# Patient Record
Sex: FEMALE | Race: OTHER | Employment: OTHER | URBAN - METROPOLITAN AREA
[De-identification: names, ages, dates, MRNs, and addresses within clinical notes are randomized per-mention and may not be internally consistent; named-entity substitution may affect disease eponyms.]

---

## 2017-01-31 ENCOUNTER — TELEPHONE (OUTPATIENT)
Dept: INTERNAL MEDICINE CLINIC | Facility: CLINIC | Age: 77
End: 2017-01-31

## 2017-01-31 ENCOUNTER — PRIOR ORIGINAL RECORDS (OUTPATIENT)
Dept: OTHER | Age: 77
End: 2017-01-31

## 2017-01-31 ENCOUNTER — OFFICE VISIT (OUTPATIENT)
Dept: INTERNAL MEDICINE CLINIC | Facility: CLINIC | Age: 77
End: 2017-01-31

## 2017-01-31 VITALS
WEIGHT: 137.63 LBS | TEMPERATURE: 98 F | OXYGEN SATURATION: 96 % | HEIGHT: 59 IN | HEART RATE: 64 BPM | BODY MASS INDEX: 27.75 KG/M2 | DIASTOLIC BLOOD PRESSURE: 80 MMHG | SYSTOLIC BLOOD PRESSURE: 128 MMHG

## 2017-01-31 DIAGNOSIS — I10 ESSENTIAL HYPERTENSION: ICD-10-CM

## 2017-01-31 DIAGNOSIS — Z72.0 TOBACCO USE: Primary | ICD-10-CM

## 2017-01-31 DIAGNOSIS — H26.9 CATARACT, UNSPECIFIED CATARACT TYPE, UNSPECIFIED LATERALITY: ICD-10-CM

## 2017-01-31 DIAGNOSIS — E78.2 COMBINED HYPERLIPIDEMIA: ICD-10-CM

## 2017-01-31 DIAGNOSIS — M25.552 PAIN OF LEFT HIP JOINT: ICD-10-CM

## 2017-01-31 DIAGNOSIS — R94.31 ABNORMAL ECG: ICD-10-CM

## 2017-01-31 DIAGNOSIS — Z01.818 PRE-OP EXAMINATION: ICD-10-CM

## 2017-01-31 LAB
BASOPHILS # BLD: 0.1 K/UL (ref 0–0.2)
BASOPHILS NFR BLD: 1 %
EOSINOPHIL # BLD: 0.4 K/UL (ref 0–0.7)
EOSINOPHIL NFR BLD: 5 %
ERYTHROCYTE [DISTWIDTH] IN BLOOD BY AUTOMATED COUNT: 14.7 % (ref 11–15)
HCT VFR BLD AUTO: 49 % (ref 35–48)
HGB BLD-MCNC: 15.9 G/DL (ref 12–16)
INR BLD: 1 (ref 0.9–1.2)
LYMPHOCYTES # BLD: 1.8 K/UL (ref 1–4)
LYMPHOCYTES NFR BLD: 21 %
MCH RBC QN AUTO: 32.8 PG (ref 27–32)
MCHC RBC AUTO-ENTMCNC: 32.5 G/DL (ref 32–37)
MCV RBC AUTO: 101 FL (ref 80–100)
MONOCYTES # BLD: 0.7 K/UL (ref 0–1)
MONOCYTES NFR BLD: 8 %
NEUTROPHILS # BLD AUTO: 5.6 K/UL (ref 1.8–7.7)
NEUTROPHILS NFR BLD: 65 %
PLATELET # BLD AUTO: 193 K/UL (ref 140–400)
PMV BLD AUTO: 8.7 FL (ref 7.4–10.3)
PROTHROMBIN TIME: 12.6 SECONDS (ref 11.8–14.5)
RBC # BLD AUTO: 4.85 M/UL (ref 3.7–5.4)
WBC # BLD AUTO: 8.6 K/UL (ref 4–11)

## 2017-01-31 PROCEDURE — G0463 HOSPITAL OUTPT CLINIC VISIT: HCPCS | Performed by: INTERNAL MEDICINE

## 2017-01-31 PROCEDURE — 93010 ELECTROCARDIOGRAM REPORT: CPT | Performed by: INTERNAL MEDICINE

## 2017-01-31 PROCEDURE — 36415 COLL VENOUS BLD VENIPUNCTURE: CPT | Performed by: INTERNAL MEDICINE

## 2017-01-31 PROCEDURE — 93005 ELECTROCARDIOGRAM TRACING: CPT | Performed by: INTERNAL MEDICINE

## 2017-01-31 PROCEDURE — 99205 OFFICE O/P NEW HI 60 MIN: CPT | Performed by: INTERNAL MEDICINE

## 2017-01-31 RX ORDER — LISINOPRIL 5 MG/1
TABLET ORAL
Refills: 5 | COMMUNITY
Start: 2017-01-27 | End: 2017-01-31

## 2017-01-31 RX ORDER — AMLODIPINE BESYLATE 10 MG/1
10 TABLET ORAL DAILY
Qty: 90 TABLET | Refills: 1 | Status: SHIPPED | OUTPATIENT
Start: 2017-01-31 | End: 2017-05-31

## 2017-01-31 RX ORDER — PRAVASTATIN SODIUM 40 MG
TABLET ORAL
COMMUNITY
Start: 2017-01-10 | End: 2017-01-31

## 2017-01-31 RX ORDER — AMLODIPINE BESYLATE 10 MG/1
10 TABLET ORAL DAILY
COMMUNITY
End: 2017-01-31

## 2017-01-31 RX ORDER — OXYBUTYNIN CHLORIDE 5 MG/1
TABLET ORAL
COMMUNITY
Start: 2016-12-31 | End: 2017-01-31

## 2017-01-31 RX ORDER — PRAVASTATIN SODIUM 40 MG
40 TABLET ORAL NIGHTLY
Qty: 90 TABLET | Refills: 1 | Status: SHIPPED | OUTPATIENT
Start: 2017-01-31 | End: 2017-09-19

## 2017-01-31 RX ORDER — ATENOLOL 50 MG/1
TABLET ORAL
COMMUNITY
Start: 2017-01-10 | End: 2017-01-31

## 2017-01-31 RX ORDER — OXYBUTYNIN CHLORIDE 5 MG/1
5 TABLET ORAL 2 TIMES DAILY
Qty: 180 TABLET | Refills: 1 | Status: SHIPPED | OUTPATIENT
Start: 2017-01-31 | End: 2017-08-01

## 2017-01-31 RX ORDER — LISINOPRIL 5 MG/1
TABLET ORAL
Qty: 90 TABLET | Refills: 1 | Status: SHIPPED | OUTPATIENT
Start: 2017-01-31 | End: 2017-03-31

## 2017-01-31 RX ORDER — ATENOLOL 50 MG/1
50 TABLET ORAL DAILY
Qty: 90 TABLET | Refills: 0 | Status: SHIPPED | OUTPATIENT
Start: 2017-01-31 | End: 2017-11-27

## 2017-01-31 NOTE — PROGRESS NOTES
HPI:    Patient ID: John Fischer is a 68year old female. Hip Pain   The pain is present in the left hip. This is a new problem. The current episode started more than 1 month ago. There has been no history of extremity trauma.  The problem occurs in Tab  Disp:  Rfl:    [DISCONTINUED] AmLODIPine Besylate 10 MG Oral Tab Take 10 mg by mouth daily.  Disp:  Rfl:       Past Medical History   Diagnosis Date   • Hyperlipidemia      Pt. taking 40 mg Pravastatin; states not been told she has high cholesterol   • Negative for chest pain, palpitations and leg swelling. Gastrointestinal: Negative for nausea, vomiting, abdominal pain, diarrhea, constipation, blood in stool, abdominal distention, anal bleeding and rectal pain.    Endocrine: Negative for cold intoleran Oral Tab Take 10 mg by mouth daily.  Disp:  Rfl:      Allergies:No Known Allergies    HISTORY:  Past Medical History   Diagnosis Date   • Hyperlipidemia      Pt. taking 40 mg Pravastatin; states not been told she has high cholesterol   • Essential hypertens retracted and not bulging. Tympanic membrane mobility is normal.  No middle ear effusion. No hemotympanum. No decreased hearing is noted. Nose: Nose normal. No mucosal edema, rhinorrhea, nose lacerations or sinus tenderness.  Right sinus exhibits no maxil left side. Dorsalis pedis pulses are 2+ on the right side, and 2+ on the left side. Posterior tibial pulses are 2+ on the right side, and 2+ on the left side.    Pulmonary/Chest: Effort normal and breath sounds normal. No accessory muscle usag preauricular, no posterior auricular and no occipital adenopathy present. Head (left side): No submental, no submandibular, no tonsillar, no preauricular, no posterior auricular and no occipital adenopathy present.      She has no cervical adenopathy Pravastatin Sodium 40 MG Oral Tab 90 tablet 1      Sig: Take 1 tablet (40 mg total) by mouth nightly. Oxybutynin Chloride 5 MG Oral Tab 180 tablet 1      Sig: Take 1 tablet (5 mg total) by mouth 2 (two) times daily.       lisinopril 5 MG Oral Tab 90 ta

## 2017-01-31 NOTE — PATIENT INSTRUCTIONS
ASSESSMENT/PLAN:   Tobacco use  (primary encounter diagnosis) Pt. Not want to quit. Combined hyperlipidemia Check records. Essential hypertension Good control. Careful with diet and excercise at least 30 minutes 3-4 times a week.  Check blood pres

## 2017-02-01 DIAGNOSIS — R89.9 ABNORMAL LABORATORY TEST RESULT: Primary | ICD-10-CM

## 2017-02-01 NOTE — PROGRESS NOTES
Quick Note:    CBC Normal (white blood cells and red blood cells and platelets), but cells are bigger in size and normal. Would recommend checking ferritin, total iron binding capacity, iron level, B12, TSH. Can we add all those on.   Clotting factors are n

## 2017-02-03 ENCOUNTER — HOSPITAL ENCOUNTER (OUTPATIENT)
Dept: GENERAL RADIOLOGY | Facility: HOSPITAL | Age: 77
Discharge: HOME | End: 2017-02-03
Attending: INTERNAL MEDICINE
Payer: MEDICARE

## 2017-02-03 ENCOUNTER — HOSPITAL ENCOUNTER (OUTPATIENT)
Dept: NUCLEAR MEDICINE | Facility: HOSPITAL | Age: 77
Discharge: HOME | End: 2017-02-03
Attending: INTERNAL MEDICINE
Payer: MEDICARE

## 2017-02-03 ENCOUNTER — HOSPITAL ENCOUNTER (OUTPATIENT)
Dept: CV DIAGNOSTICS | Facility: HOSPITAL | Age: 77
Discharge: HOME | End: 2017-02-03
Attending: INTERNAL MEDICINE
Payer: MEDICARE

## 2017-02-03 ENCOUNTER — TELEPHONE (OUTPATIENT)
Dept: INTERNAL MEDICINE CLINIC | Facility: CLINIC | Age: 77
End: 2017-02-03

## 2017-02-03 DIAGNOSIS — M25.559 ARTHRALGIA OF HIP, UNSPECIFIED LATERALITY: Primary | ICD-10-CM

## 2017-02-03 DIAGNOSIS — R89.9 ABNORMAL LABORATORY TEST RESULT: ICD-10-CM

## 2017-02-03 DIAGNOSIS — R94.31 ABNORMAL ECG: ICD-10-CM

## 2017-02-03 DIAGNOSIS — M25.552 PAIN OF LEFT HIP JOINT: ICD-10-CM

## 2017-02-03 LAB
FERRITIN SERPL IA-MCNC: 23 NG/ML (ref 11–307)
IRON SATN MFR SERPL: 35 % (ref 15–50)
IRON SERPL-MCNC: 124 MCG/DL (ref 28–170)
TIBC SERPL-MCNC: 350 MCG/DL (ref 228–428)
TRANSFERRIN SERPL-MCNC: 265 MG/DL (ref 192–382)
TSH SERPL-ACNC: 0.99 UIU/ML (ref 0.34–5.6)
VIT B12 SERPL-MCNC: 218 PG/ML (ref 181–914)

## 2017-02-03 PROCEDURE — 73502 X-RAY EXAM HIP UNI 2-3 VIEWS: CPT

## 2017-02-03 PROCEDURE — 36415 COLL VENOUS BLD VENIPUNCTURE: CPT | Performed by: INTERNAL MEDICINE

## 2017-02-03 PROCEDURE — 93018 CV STRESS TEST I&R ONLY: CPT | Performed by: INTERNAL MEDICINE

## 2017-02-03 PROCEDURE — 93017 CV STRESS TEST TRACING ONLY: CPT

## 2017-02-03 PROCEDURE — 82607 VITAMIN B-12: CPT | Performed by: INTERNAL MEDICINE

## 2017-02-03 PROCEDURE — 78452 HT MUSCLE IMAGE SPECT MULT: CPT

## 2017-02-03 PROCEDURE — 93018 CV STRESS TEST I&R ONLY: CPT | Performed by: NUCLEAR MEDICINE

## 2017-02-03 PROCEDURE — 93016 CV STRESS TEST SUPVJ ONLY: CPT | Performed by: INTERNAL MEDICINE

## 2017-02-03 PROCEDURE — 83540 ASSAY OF IRON: CPT | Performed by: INTERNAL MEDICINE

## 2017-02-03 PROCEDURE — 84443 ASSAY THYROID STIM HORMONE: CPT | Performed by: INTERNAL MEDICINE

## 2017-02-03 PROCEDURE — 82728 ASSAY OF FERRITIN: CPT | Performed by: INTERNAL MEDICINE

## 2017-02-03 PROCEDURE — 84466 ASSAY OF TRANSFERRIN: CPT | Performed by: INTERNAL MEDICINE

## 2017-02-03 RX ORDER — SODIUM CHLORIDE 0.9 % (FLUSH) 0.9 %
SYRINGE (ML) INJECTION
Status: COMPLETED
Start: 2017-02-03 | End: 2017-02-03

## 2017-02-03 RX ADMIN — SODIUM CHLORIDE 0.9 % (FLUSH) 10 ML: 0.9 % SYRINGE (ML) INJECTION at 13:28:00

## 2017-02-04 NOTE — PROGRESS NOTES
Quick Note:    Iron storage is Nl. Iron parameters are Nl. Thyroid is good. B 12 is very low.  B 12 injection 1000 qweek X 4 and then qmonth and check in 3 months.     ______

## 2017-02-06 ENCOUNTER — TELEPHONE (OUTPATIENT)
Dept: INTERNAL MEDICINE CLINIC | Facility: CLINIC | Age: 77
End: 2017-02-06

## 2017-02-07 ENCOUNTER — NURSE ONLY (OUTPATIENT)
Dept: INTERNAL MEDICINE CLINIC | Facility: CLINIC | Age: 77
End: 2017-02-07

## 2017-02-07 DIAGNOSIS — E53.8 B12 DEFICIENCY: Primary | ICD-10-CM

## 2017-02-07 PROCEDURE — 96372 THER/PROPH/DIAG INJ SC/IM: CPT | Performed by: INTERNAL MEDICINE

## 2017-02-07 RX ORDER — CYANOCOBALAMIN 1000 UG/ML
1000 INJECTION INTRAMUSCULAR; SUBCUTANEOUS ONCE
Status: COMPLETED | OUTPATIENT
Start: 2017-02-07 | End: 2017-02-07

## 2017-02-07 RX ADMIN — CYANOCOBALAMIN 1000 MCG: 1000 INJECTION INTRAMUSCULAR; SUBCUTANEOUS at 12:00:00

## 2017-02-07 NOTE — PROGRESS NOTES
Patient presents to the office for 1st B12 injection. Order verified on lab results note 2/3/2017. Patient tolerated injection well, no reactions. Next B12 appointment scheduled.

## 2017-02-10 ENCOUNTER — TELEPHONE (OUTPATIENT)
Dept: INTERNAL MEDICINE CLINIC | Facility: CLINIC | Age: 77
End: 2017-02-10

## 2017-02-10 NOTE — TELEPHONE ENCOUNTER
Called and spoke to patient. Notified of test results. Patient verbalized understanding and compliance to future plan of care. Patient had no questions at time of call.

## 2017-02-10 NOTE — TELEPHONE ENCOUNTER
Notes Recorded by Trell Scanlon MD on 2/3/2017 at 10:16 PM  Iron storage is Nl. Iron parameters are Nl. Thyroid is good. B 12 is very low. B 12 injection 1000 qweek X 4 and then qmonth and check in 3 months.

## 2017-02-10 NOTE — TELEPHONE ENCOUNTER
Notes Recorded by Alexandrea Ruelas [place] Christal on 2/7/2017 at 11:42 AM  Orders mailed to patient 2/6/17. Notes Recorded by Odin Park LPN on 3/3/9515 at 98:13 AM  PT order faxed to PT at [facility].   Notes Recorded by Zackary Carrel., MD on 2/6/2017 at 1:16 PM  Orders

## 2017-02-14 ENCOUNTER — NURSE ONLY (OUTPATIENT)
Dept: INTERNAL MEDICINE CLINIC | Facility: CLINIC | Age: 77
End: 2017-02-14

## 2017-02-14 DIAGNOSIS — E53.8 B12 DEFICIENCY: Primary | ICD-10-CM

## 2017-02-14 PROCEDURE — 96372 THER/PROPH/DIAG INJ SC/IM: CPT | Performed by: INTERNAL MEDICINE

## 2017-02-14 RX ORDER — CYANOCOBALAMIN 1000 UG/ML
1000 INJECTION INTRAMUSCULAR; SUBCUTANEOUS ONCE
Status: COMPLETED | OUTPATIENT
Start: 2017-02-14 | End: 2017-02-14

## 2017-02-14 RX ADMIN — CYANOCOBALAMIN 1000 MCG: 1000 INJECTION INTRAMUSCULAR; SUBCUTANEOUS at 12:53:00

## 2017-02-14 NOTE — PROGRESS NOTES
Patient presents to office for 2nd B12 injection. Order verified on lab result note  2/03/2017. Patient tolerated injection well, no reactions. Appointment scheduled for next B12 injection.

## 2017-02-15 LAB
HEMATOCRIT: 49 %
HEMATOCRIT: 49 %
HEMOGLOBIN: 15.9 G/DL
HEMOGLOBIN: 15.9 G/DL
INR: 1
PLATELETS: 193 K/UL
PLATELETS: 193 K/UL
PROTHROMBIN TIME: 12.6 SECONDS
RED BLOOD COUNT: 4.85 X 10-6/U
WHITE BLOOD COUNT: 8.6 X 10-3/U

## 2017-02-16 ENCOUNTER — PRIOR ORIGINAL RECORDS (OUTPATIENT)
Dept: OTHER | Age: 77
End: 2017-02-16

## 2017-02-16 ENCOUNTER — TELEPHONE (OUTPATIENT)
Dept: INTERNAL MEDICINE CLINIC | Facility: CLINIC | Age: 77
End: 2017-02-16

## 2017-02-16 ENCOUNTER — APPOINTMENT (OUTPATIENT)
Dept: PHYSICAL THERAPY | Facility: HOSPITAL | Age: 77
End: 2017-02-16
Attending: INTERNAL MEDICINE
Payer: MEDICARE

## 2017-02-22 ENCOUNTER — HOSPITAL ENCOUNTER (EMERGENCY)
Facility: HOSPITAL | Age: 77
Discharge: HOME | End: 2017-02-22
Attending: EMERGENCY MEDICINE
Payer: MEDICARE

## 2017-02-22 ENCOUNTER — APPOINTMENT (OUTPATIENT)
Dept: PHYSICAL THERAPY | Facility: HOSPITAL | Age: 77
End: 2017-02-22
Attending: INTERNAL MEDICINE
Payer: MEDICARE

## 2017-02-22 ENCOUNTER — PRIOR ORIGINAL RECORDS (OUTPATIENT)
Dept: OTHER | Age: 77
End: 2017-02-22

## 2017-02-22 ENCOUNTER — HOSPITAL ENCOUNTER (OUTPATIENT)
Dept: CV DIAGNOSTICS | Facility: HOSPITAL | Age: 77
Discharge: HOME | End: 2017-02-22
Attending: INTERNAL MEDICINE
Payer: MEDICARE

## 2017-02-22 ENCOUNTER — LAB ENCOUNTER (OUTPATIENT)
Dept: LAB | Facility: HOSPITAL | Age: 77
End: 2017-02-22
Attending: INTERNAL MEDICINE
Payer: MEDICARE

## 2017-02-22 ENCOUNTER — HOSPITAL ENCOUNTER (OUTPATIENT)
Dept: ULTRASOUND IMAGING | Facility: HOSPITAL | Age: 77
Discharge: HOME | End: 2017-02-22
Attending: INTERNAL MEDICINE
Payer: MEDICARE

## 2017-02-22 VITALS
SYSTOLIC BLOOD PRESSURE: 138 MMHG | OXYGEN SATURATION: 95 % | RESPIRATION RATE: 16 BRPM | HEART RATE: 66 BPM | DIASTOLIC BLOOD PRESSURE: 92 MMHG | TEMPERATURE: 98 F

## 2017-02-22 DIAGNOSIS — I10 HTN (HYPERTENSION): Primary | ICD-10-CM

## 2017-02-22 DIAGNOSIS — I71.9 AORTIC ANEURYSM (HCC): ICD-10-CM

## 2017-02-22 DIAGNOSIS — Q25.40 ABNORMALITY OF THORACIC AORTA: ICD-10-CM

## 2017-02-22 DIAGNOSIS — R94.31 ABNORMAL EKG: ICD-10-CM

## 2017-02-22 DIAGNOSIS — I15.9 SECONDARY HYPERTENSION: ICD-10-CM

## 2017-02-22 DIAGNOSIS — N28.9 RENAL INSUFFICIENCY: Primary | ICD-10-CM

## 2017-02-22 DIAGNOSIS — E78.00 ELEVATED CHOLESTEROL: ICD-10-CM

## 2017-02-22 LAB
ALT SERPL-CCNC: 9 U/L (ref 14–54)
ANION GAP SERPL CALC-SCNC: 6 MMOL/L (ref 0–18)
ANION GAP SERPL CALC-SCNC: 8 MMOL/L (ref 0–18)
AST SERPL-CCNC: 16 U/L (ref 15–41)
BASOPHILS # BLD: 0.1 K/UL (ref 0–0.2)
BASOPHILS NFR BLD: 1 %
BUN SERPL-MCNC: 36 MG/DL (ref 8–20)
BUN SERPL-MCNC: 39 MG/DL (ref 8–20)
BUN/CREAT SERPL: 14.2 (ref 10–20)
BUN/CREAT SERPL: 15.4 (ref 10–20)
BUN: 39 MG/DL
CALCIUM SERPL-MCNC: 8.6 MG/DL (ref 8.5–10.5)
CALCIUM SERPL-MCNC: 8.9 MG/DL (ref 8.5–10.5)
CALCIUM: 8.9 MG/DL
CHLORIDE SERPL-SCNC: 111 MMOL/L (ref 95–110)
CHLORIDE SERPL-SCNC: 112 MMOL/L (ref 95–110)
CHOLEST SERPL-MCNC: 173 MG/DL (ref 110–200)
CO2 SERPL-SCNC: 23 MMOL/L (ref 22–32)
CO2 SERPL-SCNC: 23 MMOL/L (ref 22–32)
CREAT SERPL-MCNC: 2.53 MG/DL (ref 0.5–1.5)
CREAT SERPL-MCNC: 2.54 MG/DL (ref 0.5–1.5)
CREATININE, SERUM: 2.53 MG/DL
EOSINOPHIL # BLD: 0.3 K/UL (ref 0–0.7)
EOSINOPHIL NFR BLD: 3 %
ERYTHROCYTE [DISTWIDTH] IN BLOOD BY AUTOMATED COUNT: 13.7 % (ref 11–15)
GLUCOSE SERPL-MCNC: 106 MG/DL (ref 70–99)
GLUCOSE SERPL-MCNC: 156 MG/DL (ref 70–99)
GLUCOSE: 106 MG/DL
HCT VFR BLD AUTO: 44.8 % (ref 35–48)
HDLC SERPL-MCNC: 60 MG/DL
HGB BLD-MCNC: 14.6 G/DL (ref 12–16)
LDLC SERPL CALC-MCNC: 94 MG/DL (ref 0–99)
LYMPHOCYTES # BLD: 1.6 K/UL (ref 1–4)
LYMPHOCYTES NFR BLD: 20 %
MCH RBC QN AUTO: 32.3 PG (ref 27–32)
MCHC RBC AUTO-ENTMCNC: 32.5 G/DL (ref 32–37)
MCV RBC AUTO: 99.2 FL (ref 80–100)
MONOCYTES # BLD: 0.6 K/UL (ref 0–1)
MONOCYTES NFR BLD: 7 %
NEUTROPHILS # BLD AUTO: 5.3 K/UL (ref 1.8–7.7)
NEUTROPHILS NFR BLD: 68 %
NONHDLC SERPL-MCNC: 113 MG/DL
OSMOLALITY UR CALC.SUM OF ELEC: 302 MOSM/KG (ref 275–295)
OSMOLALITY UR CALC.SUM OF ELEC: 306 MOSM/KG (ref 275–295)
PLATELET # BLD AUTO: 175 K/UL (ref 140–400)
PMV BLD AUTO: 8.4 FL (ref 7.4–10.3)
POTASSIUM SERPL-SCNC: 5.1 MMOL/L (ref 3.3–5.1)
POTASSIUM SERPL-SCNC: 6.5 MMOL/L (ref 3.3–5.1)
POTASSIUM, SERUM: 6.5 MEQ/L
RBC # BLD AUTO: 4.52 M/UL (ref 3.7–5.4)
SODIUM SERPL-SCNC: 141 MMOL/L (ref 136–144)
SODIUM SERPL-SCNC: 142 MMOL/L (ref 136–144)
SODIUM: 141 MEQ/L
TRIGL SERPL-MCNC: 97 MG/DL (ref 1–149)
TROPONIN I SERPL-MCNC: 0 NG/ML (ref ?–0.03)
WBC # BLD AUTO: 7.8 K/UL (ref 4–11)

## 2017-02-22 PROCEDURE — 85025 COMPLETE CBC W/AUTO DIFF WBC: CPT

## 2017-02-22 PROCEDURE — 84460 ALANINE AMINO (ALT) (SGPT): CPT

## 2017-02-22 PROCEDURE — 36415 COLL VENOUS BLD VENIPUNCTURE: CPT

## 2017-02-22 PROCEDURE — 99283 EMERGENCY DEPT VISIT LOW MDM: CPT

## 2017-02-22 PROCEDURE — 84484 ASSAY OF TROPONIN QUANT: CPT

## 2017-02-22 PROCEDURE — 76770 US EXAM ABDO BACK WALL COMP: CPT

## 2017-02-22 PROCEDURE — 80048 BASIC METABOLIC PNL TOTAL CA: CPT

## 2017-02-22 PROCEDURE — 80061 LIPID PANEL: CPT

## 2017-02-22 PROCEDURE — 93306 TTE W/DOPPLER COMPLETE: CPT

## 2017-02-22 PROCEDURE — 93005 ELECTROCARDIOGRAM TRACING: CPT

## 2017-02-22 PROCEDURE — 93306 TTE W/DOPPLER COMPLETE: CPT | Performed by: INTERNAL MEDICINE

## 2017-02-22 PROCEDURE — 93010 ELECTROCARDIOGRAM REPORT: CPT | Performed by: EMERGENCY MEDICINE

## 2017-02-22 PROCEDURE — 84450 TRANSFERASE (AST) (SGOT): CPT

## 2017-02-22 NOTE — ED NOTES
Pt discharged home in good condition with family pt and pt family verbalizes understanding of discharge instructions

## 2017-02-22 NOTE — ED PROVIDER NOTES
Patient Seen in: [facility] AND [facility] Emergency Department    History   Patient presents with:  Abnormal Result (metabolic, cardiac)    Stated Complaint: Abn Labs    HPI    Patient is a 30-year-old female with a history of high blood pressure and known aort Smoker        Packs/Day: 0.90  Years:           Types: Cigarettes      Start date: [birth year]01/1963    Alcohol Use: No                Review of Systems    Positive for stated complaint: Abn Labs  Other systems are as noted in HPI.   Constitutional and vital signs reviewed.           ED Course     Labs Reviewed   BASIC METABOLIC PANEL (8) - Abnormal; Notable for the following:     Glucose 156 (*)     Chloride 111 (*)     BUN 36 (*)     Creatinine 2.54 (*)     Calculated Osmolality 306 (*)     GFR, Non- Jenny

## 2017-02-23 ENCOUNTER — TELEPHONE (OUTPATIENT)
Dept: INTERNAL MEDICINE CLINIC | Facility: CLINIC | Age: 77
End: 2017-02-23

## 2017-02-23 ENCOUNTER — NURSE ONLY (OUTPATIENT)
Dept: INTERNAL MEDICINE CLINIC | Facility: CLINIC | Age: 77
End: 2017-02-23

## 2017-02-23 DIAGNOSIS — E53.8 B12 DEFICIENCY: Primary | ICD-10-CM

## 2017-02-23 LAB
ALT (SGPT): 9 U/L
AST (SGOT): 16 U/L
CHOLESTEROL, TOTAL: 173 MG/DL
GLUCOSE: 106 MG/DL
HDL CHOLESTEROL: 60 MG/DL
LDL CHOLESTEROL: 94 MG/DL
TRIGLYCERIDES: 97 MG/DL

## 2017-02-23 PROCEDURE — 96372 THER/PROPH/DIAG INJ SC/IM: CPT | Performed by: INTERNAL MEDICINE

## 2017-02-23 RX ORDER — CYANOCOBALAMIN 1000 UG/ML
1000 INJECTION INTRAMUSCULAR; SUBCUTANEOUS ONCE
Status: COMPLETED | OUTPATIENT
Start: 2017-02-23 | End: 2017-02-23

## 2017-02-23 RX ADMIN — CYANOCOBALAMIN 1000 MCG: 1000 INJECTION INTRAMUSCULAR; SUBCUTANEOUS at 11:15:00

## 2017-02-23 NOTE — PROGRESS NOTES
Patient presents to office for B12 injection. Order verified on 2/03/2017 lab result note. Patient tolerated injection well, no reactions. Next B12 injection scheduled.

## 2017-02-23 NOTE — TELEPHONE ENCOUNTER
Patient was in [place] [address] yesterday she is looking to see you tomorrow if possible to review the blood test

## 2017-02-23 NOTE — TELEPHONE ENCOUNTER
Sometimes when blood is drawn if it is drawn through a butterfly it can be lysed and or if it is not processed immediately the cells can break open when the cells break open the release potassium so could have been a false air but in the ER it was perfectl

## 2017-02-24 ENCOUNTER — TELEPHONE (OUTPATIENT)
Dept: INTERNAL MEDICINE CLINIC | Facility: CLINIC | Age: 77
End: 2017-02-24

## 2017-02-24 ENCOUNTER — PRIOR ORIGINAL RECORDS (OUTPATIENT)
Dept: OTHER | Age: 77
End: 2017-02-24

## 2017-02-24 NOTE — TELEPHONE ENCOUNTER
Spoke with Dr. oYlanda Elliott. Stop ACE I??? Echo shows ? Pleural effusion. On echo. (Noticed area adjacent space near heart). Has F/U with cards.

## 2017-02-27 NOTE — TELEPHONE ENCOUNTER
Do you want patient to stop ACE ? Is Echo showing pleural effusion and she should f/u with Dr. Latoya King?

## 2017-02-28 NOTE — TELEPHONE ENCOUNTER
LMTCB. Possible stopping ace (lisinopril) due to high potassium. She should F/U with Dr. Mable Bruner.

## 2017-02-28 NOTE — TELEPHONE ENCOUNTER
Dr. Jorge Batista, spoke with patient and advised that she follow up with Dr. Flora Perez, however, not sure if she should stop lisinopril based on note below. She said she is home if you want to call her.

## 2017-03-01 ENCOUNTER — APPOINTMENT (OUTPATIENT)
Dept: PHYSICAL THERAPY | Facility: HOSPITAL | Age: 77
End: 2017-03-01
Attending: INTERNAL MEDICINE
Payer: MEDICARE

## 2017-03-01 NOTE — TELEPHONE ENCOUNTER
Pt informed of Dr. Cindy Carpenter instructions to hold lisinopril and to check b/p on different days and different times. Pt informed to avoid salt and fried foods and to exercises 3-4 times a week.

## 2017-03-01 NOTE — TELEPHONE ENCOUNTER
Hold lisinopril for now. Careful with diet and excercise at least 30 minutes 3-4 times a week. Check blood pressures at different times on different days. Can purchase own blood pressure monitor. If not, check at local pharmacy.  Bake foods more and grill o

## 2017-03-06 ENCOUNTER — APPOINTMENT (OUTPATIENT)
Dept: PHYSICAL THERAPY | Facility: HOSPITAL | Age: 77
End: 2017-03-06
Attending: INTERNAL MEDICINE
Payer: MEDICARE

## 2017-03-08 ENCOUNTER — APPOINTMENT (OUTPATIENT)
Dept: PHYSICAL THERAPY | Facility: HOSPITAL | Age: 77
End: 2017-03-08
Attending: INTERNAL MEDICINE
Payer: MEDICARE

## 2017-03-13 ENCOUNTER — APPOINTMENT (OUTPATIENT)
Dept: PHYSICAL THERAPY | Facility: HOSPITAL | Age: 77
End: 2017-03-13
Attending: INTERNAL MEDICINE
Payer: MEDICARE

## 2017-03-15 ENCOUNTER — APPOINTMENT (OUTPATIENT)
Dept: PHYSICAL THERAPY | Facility: HOSPITAL | Age: 77
End: 2017-03-15
Attending: INTERNAL MEDICINE
Payer: MEDICARE

## 2017-03-20 ENCOUNTER — APPOINTMENT (OUTPATIENT)
Dept: PHYSICAL THERAPY | Facility: HOSPITAL | Age: 77
End: 2017-03-20
Attending: INTERNAL MEDICINE
Payer: MEDICARE

## 2017-03-21 ENCOUNTER — PRIOR ORIGINAL RECORDS (OUTPATIENT)
Dept: OTHER | Age: 77
End: 2017-03-21

## 2017-03-31 ENCOUNTER — TELEPHONE (OUTPATIENT)
Dept: INTERNAL MEDICINE CLINIC | Facility: CLINIC | Age: 77
End: 2017-03-31

## 2017-03-31 ENCOUNTER — OFFICE VISIT (OUTPATIENT)
Dept: INTERNAL MEDICINE CLINIC | Facility: CLINIC | Age: 77
End: 2017-03-31

## 2017-03-31 VITALS
HEIGHT: 59 IN | TEMPERATURE: 97 F | HEART RATE: 64 BPM | DIASTOLIC BLOOD PRESSURE: 80 MMHG | OXYGEN SATURATION: 93 % | WEIGHT: 140.81 LBS | SYSTOLIC BLOOD PRESSURE: 130 MMHG | BODY MASS INDEX: 28.39 KG/M2

## 2017-03-31 DIAGNOSIS — H26.9 CATARACT OF BOTH EYES, UNSPECIFIED CATARACT TYPE: ICD-10-CM

## 2017-03-31 DIAGNOSIS — E87.5 HYPERKALEMIA: Primary | ICD-10-CM

## 2017-03-31 DIAGNOSIS — E78.2 COMBINED HYPERLIPIDEMIA: ICD-10-CM

## 2017-03-31 DIAGNOSIS — E53.8 B12 DEFICIENCY: Primary | ICD-10-CM

## 2017-03-31 DIAGNOSIS — N18.30 CKD (CHRONIC KIDNEY DISEASE) STAGE 3, GFR 30-59 ML/MIN (HCC): ICD-10-CM

## 2017-03-31 DIAGNOSIS — I12.9 RENAL HYPERTENSION, STAGE 1-4 OR UNSPECIFIED CHRONIC KIDNEY DISEASE: ICD-10-CM

## 2017-03-31 PROCEDURE — 36415 COLL VENOUS BLD VENIPUNCTURE: CPT | Performed by: INTERNAL MEDICINE

## 2017-03-31 PROCEDURE — G0463 HOSPITAL OUTPT CLINIC VISIT: HCPCS | Performed by: INTERNAL MEDICINE

## 2017-03-31 PROCEDURE — 99214 OFFICE O/P EST MOD 30 MIN: CPT | Performed by: INTERNAL MEDICINE

## 2017-03-31 NOTE — PATIENT INSTRUCTIONS
ASSESSMENT/PLAN:   B12 deficiency  (primary encounter diagnosis) B 12 2000 a day. Check B 12 in 3 months. Combined hyperlipidemia Stable. Essential hypertension Good control. Careful with diet and excercise at least 30 minutes 3-4 times a week.  Kenzie Ventura

## 2017-03-31 NOTE — PROGRESS NOTES
HPI:    Patient ID: Mayuri Conde is a 68year old female. HPI    Coldness all over. S/P B 12 X 4. Hypertension  Patient is here for follow up of hypertension. BP at home: not check. Has been compliant with medications.   Exercise level: moderat total) by mouth daily. Disp: 90 tablet Rfl: 1   Pravastatin Sodium 40 MG Oral Tab Take 1 tablet (40 mg total) by mouth nightly. Disp: 90 tablet Rfl: 1   Oxybutynin Chloride 5 MG Oral Tab Take 1 tablet (5 mg total) by mouth 2 (two) times daily.  Disp: 180 ta pulses are 2+ on the right side, and 2+ on the left side. Posterior tibial pulses are 2+ on the right side, and 2+ on the left side. Pulmonary/Chest: Effort normal and breath sounds normal. No accessory muscle usage.  No apnea, no tachypnea and no

## 2017-03-31 NOTE — TELEPHONE ENCOUNTER
Paged for lab values, potassium 6.7. Drawn earlier today. Reviewed records and spoke with Dr Eunice Beal. Had high K last month and repeat just a few hours later was normal. Patient stopped her lisinopril at the time.  Saw Maurice Marie today for routine f/u and had rep

## 2017-04-01 ENCOUNTER — APPOINTMENT (OUTPATIENT)
Dept: LAB | Facility: HOSPITAL | Age: 77
End: 2017-04-01
Attending: INTERNAL MEDICINE
Payer: MEDICARE

## 2017-04-01 DIAGNOSIS — N18.30 CKD (CHRONIC KIDNEY DISEASE) STAGE 3, GFR 30-59 ML/MIN (HCC): Primary | ICD-10-CM

## 2017-04-01 DIAGNOSIS — E87.5 HYPERKALEMIA: ICD-10-CM

## 2017-04-01 PROCEDURE — 84132 ASSAY OF SERUM POTASSIUM: CPT

## 2017-04-01 PROCEDURE — 36415 COLL VENOUS BLD VENIPUNCTURE: CPT

## 2017-04-01 NOTE — TELEPHONE ENCOUNTER
Phoned patient again, agrees to go to [facility] for redraw

## 2017-04-03 ENCOUNTER — TELEPHONE (OUTPATIENT)
Dept: INTERNAL MEDICINE CLINIC | Facility: CLINIC | Age: 77
End: 2017-04-03

## 2017-04-03 ENCOUNTER — PATIENT MESSAGE (OUTPATIENT)
Dept: INTERNAL MEDICINE CLINIC | Facility: CLINIC | Age: 77
End: 2017-04-03

## 2017-04-03 DIAGNOSIS — I71.4 ABDOMINAL AORTIC ANEURYSM (AAA) WITHOUT RUPTURE (HCC): Primary | ICD-10-CM

## 2017-04-04 NOTE — TELEPHONE ENCOUNTER
Spoke to Dr. Travis Stokes. She wanted her to see Dr. Sebas Diaz who works with aneurysms so a referral was entered she should be able to call and schedule as soon as a referrals approved.

## 2017-04-06 NOTE — TELEPHONE ENCOUNTER
Referral to see Dr. Halima Almanzar has been approved. Tried to call patient, left message regarding approval of referral and to call back if any questions.

## 2017-04-18 ENCOUNTER — TELEPHONE (OUTPATIENT)
Dept: NEPHROLOGY | Facility: CLINIC | Age: 77
End: 2017-04-18

## 2017-04-18 ENCOUNTER — APPOINTMENT (OUTPATIENT)
Dept: LAB | Facility: HOSPITAL | Age: 77
End: 2017-04-18
Attending: INTERNAL MEDICINE
Payer: MEDICARE

## 2017-04-18 ENCOUNTER — OFFICE VISIT (OUTPATIENT)
Dept: NEPHROLOGY | Facility: CLINIC | Age: 77
End: 2017-04-18

## 2017-04-18 VITALS
BODY MASS INDEX: 28.06 KG/M2 | HEIGHT: 59 IN | WEIGHT: 139.19 LBS | DIASTOLIC BLOOD PRESSURE: 80 MMHG | SYSTOLIC BLOOD PRESSURE: 124 MMHG | TEMPERATURE: 98 F | HEART RATE: 78 BPM

## 2017-04-18 DIAGNOSIS — E87.5 HYPERKALEMIA: ICD-10-CM

## 2017-04-18 DIAGNOSIS — N18.4 CKD (CHRONIC KIDNEY DISEASE), STAGE IV (HCC): ICD-10-CM

## 2017-04-18 DIAGNOSIS — N18.4 CKD (CHRONIC KIDNEY DISEASE), STAGE IV (HCC): Primary | ICD-10-CM

## 2017-04-18 PROCEDURE — 83970 ASSAY OF PARATHORMONE: CPT | Performed by: INTERNAL MEDICINE

## 2017-04-18 PROCEDURE — 36415 COLL VENOUS BLD VENIPUNCTURE: CPT

## 2017-04-18 PROCEDURE — G0463 HOSPITAL OUTPT CLINIC VISIT: HCPCS | Performed by: INTERNAL MEDICINE

## 2017-04-18 PROCEDURE — 80069 RENAL FUNCTION PANEL: CPT

## 2017-04-18 PROCEDURE — 81001 URINALYSIS AUTO W/SCOPE: CPT

## 2017-04-18 PROCEDURE — 99204 OFFICE O/P NEW MOD 45 MIN: CPT | Performed by: INTERNAL MEDICINE

## 2017-04-18 PROCEDURE — 82306 VITAMIN D 25 HYDROXY: CPT | Performed by: INTERNAL MEDICINE

## 2017-04-18 PROCEDURE — 82043 UR ALBUMIN QUANTITATIVE: CPT

## 2017-04-18 PROCEDURE — 80076 HEPATIC FUNCTION PANEL: CPT

## 2017-04-18 PROCEDURE — 84156 ASSAY OF PROTEIN URINE: CPT

## 2017-04-18 PROCEDURE — 82570 ASSAY OF URINE CREATININE: CPT

## 2017-04-18 RX ORDER — GUAIFENESIN 400 MG
2000 TABLET ORAL DAILY
COMMUNITY
End: 2020-01-01 | Stop reason: ALTCHOICE

## 2017-04-18 NOTE — PROGRESS NOTES
Consult Requested By: Dr. Janet Miller    Reason for Consult: Hyperkalemia    HPI:     Symone Herzog is a 68 yr old female with pmh of left kidney cyst s/p removal, HL, AAA, tobacoo abuse, HTN, CKD stage IV who presented today for work up and evalu • Diabetes Brother    • Diabetes Maternal Grandmother    • Diabetes Maternal Aunt       Social History:   Smoking Status: Current Every Day Smoker        Packs/Day: 0.90  Years:           Types: Cigarettes      Start date: 06/01/1963    Alcohol Use: No intact  Nose/Mouth/Throat:mucous membranes are moist   Neck/Thyroid: neck is supple without adenopathy  Lymphatic: no abnormal cervical, supraclavicular adenopathy is noted  Respiratory:  lungs are clear to auscultation bilaterally  Cardiovascular: regular

## 2017-04-18 NOTE — PATIENT INSTRUCTIONS
Follow up in  2 weeks  Blood and urine test this week   Kidney ultrasound - call to make an appointment   Low potassium diet   Drink 4-5 glasses

## 2017-04-20 ENCOUNTER — HOSPITAL ENCOUNTER (OUTPATIENT)
Dept: ULTRASOUND IMAGING | Facility: HOSPITAL | Age: 77
Discharge: HOME | End: 2017-04-20
Attending: INTERNAL MEDICINE
Payer: MEDICARE

## 2017-04-20 DIAGNOSIS — N18.4 CKD (CHRONIC KIDNEY DISEASE), STAGE IV (HCC): ICD-10-CM

## 2017-04-20 PROCEDURE — 76770 US EXAM ABDO BACK WALL COMP: CPT

## 2017-05-05 ENCOUNTER — OFFICE VISIT (OUTPATIENT)
Dept: NEPHROLOGY | Facility: CLINIC | Age: 77
End: 2017-05-05

## 2017-05-05 ENCOUNTER — LAB ENCOUNTER (OUTPATIENT)
Dept: LAB | Facility: HOSPITAL | Age: 77
End: 2017-05-05
Attending: INTERNAL MEDICINE
Payer: MEDICARE

## 2017-05-05 VITALS
DIASTOLIC BLOOD PRESSURE: 83 MMHG | BODY MASS INDEX: 28.67 KG/M2 | WEIGHT: 142.19 LBS | TEMPERATURE: 99 F | HEART RATE: 64 BPM | SYSTOLIC BLOOD PRESSURE: 149 MMHG | HEIGHT: 59 IN

## 2017-05-05 DIAGNOSIS — R80.1 PERSISTENT PROTEINURIA: ICD-10-CM

## 2017-05-05 DIAGNOSIS — N18.4 CKD (CHRONIC KIDNEY DISEASE), STAGE IV (HCC): ICD-10-CM

## 2017-05-05 DIAGNOSIS — N18.4 CKD (CHRONIC KIDNEY DISEASE), STAGE IV (HCC): Primary | ICD-10-CM

## 2017-05-05 PROCEDURE — 86803 HEPATITIS C AB TEST: CPT

## 2017-05-05 PROCEDURE — 86235 NUCLEAR ANTIGEN ANTIBODY: CPT

## 2017-05-05 PROCEDURE — 86335 IMMUNFIX E-PHORSIS/URINE/CSF: CPT

## 2017-05-05 PROCEDURE — 86039 ANTINUCLEAR ANTIBODIES (ANA): CPT

## 2017-05-05 PROCEDURE — 84165 PROTEIN E-PHORESIS SERUM: CPT

## 2017-05-05 PROCEDURE — 86704 HEP B CORE ANTIBODY TOTAL: CPT

## 2017-05-05 PROCEDURE — 86706 HEP B SURFACE ANTIBODY: CPT

## 2017-05-05 PROCEDURE — 86160 COMPLEMENT ANTIGEN: CPT

## 2017-05-05 PROCEDURE — 86038 ANTINUCLEAR ANTIBODIES: CPT

## 2017-05-05 PROCEDURE — 83883 ASSAY NEPHELOMETRY NOT SPEC: CPT

## 2017-05-05 PROCEDURE — 80500 HEPATITIS B PROFILE: CPT

## 2017-05-05 PROCEDURE — G0463 HOSPITAL OUTPT CLINIC VISIT: HCPCS | Performed by: INTERNAL MEDICINE

## 2017-05-05 PROCEDURE — 87340 HEPATITIS B SURFACE AG IA: CPT

## 2017-05-05 PROCEDURE — 99214 OFFICE O/P EST MOD 30 MIN: CPT | Performed by: INTERNAL MEDICINE

## 2017-05-05 PROCEDURE — 86334 IMMUNOFIX E-PHORESIS SERUM: CPT

## 2017-05-05 PROCEDURE — 86225 DNA ANTIBODY NATIVE: CPT

## 2017-05-05 PROCEDURE — 36415 COLL VENOUS BLD VENIPUNCTURE: CPT

## 2017-05-05 PROCEDURE — 84166 PROTEIN E-PHORESIS/URINE/CSF: CPT

## 2017-05-05 NOTE — PROGRESS NOTES
Progress Note:     Nenita Ramirez is a 68 yr old female with pmh of left kidney cyst s/p removal, HL, AAA with 3 cm , tobacoo abuse, HTN, CKD stage IV with proteinuria who presented today for follow up     Patient recently started seeing Dr. Carlos martinez CABG.    • Diabetes Brother    • Diabetes Maternal Grandmother    • Diabetes Maternal Aunt       Social History:   Smoking Status: Current Every Day Smoker        Packs/Day: 0.90  Years:           Types: Cigarettes      Start date: 06/01/1963    Alcohol Us intact  Nose/Mouth/Throat:mucous membranes are moist   Neck/Thyroid: neck is supple without adenopathy  Lymphatic: no abnormal cervical, supraclavicular adenopathy is noted  Respiratory:  lungs are clear to auscultation bilaterally  Cardiovascular: regular requested or ordered in this encounter    Imaging & Referrals:  None     5/5/2017  Darline Junior MD

## 2017-05-05 NOTE — PATIENT INSTRUCTIONS
Follow up in 3 months  Blood and urine test as ordered   Take vitamin D - vitamin D3 2000 units daily

## 2017-05-30 ENCOUNTER — TELEPHONE (OUTPATIENT)
Dept: NEPHROLOGY | Facility: CLINIC | Age: 77
End: 2017-05-30

## 2017-05-30 NOTE — TELEPHONE ENCOUNTER
LOV 5/5/17. Dr. Johanny Pepper note says to return in 4 months for follow up. Patient contacted. Appointment booked for 9/8/17 at 11:40PM with Dr. Leroy Abbott.  Will need referral.

## 2017-05-31 ENCOUNTER — TELEPHONE (OUTPATIENT)
Dept: INTERNAL MEDICINE CLINIC | Facility: CLINIC | Age: 77
End: 2017-05-31

## 2017-05-31 RX ORDER — AMLODIPINE BESYLATE 10 MG/1
10 TABLET ORAL DAILY
Qty: 90 TABLET | Refills: 0 | Status: SHIPPED | OUTPATIENT
Start: 2017-05-31 | End: 2017-05-31

## 2017-05-31 RX ORDER — AMLODIPINE BESYLATE 10 MG/1
10 TABLET ORAL DAILY
Qty: 30 TABLET | Refills: 0 | Status: SHIPPED | OUTPATIENT
Start: 2017-05-31 | End: 2017-08-01

## 2017-05-31 NOTE — TELEPHONE ENCOUNTER
Needs 30 days to [facility] in [place]  90 days to [address] [place] [address]         Current outpatient prescriptions:   •  AmLODIPine Besylate 10 MG Oral Tab, Take 1 tablet (10 mg total) by mouth daily. , Disp: 90 tablet, Rfl: 1  •

## 2017-06-06 ENCOUNTER — PRIOR ORIGINAL RECORDS (OUTPATIENT)
Dept: OTHER | Age: 77
End: 2017-06-06

## 2017-06-30 ENCOUNTER — OFFICE VISIT (OUTPATIENT)
Dept: INTERNAL MEDICINE CLINIC | Facility: CLINIC | Age: 77
End: 2017-06-30

## 2017-06-30 VITALS
HEIGHT: 59 IN | BODY MASS INDEX: 28.26 KG/M2 | SYSTOLIC BLOOD PRESSURE: 134 MMHG | OXYGEN SATURATION: 91 % | DIASTOLIC BLOOD PRESSURE: 72 MMHG | TEMPERATURE: 99 F | HEART RATE: 66 BPM | WEIGHT: 140.19 LBS

## 2017-06-30 DIAGNOSIS — Z12.11 SCREENING FOR COLON CANCER: ICD-10-CM

## 2017-06-30 DIAGNOSIS — Z23 NEED FOR VACCINATION: ICD-10-CM

## 2017-06-30 DIAGNOSIS — N18.30 CKD (CHRONIC KIDNEY DISEASE) STAGE 3, GFR 30-59 ML/MIN (HCC): ICD-10-CM

## 2017-06-30 DIAGNOSIS — R20.2 NUMBNESS AND TINGLING OF BOTH FEET: ICD-10-CM

## 2017-06-30 DIAGNOSIS — E53.8 B12 DEFICIENCY: ICD-10-CM

## 2017-06-30 DIAGNOSIS — E87.5 HYPERKALEMIA: ICD-10-CM

## 2017-06-30 DIAGNOSIS — I71.4 ABDOMINAL AORTIC ANEURYSM (AAA) WITHOUT RUPTURE (HCC): Primary | ICD-10-CM

## 2017-06-30 DIAGNOSIS — R20.0 NUMBNESS AND TINGLING OF BOTH FEET: ICD-10-CM

## 2017-06-30 DIAGNOSIS — E78.2 COMBINED HYPERLIPIDEMIA: ICD-10-CM

## 2017-06-30 DIAGNOSIS — Z12.39 BREAST CANCER SCREENING: ICD-10-CM

## 2017-06-30 DIAGNOSIS — I12.9 RENAL HYPERTENSION, STAGE 1-4 OR UNSPECIFIED CHRONIC KIDNEY DISEASE: ICD-10-CM

## 2017-06-30 LAB
ALBUMIN SERPL BCP-MCNC: 3.5 G/DL (ref 3.5–4.8)
ALBUMIN/GLOB SERPL: 1.5 {RATIO} (ref 1–2)
ALP SERPL-CCNC: 63 U/L (ref 32–100)
ALT SERPL-CCNC: 14 U/L (ref 14–54)
ANION GAP SERPL CALC-SCNC: 10 MMOL/L (ref 0–18)
AST SERPL-CCNC: 19 U/L (ref 15–41)
BILIRUB SERPL-MCNC: 0.4 MG/DL (ref 0.3–1.2)
BUN SERPL-MCNC: 33 MG/DL (ref 8–20)
BUN/CREAT SERPL: 13.2 (ref 10–20)
CALCIUM SERPL-MCNC: 8.3 MG/DL (ref 8.5–10.5)
CHLORIDE SERPL-SCNC: 109 MMOL/L (ref 95–110)
CHOLEST SERPL-MCNC: 162 MG/DL (ref 110–200)
CO2 SERPL-SCNC: 19 MMOL/L (ref 22–32)
CREAT SERPL-MCNC: 2.5 MG/DL (ref 0.5–1.5)
GLOBULIN PLAS-MCNC: 2.3 G/DL (ref 2.5–3.7)
GLUCOSE SERPL-MCNC: 94 MG/DL (ref 70–99)
HDLC SERPL-MCNC: 66 MG/DL
LDLC SERPL CALC-MCNC: 68 MG/DL (ref 0–99)
NONHDLC SERPL-MCNC: 96 MG/DL
OSMOLALITY UR CALC.SUM OF ELEC: 293 MOSM/KG (ref 275–295)
POTASSIUM SERPL-SCNC: 4.4 MMOL/L (ref 3.3–5.1)
PROT SERPL-MCNC: 5.8 G/DL (ref 5.9–8.4)
SODIUM SERPL-SCNC: 138 MMOL/L (ref 136–144)
TRIGL SERPL-MCNC: 141 MG/DL (ref 1–149)
VIT B12 SERPL-MCNC: 535 PG/ML (ref 181–914)

## 2017-06-30 PROCEDURE — G0009 ADMIN PNEUMOCOCCAL VACCINE: HCPCS | Performed by: INTERNAL MEDICINE

## 2017-06-30 PROCEDURE — G0463 HOSPITAL OUTPT CLINIC VISIT: HCPCS | Performed by: INTERNAL MEDICINE

## 2017-06-30 PROCEDURE — 99214 OFFICE O/P EST MOD 30 MIN: CPT | Performed by: INTERNAL MEDICINE

## 2017-06-30 PROCEDURE — 36415 COLL VENOUS BLD VENIPUNCTURE: CPT | Performed by: INTERNAL MEDICINE

## 2017-06-30 PROCEDURE — 90670 PCV13 VACCINE IM: CPT | Performed by: INTERNAL MEDICINE

## 2017-06-30 NOTE — PATIENT INSTRUCTIONS
ASSESSMENT/PLAN:   Abdominal aortic aneurysm (aaa) without rupture (hcc)  (primary encounter diagnosis) Check U/S. Hyperkalemia Check blood. B12 deficiency Check blood. Combined hyperlipidemia Check fasting blood in 10-17.      Ckd (chronic kidn

## 2017-06-30 NOTE — PROGRESS NOTES
HPI:    Patient ID: Arcelia Durbin is a 68year old female. Numbness   This is a new problem. The current episode started more than 1 month ago. The problem occurs daily. The problem has been unchanged.  Associated symptoms include numbness (Bottoms o change. Respiratory: Negative for apnea, cough, choking, chest tightness, shortness of breath, wheezing and stridor. Cardiovascular: Negative for chest pain, palpitations and leg swelling.    Gastrointestinal: Negative for abdominal distention, abdomin OTHER SURGICAL HISTORY      Comment: Parathyroid sx. No date: TONSILLECTOMY   Family History   Problem Relation Age of Onset   • Heart Attack Father 54     CAd S/P MI.    • Heart Attack Mother 43     Rheumatic fever.     • Heart Disorder Brother 37     He ASSESSMENT/PLAN:   Abdominal aortic aneurysm (aaa) without rupture (hcc)  (primary encounter diagnosis) Check U/S. Hyperkalemia Check blood. B12 deficiency Check blood. Combined hyperlipidemia Check fasting blood in 10-17.      Ckd (chronic ki

## 2017-07-15 ENCOUNTER — APPOINTMENT (OUTPATIENT)
Dept: LAB | Facility: HOSPITAL | Age: 77
End: 2017-07-15
Attending: INTERNAL MEDICINE
Payer: MEDICARE

## 2017-07-15 DIAGNOSIS — Z12.11 SCREENING FOR COLON CANCER: ICD-10-CM

## 2017-07-15 PROCEDURE — 82274 ASSAY TEST FOR BLOOD FECAL: CPT

## 2017-07-17 LAB — HEMOCCULT STL QL: NEGATIVE

## 2017-07-18 ENCOUNTER — TELEPHONE (OUTPATIENT)
Dept: OPHTHALMOLOGY | Facility: CLINIC | Age: 77
End: 2017-07-18

## 2017-07-20 ENCOUNTER — TELEPHONE (OUTPATIENT)
Dept: INTERNAL MEDICINE CLINIC | Facility: CLINIC | Age: 77
End: 2017-07-20

## 2017-07-20 DIAGNOSIS — H26.9 CATARACT OF BOTH EYES, UNSPECIFIED CATARACT TYPE: Primary | ICD-10-CM

## 2017-08-01 ENCOUNTER — TELEPHONE (OUTPATIENT)
Dept: INTERNAL MEDICINE CLINIC | Facility: CLINIC | Age: 77
End: 2017-08-01

## 2017-08-01 RX ORDER — AMLODIPINE BESYLATE 10 MG/1
10 TABLET ORAL DAILY
Qty: 30 TABLET | Refills: 1 | Status: SHIPPED | OUTPATIENT
Start: 2017-08-01 | End: 2017-10-03

## 2017-08-01 RX ORDER — OXYBUTYNIN CHLORIDE 5 MG/1
5 TABLET ORAL 2 TIMES DAILY
Qty: 180 TABLET | Refills: 1 | Status: SHIPPED | OUTPATIENT
Start: 2017-08-01 | End: 2017-10-03

## 2017-08-01 NOTE — TELEPHONE ENCOUNTER
Current Outpatient Prescriptions:   •  AmLODIPine Besylate 10 MG Oral Tab, Take 1 tablet (10 mg total) by mouth daily. , Disp: 30 tablet, Rfl: 0  •  NOTE   Please send it to Mail Order

## 2017-08-01 NOTE — TELEPHONE ENCOUNTER
Current Outpatient Prescriptions:   ••  Oxybutynin Chloride 5 MG Oral Tab, Take 1 tablet (5 mg total) by mouth 2 (two) times daily.  (Patient taking differently: Take 5 mg by mouth 3 (three) times daily.  ), Disp: 180 tablet, Rfl: 1    NOTE Shes out of  P

## 2017-08-25 ENCOUNTER — TELEPHONE (OUTPATIENT)
Dept: INTERNAL MEDICINE CLINIC | Facility: CLINIC | Age: 77
End: 2017-08-25

## 2017-08-25 NOTE — TELEPHONE ENCOUNTER
Spoke to patient and ttempted to schedule Medicare annual exam with PCP, Pt asked if her visit on 10/6/17 if this could take place. Pt overwhelmed with all of her up coming appts. Thank you.

## 2017-08-30 ENCOUNTER — TELEPHONE (OUTPATIENT)
Dept: INTERNAL MEDICINE CLINIC | Facility: CLINIC | Age: 77
End: 2017-08-30

## 2017-08-30 DIAGNOSIS — I71.4 ABDOMINAL AORTIC ANEURYSM (AAA) WITHOUT RUPTURE (HCC): Primary | ICD-10-CM

## 2017-08-30 NOTE — TELEPHONE ENCOUNTER
Alexandra from Ultrasound department calling requesting clarification on US order, per dx on order they do have a separate US for that, requesting to make sure US complete is wanted or  Aortic US?  Patient is scheduled in a couple days, wanting to clarify ord

## 2017-08-31 NOTE — TELEPHONE ENCOUNTER
Did you want aorta only or did you want other abdominal organs as well? Per Lenora Rodriguez there is an order for aortic ultrasound only. Pls advise.

## 2017-08-31 NOTE — TELEPHONE ENCOUNTER
Marta Torres from [address] [place] [address] updated that [place] for abdominal aorta complete is the one Dr. Jonathan Kenyon wants since she only wants the aorta scanned.

## 2017-09-02 ENCOUNTER — APPOINTMENT (OUTPATIENT)
Dept: LAB | Facility: HOSPITAL | Age: 77
End: 2017-09-02
Attending: INTERNAL MEDICINE
Payer: MEDICARE

## 2017-09-02 ENCOUNTER — HOSPITAL ENCOUNTER (OUTPATIENT)
Dept: MAMMOGRAPHY | Facility: HOSPITAL | Age: 77
Discharge: HOME | End: 2017-09-02
Attending: INTERNAL MEDICINE
Payer: MEDICARE

## 2017-09-02 ENCOUNTER — HOSPITAL ENCOUNTER (OUTPATIENT)
Dept: ULTRASOUND IMAGING | Facility: HOSPITAL | Age: 77
Discharge: HOME | End: 2017-09-02
Attending: INTERNAL MEDICINE
Payer: MEDICARE

## 2017-09-02 DIAGNOSIS — I71.4 ABDOMINAL AORTIC ANEURYSM (AAA) WITHOUT RUPTURE (HCC): ICD-10-CM

## 2017-09-02 DIAGNOSIS — Z12.39 BREAST CANCER SCREENING: ICD-10-CM

## 2017-09-02 LAB
ALBUMIN SERPL BCP-MCNC: 3.8 G/DL (ref 3.5–4.8)
ALBUMIN/GLOB SERPL: 1.4 {RATIO} (ref 1–2)
ALP SERPL-CCNC: 58 U/L (ref 32–100)
ALT SERPL-CCNC: 10 U/L (ref 14–54)
ANION GAP SERPL CALC-SCNC: 8 MMOL/L (ref 0–18)
AST SERPL-CCNC: 14 U/L (ref 15–41)
BILIRUB SERPL-MCNC: 0.7 MG/DL (ref 0.3–1.2)
BUN SERPL-MCNC: 42 MG/DL (ref 8–20)
BUN/CREAT SERPL: 15.2 (ref 10–20)
CALCIUM SERPL-MCNC: 8.9 MG/DL (ref 8.5–10.5)
CHLORIDE SERPL-SCNC: 107 MMOL/L (ref 95–110)
CO2 SERPL-SCNC: 26 MMOL/L (ref 22–32)
CREAT SERPL-MCNC: 2.76 MG/DL (ref 0.5–1.5)
GLOBULIN PLAS-MCNC: 2.8 G/DL (ref 2.5–3.7)
GLUCOSE SERPL-MCNC: 104 MG/DL (ref 70–99)
MAGNESIUM SERPL-MCNC: 2.2 MG/DL (ref 1.8–2.5)
OSMOLALITY UR CALC.SUM OF ELEC: 303 MOSM/KG (ref 275–295)
POTASSIUM SERPL-SCNC: 5.3 MMOL/L (ref 3.3–5.1)
PROT SERPL-MCNC: 6.6 G/DL (ref 5.9–8.4)
SODIUM SERPL-SCNC: 141 MMOL/L (ref 136–144)
TSH SERPL-ACNC: 1.12 UIU/ML (ref 0.45–5.33)
VIT B12 SERPL-MCNC: >1500 PG/ML (ref 181–914)

## 2017-09-02 PROCEDURE — 77067 SCR MAMMO BI INCL CAD: CPT | Performed by: INTERNAL MEDICINE

## 2017-09-02 PROCEDURE — 83735 ASSAY OF MAGNESIUM: CPT | Performed by: INTERNAL MEDICINE

## 2017-09-02 PROCEDURE — 76770 US EXAM ABDO BACK WALL COMP: CPT | Performed by: INTERNAL MEDICINE

## 2017-09-02 PROCEDURE — 84443 ASSAY THYROID STIM HORMONE: CPT | Performed by: INTERNAL MEDICINE

## 2017-09-02 PROCEDURE — 80053 COMPREHEN METABOLIC PANEL: CPT | Performed by: INTERNAL MEDICINE

## 2017-09-02 PROCEDURE — 82607 VITAMIN B-12: CPT | Performed by: INTERNAL MEDICINE

## 2017-09-06 ENCOUNTER — APPOINTMENT (OUTPATIENT)
Dept: LAB | Facility: HOSPITAL | Age: 77
End: 2017-09-06
Attending: INTERNAL MEDICINE
Payer: MEDICARE

## 2017-09-06 DIAGNOSIS — N18.4 CKD (CHRONIC KIDNEY DISEASE), STAGE IV (HCC): ICD-10-CM

## 2017-09-06 DIAGNOSIS — E87.5 HYPERKALEMIA: ICD-10-CM

## 2017-09-06 LAB
ANION GAP SERPL CALC-SCNC: 8 MMOL/L (ref 0–18)
BUN SERPL-MCNC: 40 MG/DL (ref 8–20)
BUN/CREAT SERPL: 14.8 (ref 10–20)
CALCIUM SERPL-MCNC: 8.3 MG/DL (ref 8.5–10.5)
CHLORIDE SERPL-SCNC: 110 MMOL/L (ref 95–110)
CO2 SERPL-SCNC: 23 MMOL/L (ref 22–32)
CREAT SERPL-MCNC: 2.71 MG/DL (ref 0.5–1.5)
GLUCOSE SERPL-MCNC: 103 MG/DL (ref 70–99)
OSMOLALITY UR CALC.SUM OF ELEC: 302 MOSM/KG (ref 275–295)
POTASSIUM SERPL-SCNC: 4.9 MMOL/L (ref 3.3–5.1)
SODIUM SERPL-SCNC: 141 MMOL/L (ref 136–144)

## 2017-09-06 PROCEDURE — 80048 BASIC METABOLIC PNL TOTAL CA: CPT

## 2017-09-06 PROCEDURE — 36415 COLL VENOUS BLD VENIPUNCTURE: CPT

## 2017-09-08 ENCOUNTER — OFFICE VISIT (OUTPATIENT)
Dept: NEPHROLOGY | Facility: CLINIC | Age: 77
End: 2017-09-08

## 2017-09-08 VITALS
HEIGHT: 59 IN | BODY MASS INDEX: 28.1 KG/M2 | SYSTOLIC BLOOD PRESSURE: 136 MMHG | DIASTOLIC BLOOD PRESSURE: 84 MMHG | HEART RATE: 60 BPM | TEMPERATURE: 98 F | WEIGHT: 139.38 LBS

## 2017-09-08 DIAGNOSIS — N18.4 CHRONIC KIDNEY DISEASE (CKD), STAGE IV (SEVERE) (HCC): Primary | ICD-10-CM

## 2017-09-08 DIAGNOSIS — R80.1 PERSISTENT PROTEINURIA: ICD-10-CM

## 2017-09-08 PROCEDURE — 99214 OFFICE O/P EST MOD 30 MIN: CPT | Performed by: INTERNAL MEDICINE

## 2017-09-08 PROCEDURE — G0463 HOSPITAL OUTPT CLINIC VISIT: HCPCS | Performed by: INTERNAL MEDICINE

## 2017-09-08 RX ORDER — ACETAMINOPHEN 160 MG
2000 TABLET,DISINTEGRATING ORAL DAILY
COMMUNITY
End: 2017-09-08 | Stop reason: ALTCHOICE

## 2017-09-08 RX ORDER — CALCITRIOL 0.25 UG/1
0.25 CAPSULE, LIQUID FILLED ORAL DAILY
Qty: 90 CAPSULE | Refills: 1 | Status: SHIPPED | OUTPATIENT
Start: 2017-09-08 | End: 2017-11-03

## 2017-09-08 NOTE — PATIENT INSTRUCTIONS
Follow up in 4 weeks   Option meeting - call to make an appointment . Mahesh Orwell - [phone].  Nurse Neha Davis   Blood test before next visit   Start calcitriol 0.25 mg daily dose

## 2017-09-08 NOTE — PROGRESS NOTES
Progress Note:     Ruddy Lundberg is a 68 yr old female with pmh of left kidney cyst s/p removal, HL, AAA with 3 cm, tobacoo abuse, HTN, CKD stage IV with proteinuria who presented today for follow up     Patient recently started seeing Dr. Robert Painter as Disorder Brother 54     CAD S/P CABG.     • Diabetes Brother    • Diabetes Maternal Grandmother    • Diabetes Maternal Aunt       Social History: Smoking status: Current Every Day Smoker                                                   Packs/day: 0.90 PHYSICAL EXAM:   Constitutional: appears well hydrated alert and responsive no acute distress noted  Head/Face: normocephalic  Eyes/Vision: normal extraocular motion is intact  Nose/Mouth/Throat:mucous membranes are moist   Neck/Thyroid: neck is supp Provided info.  Patient to follow up with an educator regarding more details around RRT    Orders This Visit:    Orders Placed This Encounter      Renal Function Panel      PTH, Intact [E]    Meds This Visit:    Signed Prescriptions Disp Refills    calciTRI

## 2017-09-20 RX ORDER — PRAVASTATIN SODIUM 40 MG
TABLET ORAL
Qty: 90 TABLET | Refills: 1 | Status: SHIPPED | OUTPATIENT
Start: 2017-09-20 | End: 2018-05-19

## 2017-10-04 RX ORDER — AMLODIPINE BESYLATE 10 MG/1
TABLET ORAL
Qty: 90 TABLET | Refills: 0 | Status: SHIPPED | OUTPATIENT
Start: 2017-10-04 | End: 2018-02-06

## 2017-10-04 RX ORDER — OXYBUTYNIN CHLORIDE 5 MG/1
TABLET ORAL
Qty: 180 TABLET | Refills: 0 | Status: SHIPPED | OUTPATIENT
Start: 2017-10-04 | End: 2017-10-06 | Stop reason: ALTCHOICE

## 2017-10-04 NOTE — TELEPHONE ENCOUNTER
Refill protocol failed, creatinine out of range. [id number] [address] please advise on refill requests.     Hypertensive Medications  Protocol Criteria:  · Appointment scheduled in the past 6 months or in the next 3 months  · BMP or CMP in the past 12 months  · Creatinine

## 2017-10-05 NOTE — PROGRESS NOTES
HPI:    Patient ID: Artie Payne is a 68year old female. HPI  Artie Payne is a 68year old female who presents for a complete physical exam.   HPI:   Patient presents with:  Physical: pt denies any issues at this time   Lives alone.  2 floors 06/30/17 : 140 lb 3.2 oz (63.6 kg)  05/05/17 : 142 lb 3.2 oz (64.5 kg)    Body mass index is 27.67 kg/m².      Labs:     Lab Results  Component Value Date/Time   WBC 7.8 02/22/2017 02:34 PM   HGB 14.6 02/22/2017 02:34 PM    02/22/2017 02:34 PM • Essential hypertension    • Hyperlipidemia     Pt. taking 40 mg Pravastatin; states not been told she has high cholesterol      Past Surgical History:  No date:   No date: D & C  No date: LAPAROSCOPY, SURGICAL; ABLATION, RENAL MASS LE* Left Eyes: Negative for photophobia, pain, discharge, redness, itching and visual disturbance. Respiratory: Negative. Negative for apnea, cough, choking, chest tightness, shortness of breath, wheezing and stridor. Cardiovascular: Negative.   Negative for c Physical Exam   Constitutional: She is oriented to person, place, and time. Vital signs are normal. She appears well-developed and well-nourished. No distress. HENT:   Head: Normocephalic and atraumatic.    Right Ear: Tympanic membrane, external ear and Eyes: Conjunctivae, EOM and lids are normal. Pupils are equal, round, and reactive to light. Right eye exhibits no chemosis, no discharge, no exudate and no hordeolum. No foreign body present in the right eye.  Left eye exhibits no chemosis, no discharge, n Abdominal: Soft. Bowel sounds are normal. She exhibits no distension, no fluid wave, no ascites and no mass. There is no hepatosplenomegaly, splenomegaly or hepatomegaly. There is no tenderness.  There is no rigidity, no rebound, no guarding and no CVA tend Last Cholesterol Labs:     Lab Results  Component Value Date   CHOLEST 162 06/30/2017   HDL 66 06/30/2017   LDL 68 06/30/2017   TRIG 141 06/30/2017          Last Chemistry Labs:     Lab Results  Component Value Date   AST 14 (L) 09/02/2017   ALT 10 (L) 09/ She  reports that she has been smoking Cigarettes. She started smoking about 54 years ago. She has been smoking about 1.00 pack per day. She has never used smokeless tobacco. She reports that she does not drink alcohol or use drugs.      Medicare Hearing A Problems with daily activities? : No    Memory Problems?: No      Fall/Risk Assessment     Do you have 3 or more medical conditions?: 0-No    Have you fallen in the last 12 months?: 0-No    Do you accidently lose urine?: 1-Yes    Do you have difficulty see Fecal Occult Blood Annually Occult Blood (no units)   Date Value   07/15/2017 Negative    No flowsheet data found. Glaucoma Screening      Ophthalmology Visit Annually: Diabetics, FHx Glaucoma, AA>50, > 65 No flowsheet data found.     Bone Densi SPECIFIC DISEASE MONITORING Internal Lab or Procedure External Lab or Procedure   Annual Monitoring of Persistent     Medications (ACE/ARB, digoxin diuretics, anticonvulsants.)    Potassium  Annually Potassium (mmol/L)   Date Value   10/06/2017 5.2 (H) Sig: Take 1 capsule (2 mg total) by mouth daily.            Imaging & Referrals:  PNEUMOCOCCAL IMM (PNEUMOVAX)  XR DEXA BONE DENSITOMETRY (CPT=77080)     ASSESSMENT AND OTHER RELEVANT CHRONIC CONDITIONS:   Arcelia Durbin is a 68year old female who pr Living Will on file in [facility]? Jb Martinez does not have a Living Will on file in [facility]  Discussed with patient and provided information       Healthcare Power of Christine on file in Epic:    Jb Martinez does not have a Power of  for [employer]

## 2017-10-06 ENCOUNTER — OFFICE VISIT (OUTPATIENT)
Dept: INTERNAL MEDICINE CLINIC | Facility: CLINIC | Age: 77
End: 2017-10-06

## 2017-10-06 ENCOUNTER — TELEPHONE (OUTPATIENT)
Dept: INTERNAL MEDICINE CLINIC | Facility: CLINIC | Age: 77
End: 2017-10-06

## 2017-10-06 ENCOUNTER — APPOINTMENT (OUTPATIENT)
Dept: LAB | Facility: HOSPITAL | Age: 77
End: 2017-10-06
Attending: INTERNAL MEDICINE
Payer: MEDICARE

## 2017-10-06 ENCOUNTER — TELEPHONE (OUTPATIENT)
Dept: NEPHROLOGY | Facility: CLINIC | Age: 77
End: 2017-10-06

## 2017-10-06 VITALS
TEMPERATURE: 98 F | WEIGHT: 137 LBS | HEART RATE: 72 BPM | DIASTOLIC BLOOD PRESSURE: 79 MMHG | BODY MASS INDEX: 27.62 KG/M2 | OXYGEN SATURATION: 93 % | HEIGHT: 59 IN | SYSTOLIC BLOOD PRESSURE: 136 MMHG

## 2017-10-06 DIAGNOSIS — Z28.21 IMMUNIZATION NOT CARRIED OUT BECAUSE OF PATIENT REFUSAL: ICD-10-CM

## 2017-10-06 DIAGNOSIS — E21.0 PRIMARY HYPERPARATHYROIDISM (HCC): ICD-10-CM

## 2017-10-06 DIAGNOSIS — N39.41 URGE INCONTINENCE OF URINE: ICD-10-CM

## 2017-10-06 DIAGNOSIS — N18.4 CHRONIC KIDNEY DISEASE (CKD), STAGE IV (SEVERE) (HCC): ICD-10-CM

## 2017-10-06 DIAGNOSIS — N18.4 CKD (CHRONIC KIDNEY DISEASE), STAGE IV (HCC): ICD-10-CM

## 2017-10-06 DIAGNOSIS — E53.8 B12 DEFICIENCY: Primary | ICD-10-CM

## 2017-10-06 DIAGNOSIS — I12.9 RENAL HYPERTENSION, STAGE 1-4 OR UNSPECIFIED CHRONIC KIDNEY DISEASE: ICD-10-CM

## 2017-10-06 DIAGNOSIS — Z23 NEED FOR VACCINATION: ICD-10-CM

## 2017-10-06 DIAGNOSIS — E78.2 COMBINED HYPERLIPIDEMIA: ICD-10-CM

## 2017-10-06 DIAGNOSIS — E87.5 HYPERKALEMIA: ICD-10-CM

## 2017-10-06 DIAGNOSIS — Z78.0 POSTMENOPAUSAL: ICD-10-CM

## 2017-10-06 DIAGNOSIS — R80.1 PERSISTENT PROTEINURIA: ICD-10-CM

## 2017-10-06 DIAGNOSIS — Z00.00 ENCOUNTER FOR ANNUAL HEALTH EXAMINATION: ICD-10-CM

## 2017-10-06 DIAGNOSIS — I71.4 ABDOMINAL AORTIC ANEURYSM (AAA) WITHOUT RUPTURE (HCC): ICD-10-CM

## 2017-10-06 PROCEDURE — 90732 PPSV23 VACC 2 YRS+ SUBQ/IM: CPT | Performed by: INTERNAL MEDICINE

## 2017-10-06 PROCEDURE — 80069 RENAL FUNCTION PANEL: CPT

## 2017-10-06 PROCEDURE — G0009 ADMIN PNEUMOCOCCAL VACCINE: HCPCS | Performed by: INTERNAL MEDICINE

## 2017-10-06 PROCEDURE — 96160 PT-FOCUSED HLTH RISK ASSMT: CPT | Performed by: INTERNAL MEDICINE

## 2017-10-06 PROCEDURE — 36415 COLL VENOUS BLD VENIPUNCTURE: CPT

## 2017-10-06 PROCEDURE — 83970 ASSAY OF PARATHORMONE: CPT | Performed by: INTERNAL MEDICINE

## 2017-10-06 RX ORDER — BUPROPION HYDROCHLORIDE 150 MG/1
150 TABLET, EXTENDED RELEASE ORAL 2 TIMES DAILY
Qty: 60 TABLET | Refills: 3 | Status: SHIPPED | OUTPATIENT
Start: 2017-10-06 | End: 2018-02-16

## 2017-10-06 RX ORDER — TOLTERODINE 2 MG/1
2 CAPSULE, EXTENDED RELEASE ORAL DAILY
Qty: 30 CAPSULE | Refills: 2 | Status: SHIPPED | OUTPATIENT
Start: 2017-10-06 | End: 2017-11-21

## 2017-10-06 NOTE — TELEPHONE ENCOUNTER
Prior Authorization for Tolterodine Tartrate ER   Key:[id number]  Patient Last Name: Gardenia Ny  : 3/31/1940    [email or web address]

## 2017-10-09 ENCOUNTER — OFFICE VISIT (OUTPATIENT)
Dept: NEPHROLOGY | Facility: CLINIC | Age: 77
End: 2017-10-09

## 2017-10-09 VITALS
TEMPERATURE: 98 F | DIASTOLIC BLOOD PRESSURE: 72 MMHG | HEART RATE: 60 BPM | WEIGHT: 139.81 LBS | HEIGHT: 59 IN | SYSTOLIC BLOOD PRESSURE: 139 MMHG | BODY MASS INDEX: 28.19 KG/M2

## 2017-10-09 DIAGNOSIS — N18.4 CHRONIC KIDNEY DISEASE (CKD), STAGE IV (SEVERE) (HCC): Primary | ICD-10-CM

## 2017-10-09 PROCEDURE — G0463 HOSPITAL OUTPT CLINIC VISIT: HCPCS | Performed by: INTERNAL MEDICINE

## 2017-10-09 PROCEDURE — 99214 OFFICE O/P EST MOD 30 MIN: CPT | Performed by: INTERNAL MEDICINE

## 2017-10-09 RX ORDER — OXYBUTYNIN CHLORIDE 5 MG/1
5 TABLET ORAL 2 TIMES DAILY
COMMUNITY
End: 2018-02-06

## 2017-10-09 NOTE — PROGRESS NOTES
Progress Note:     Mariajose Haque is a 68 yr old female with pmh of left kidney cyst s/p removal, HL, AAA with 3 cm, tobacoo abuse, HTN, CKD stage IV with proteinuria who presented today for follow up     Patient recently started seeing Dr. Ally Hay as ruptured   • Heart Surgery Brother    • Heart Disorder Brother 54     CAD S/P CABG.     • Diabetes Brother    • Diabetes Maternal Grandmother    • Diabetes Maternal Aunt       Social History: Smoking status: Current Every Day Smoker bruising  Integumentary:  Negative for pruritus and rash  Musculoskeletal:  Negative for bone/joint symptoms  Neurological:  Negative for gait disturbance  Psychiatric:  Negative for inappropriate interaction        10/09/17  1120   BP: 139/72   Pulse: 60 pressure at home - stable in office     Follow up in 4 weeks    Had a long discussion with patient and 2 daughters about s/s of uremia and worsening renal failure on last visit. Forms of RRT and option meeting. ifo was provided.  Patient didn't go despite r

## 2017-10-09 NOTE — TELEPHONE ENCOUNTER
PA for Tolterodine Tartrate ER 2 mg cap completed with [employer] via CMM response time 24-72 hours KEY CHEO.

## 2017-10-26 ENCOUNTER — TELEPHONE (OUTPATIENT)
Dept: INTERNAL MEDICINE CLINIC | Facility: CLINIC | Age: 77
End: 2017-10-26

## 2017-10-26 ENCOUNTER — NURSE ONLY (OUTPATIENT)
Dept: INTERNAL MEDICINE CLINIC | Facility: CLINIC | Age: 77
End: 2017-10-26

## 2017-10-26 DIAGNOSIS — Z00.00 ANNUAL PHYSICAL EXAM: Primary | ICD-10-CM

## 2017-10-26 DIAGNOSIS — R82.90 ABNORMAL URINE: Primary | ICD-10-CM

## 2017-10-26 PROCEDURE — 81002 URINALYSIS NONAUTO W/O SCOPE: CPT | Performed by: INTERNAL MEDICINE

## 2017-10-26 RX ORDER — CIPROFLOXACIN 500 MG/1
500 TABLET, FILM COATED ORAL 2 TIMES DAILY
Qty: 10 TABLET | Refills: 0 | Status: SHIPPED | OUTPATIENT
Start: 2017-10-26 | End: 2017-10-31

## 2017-10-26 NOTE — TELEPHONE ENCOUNTER
Start antibiotics ASAP and take as directed with food til done. Take probiotics while on antibiotics if can to prevent yeast infections. Rx sent. Please notify pt. See new Rx, sent to pharmacy please notify patientUrine shows possible UTI.  Increase fluids

## 2017-10-26 NOTE — TELEPHONE ENCOUNTER
Patient brought in urine sample at [facility]. Stated she was supposed to bring urine sample two weeks ago? No order in chart. Please advise.

## 2017-10-26 NOTE — TELEPHONE ENCOUNTER
Patient walked into the [facility] office to let you know patient went to [address] [place] [address] for lab draw ordered by Dr Bird Sheets patient was turned away because we no longer can do lab draws with her insurance I advised patient to call her insurance to find locations that ar

## 2017-11-03 ENCOUNTER — TELEPHONE (OUTPATIENT)
Dept: INTERNAL MEDICINE CLINIC | Facility: CLINIC | Age: 77
End: 2017-11-03

## 2017-11-03 ENCOUNTER — OFFICE VISIT (OUTPATIENT)
Dept: NEPHROLOGY | Facility: CLINIC | Age: 77
End: 2017-11-03

## 2017-11-03 VITALS
HEART RATE: 70 BPM | WEIGHT: 139.63 LBS | TEMPERATURE: 98 F | BODY MASS INDEX: 28.15 KG/M2 | DIASTOLIC BLOOD PRESSURE: 77 MMHG | HEIGHT: 59 IN | SYSTOLIC BLOOD PRESSURE: 126 MMHG

## 2017-11-03 DIAGNOSIS — N18.30 STAGE 3 CHRONIC KIDNEY DISEASE (HCC): Primary | ICD-10-CM

## 2017-11-03 DIAGNOSIS — N18.5 CHRONIC KIDNEY DISEASE (CKD), STAGE V (HCC): Primary | ICD-10-CM

## 2017-11-03 PROCEDURE — 99214 OFFICE O/P EST MOD 30 MIN: CPT | Performed by: INTERNAL MEDICINE

## 2017-11-03 PROCEDURE — G0463 HOSPITAL OUTPT CLINIC VISIT: HCPCS | Performed by: INTERNAL MEDICINE

## 2017-11-03 RX ORDER — SODIUM POLYSTYRENE SULFONATE 15 G/60ML
SUSPENSION ORAL; RECTAL
Qty: 480 ML | Refills: 1 | Status: SHIPPED | OUTPATIENT
Start: 2017-11-03 | End: 2017-12-15

## 2017-11-03 RX ORDER — CALCITRIOL 0.25 UG/1
0.25 CAPSULE, LIQUID FILLED ORAL EVERY OTHER DAY
Qty: 90 CAPSULE | Refills: 1 | Status: SHIPPED | OUTPATIENT
Start: 2017-11-03 | End: 2017-12-15

## 2017-11-03 NOTE — PROGRESS NOTES
Progress Note:     Rita Paris is a 68 yr old female with pmh of left kidney cyst s/p removal, HL, AAA with 3 cm, tobacoo abuse, HTN, CKD stage IV with proteinuria who presented today for follow up     Patient recently started seeing Dr. Sherine Zavala as Brother 54     CAD S/P CABG.     • Diabetes Brother    • Diabetes Maternal Grandmother    • Diabetes Maternal Aunt       Social History: Smoking status: Current Every Day Smoker                                                   Packs/day: 1.00      Years: 0 abnormal sleep patterns  Hema/Lymph:  Negative for easy bleeding and easy bruising  Integumentary:  Negative for pruritus and rash  Musculoskeletal:  Negative for bone/joint symptoms  Neurological:  Negative for gait disturbance  Psychiatric:  Negative for hgb 14 -> 16.3. Repeat and check renal US. If continue to rise will refer to hematologist     3.  HTN:  - on atenolol and amlodipine   - monitor blood pressure at home - stable in office       Had a long discussion with patient and 2 daughters about s/s of

## 2017-11-03 NOTE — PATIENT INSTRUCTIONS
Drink 50-55 ounces a day   Blood test in 7-10 days   Go for educational meeting next week - nurse will call you  Follow up in 2 weeks   Reduce calcitriol to every other day   Start kayexelate 15 gm twice a week   Renal ultrasound as ordered

## 2017-11-07 PROBLEM — R73.9 HYPERGLYCEMIA: Status: ACTIVE | Noted: 2017-11-07

## 2017-11-07 PROBLEM — D75.1 POLYCYTHEMIA: Status: ACTIVE | Noted: 2017-11-07

## 2017-11-07 PROBLEM — Z72.0 TOBACCO USE: Status: ACTIVE | Noted: 2017-11-07

## 2017-11-07 PROBLEM — N39.3 STRESS INCONTINENCE IN FEMALE: Status: ACTIVE | Noted: 2017-11-07

## 2017-11-07 PROBLEM — E78.5 HYPERLIPIDEMIA: Status: ACTIVE | Noted: 2017-11-07

## 2017-11-07 PROBLEM — I70.0 CALCIFICATION OF AORTA (HCC): Status: ACTIVE | Noted: 2017-11-07

## 2017-11-07 PROBLEM — I10 ESSENTIAL HYPERTENSION: Status: ACTIVE | Noted: 2017-11-07

## 2017-11-09 ENCOUNTER — HOSPITAL ENCOUNTER (OUTPATIENT)
Dept: BONE DENSITY | Facility: HOSPITAL | Age: 77
Discharge: HOME | End: 2017-11-09
Attending: INTERNAL MEDICINE
Payer: MEDICARE

## 2017-11-09 DIAGNOSIS — Z78.0 POSTMENOPAUSAL: ICD-10-CM

## 2017-11-09 PROCEDURE — 77080 DXA BONE DENSITY AXIAL: CPT | Performed by: INTERNAL MEDICINE

## 2017-11-10 PROBLEM — M81.0 AGE-RELATED OSTEOPOROSIS WITHOUT CURRENT PATHOLOGICAL FRACTURE: Status: ACTIVE | Noted: 2017-11-10

## 2017-11-13 ENCOUNTER — HOSPITAL ENCOUNTER (OUTPATIENT)
Dept: ULTRASOUND IMAGING | Facility: HOSPITAL | Age: 77
Discharge: HOME | End: 2017-11-13
Attending: INTERNAL MEDICINE
Payer: MEDICARE

## 2017-11-13 DIAGNOSIS — N18.5 CHRONIC KIDNEY DISEASE (CKD), STAGE V (HCC): ICD-10-CM

## 2017-11-13 PROCEDURE — 76770 US EXAM ABDO BACK WALL COMP: CPT | Performed by: INTERNAL MEDICINE

## 2017-11-15 ENCOUNTER — PATIENT OUTREACH (OUTPATIENT)
Dept: CASE MANAGEMENT | Age: 77
End: 2017-11-15

## 2017-11-15 NOTE — PROGRESS NOTES
Outreached to patient in regards to enrollment to Chronic Care Management program. Left message for patient to return my call at ext. [phone]. Thank you.

## 2017-11-15 NOTE — PROGRESS NOTES
Patient left me a message to returned her call in regards to CCM program, patient  was not available. Left message for patient to return my call ext. [phone]. Thank you.

## 2017-11-21 ENCOUNTER — OFFICE VISIT (OUTPATIENT)
Dept: NEPHROLOGY | Facility: CLINIC | Age: 77
End: 2017-11-21

## 2017-11-21 ENCOUNTER — TELEPHONE (OUTPATIENT)
Dept: NEPHROLOGY | Facility: CLINIC | Age: 77
End: 2017-11-21

## 2017-11-21 VITALS
SYSTOLIC BLOOD PRESSURE: 121 MMHG | BODY MASS INDEX: 28.3 KG/M2 | HEART RATE: 65 BPM | DIASTOLIC BLOOD PRESSURE: 70 MMHG | HEIGHT: 59 IN | WEIGHT: 140.38 LBS | TEMPERATURE: 99 F

## 2017-11-21 DIAGNOSIS — N18.5 CHRONIC KIDNEY DISEASE (CKD), STAGE V (HCC): Primary | ICD-10-CM

## 2017-11-21 PROCEDURE — 99214 OFFICE O/P EST MOD 30 MIN: CPT | Performed by: INTERNAL MEDICINE

## 2017-11-21 PROCEDURE — G0463 HOSPITAL OUTPT CLINIC VISIT: HCPCS | Performed by: INTERNAL MEDICINE

## 2017-11-21 RX ORDER — MULTIVIT-MIN/IRON/FOLIC ACID/K 18-600-40
1 CAPSULE ORAL DAILY
COMMUNITY
End: 2019-01-18

## 2017-11-21 NOTE — TELEPHONE ENCOUNTER
pls call [facility] at [phone] as per patient she had cbc and renal function panel were done last week. If those weren't done, ask patient to get the lab work done this week.

## 2017-11-21 NOTE — PROGRESS NOTES
Progress Note:     Elfego Chavez is a 68 yr old female with pmh of left kidney cyst s/p removal, HL, AAA with 3 cm, tobacoo abuse, HTN, CKD stage IV with proteinuria who presented today for follow up     Patient recently started seeing Dr. Vivien Larkin as SURGICAL HISTORY      Comment: Parathyroid sx. No date: TONSILLECTOMY   Family History   Problem Relation Age of Onset   • Heart Attack Father 54     CAd S/P MI.    • Heart Attack Mother 43     Rheumatic fever.     • Heart Disorder Brother 37     Herat an drainage  Eyes:  Negative for eye discharge and vision loss  Cardiovascular:  Negative for chest pain, sob  Respiratory:  Negative for cough, dyspnea and wheezing  Gastrointestinal:  Negative for abdominal pain, constipation  Genitourinary:  Negative for d 2. CKD complications:  - PTH 688Y- On calcitriol 0.25 mg every other day  . Improve calcium   - bicarb stable  - hyperkalemia: low potassium diet, instruction and diet chart provided. kayexelate twice a week.  Off lisinopril.  - volume status and HTN: s

## 2017-11-21 NOTE — TELEPHONE ENCOUNTER
Spoke to Santos Blunt from [facility], States pt did have CBC and renal panel done on 11/14/17, faxing over results today 11/21/17. Office fax number provided. Awaiting fax.

## 2017-11-22 NOTE — TELEPHONE ENCOUNTER
Spoke with patient and informed her of results. Patient reports she is currently taking vitamin D 2000 units daily. Did you want her to continue or increase dose?

## 2017-11-22 NOTE — TELEPHONE ENCOUNTER
Lab results noted. Kidney function are stable compared to last month results - eGFR 14 ml/min.     Tell patient to start on vitamin D 2000 units daily dose     Follow up as instructed yesterday

## 2017-11-27 RX ORDER — ATENOLOL 50 MG/1
TABLET ORAL
Qty: 90 TABLET | Refills: 0 | Status: SHIPPED | OUTPATIENT
Start: 2017-11-27 | End: 2018-02-06

## 2017-12-05 ENCOUNTER — PRIOR ORIGINAL RECORDS (OUTPATIENT)
Dept: OTHER | Age: 77
End: 2017-12-05

## 2017-12-05 ENCOUNTER — MYAURORA ACCOUNT LINK (OUTPATIENT)
Dept: OTHER | Age: 77
End: 2017-12-05

## 2017-12-13 ENCOUNTER — TELEPHONE (OUTPATIENT)
Dept: NEPHROLOGY | Facility: CLINIC | Age: 77
End: 2017-12-13

## 2017-12-15 RX ORDER — CALCITRIOL 0.25 UG/1
0.25 CAPSULE, LIQUID FILLED ORAL EVERY OTHER DAY
Qty: 90 CAPSULE | Refills: 1 | Status: SHIPPED | OUTPATIENT
Start: 2017-12-15 | End: 2018-08-31

## 2017-12-15 RX ORDER — SODIUM POLYSTYRENE SULFONATE 15 G/60ML
SUSPENSION ORAL; RECTAL
Qty: 480 ML | Refills: 1 | Status: SHIPPED | OUTPATIENT
Start: 2017-12-15 | End: 2018-03-09

## 2017-12-21 ENCOUNTER — OFFICE VISIT (OUTPATIENT)
Dept: NEPHROLOGY | Facility: CLINIC | Age: 77
End: 2017-12-21

## 2017-12-21 ENCOUNTER — TELEPHONE (OUTPATIENT)
Dept: NEPHROLOGY | Facility: CLINIC | Age: 77
End: 2017-12-21

## 2017-12-21 VITALS
WEIGHT: 141.63 LBS | BODY MASS INDEX: 28.55 KG/M2 | TEMPERATURE: 98 F | SYSTOLIC BLOOD PRESSURE: 126 MMHG | HEIGHT: 59 IN | HEART RATE: 64 BPM | DIASTOLIC BLOOD PRESSURE: 79 MMHG

## 2017-12-21 DIAGNOSIS — N18.5 CHRONIC KIDNEY DISEASE, STAGE V (HCC): Primary | ICD-10-CM

## 2017-12-21 DIAGNOSIS — N18.5 CHRONIC KIDNEY DISEASE (CKD), STAGE V (HCC): Primary | ICD-10-CM

## 2017-12-21 PROCEDURE — G0463 HOSPITAL OUTPT CLINIC VISIT: HCPCS | Performed by: INTERNAL MEDICINE

## 2017-12-21 PROCEDURE — 99214 OFFICE O/P EST MOD 30 MIN: CPT | Performed by: INTERNAL MEDICINE

## 2017-12-21 NOTE — TELEPHONE ENCOUNTER
Renal function panel does not cross over to [facility]; needs to be ordered as BMP and phosphorus. Doubled checked with [facility] and renal function panel did not get drawn on 12/16/17. Discussed with [facility]. [facility] ordered labs to be drawn in ~3 weeks.  I edited chart so

## 2017-12-21 NOTE — PROGRESS NOTES
Progress Note:     Lizbeth Multani is a 68 yr old female with pmh of left kidney cyst s/p removal, HL, AAA with 3 cm, tobacoo abuse, HTN, CKD stage IV with proteinuria who presented today for follow up     Patient recently started seeing Dr. Efren Cody as SURGICAL HISTORY      Comment: Parathyroid sx. No date: TONSILLECTOMY   Family History   Problem Relation Age of Onset   • Heart Attack Father 54     CAd S/P MI.    • Heart Attack Mother 43     Rheumatic fever.     • Heart Disorder Brother 37     Herat an eye discharge and vision loss  Cardiovascular:  Negative for chest pain, sob  Respiratory:  Negative for cough, dyspnea and wheezing  Gastrointestinal:  Negative for abdominal pain, constipation  Genitourinary:  Negative for dysuria and hematuria  Endocrin every other day  . Improve calcium   - bicarb stable  - hyperkalemia: low potassium diet, instruction and diet chart provided. kayexelate twice a week.  Off lisinopril.  - volume status and HTN: stable   - cbc pending- last hgb 14 -> 16.3. - If continue to

## 2018-01-10 ENCOUNTER — TELEPHONE (OUTPATIENT)
Dept: INTERNAL MEDICINE CLINIC | Facility: CLINIC | Age: 78
End: 2018-01-10

## 2018-01-10 DIAGNOSIS — N18.4 CKD (CHRONIC KIDNEY DISEASE), STAGE IV (HCC): Primary | ICD-10-CM

## 2018-01-16 ENCOUNTER — TELEPHONE (OUTPATIENT)
Dept: NEPHROLOGY | Facility: CLINIC | Age: 78
End: 2018-01-16

## 2018-01-16 NOTE — TELEPHONE ENCOUNTER
Kyra/daughter calling for pt requesting that [facility] be mailed to pt. Pts insurance does not cover [facility]. Pls call daughter at:[phone],thanks.   *confirmed mailing address correct

## 2018-01-23 LAB
ABSOLUTE BASOPHILS: 77 CELLS/UL (ref 0–200)
ABSOLUTE EOSINOPHILS: 396 CELLS/UL (ref 15–500)
ABSOLUTE LYMPHOCYTES: 1858 CELLS/UL (ref 850–3900)
ABSOLUTE MONOCYTES: 576 CELLS/UL (ref 200–950)
ABSOLUTE NEUTROPHILS: 5693 CELLS/UL (ref 1500–7800)
BASOPHILS: 0.9 %
BUN/CREATININE RATIO: 12 (CALC) (ref 6–22)
BUN: 30 MG/DL (ref 7–25)
CALCIUM: 8.7 MG/DL (ref 8.6–10.4)
CARBON DIOXIDE: 27 MMOL/L (ref 20–31)
CHLORIDE: 109 MMOL/L (ref 98–110)
CREATININE: 2.5 MG/DL (ref 0.6–0.93)
EGFR IF AFRICN AM: 21 ML/MIN/1.73M2
EGFR IF NONAFRICN AM: 18 ML/MIN/1.73M2
EOSINOPHILS: 4.6 %
GLUCOSE: 93 MG/DL (ref 65–99)
HEMATOCRIT: 47.8 % (ref 35–45)
HEMOGLOBIN: 16 G/DL (ref 11.7–15.5)
LYMPHOCYTES: 21.6 %
MCH: 31.6 PG (ref 27–33)
MCHC: 33.5 G/DL (ref 32–36)
MCV: 94.3 FL (ref 80–100)
MONOCYTES: 6.7 %
MPV: 9.8 FL (ref 7.5–12.5)
NEUTROPHILS: 66.2 %
PHOSPHATE (AS PHOSPHORUS): 4.3 MG/DL (ref 2.1–4.3)
PLATELET COUNT: 247 THOUSAND/UL (ref 140–400)
POTASSIUM: 4.9 MMOL/L (ref 3.5–5.3)
RDW: 12.8 % (ref 11–15)
RED BLOOD CELL COUNT: 5.07 MILLION/UL (ref 3.8–5.1)
SODIUM: 144 MMOL/L (ref 135–146)
WHITE BLOOD CELL COUNT: 8.6 THOUSAND/UL (ref 3.8–10.8)

## 2018-01-26 ENCOUNTER — OFFICE VISIT (OUTPATIENT)
Dept: NEPHROLOGY | Facility: CLINIC | Age: 78
End: 2018-01-26

## 2018-01-26 VITALS
DIASTOLIC BLOOD PRESSURE: 88 MMHG | BODY MASS INDEX: 28.76 KG/M2 | HEIGHT: 59 IN | TEMPERATURE: 98 F | HEART RATE: 65 BPM | SYSTOLIC BLOOD PRESSURE: 145 MMHG | WEIGHT: 142.63 LBS

## 2018-01-26 DIAGNOSIS — N18.5 CHRONIC KIDNEY DISEASE (CKD), STAGE V (HCC): Primary | ICD-10-CM

## 2018-01-26 PROCEDURE — 99214 OFFICE O/P EST MOD 30 MIN: CPT | Performed by: INTERNAL MEDICINE

## 2018-01-26 PROCEDURE — G0463 HOSPITAL OUTPT CLINIC VISIT: HCPCS | Performed by: INTERNAL MEDICINE

## 2018-01-26 NOTE — PROGRESS NOTES
Progress Note:     Vern Eagle is a 68 yr old female with pmh of left kidney cyst s/p removal, HL, AAA with 3 cm, tobacoo abuse, HTN, CKD stage IV with proteinuria who presented today for follow up     Patient recently started seeing Dr. Javan Cardenas as lesion removed from left                kidney  2016: OTHER SURGICAL HISTORY      Comment: Parathyroid sx.    No date: TONSILLECTOMY   Family History   Problem Relation Age of Onset   • Heart Attack Father 54     CAd S/P MI.    • Heart Attack Mother 43 drainage, hearing loss and nasal drainage  Eyes:  Negative for eye discharge and vision loss  Cardiovascular:  Negative for chest pain, sob  Respiratory:  Negative for cough, dyspnea and wheezing  Gastrointestinal:  Negative for abdominal pain, constipatio continue to assess for RRT - no uremic symptoms    2. CKD complications:  - PTH 201E- On calcitriol 0.25 mg every other day. Improve calcium   - bicarb stable  - hyperkalemia: low potassium diet, instruction and diet chart provided.  Change kayexelate once

## 2018-02-07 RX ORDER — OXYBUTYNIN CHLORIDE 5 MG/1
TABLET ORAL
Qty: 180 TABLET | Refills: 0 | Status: SHIPPED | OUTPATIENT
Start: 2018-02-07 | End: 2018-04-25

## 2018-02-07 RX ORDER — AMLODIPINE BESYLATE 10 MG/1
TABLET ORAL
Qty: 90 TABLET | Refills: 0 | Status: SHIPPED | OUTPATIENT
Start: 2018-02-07 | End: 2018-04-25

## 2018-02-07 RX ORDER — ATENOLOL 50 MG/1
TABLET ORAL
Qty: 90 TABLET | Refills: 0 | Status: SHIPPED | OUTPATIENT
Start: 2018-02-07 | End: 2018-04-25

## 2018-02-07 NOTE — TELEPHONE ENCOUNTER
Hypertensive Medications  Protocol Criteria:  · Appointment scheduled in the past 6 months or in the next 3 months  · BMP or CMP in the past 12 months  · Creatinine result < 2  Recent Outpatient Visits            1 week ago Chronic kidney disease (CKD), st

## 2018-02-16 ENCOUNTER — OFFICE VISIT (OUTPATIENT)
Dept: INTERNAL MEDICINE CLINIC | Facility: CLINIC | Age: 78
End: 2018-02-16

## 2018-02-16 VITALS
HEIGHT: 59 IN | BODY MASS INDEX: 28.83 KG/M2 | HEART RATE: 62 BPM | TEMPERATURE: 99 F | WEIGHT: 143 LBS | DIASTOLIC BLOOD PRESSURE: 78 MMHG | OXYGEN SATURATION: 98 % | SYSTOLIC BLOOD PRESSURE: 129 MMHG

## 2018-02-16 DIAGNOSIS — N18.30 STAGE 3 CHRONIC KIDNEY DISEASE (HCC): ICD-10-CM

## 2018-02-16 DIAGNOSIS — N18.30 CKD (CHRONIC KIDNEY DISEASE) STAGE 3, GFR 30-59 ML/MIN (HCC): ICD-10-CM

## 2018-02-16 DIAGNOSIS — D75.1 POLYCYTHEMIA: ICD-10-CM

## 2018-02-16 DIAGNOSIS — I71.4 ABDOMINAL AORTIC ANEURYSM (AAA) WITHOUT RUPTURE (HCC): ICD-10-CM

## 2018-02-16 DIAGNOSIS — Z12.11 SCREENING FOR COLON CANCER: ICD-10-CM

## 2018-02-16 DIAGNOSIS — E21.0 PRIMARY HYPERPARATHYROIDISM (HCC): ICD-10-CM

## 2018-02-16 DIAGNOSIS — E53.8 B12 DEFICIENCY: ICD-10-CM

## 2018-02-16 DIAGNOSIS — E78.2 COMBINED HYPERLIPIDEMIA: ICD-10-CM

## 2018-02-16 DIAGNOSIS — Z12.39 BREAST CANCER SCREENING: ICD-10-CM

## 2018-02-16 DIAGNOSIS — Z28.21 IMMUNIZATION NOT CARRIED OUT BECAUSE OF PATIENT REFUSAL: ICD-10-CM

## 2018-02-16 DIAGNOSIS — H53.8 VISION BLURRING: ICD-10-CM

## 2018-02-16 DIAGNOSIS — I12.9 RENAL HYPERTENSION, STAGE 1-4 OR UNSPECIFIED CHRONIC KIDNEY DISEASE: ICD-10-CM

## 2018-02-16 DIAGNOSIS — Z00.00 ENCOUNTER FOR ANNUAL HEALTH EXAMINATION: ICD-10-CM

## 2018-02-16 DIAGNOSIS — N18.4 CKD (CHRONIC KIDNEY DISEASE), STAGE IV (HCC): Primary | ICD-10-CM

## 2018-02-16 DIAGNOSIS — Z00.00 ANNUAL PHYSICAL EXAM: ICD-10-CM

## 2018-02-16 LAB
MULTISTIX LOT#: NORMAL NUMERIC
PH, URINE: 5.5 (ref 4.5–8)
PROTEIN (URINE DIPSTICK): 300 MG/DL
SPECIFIC GRAVITY: 1.02 (ref 1–1.03)
URINE-COLOR: YELLOW
UROBILINOGEN,SEMI-QN: 0.2 MG/DL (ref 0–1.9)

## 2018-02-16 PROCEDURE — 81003 URINALYSIS AUTO W/O SCOPE: CPT | Performed by: INTERNAL MEDICINE

## 2018-02-16 PROCEDURE — 99397 PER PM REEVAL EST PAT 65+ YR: CPT | Performed by: INTERNAL MEDICINE

## 2018-02-16 NOTE — PROGRESS NOTES
HPI:    Patient ID: Dianna Choudhury is a 68year old female.     HPI  Dianna Choudhury is a 68year old female who presents for a complete physical exam.   HPI:   Patient presents with:  Wellness Visit: Medicare   4 steps to get into home with railings B two weeks)?: Several days    PHQ-2 SCORE: 2        Advance Directives     Do you have a healthcare power of ?: No    Do you have a living will?: No     Hearing Assessment (Required for AWV/SWV)      Hearing Screening    Time taken:  2/16/2018  3:02 is here for follow up of hypertension. BP at home: Not check. Has been compliant with medications. Exercise level: moderately active and has been following low salt diet. Weight has been stable.   Wt Readings from Last 3 Encounters:  02/16/18 : 143 lb (6 02:13 PM   TRIG 141 06/30/2017 02:13 PM   LDL 68 06/30/2017 02:13 PM   [place] 96 06/30/2017 02:13 PM       No results found for: A1C   No results found for: VITD      There are no preventive care reminders to display for this patient.       Current Outpati HISTORY      Comment: Parathyroid sx. No date: TONSILLECTOMY   Family History   Problem Relation Age of Onset   • Heart Attack Father 54     CAd S/P MI.    • Heart Attack Mother 43     Rheumatic fever.     • Heart Disorder Brother 37     Herat aneuyrism r constipation, diarrhea, nausea, rectal pain and vomiting. Endocrine: Negative for cold intolerance, heat intolerance, polydipsia, polyphagia and polyuria. Genitourinary: Positive for frequency and urgency.  Negative for decreased urine volume, difficult mouth 2 (two) times daily. Disp: 14 tablet Rfl: 0   Sulfamethoxazole-TMP DS (BACTRIM DS) 800-160 MG Oral Tab per tablet Take 1 tablet by mouth 2 (two) times daily.  Disp: 20 tablet Rfl: 0     Allergies:No Known Allergies    HISTORY:  Past Medical History: injected, not scarred, not perforated, not erythematous, not retracted and not bulging. Tympanic membrane mobility is normal. No middle ear effusion. No hemotympanum. No decreased hearing is noted.    Left Ear: Tympanic membrane, external ear and ear canal and phonation normal. Neck supple. Normal carotid pulses, no hepatojugular reflux and no JVD present. No tracheal tenderness present. Carotid bruit is not present. No tracheal deviation present. No thyroid mass and no thyromegaly present.    Cardiovascular: She has no cervical adenopathy. Right cervical: No superficial cervical, no deep cervical and no posterior cervical adenopathy present. Left cervical: No superficial cervical, no deep cervical and no posterior cervical adenopathy present. Lipid Panel [E]      Comp Metabolic Panel (14) [E]      Vitamin B12      POC Urinalysis, Automated Dip without microscopy (PCA and EMMG ONLY) [75845]      Urine Culture, Routine [E]    Meds This Visit:  No prescriptions requested or ordered in this encount tobacco cessation counseling today.  (update Vitals or Tob Hx section to [place] Tobacco Cessation counseling given as YES and refresh this link)          CAGE Alcohol screening   Katrin Gaytan was screened for Alcohol abuse and had a score of 0 so is at l (0.25 mcg total) by mouth every other day. Cholecalciferol (VITAMIN D) 2000 units Oral Cap Take 1 capsule by mouth daily.    PRAVASTATIN SODIUM 40 MG Oral Tab TAKE 1 TABLET EVERY NIGHT   Cyanocobalamin (VITAMIN B-12 ER) 2000 MCG Oral Tab CR Take 2,000 mcg conversations when two or more people are talking at the same time:  No   I have trouble understanding things on the TV:  No I have to strain to understand conversations:  No   I have to worry about missing the telephone ring or doorbell:  No I have troubl SCOPE    B12 deficiency  -     VITAMIN B12    Renal hypertension, stage 1-4 or unspecified chronic kidney disease    Combined hyperlipidemia  -     LIPID PANEL    CKD (chronic kidney disease) stage 3, GFR 30-59 ml/min  -     COMP METABOLIC PANEL (14)  - Cancer Screening      Colonoscopy Screen every 10 years There are no preventive care reminders to display for this patient. Update Health Maintenance if applicable    Flex Sigmoidoscopy Screen every 10 years No results found for this or any previous visit. history found This may be covered with your prescription benefits, but Medicare does not cover unless Medically needed    Zoster  Not covered by Medicare Part B No vaccine history found This may be covered with your pharmacy  prescription benefits        F score of 0 so is at low risk.     Patient Care Team: Patient Care Team:  Chandler Hernandez MD as PCP - General    Patient Active Problem List:     Aortic aneurysm [facility])     Primary hyperparathyroidism ([facility]     CKD (chronic kidney disease), stage IV ([facility] CR Take 2,000 mcg by mouth daily. aspirin 81 MG Oral Chew Tab Chew by mouth daily. MEDICAL INFORMATION:   She  has a past medical history of Aortic aneurysm ([address] Calculus of kidney; Essential hypertension; and Hyperlipidemia.     She  has a past griffith gail:  No I have trouble hearing conversations in a noisy background such as a crowded room or restaurant:  No   I get confused about where sounds come from:  No I misunderstand some words in a sentence and need to ask people to repeat themselves:   No 30-59 ml/min  -     COMP METABOLIC PANEL (14)  -     URINE CULTURE, ROUTINE    Screening for colon cancer    Abdominal aortic aneurysm (AAA) without rupture (HCC)    Breast cancer screening    Immunization not carried out because of patient refusal    Visi Blood Annually Occult Blood (no units)   Date Value   07/15/2017 Negative    No flowsheet data found. Glaucoma Screening      Ophthalmology Visit Annually: Diabetics, FHx Glaucoma, AA>50, > 65 No flowsheet data found.     Bone Density Screening

## 2018-02-16 NOTE — PATIENT INSTRUCTIONS
ASSESSMENT/PLAN:   Ckd (chronic kidney disease), stage iv (hcc)  (primary encounter diagnosis)No motrin, ibuprofen, advil, alleve, naprosyn  with these medications. F/U renal.     Annual physical exam Check urine. B12 deficiency Check blood.      Renal following:   • Overweight (BMI ³25 but <30)   • Family history of diabetes   • Age 72 years or older   • History of gestational diabetes or birth of baby weighing more than 9 pounds     Covered at least every 3 years,           GLUCOSE (mg/dL)   Date Value schedule if you are in this risk group, make sure you have a referral   Bone Density Screening      Bone density screening   Covered every 2 yrs after age 72    Covered yearly for Long term Glucocorticoid medication (Steroids) Requires diagnosis related to covered with a cut with metal- TD and TDaP Not covered by Medicare Part B) No orders found for this or any previous visit.  This may be covered with your prescription benefits, but Medicare does not cover unless Medically needed    Zoster (Not covered by Me

## 2018-02-18 PROBLEM — N30.00 ACUTE CYSTITIS WITHOUT HEMATURIA: Status: ACTIVE | Noted: 2018-02-18

## 2018-03-03 ENCOUNTER — PRIOR ORIGINAL RECORDS (OUTPATIENT)
Dept: OTHER | Age: 78
End: 2018-03-03

## 2018-03-04 LAB
BUN/CREATININE RATIO: 13 (CALC) (ref 6–22)
BUN: 36 MG/DL (ref 7–25)
CALCIUM: 8.2 MG/DL (ref 8.6–10.4)
CARBON DIOXIDE: 23 MMOL/L (ref 20–31)
CHLORIDE: 112 MMOL/L (ref 98–110)
CREATININE: 2.88 MG/DL (ref 0.6–0.93)
EGFR IF AFRICN AM: 18 ML/MIN/1.73M2
EGFR IF NONAFRICN AM: 15 ML/MIN/1.73M2
GLUCOSE: 83 MG/DL (ref 65–99)
POTASSIUM: 4.6 MMOL/L (ref 3.5–5.3)
SODIUM: 142 MMOL/L (ref 135–146)

## 2018-03-05 LAB
ALT (SGPT): 6 U/L
AST (SGOT): 12 U/L
CHOLESTEROL, TOTAL: 183 MG/DL
HDL CHOLESTEROL: 78 MG/DL
LDL CHOLESTEROL: 85 MG/DL
TRIGLYCERIDES: 100 MG/DL

## 2018-03-08 ENCOUNTER — TELEPHONE (OUTPATIENT)
Dept: INTERNAL MEDICINE CLINIC | Facility: CLINIC | Age: 78
End: 2018-03-08

## 2018-03-09 ENCOUNTER — OFFICE VISIT (OUTPATIENT)
Dept: NEPHROLOGY | Facility: CLINIC | Age: 78
End: 2018-03-09

## 2018-03-09 VITALS
DIASTOLIC BLOOD PRESSURE: 74 MMHG | TEMPERATURE: 98 F | SYSTOLIC BLOOD PRESSURE: 115 MMHG | WEIGHT: 144.38 LBS | HEIGHT: 59 IN | HEART RATE: 62 BPM | BODY MASS INDEX: 29.11 KG/M2

## 2018-03-09 DIAGNOSIS — N18.5 CHRONIC KIDNEY DISEASE (CKD), STAGE V (HCC): Primary | ICD-10-CM

## 2018-03-09 PROCEDURE — G0463 HOSPITAL OUTPT CLINIC VISIT: HCPCS | Performed by: INTERNAL MEDICINE

## 2018-03-09 PROCEDURE — 99214 OFFICE O/P EST MOD 30 MIN: CPT | Performed by: INTERNAL MEDICINE

## 2018-03-09 RX ORDER — SODIUM POLYSTYRENE SULFONATE 15 G/60ML
15 SUSPENSION ORAL; RECTAL WEEKLY
Qty: 120 ML | Refills: 2 | Status: SHIPPED | OUTPATIENT
Start: 2018-03-09 | End: 2018-03-16

## 2018-03-09 NOTE — PROGRESS NOTES
Progress Note:     Jeb Herndon is a 68 yr old female with pmh of left kidney cyst s/p removal, HL, AAA with 3 cm, tobacoo abuse, HTN, CKD stage IV with proteinuria who presented today for follow up     Patient recently started seeing Dr. Cris Palma as Relation Age of Onset   • Heart Attack Father 54     CAd S/P MI.    • Heart Attack Mother 43     Rheumatic fever. • Heart Disorder Brother 37     Herat aneuyrism ruptured   • Heart Surgery Brother    • Heart Disorder Brother 54     CAD S/P CABG.     • Eric Sheridan Negative for dysuria and hematuria  Endocrine:  Negative for abnormal sleep patterns  Hema/Lymph:  Negative for easy bleeding and easy bruising  Integumentary:  Negative for pruritus and rash  Musculoskeletal:  Negative for bone/joint symptoms  Neurologica lisinopril.  - volume status and HTN: stable   - Hgb - If continue to rise will refer to hematologist   - repeat renal US - Redemonstration of increase in the echogenicity of both kidneys compatible with a medical renal failure process.  No hydronephrosis o

## 2018-03-16 ENCOUNTER — TELEPHONE (OUTPATIENT)
Dept: NEPHROLOGY | Facility: CLINIC | Age: 78
End: 2018-03-16

## 2018-03-16 ENCOUNTER — PRIOR ORIGINAL RECORDS (OUTPATIENT)
Dept: OTHER | Age: 78
End: 2018-03-16

## 2018-03-16 RX ORDER — SODIUM POLYSTYRENE SULFONATE 15 G/60ML
15 SUSPENSION ORAL; RECTAL WEEKLY
Qty: 120 ML | Refills: 2 | Status: SHIPPED | OUTPATIENT
Start: 2018-03-16 | End: 2018-03-19

## 2018-03-16 NOTE — TELEPHONE ENCOUNTER
Current Outpatient Prescriptions:  Sodium Polystyrene Sulfonate 15 GM/60ML Oral Suspension Take 60 mL (15 g total) by mouth once a week.  Take 15 gm weekly Disp: 120 mL Rfl: 2

## 2018-03-16 NOTE — TELEPHONE ENCOUNTER
LOV 3/9/18. Medication refilled for 3 months per written protocol in Dr. Roman Jurado's absence from the office this week.

## 2018-03-20 RX ORDER — SODIUM POLYSTYRENE SULFONATE 15 G/60ML
15 SUSPENSION ORAL; RECTAL WEEKLY
Qty: 473 ML | Refills: 0 | Status: SHIPPED | OUTPATIENT
Start: 2018-03-20 | End: 2018-06-22

## 2018-03-23 ENCOUNTER — PATIENT MESSAGE (OUTPATIENT)
Dept: NEPHROLOGY | Facility: CLINIC | Age: 78
End: 2018-03-23

## 2018-03-23 DIAGNOSIS — N18.5 CHRONIC KIDNEY DISEASE, STAGE V (HCC): Primary | ICD-10-CM

## 2018-03-23 NOTE — TELEPHONE ENCOUNTER
From: Joaquin Gallegos  To: Caden Boston MD  Sent: 3/23/2018 12:30 PM CDT  Subject: Non-Urgent Medical Question    I need a blood order for the lab for my next visit. You can mail this to my home. Thank you.

## 2018-03-26 NOTE — TELEPHONE ENCOUNTER
Confirmed with [facility] that no additional labs are needed.  Changed renal function panel to BMP and phosphorus since pt uses [facility].

## 2018-04-20 ENCOUNTER — OFFICE VISIT (OUTPATIENT)
Dept: NEPHROLOGY | Facility: CLINIC | Age: 78
End: 2018-04-20

## 2018-04-20 VITALS
TEMPERATURE: 97 F | HEIGHT: 59 IN | WEIGHT: 146.81 LBS | SYSTOLIC BLOOD PRESSURE: 128 MMHG | BODY MASS INDEX: 29.6 KG/M2 | HEART RATE: 63 BPM | DIASTOLIC BLOOD PRESSURE: 79 MMHG

## 2018-04-20 DIAGNOSIS — N18.5 CHRONIC KIDNEY DISEASE (CKD), STAGE V (HCC): Primary | ICD-10-CM

## 2018-04-20 PROCEDURE — 99215 OFFICE O/P EST HI 40 MIN: CPT | Performed by: INTERNAL MEDICINE

## 2018-04-20 PROCEDURE — G0463 HOSPITAL OUTPT CLINIC VISIT: HCPCS | Performed by: INTERNAL MEDICINE

## 2018-04-20 NOTE — PROGRESS NOTES
Progress Note:     Moraima Hoyos is a 68 yr old female with pmh of left kidney cyst s/p removal, HL, AAA with 3 cm, tobacoo abuse, HTN, CKD stage IV with proteinuria who presented today for follow up     Patient recently started seeing Dr. Celeste Pike as Mother 43     Rheumatic fever. • Heart Disorder Brother 37     Herat aneuyrism ruptured   • Heart Surgery Brother    • Heart Disorder Brother 54     CAD S/P CABG.     • Diabetes Brother    • Diabetes Maternal Grandmother    • Diabetes Maternal Aunt Negative for easy bleeding and easy bruising  Integumentary:  Negative for pruritus and rash  Musculoskeletal:  Negative for bone/joint symptoms  Neurological:  Negative for gait disturbance  Psychiatric:  Negative for inappropriate interaction        04/2 hematologist   - repeat renal US - Redemonstration of increase in the echogenicity of both kidneys compatible with a medical renal failure process. No hydronephrosis or solid mass. No shadowing stone. Stable appearing cysts bilaterally stable    3.  HTN:  -

## 2018-04-25 RX ORDER — ATENOLOL 50 MG/1
TABLET ORAL
Qty: 90 TABLET | Refills: 0 | Status: SHIPPED | OUTPATIENT
Start: 2018-04-25 | End: 2018-08-17

## 2018-04-25 RX ORDER — OXYBUTYNIN CHLORIDE 5 MG/1
TABLET ORAL
Qty: 180 TABLET | Refills: 0 | Status: SHIPPED | OUTPATIENT
Start: 2018-04-25 | End: 2018-08-17

## 2018-04-25 RX ORDER — AMLODIPINE BESYLATE 10 MG/1
TABLET ORAL
Qty: 90 TABLET | Refills: 0 | Status: SHIPPED | OUTPATIENT
Start: 2018-04-25 | End: 2018-08-17

## 2018-04-25 NOTE — TELEPHONE ENCOUNTER
Refill Protocol Appointment Criteria  · Appointment scheduled in the past 12 months or in the next 3 months  Recent Outpatient Visits            5 days ago Chronic kidney disease (CKD), stage V [facility])    [address] [place] [address], [address] [place] [address], [address], months ago Chronic kidney disease (CKD), stage V [facility])    Марина Mosher MD    Office Visit        Future Appointments       Provider Department Appt Notes    In 1 month Delbert Roman MD [address], [place]

## 2018-05-20 RX ORDER — PRAVASTATIN SODIUM 40 MG
TABLET ORAL
Qty: 90 TABLET | Refills: 0 | Status: SHIPPED | OUTPATIENT
Start: 2018-05-20 | End: 2018-11-23

## 2018-06-01 ENCOUNTER — OFFICE VISIT (OUTPATIENT)
Dept: NEPHROLOGY | Facility: CLINIC | Age: 78
End: 2018-06-01

## 2018-06-01 VITALS
DIASTOLIC BLOOD PRESSURE: 88 MMHG | SYSTOLIC BLOOD PRESSURE: 154 MMHG | HEIGHT: 59 IN | HEART RATE: 70 BPM | WEIGHT: 148 LBS | BODY MASS INDEX: 29.84 KG/M2 | TEMPERATURE: 98 F

## 2018-06-01 DIAGNOSIS — N18.6 ESRD (END STAGE RENAL DISEASE) (HCC): Primary | ICD-10-CM

## 2018-06-01 DIAGNOSIS — N18.5 CHRONIC KIDNEY DISEASE (CKD), STAGE V (HCC): ICD-10-CM

## 2018-06-01 PROCEDURE — 99214 OFFICE O/P EST MOD 30 MIN: CPT | Performed by: INTERNAL MEDICINE

## 2018-06-01 PROCEDURE — G0463 HOSPITAL OUTPT CLINIC VISIT: HCPCS | Performed by: INTERNAL MEDICINE

## 2018-06-01 NOTE — PROGRESS NOTES
Progress Note:     Lizbeth Multani is a 68 yr old female with pmh of left kidney cyst s/p removal, HL, AAA with 3 cm, tobacoo abuse, HTN, CKD stage V with proteinuria who presented today for follow up     Patient recently started seeing Dr. Efren Cody as Day Smoker                                                   Packs/day: 1.00      Years: 0.00         Types: Cigarettes     Start date: 6/1/1963  Smokeless tobacco: Never Used                      Alcohol use:  No                 Medications (Active prior t (36.7 °C)       PHYSICAL EXAM:   Constitutional: appears well hydrated alert and responsive  Head/Face: normocephalic  Eyes/Vision: normal extraocular motion is intact  Nose/Mouth/Throat:mucous membranes are moist   Neck/Thyroid: neck is supple without sabrina - stable in office       Follow up in 4 weeks and will send patient for PD catheter placement. Discussed again the pros vs cons for PD vs HD .  Total time >35 mins       Orders This Visit:    Orders Placed This Encounter      CBC W Differential W Platelet [

## 2018-06-01 NOTE — PATIENT INSTRUCTIONS
Blood and urine test as ordered   Follow up in 4 weeks  Follow up with surgery for catheter placement

## 2018-06-21 PROBLEM — N30.00 ACUTE CYSTITIS WITHOUT HEMATURIA: Status: RESOLVED | Noted: 2018-02-18 | Resolved: 2018-06-21

## 2018-06-22 ENCOUNTER — OFFICE VISIT (OUTPATIENT)
Dept: INTERNAL MEDICINE CLINIC | Facility: CLINIC | Age: 78
End: 2018-06-22

## 2018-06-22 VITALS
TEMPERATURE: 98 F | SYSTOLIC BLOOD PRESSURE: 124 MMHG | HEART RATE: 67 BPM | HEIGHT: 59 IN | DIASTOLIC BLOOD PRESSURE: 77 MMHG | WEIGHT: 150 LBS | BODY MASS INDEX: 30.24 KG/M2 | OXYGEN SATURATION: 98 %

## 2018-06-22 DIAGNOSIS — E87.5 HYPERKALEMIA: ICD-10-CM

## 2018-06-22 DIAGNOSIS — I12.9 RENAL HYPERTENSION, STAGE 1-4 OR UNSPECIFIED CHRONIC KIDNEY DISEASE: ICD-10-CM

## 2018-06-22 DIAGNOSIS — R53.83 OTHER FATIGUE: ICD-10-CM

## 2018-06-22 DIAGNOSIS — E53.8 B12 DEFICIENCY: ICD-10-CM

## 2018-06-22 DIAGNOSIS — E78.2 COMBINED HYPERLIPIDEMIA: ICD-10-CM

## 2018-06-22 DIAGNOSIS — N18.4 CKD (CHRONIC KIDNEY DISEASE), STAGE IV (HCC): Primary | ICD-10-CM

## 2018-06-22 DIAGNOSIS — M81.0 AGE-RELATED OSTEOPOROSIS WITHOUT CURRENT PATHOLOGICAL FRACTURE: ICD-10-CM

## 2018-06-22 DIAGNOSIS — R25.2 CRAMPS OF LOWER EXTREMITY: ICD-10-CM

## 2018-06-22 PROCEDURE — 36415 COLL VENOUS BLD VENIPUNCTURE: CPT | Performed by: INTERNAL MEDICINE

## 2018-06-22 PROCEDURE — 99215 OFFICE O/P EST HI 40 MIN: CPT | Performed by: INTERNAL MEDICINE

## 2018-06-22 PROCEDURE — 93000 ELECTROCARDIOGRAM COMPLETE: CPT | Performed by: INTERNAL MEDICINE

## 2018-06-22 NOTE — ASSESSMENT & PLAN NOTE
11-9-17 dexa: CONCLUSION: Bone mineral content in the left femur is in the osteoporotic range. Bone mineral content in the lumbar spine is in the normal range; however, values are likely inaccurate due to levoscoliosis and spondylosis.    The

## 2018-06-22 NOTE — PROGRESS NOTES
HPI:    Patient ID: Hero Ac is a 66year old female. HPI   Cramps in legs and toes. Not exertional. At end of day. Noticed in past few months. Hypertension  Patient is here for follow up of hypertension. BP at home: not check.   Has been co leg swelling. Gastrointestinal: Negative for abdominal distention and abdominal pain. Endocrine: Negative for cold intolerance, heat intolerance, polydipsia, polyphagia and polyuria. Genitourinary: Negative for dysuria.    Musculoskeletal: Negative fo • Hyperlipidemia     Pt. taking 40 mg Pravastatin; states not been told she has high cholesterol      Past Surgical History:  No date:   11/15/2017: CATARACT EXTRACTION Right      Comment: Dr. Floyd President.   No date: D & C  No date: Pam Whitten normal.  No middle ear effusion. No hemotympanum. No decreased hearing is noted. Nose: Nose normal. No mucosal edema, rhinorrhea, nose lacerations, sinus tenderness, nasal deformity or nasal septal hematoma. No epistaxis. No foreign bodies.  Right sinus crepitus, no deformity and no laceration. Lumbar back: She exhibits normal range of motion, no tenderness, no bony tenderness, no swelling, no edema, no deformity, no laceration, no pain, no spasm and normal pulse. Negative SLR B/L.     Lymphadenop fracture  11-9-17 dexa: CONCLUSION: Bone mineral content in the left femur is in the osteoporotic range.                Bone mineral content in the lumbar spine is in the normal range; however, values are likely inaccurate due to levoscoliosis and spondylos

## 2018-06-22 NOTE — ASSESSMENT & PLAN NOTE
No motrin, ibuprofen, advil, alleve, naprosyn  with these medications. Sees renal MD Dr. Patrick Webb. ? PD or HD? (Dr. Hal Villafuerte).

## 2018-06-22 NOTE — ASSESSMENT & PLAN NOTE
Careful with diet and excercise at least 30 minutes 3-4 times a week. Check blood pressures at different times on different days. Can purchase own blood pressure monitor. If not, check at local pharmacy. Bake foods more and grill occasionally.  Avoid fried

## 2018-06-22 NOTE — ASSESSMENT & PLAN NOTE
9-2-17 abdominal U/S: CONCLUSION: Aneurysmal infrarenal abdominal aorta with distal aorta measuring 4.4 x 4.3 cm transverse. Abdominal aortic aneurysm relatively stable from February 22, 2017. Sees Dr. Rupert Noel cardiology with [facility]. Needs to check in 1 yr.  (9

## 2018-06-22 NOTE — PATIENT INSTRUCTIONS
ASSESSMENT/PLAN:   Ckd (chronic kidney disease), stage iv (hcc)  (primary encounter diagnosis)  Combined hyperlipidemia  Renal hypertension, stage 1-4 or unspecified chronic kidney disease  B12 deficiency  Hyperkalemia  Age-related osteoporosis without cur Encounter      Lipid Panel [E]      CBC W Differential W Platelet [E]      Comp Metabolic Panel (14) [E]      Magnesium [E]      TSH W Reflex To Free T4 [E]      Vitamin B12      Sed Rate, W[place]gren (Automated) [E]    Meds This Visit:  No prescriptions re

## 2018-06-25 ENCOUNTER — TELEPHONE (OUTPATIENT)
Dept: INTERNAL MEDICINE CLINIC | Facility: CLINIC | Age: 78
End: 2018-06-25

## 2018-06-25 NOTE — TELEPHONE ENCOUNTER
Pt asked if it's fine to draw her potassium levels tomorrow? She needs to get blood work before she goes to her specialist appt on Friday and wants to do both orders at th same time. Pt haven't start taking Kayexalate 30 g ×1 yet.  Please advice

## 2018-06-29 ENCOUNTER — OFFICE VISIT (OUTPATIENT)
Dept: NEPHROLOGY | Facility: CLINIC | Age: 78
End: 2018-06-29

## 2018-06-29 VITALS
HEART RATE: 59 BPM | HEIGHT: 59.5 IN | SYSTOLIC BLOOD PRESSURE: 133 MMHG | WEIGHT: 148.63 LBS | DIASTOLIC BLOOD PRESSURE: 77 MMHG | BODY MASS INDEX: 29.57 KG/M2 | TEMPERATURE: 97 F

## 2018-06-29 DIAGNOSIS — N18.5 CHRONIC KIDNEY DISEASE (CKD), STAGE V (HCC): Primary | ICD-10-CM

## 2018-06-29 PROBLEM — N18.6 CKD (CHRONIC KIDNEY DISEASE) STAGE V REQUIRING CHRONIC DIALYSIS (HCC): Status: ACTIVE | Noted: 2018-06-29

## 2018-06-29 PROBLEM — N18.4 CKD (CHRONIC KIDNEY DISEASE), STAGE IV (HCC): Status: RESOLVED | Noted: 2017-04-18 | Resolved: 2018-06-29

## 2018-06-29 PROBLEM — Z99.2 CKD (CHRONIC KIDNEY DISEASE) STAGE V REQUIRING CHRONIC DIALYSIS (HCC): Status: ACTIVE | Noted: 2018-06-29

## 2018-06-29 PROCEDURE — G0463 HOSPITAL OUTPT CLINIC VISIT: HCPCS | Performed by: INTERNAL MEDICINE

## 2018-06-29 PROCEDURE — 99214 OFFICE O/P EST MOD 30 MIN: CPT | Performed by: INTERNAL MEDICINE

## 2018-06-29 NOTE — PROGRESS NOTES
Progress Note:     Marlo Bumpers is a 68 yr old female with pmh of left kidney cyst s/p removal, HL, AAA with 3 cm, tobacoo abuse, HTN, CKD stage V with proteinuria who presented today for follow up     Patient recently started seeing Dr. Tish Stallings as Diabetes Maternal Grandmother    • Diabetes Maternal Aunt       Social History: Smoking status: Current Every Day Smoker                                                   Packs/day: 1.00      Years: 0.00         Types: Cigarettes     Start date: 6/1/1963 PHYSICAL EXAM:   Constitutional: appears well hydrated alert and responsive  Head/Face: normocephalic  Eyes/Vision: normal extraocular motion is intact  Nose/Mouth/Throat:mucous membranes are moist   Neck/Thyroid: neck is supple without adenopathy  L Orders This Visit:  No orders of the defined types were placed in this encounter.       Meds This Visit:    No prescriptions requested or ordered in this encounter  Imaging & Referrals:  None     6/29/2018  Jess Cleaning MD

## 2018-07-10 ENCOUNTER — ANESTHESIA (OUTPATIENT)
Dept: SURGERY | Facility: HOSPITAL | Age: 78
DRG: 673 | End: 2018-07-10
Payer: MEDICARE

## 2018-07-10 ENCOUNTER — APPOINTMENT (OUTPATIENT)
Dept: GENERAL RADIOLOGY | Facility: HOSPITAL | Age: 78
DRG: 673 | End: 2018-07-10
Attending: ANESTHESIOLOGY
Payer: MEDICARE

## 2018-07-10 ENCOUNTER — SURGERY (OUTPATIENT)
Age: 78
End: 2018-07-10

## 2018-07-10 ENCOUNTER — HOSPITAL ENCOUNTER (INPATIENT)
Facility: HOSPITAL | Age: 78
LOS: 1 days | Discharge: HOME | DRG: 673 | End: 2018-07-11
Attending: SURGERY | Admitting: INTERNAL MEDICINE
Payer: MEDICARE

## 2018-07-10 ENCOUNTER — ANESTHESIA EVENT (OUTPATIENT)
Dept: SURGERY | Facility: HOSPITAL | Age: 78
DRG: 673 | End: 2018-07-10
Payer: MEDICARE

## 2018-07-10 DIAGNOSIS — N18.6 CKD (CHRONIC KIDNEY DISEASE) STAGE V REQUIRING CHRONIC DIALYSIS (HCC): Primary | ICD-10-CM

## 2018-07-10 DIAGNOSIS — Z99.2 CKD (CHRONIC KIDNEY DISEASE) STAGE V REQUIRING CHRONIC DIALYSIS (HCC): Primary | ICD-10-CM

## 2018-07-10 LAB
ANION GAP SERPL CALC-SCNC: 9 MMOL/L (ref 0–18)
BASOPHILS # BLD: 0.1 K/UL (ref 0–0.2)
BASOPHILS NFR BLD: 1 %
BUN SERPL-MCNC: 47 MG/DL (ref 8–20)
BUN/CREAT SERPL: 14.2 (ref 10–20)
CALCIUM SERPL-MCNC: 7.6 MG/DL (ref 8.5–10.5)
CHLORIDE SERPL-SCNC: 108 MMOL/L (ref 95–110)
CO2 SERPL-SCNC: 20 MMOL/L (ref 22–32)
CREAT SERPL-MCNC: 3.31 MG/DL (ref 0.5–1.5)
EOSINOPHIL # BLD: 0 K/UL (ref 0–0.7)
EOSINOPHIL NFR BLD: 1 %
ERYTHROCYTE [DISTWIDTH] IN BLOOD BY AUTOMATED COUNT: 14 % (ref 11–15)
GLUCOSE SERPL-MCNC: 103 MG/DL (ref 70–99)
HCT VFR BLD AUTO: 44.5 % (ref 35–48)
HGB BLD-MCNC: 14.5 G/DL (ref 12–16)
LYMPHOCYTES # BLD: 0.8 K/UL (ref 1–4)
LYMPHOCYTES NFR BLD: 8 %
MAGNESIUM SERPL-MCNC: 2 MG/DL (ref 1.8–2.5)
MCH RBC QN AUTO: 31.8 PG (ref 27–32)
MCHC RBC AUTO-ENTMCNC: 32.5 G/DL (ref 32–37)
MCV RBC AUTO: 98.1 FL (ref 80–100)
MONOCYTES # BLD: 0.4 K/UL (ref 0–1)
MONOCYTES NFR BLD: 4 %
NEUTROPHILS # BLD AUTO: 8.5 K/UL (ref 1.8–7.7)
NEUTROPHILS NFR BLD: 87 %
OSMOLALITY UR CALC.SUM OF ELEC: 297 MOSM/KG (ref 275–295)
PHOSPHATE SERPL-MCNC: 5.5 MG/DL (ref 2.4–4.7)
PLATELET # BLD AUTO: 168 K/UL (ref 140–400)
PMV BLD AUTO: 7.7 FL (ref 7.4–10.3)
POTASSIUM SERPL-SCNC: 5.4 MMOL/L (ref 3.3–5.1)
RBC # BLD AUTO: 4.54 M/UL (ref 3.7–5.4)
SODIUM SERPL-SCNC: 137 MMOL/L (ref 136–144)
WBC # BLD AUTO: 9.7 K/UL (ref 4–11)

## 2018-07-10 PROCEDURE — 0WHG43Z INSERTION OF INFUSION DEVICE INTO PERITONEAL CAVITY, PERCUTANEOUS ENDOSCOPIC APPROACH: ICD-10-PCS | Performed by: SURGERY

## 2018-07-10 PROCEDURE — 3E0F7GC INTRODUCTION OF OTHER THERAPEUTIC SUBSTANCE INTO RESPIRATORY TRACT, VIA NATURAL OR ARTIFICIAL OPENING: ICD-10-PCS | Performed by: INTERNAL MEDICINE

## 2018-07-10 PROCEDURE — 71045 X-RAY EXAM CHEST 1 VIEW: CPT | Performed by: ANESTHESIOLOGY

## 2018-07-10 PROCEDURE — 99222 1ST HOSP IP/OBS MODERATE 55: CPT | Performed by: INTERNAL MEDICINE

## 2018-07-10 DEVICE — CATH DLYS 62.5CM 2 CUF: Type: IMPLANTABLE DEVICE | Site: ABDOMEN | Status: FUNCTIONAL

## 2018-07-10 RX ORDER — ACETAMINOPHEN 325 MG/1
650 TABLET ORAL EVERY 6 HOURS PRN
Status: DISCONTINUED | OUTPATIENT
Start: 2018-07-10 | End: 2018-07-11

## 2018-07-10 RX ORDER — LIDOCAINE HYDROCHLORIDE 10 MG/ML
INJECTION, SOLUTION EPIDURAL; INFILTRATION; INTRACAUDAL; PERINEURAL AS NEEDED
Status: DISCONTINUED | OUTPATIENT
Start: 2018-07-10 | End: 2018-07-10 | Stop reason: SURG

## 2018-07-10 RX ORDER — NEOSTIGMINE METHYLSULFATE 0.5 MG/ML
INJECTION INTRAVENOUS AS NEEDED
Status: DISCONTINUED | OUTPATIENT
Start: 2018-07-10 | End: 2018-07-10 | Stop reason: SURG

## 2018-07-10 RX ORDER — NALOXONE HYDROCHLORIDE 0.4 MG/ML
80 INJECTION, SOLUTION INTRAMUSCULAR; INTRAVENOUS; SUBCUTANEOUS AS NEEDED
Status: DISCONTINUED | OUTPATIENT
Start: 2018-07-10 | End: 2018-07-10 | Stop reason: HOSPADM

## 2018-07-10 RX ORDER — BISACODYL 10 MG
10 SUPPOSITORY, RECTAL RECTAL
Status: DISCONTINUED | OUTPATIENT
Start: 2018-07-10 | End: 2018-07-11

## 2018-07-10 RX ORDER — IPRATROPIUM BROMIDE AND ALBUTEROL SULFATE 2.5; .5 MG/3ML; MG/3ML
3 SOLUTION RESPIRATORY (INHALATION) 3 TIMES DAILY
Status: DISCONTINUED | OUTPATIENT
Start: 2018-07-10 | End: 2018-07-11

## 2018-07-10 RX ORDER — HYDROMORPHONE HYDROCHLORIDE 1 MG/ML
0.2 INJECTION, SOLUTION INTRAMUSCULAR; INTRAVENOUS; SUBCUTANEOUS EVERY 5 MIN PRN
Status: DISCONTINUED | OUTPATIENT
Start: 2018-07-10 | End: 2018-07-10 | Stop reason: HOSPADM

## 2018-07-10 RX ORDER — METOPROLOL TARTRATE 5 MG/5ML
2.5 INJECTION INTRAVENOUS ONCE
Status: DISCONTINUED | OUTPATIENT
Start: 2018-07-10 | End: 2018-07-10 | Stop reason: HOSPADM

## 2018-07-10 RX ORDER — HYDROMORPHONE HYDROCHLORIDE 1 MG/ML
0.6 INJECTION, SOLUTION INTRAMUSCULAR; INTRAVENOUS; SUBCUTANEOUS EVERY 5 MIN PRN
Status: DISCONTINUED | OUTPATIENT
Start: 2018-07-10 | End: 2018-07-10 | Stop reason: HOSPADM

## 2018-07-10 RX ORDER — METOCLOPRAMIDE 10 MG/1
10 TABLET ORAL ONCE
Status: COMPLETED | OUTPATIENT
Start: 2018-07-10 | End: 2018-07-10

## 2018-07-10 RX ORDER — IPRATROPIUM BROMIDE AND ALBUTEROL SULFATE 2.5; .5 MG/3ML; MG/3ML
3 SOLUTION RESPIRATORY (INHALATION)
Status: DISCONTINUED | OUTPATIENT
Start: 2018-07-11 | End: 2018-07-11

## 2018-07-10 RX ORDER — HALOPERIDOL 5 MG/ML
0.25 INJECTION INTRAMUSCULAR ONCE AS NEEDED
Status: DISCONTINUED | OUTPATIENT
Start: 2018-07-10 | End: 2018-07-10 | Stop reason: HOSPADM

## 2018-07-10 RX ORDER — HYDROMORPHONE HYDROCHLORIDE 1 MG/ML
0.4 INJECTION, SOLUTION INTRAMUSCULAR; INTRAVENOUS; SUBCUTANEOUS EVERY 2 HOUR PRN
Status: DISCONTINUED | OUTPATIENT
Start: 2018-07-10 | End: 2018-07-11

## 2018-07-10 RX ORDER — ONDANSETRON 2 MG/ML
4 INJECTION INTRAMUSCULAR; INTRAVENOUS EVERY 6 HOURS PRN
Status: DISCONTINUED | OUTPATIENT
Start: 2018-07-10 | End: 2018-07-11

## 2018-07-10 RX ORDER — ACETAMINOPHEN 500 MG
1000 TABLET ORAL ONCE
Status: COMPLETED | OUTPATIENT
Start: 2018-07-10 | End: 2018-07-10

## 2018-07-10 RX ORDER — HYDROMORPHONE HYDROCHLORIDE 1 MG/ML
0.4 INJECTION, SOLUTION INTRAMUSCULAR; INTRAVENOUS; SUBCUTANEOUS
Status: DISCONTINUED | OUTPATIENT
Start: 2018-07-10 | End: 2018-07-11

## 2018-07-10 RX ORDER — HYDROCODONE BITARTRATE AND ACETAMINOPHEN 5; 325 MG/1; MG/1
1 TABLET ORAL EVERY 4 HOURS PRN
Status: DISCONTINUED | OUTPATIENT
Start: 2018-07-10 | End: 2018-07-11

## 2018-07-10 RX ORDER — OXYBUTYNIN CHLORIDE 5 MG/1
5 TABLET ORAL 2 TIMES DAILY
Status: DISCONTINUED | OUTPATIENT
Start: 2018-07-11 | End: 2018-07-11

## 2018-07-10 RX ORDER — SODIUM CHLORIDE, SODIUM LACTATE, POTASSIUM CHLORIDE, CALCIUM CHLORIDE 600; 310; 30; 20 MG/100ML; MG/100ML; MG/100ML; MG/100ML
INJECTION, SOLUTION INTRAVENOUS CONTINUOUS
Status: DISCONTINUED | OUTPATIENT
Start: 2018-07-10 | End: 2018-07-10 | Stop reason: HOSPADM

## 2018-07-10 RX ORDER — LIDOCAINE HYDROCHLORIDE 10 MG/ML
INJECTION, SOLUTION EPIDURAL; INFILTRATION; INTRACAUDAL; PERINEURAL AS NEEDED
Status: DISCONTINUED | OUTPATIENT
Start: 2018-07-10 | End: 2018-07-10 | Stop reason: HOSPADM

## 2018-07-10 RX ORDER — AMLODIPINE BESYLATE 10 MG/1
10 TABLET ORAL
Status: DISCONTINUED | OUTPATIENT
Start: 2018-07-11 | End: 2018-07-11

## 2018-07-10 RX ORDER — PRAVASTATIN SODIUM 20 MG
40 TABLET ORAL NIGHTLY
Status: DISCONTINUED | OUTPATIENT
Start: 2018-07-11 | End: 2018-07-11

## 2018-07-10 RX ORDER — HYDROMORPHONE HYDROCHLORIDE 1 MG/ML
0.2 INJECTION, SOLUTION INTRAMUSCULAR; INTRAVENOUS; SUBCUTANEOUS
Status: DISCONTINUED | OUTPATIENT
Start: 2018-07-10 | End: 2018-07-11

## 2018-07-10 RX ORDER — HEPARIN SODIUM 5000 [USP'U]/ML
5000 INJECTION, SOLUTION INTRAVENOUS; SUBCUTANEOUS EVERY 8 HOURS SCHEDULED
Status: DISCONTINUED | OUTPATIENT
Start: 2018-07-10 | End: 2018-07-11

## 2018-07-10 RX ORDER — ROCURONIUM BROMIDE 10 MG/ML
INJECTION, SOLUTION INTRAVENOUS AS NEEDED
Status: DISCONTINUED | OUTPATIENT
Start: 2018-07-10 | End: 2018-07-10 | Stop reason: SURG

## 2018-07-10 RX ORDER — FAMOTIDINE 20 MG/1
20 TABLET ORAL ONCE
Status: COMPLETED | OUTPATIENT
Start: 2018-07-10 | End: 2018-07-10

## 2018-07-10 RX ORDER — CEFAZOLIN SODIUM/WATER 2 G/20 ML
2 SYRINGE (ML) INTRAVENOUS ONCE
Status: COMPLETED | OUTPATIENT
Start: 2018-07-10 | End: 2018-07-10

## 2018-07-10 RX ORDER — HYDROMORPHONE HYDROCHLORIDE 1 MG/ML
0.2 INJECTION, SOLUTION INTRAMUSCULAR; INTRAVENOUS; SUBCUTANEOUS EVERY 2 HOUR PRN
Status: DISCONTINUED | OUTPATIENT
Start: 2018-07-10 | End: 2018-07-11

## 2018-07-10 RX ORDER — HYDROCODONE BITARTRATE AND ACETAMINOPHEN 5; 325 MG/1; MG/1
1 TABLET ORAL AS NEEDED
Status: DISCONTINUED | OUTPATIENT
Start: 2018-07-10 | End: 2018-07-10 | Stop reason: HOSPADM

## 2018-07-10 RX ORDER — SODIUM CHLORIDE 9 MG/ML
INJECTION, SOLUTION INTRAVENOUS CONTINUOUS
Status: DISCONTINUED | OUTPATIENT
Start: 2018-07-10 | End: 2018-07-10

## 2018-07-10 RX ORDER — HYDROCODONE BITARTRATE AND ACETAMINOPHEN 5; 325 MG/1; MG/1
2 TABLET ORAL EVERY 4 HOURS PRN
Status: DISCONTINUED | OUTPATIENT
Start: 2018-07-10 | End: 2018-07-11

## 2018-07-10 RX ORDER — ATENOLOL 50 MG/1
50 TABLET ORAL
Status: DISCONTINUED | OUTPATIENT
Start: 2018-07-11 | End: 2018-07-11

## 2018-07-10 RX ORDER — DOCUSATE SODIUM 100 MG/1
100 CAPSULE, LIQUID FILLED ORAL 2 TIMES DAILY
Status: DISCONTINUED | OUTPATIENT
Start: 2018-07-10 | End: 2018-07-11

## 2018-07-10 RX ORDER — ONDANSETRON 2 MG/ML
4 INJECTION INTRAMUSCULAR; INTRAVENOUS ONCE AS NEEDED
Status: COMPLETED | OUTPATIENT
Start: 2018-07-10 | End: 2018-07-10

## 2018-07-10 RX ORDER — METOCLOPRAMIDE HYDROCHLORIDE 5 MG/ML
5 INJECTION INTRAMUSCULAR; INTRAVENOUS EVERY 8 HOURS PRN
Status: DISCONTINUED | OUTPATIENT
Start: 2018-07-10 | End: 2018-07-11

## 2018-07-10 RX ORDER — ONDANSETRON 2 MG/ML
INJECTION INTRAMUSCULAR; INTRAVENOUS AS NEEDED
Status: DISCONTINUED | OUTPATIENT
Start: 2018-07-10 | End: 2018-07-10 | Stop reason: SURG

## 2018-07-10 RX ORDER — EPHEDRINE SULFATE 50 MG/ML
INJECTION, SOLUTION INTRAVENOUS AS NEEDED
Status: DISCONTINUED | OUTPATIENT
Start: 2018-07-10 | End: 2018-07-10 | Stop reason: SURG

## 2018-07-10 RX ORDER — POLYETHYLENE GLYCOL 3350 17 G/17G
17 POWDER, FOR SOLUTION ORAL DAILY PRN
Status: DISCONTINUED | OUTPATIENT
Start: 2018-07-10 | End: 2018-07-11

## 2018-07-10 RX ORDER — HEPARIN SODIUM 5000 [USP'U]/ML
5000 INJECTION, SOLUTION INTRAVENOUS; SUBCUTANEOUS EVERY 12 HOURS SCHEDULED
Status: DISCONTINUED | OUTPATIENT
Start: 2018-07-11 | End: 2018-07-10

## 2018-07-10 RX ORDER — CHOLECALCIFEROL (VITAMIN D3) 125 MCG
2000 CAPSULE ORAL DAILY
Status: DISCONTINUED | OUTPATIENT
Start: 2018-07-11 | End: 2018-07-11

## 2018-07-10 RX ORDER — SODIUM CHLORIDE 0.9 % (FLUSH) 0.9 %
3 SYRINGE (ML) INJECTION AS NEEDED
Status: DISCONTINUED | OUTPATIENT
Start: 2018-07-10 | End: 2018-07-11

## 2018-07-10 RX ORDER — HYDROMORPHONE HYDROCHLORIDE 1 MG/ML
0.4 INJECTION, SOLUTION INTRAMUSCULAR; INTRAVENOUS; SUBCUTANEOUS EVERY 5 MIN PRN
Status: DISCONTINUED | OUTPATIENT
Start: 2018-07-10 | End: 2018-07-10 | Stop reason: HOSPADM

## 2018-07-10 RX ORDER — GLYCOPYRROLATE 0.2 MG/ML
INJECTION INTRAMUSCULAR; INTRAVENOUS AS NEEDED
Status: DISCONTINUED | OUTPATIENT
Start: 2018-07-10 | End: 2018-07-10 | Stop reason: SURG

## 2018-07-10 RX ORDER — HYDROCODONE BITARTRATE AND ACETAMINOPHEN 5; 325 MG/1; MG/1
1 TABLET ORAL EVERY 6 HOURS PRN
Qty: 15 TABLET | Refills: 0 | Status: SHIPPED | OUTPATIENT
Start: 2018-07-10 | End: 2018-08-31

## 2018-07-10 RX ORDER — HYDROCODONE BITARTRATE AND ACETAMINOPHEN 5; 325 MG/1; MG/1
2 TABLET ORAL AS NEEDED
Status: DISCONTINUED | OUTPATIENT
Start: 2018-07-10 | End: 2018-07-10 | Stop reason: HOSPADM

## 2018-07-10 RX ORDER — HYDROMORPHONE HYDROCHLORIDE 1 MG/ML
0.8 INJECTION, SOLUTION INTRAMUSCULAR; INTRAVENOUS; SUBCUTANEOUS EVERY 2 HOUR PRN
Status: DISCONTINUED | OUTPATIENT
Start: 2018-07-10 | End: 2018-07-11

## 2018-07-10 RX ADMIN — GLYCOPYRROLATE 0.2 MG: 0.2 INJECTION INTRAMUSCULAR; INTRAVENOUS at 10:35:00

## 2018-07-10 RX ADMIN — NEOSTIGMINE METHYLSULFATE 4 MG: 0.5 INJECTION INTRAVENOUS at 10:21:00

## 2018-07-10 RX ADMIN — LIDOCAINE HYDROCHLORIDE 50 MG: 10 INJECTION, SOLUTION EPIDURAL; INFILTRATION; INTRACAUDAL; PERINEURAL at 09:19:00

## 2018-07-10 RX ADMIN — ROCURONIUM BROMIDE 10 MG: 10 INJECTION, SOLUTION INTRAVENOUS at 09:45:00

## 2018-07-10 RX ADMIN — SODIUM CHLORIDE: 9 INJECTION, SOLUTION INTRAVENOUS at 09:55:00

## 2018-07-10 RX ADMIN — ROCURONIUM BROMIDE 40 MG: 10 INJECTION, SOLUTION INTRAVENOUS at 09:20:00

## 2018-07-10 RX ADMIN — CEFAZOLIN SODIUM/WATER 2 G: 2 G/20 ML SYRINGE (ML) INTRAVENOUS at 09:32:00

## 2018-07-10 RX ADMIN — SODIUM CHLORIDE: 9 INJECTION, SOLUTION INTRAVENOUS at 09:12:00

## 2018-07-10 RX ADMIN — EPHEDRINE SULFATE 10 MG: 50 INJECTION, SOLUTION INTRAVENOUS at 09:28:00

## 2018-07-10 RX ADMIN — NEOSTIGMINE METHYLSULFATE 1 MG: 0.5 INJECTION INTRAVENOUS at 10:35:00

## 2018-07-10 RX ADMIN — ONDANSETRON 4 MG: 2 INJECTION INTRAMUSCULAR; INTRAVENOUS at 09:47:00

## 2018-07-10 RX ADMIN — GLYCOPYRROLATE 0.6 MG: 0.2 INJECTION INTRAMUSCULAR; INTRAVENOUS at 10:21:00

## 2018-07-10 NOTE — BRIEF OP NOTE
[facility] POST ANESTHESIA CARE UNIT  Brief Op Note       Patients Name: Miguel Suárez  Attending Physician: Pedro Shannon MD  Operating Physician: Pedro Shannon MD  CSN: [id number]     Location:  OR  MRN: [id number]    YOB: 1940

## 2018-07-10 NOTE — H&P
Moon Cook is a 66year old female has been followed by renal for decreasing renal function. Pt here to discuss possible PD cath placement. Pt has discussed with Dr Fanny Matt and met with PD nurse already.   Previous surgery C sections only       TWICE DAILY Disp: 180 tablet Rfl: 0   AMLODIPINE BESYLATE 10 MG Oral Tab TAKE 1 TABLET EVERY DAY Disp: 90 tablet Rfl: 0   ATENOLOL 50 MG Oral Tab TAKE 1 TABLET EVERY DAY Disp: 90 tablet Rfl: 0   Sodium Polystyrene Sulfonate 15 GM/60ML Oral Suspension Take Packs/day: 1.00      Years: 0.00         Types: Cigarettes     Start date: 6/1/1963  Smokeless tobacco: Never Used                      Alcohol use:  No                   ROS:      GENERAL HEALTH: generally getting weaker, no fevers or chills

## 2018-07-10 NOTE — ANESTHESIA PREPROCEDURE EVALUATION
Anesthesia PreOp Note    HPI:     Cayden Harley is a 66year old female who presents for preoperative consultation requested by: Rajni Hernandez MD    Date of Surgery: 7/10/2018    Procedure(s):  LAPAROSCOPIC PERITONEAL DIALYSIS CATH INSERTION  Indicati HISTORY      Comment: Parathyroid sx.    No date: TONSILLECTOMY      Prescriptions Prior to Admission:  PRAVASTATIN SODIUM 40 MG Oral Tab TAKE 1 TABLET EVERY NIGHT Disp: 90 tablet Rfl: 0 7/8/2018   OXYBUTYNIN CHLORIDE 5 MG Oral Tab TAKE 1 TABLET TWICE DAILY Occupational History  None on file     Social History Main Topics   Smoking status: Current Every Day Smoker  0.50 Packs/day  For 53.00 Years     Types: Cigarettes    Start date: 6/1/1963    Smokeless tobacco: Never Used    Alcohol use Yes  0.0 oz/week 50-55%. Septal wall motion abnormality     Doppler parameters are consistent with abnormal left ventricular relaxation     (grade 1 diastolic dysfunction). 2. Mitral valve: Mildly calcified annulus. Normal thickness leaflets. .  3.  Right atrium: The atriu

## 2018-07-10 NOTE — ANESTHESIA PROCEDURE NOTES
Airway  Date/Time: 7/10/2018 9:22 AM  Urgency: elective    Airway not difficult    General Information and Staff    Patient location during procedure: OR  Anesthesiologist: Pam Cardona  Resident/CRNA: Darryl Cordon  Performed: anesthesiologist an

## 2018-07-10 NOTE — CONSULTS
[facility] HOSP - [facility]    Report of Consultation    Svetlana Campos Patient Status:  Hospital Outpatient Surgery    3/31/1940 MRN [id number]   Location [address] [facility] Attending Dianelys Pierre MD   Hosp Day # 0 [facility] taking 40 mg Pravastatin; states not been told she has high cholesterol   • Renal disorder    • Visual impairment        Past Surgical History  Past Surgical History:  No date:   No date: CATARACT  11/15/2017: CATARACT EXTRACTION Right      Commen mg Intravenous Q5 Min PRN   metoprolol Tartrate (LOPRESSOR) 5 MG/5ML injection 2.5 mg 2.5 mg Intravenous Once   Atropine Sulfate 0.1 MG/ML injection 0.5 mg 0.5 mg Intravenous PRN   Prochlorperazine Edisylate (COMPAZINE) injection 5 mg 5 mg Intravenous Once 06/27/2018   BUN 37 (H) 06/27/2018    06/27/2018   K 4.5 06/27/2018    06/27/2018   CO2 26 06/27/2018    (H) 06/27/2018   CA 8.6 06/27/2018   ALB 3.5 06/22/2018   ALKPHO 55 06/22/2018   TP 6.3 06/22/2018   AST 12 (L) 06/22/2018   ALT 9 (

## 2018-07-10 NOTE — H&P
[facility] HOSP - [facility]    History and Physical    Sukumar Chapman Patient Status:  Hospital Outpatient Surgery    3/31/1940 MRN [id number]   Location [address] UNIT Attending Stacey Bolanos MD   Hosp Day # 0 PCP Kimmy Freeman daily.       Review of Systems:     Constitutional: Negative for activity change, appetite change, chills, diaphoresis, fatigue, fever and unexpected weight change. HENT: Negative. Eyes: Negative.     Respiratory: Negative for apnea, cough, choking, ch Lab Results  Component Value Date   WBC 9.7 07/10/2018   HGB 14.5 07/10/2018   HCT 44.5 07/10/2018    07/10/2018   CREATSERUM 3.31 (H) 07/10/2018   BUN 47 (H) 07/10/2018    07/10/2018   K 5.4 (H) 07/10/2018    07/10/2018   CO2 20 (L)

## 2018-07-10 NOTE — ANESTHESIA POSTPROCEDURE EVALUATION
Patient: Miguel Suárez    Procedure Summary     Date:  07/10/18 Room / Location:  [address] [place] [address] MAIN OR 08 / 300 [facility] MAIN OR    Anesthesia Start:  1742 Anesthesia Stop:      Procedure:  LAPAROSCOPIC PERITONEAL DIALYSIS CATH INSERTION (N/A ) Diagnosis:  (Chronic franny

## 2018-07-11 VITALS
HEIGHT: 59 IN | DIASTOLIC BLOOD PRESSURE: 65 MMHG | OXYGEN SATURATION: 91 % | WEIGHT: 148.31 LBS | TEMPERATURE: 98 F | RESPIRATION RATE: 19 BRPM | SYSTOLIC BLOOD PRESSURE: 104 MMHG | BODY MASS INDEX: 29.9 KG/M2 | HEART RATE: 69 BPM

## 2018-07-11 LAB
ANION GAP SERPL CALC-SCNC: 7 MMOL/L (ref 0–18)
BASOPHILS # BLD: 0.1 K/UL (ref 0–0.2)
BASOPHILS NFR BLD: 1 %
BUN SERPL-MCNC: 44 MG/DL (ref 8–20)
BUN/CREAT SERPL: 13.4 (ref 10–20)
CALCIUM SERPL-MCNC: 7.6 MG/DL (ref 8.5–10.5)
CHLORIDE SERPL-SCNC: 110 MMOL/L (ref 95–110)
CO2 SERPL-SCNC: 21 MMOL/L (ref 22–32)
CREAT SERPL-MCNC: 3.29 MG/DL (ref 0.5–1.5)
EOSINOPHIL # BLD: 0.2 K/UL (ref 0–0.7)
EOSINOPHIL NFR BLD: 3 %
ERYTHROCYTE [DISTWIDTH] IN BLOOD BY AUTOMATED COUNT: 14.2 % (ref 11–15)
GLUCOSE SERPL-MCNC: 81 MG/DL (ref 70–99)
HBA1C MFR BLD: 5.7 % (ref 4–6)
HCT VFR BLD AUTO: 42.1 % (ref 35–48)
HGB BLD-MCNC: 13.5 G/DL (ref 12–16)
LYMPHOCYTES # BLD: 1.5 K/UL (ref 1–4)
LYMPHOCYTES NFR BLD: 19 %
MAGNESIUM SERPL-MCNC: 1.9 MG/DL (ref 1.8–2.5)
MCH RBC QN AUTO: 32.1 PG (ref 27–32)
MCHC RBC AUTO-ENTMCNC: 32.1 G/DL (ref 32–37)
MCV RBC AUTO: 100 FL (ref 80–100)
MONOCYTES # BLD: 0.7 K/UL (ref 0–1)
MONOCYTES NFR BLD: 8 %
NEUTROPHILS # BLD AUTO: 5.6 K/UL (ref 1.8–7.7)
NEUTROPHILS NFR BLD: 69 %
OSMOLALITY UR CALC.SUM OF ELEC: 296 MOSM/KG (ref 275–295)
PHOSPHATE SERPL-MCNC: 5.3 MG/DL (ref 2.4–4.7)
PLATELET # BLD AUTO: 180 K/UL (ref 140–400)
PMV BLD AUTO: 8 FL (ref 7.4–10.3)
POTASSIUM SERPL-SCNC: 5 MMOL/L (ref 3.3–5.1)
RBC # BLD AUTO: 4.21 M/UL (ref 3.7–5.4)
SODIUM SERPL-SCNC: 138 MMOL/L (ref 136–144)
WBC # BLD AUTO: 8 K/UL (ref 4–11)

## 2018-07-11 PROCEDURE — 99232 SBSQ HOSP IP/OBS MODERATE 35: CPT | Performed by: INTERNAL MEDICINE

## 2018-07-11 PROCEDURE — 99238 HOSP IP/OBS DSCHRG MGMT 30/<: CPT | Performed by: INTERNAL MEDICINE

## 2018-07-11 PROCEDURE — 99223 1ST HOSP IP/OBS HIGH 75: CPT | Performed by: INTERNAL MEDICINE

## 2018-07-11 NOTE — PROGRESS NOTES
[facility] Note:  Renal Dose Adjustment for Metoclopramide (REGLAN)    Sukumar Chapman has been prescribed Metoclopramide (REGLAN) 10 mg every 8 hours as needed for Nausea, vomiting.     Estimated Creatinine Clearance: 14.8 mL/min (A) (based on SCr of 3

## 2018-07-11 NOTE — PROGRESS NOTES
Vitamin B-12 ER TBCR 2,000 mcg is Non-Formulary Medication &  Auto-Substituted to Vitamin B-12 ER immediate release 2,000  Per P&T PROTOCOL

## 2018-07-11 NOTE — CM/SW NOTE
7/11/18 CM Discharge planning   Spoke with RN, pt is independent, planning d/c home today, no home  going needs identified.    Bev De Los Santos X [phone]

## 2018-07-11 NOTE — PLAN OF CARE
Patient Centered Care    • Patient preferences are identified and integrated in the patient's plan of care Progressing    Client participates in care     Patient/Family Goals    • Patient/Family Long Term Goal Progressing    • Patient/Family [address]

## 2018-07-11 NOTE — DISCHARGE SUMMARY
DISCHARGE SUMMARY     Jennifer Metcalf Patient Status:  Inpatient    3/31/1940 MRN [id number]   Location [facility] 3W/SW Attending Nolan Thurston., MD   [facility] Day # 1 PCP Michael Brock.  Phoebe Costa MD     Date of Admission: 7/10/2018  Date of Discharge hepatosplenomegaly. There is no tenderness. No hernia. Musculoskeletal: Normal range of motion. Neurological: She is alert and oriented to person, place, and time. She displays normal reflexes.  No cranial nerve deficit, sensory deficit or motor deficit List    New Orders    HYDROcodone-acetaminophen 5-325 MG Oral Tab  Take 1 tablet by mouth every 6 (six) hours as needed for Pain.       Home Meds - Unchanged    PRAVASTATIN SODIUM 40 MG Oral Tab  TAKE 1 TABLET EVERY NIGHT    OXYBUTYNIN CHLORIDE 5 MG Oral Ta

## 2018-07-11 NOTE — PROGRESS NOTES
[facility]    Progress Note    Vladislav Graver Patient Status:  Inpatient    3/31/1940 MRN [id number]   Location [facility] 3W/SW Attending Dara Tay MD   [facility] Day # 1 PCP Romelia Mckeon.  Helen Flower MD            Subjective: (cpt=71045)    Result Date: 7/10/2018  CONCLUSION:  1. Cardiomegaly. 2. Atherosclerosis. 3. Scarring/atelectasis. 4. Prominent markings. 5. Demineralization. 6. Scoliosis. 7. Osteoarthritis. 8. Postop changes in the left paratracheal region.      Dictated b

## 2018-07-11 NOTE — PROGRESS NOTES
[facility] - [facility]    Progress Note    [facility] Patient Status:  Inpatient    3/31/1940 MRN [id number]   Location [facility] 3W/SW Attending Deneen Soto MD   [facility] Day # 1 PCP Sandra Lepe.  Kyara Simon MD     Subjective:     Cons 06/22/2018   BILT 0.5 06/22/2018   TP 6.3 06/22/2018   AST 12 (L) 06/22/2018   ALT 9 (L) 06/22/2018   INR 1.0 01/31/2017   TSH 1.51 06/22/2018   ESRML 6 06/22/2018   MG 1.9 07/11/2018   PHOS 5.3 (H) 07/11/2018   TROP 0.00 02/22/2017   B12 >1,500 (H) 06/22/

## 2018-07-11 NOTE — OPERATIVE REPORT
[facility]    PATIENT'S NAME: Albin Hoyt   ATTENDING PHYSICIAN: [address] Mey Bai MD   OPERATING PHYSICIAN: Mich Stevens MD   PATIENT ACCOUNT#:   [id number]    LOCATION:  [address] [place] [address] #:   [id number]       DATE OF BIRTH: umbilicus. The only area to really bring it out was in the left lower quadrant. I had marked the area with the patient. The catheter was measured and we made a small incision just above the umbilicus in the rectus area on the left and taken down.   A pur placed careful dressings around it. Sterile dressings were applied. We added a portion of the tubing. The patient tolerated the procedure well and went to the recovery room in good condition. Dictated By David Louise MD  d: 07/11/2018 11:12:09  t:

## 2018-07-11 NOTE — CONSULTS
[facility]    PATIENT'S NAME: Theoplis Minder   ATTENDING PHYSICIAN: [address] [facility]  Sheila Costa MD   CONSULTING PHYSICIAN: Melchor Trinidad MD   PATIENT ACCOUNT#:   [id number]    LOCATION:  [facility] [address] [address] S #:   [id number]       DATE OF BI distress, has minimal discomfort from placement of the peritoneal dialysis catheter. VITAL SIGNS:  Blood pressure 104/65 with a pulse 69, respirations 19, temperature 98.2. Weight 148 pounds.    HEENT:  Mucous membranes moist.    SKIN:  Skin turgor adeq Encouraged her to quit smoking cigarettes completely.      Dictated By Aida Mahoney MD  d: 07/11/2018 10:24:49  t: 07/11/2018 10:33:19  [facility] [id number]  [facility]/

## 2018-07-20 ENCOUNTER — TELEPHONE (OUTPATIENT)
Dept: INTERNAL MEDICINE CLINIC | Facility: CLINIC | Age: 78
End: 2018-07-20

## 2018-07-20 DIAGNOSIS — N18.6 END STAGE RENAL DISEASE (HCC): Primary | ICD-10-CM

## 2018-07-20 NOTE — TELEPHONE ENCOUNTER
Patient called requesting a referral for dialysis at [address] [place] [address]. Pended referral. Please review diagnosis and sign off if you agree.     Thank you,  [facility] [phone]

## 2018-07-27 ENCOUNTER — TELEPHONE (OUTPATIENT)
Dept: NEPHROLOGY | Facility: CLINIC | Age: 78
End: 2018-07-27

## 2018-07-27 DIAGNOSIS — Z20.1 TB (TUBERCULOSIS) CONTACT: Primary | ICD-10-CM

## 2018-08-13 ENCOUNTER — HOSPITAL ENCOUNTER (OUTPATIENT)
Dept: GENERAL RADIOLOGY | Facility: HOSPITAL | Age: 78
Discharge: HOME | End: 2018-08-13
Attending: INTERNAL MEDICINE
Payer: MEDICARE

## 2018-08-13 DIAGNOSIS — Z20.1 TB (TUBERCULOSIS) CONTACT: ICD-10-CM

## 2018-08-13 PROCEDURE — 71046 X-RAY EXAM CHEST 2 VIEWS: CPT | Performed by: INTERNAL MEDICINE

## 2018-08-17 RX ORDER — AMLODIPINE BESYLATE 10 MG/1
10 TABLET ORAL
Qty: 90 TABLET | Refills: 0 | Status: SHIPPED | OUTPATIENT
Start: 2018-08-17 | End: 2019-01-18

## 2018-08-17 RX ORDER — ATENOLOL 50 MG/1
50 TABLET ORAL
Qty: 90 TABLET | Refills: 0 | Status: SHIPPED | OUTPATIENT
Start: 2018-08-17 | End: 2018-11-23

## 2018-08-17 RX ORDER — OXYBUTYNIN CHLORIDE 5 MG/1
5 TABLET ORAL 2 TIMES DAILY
Qty: 180 TABLET | Refills: 0 | Status: SHIPPED | OUTPATIENT
Start: 2018-08-17 | End: 2018-11-23

## 2018-08-17 NOTE — TELEPHONE ENCOUNTER
From: Cayden Harley  Sent: 8/17/2018 3:33 PM CDT  Subject: Medication Renewal Request    Cayden Harley would like a refill of the following medications:     OXYBUTYNIN CHLORIDE 5 MG Oral Tab [Eunice Villareal MD]     AMLODIPINE BESYLATE 10 MG Oral

## 2018-08-17 NOTE — TELEPHONE ENCOUNTER
Refill protocol failed, creatinine out of range.   [address] [address] please advise on refill request.      Hypertensive Medications  Protocol Criteria:  · Appointment scheduled in the past 6 months or in the next 3 months  · BMP or CMP in the past 12 months  · Creatinine

## 2018-08-29 ENCOUNTER — HOSPITAL ENCOUNTER (EMERGENCY)
Facility: HOSPITAL | Age: 78
Discharge: HOME | End: 2018-08-29
Attending: EMERGENCY MEDICINE
Payer: MEDICARE

## 2018-08-29 ENCOUNTER — TELEPHONE (OUTPATIENT)
Dept: INTERNAL MEDICINE CLINIC | Facility: CLINIC | Age: 78
End: 2018-08-29

## 2018-08-29 VITALS
WEIGHT: 140 LBS | OXYGEN SATURATION: 98 % | TEMPERATURE: 99 F | BODY MASS INDEX: 28.22 KG/M2 | HEART RATE: 70 BPM | RESPIRATION RATE: 18 BRPM | DIASTOLIC BLOOD PRESSURE: 89 MMHG | SYSTOLIC BLOOD PRESSURE: 129 MMHG | HEIGHT: 59 IN

## 2018-08-29 DIAGNOSIS — N30.00 ACUTE CYSTITIS WITHOUT HEMATURIA: Primary | ICD-10-CM

## 2018-08-29 DIAGNOSIS — R41.3 MEMORY DIFFICULTY: ICD-10-CM

## 2018-08-29 LAB
ANION GAP SERPL CALC-SCNC: 11 MMOL/L (ref 0–18)
BASOPHILS # BLD: 0 K/UL (ref 0–0.2)
BASOPHILS NFR BLD: 0 %
BILIRUB UR QL: NEGATIVE
BUN SERPL-MCNC: 52 MG/DL (ref 8–20)
BUN/CREAT SERPL: 11.8 (ref 10–20)
CALCIUM SERPL-MCNC: 7.6 MG/DL (ref 8.5–10.5)
CHLORIDE SERPL-SCNC: 97 MMOL/L (ref 95–110)
CO2 SERPL-SCNC: 23 MMOL/L (ref 22–32)
COLOR UR: YELLOW
CREAT SERPL-MCNC: 4.4 MG/DL (ref 0.5–1.5)
EOSINOPHIL # BLD: 0.4 K/UL (ref 0–0.7)
EOSINOPHIL NFR BLD: 5 %
ERYTHROCYTE [DISTWIDTH] IN BLOOD BY AUTOMATED COUNT: 13.8 % (ref 11–15)
GLUCOSE SERPL-MCNC: 94 MG/DL (ref 70–99)
GLUCOSE UR-MCNC: NEGATIVE MG/DL
HCT VFR BLD AUTO: 47.9 % (ref 35–48)
HGB BLD-MCNC: 15.5 G/DL (ref 12–16)
KETONES UR-MCNC: NEGATIVE MG/DL
LYMPHOCYTES # BLD: 1.7 K/UL (ref 1–4)
LYMPHOCYTES NFR BLD: 23 %
MCH RBC QN AUTO: 31.6 PG (ref 27–32)
MCHC RBC AUTO-ENTMCNC: 32.4 G/DL (ref 32–37)
MCV RBC AUTO: 97.3 FL (ref 80–100)
MONOCYTES # BLD: 0.8 K/UL (ref 0–1)
MONOCYTES NFR BLD: 11 %
NEUTROPHILS # BLD AUTO: 4.5 K/UL (ref 1.8–7.7)
NEUTROPHILS NFR BLD: 60 %
NITRITE UR QL STRIP.AUTO: NEGATIVE
OSMOLALITY UR CALC.SUM OF ELEC: 286 MOSM/KG (ref 275–295)
PH UR: 5 [PH] (ref 5–8)
PLATELET # BLD AUTO: 170 K/UL (ref 140–400)
PMV BLD AUTO: 8 FL (ref 7.4–10.3)
PROT UR-MCNC: 100 MG/DL
RBC # BLD AUTO: 4.93 M/UL (ref 3.7–5.4)
RBC #/AREA URNS AUTO: 7 /HPF
SODIUM SERPL-SCNC: 131 MMOL/L (ref 136–144)
SP GR UR STRIP: 1.02 (ref 1–1.03)
UROBILINOGEN UR STRIP-ACNC: <2
VIT C UR-MCNC: NEGATIVE MG/DL
WBC # BLD AUTO: 7.4 K/UL (ref 4–11)
WBC #/AREA URNS AUTO: 51 /HPF

## 2018-08-29 PROCEDURE — 87077 CULTURE AEROBIC IDENTIFY: CPT | Performed by: EMERGENCY MEDICINE

## 2018-08-29 PROCEDURE — 80048 BASIC METABOLIC PNL TOTAL CA: CPT

## 2018-08-29 PROCEDURE — 87086 URINE CULTURE/COLONY COUNT: CPT | Performed by: EMERGENCY MEDICINE

## 2018-08-29 PROCEDURE — 85025 COMPLETE CBC W/AUTO DIFF WBC: CPT | Performed by: EMERGENCY MEDICINE

## 2018-08-29 PROCEDURE — 85025 COMPLETE CBC W/AUTO DIFF WBC: CPT

## 2018-08-29 PROCEDURE — 87184 SC STD DISK METHOD PER PLATE: CPT | Performed by: EMERGENCY MEDICINE

## 2018-08-29 PROCEDURE — 81001 URINALYSIS AUTO W/SCOPE: CPT | Performed by: EMERGENCY MEDICINE

## 2018-08-29 PROCEDURE — 99284 EMERGENCY DEPT VISIT MOD MDM: CPT

## 2018-08-29 PROCEDURE — 87186 SC STD MICRODIL/AGAR DIL: CPT | Performed by: EMERGENCY MEDICINE

## 2018-08-29 PROCEDURE — 80048 BASIC METABOLIC PNL TOTAL CA: CPT | Performed by: EMERGENCY MEDICINE

## 2018-08-29 PROCEDURE — 36415 COLL VENOUS BLD VENIPUNCTURE: CPT

## 2018-08-29 RX ORDER — CEPHALEXIN 250 MG/1
250 CAPSULE ORAL 2 TIMES DAILY
Qty: 14 CAPSULE | Refills: 0 | Status: SHIPPED | OUTPATIENT
Start: 2018-08-29 | End: 2018-08-31

## 2018-08-29 NOTE — TELEPHONE ENCOUNTER
Unable to reach patient by phone. PD nurse was able to provide me the son's phone number, Jose Traylor. I contacted Jose Traylor. He is going to his mom's now to check on her and understands we are recommending ER evaluation.  He stated mom may be out to lunch with a frien

## 2018-08-29 NOTE — ED INITIAL ASSESSMENT (HPI)
Patient presents to triage with family member with complaints of increasing memory issues. Per son patient was sent to ED for evaluation for AMS. Patient is currently undergoing peritoneal dialysis.   Patient states a few nights ago she was preparing her

## 2018-08-29 NOTE — TELEPHONE ENCOUNTER
Son at [facility] home now. Patient called back. Instructed you recommended an ER eval and she expressed understanding. Stated son would transport to [facility] ER today. Will follow.

## 2018-08-29 NOTE — TELEPHONE ENCOUNTER
Avni Pacheco, peritoneal dialysis nurse with [address] [facility] ([phone]) phoned regarding [facility]. They are concerned that patient is having increased confusion and memory loss. They are checking a UA but wanted her to see you for evaluation.  She has an appoi

## 2018-08-30 ENCOUNTER — TELEPHONE (OUTPATIENT)
Dept: INTERNAL MEDICINE CLINIC | Facility: CLINIC | Age: 78
End: 2018-08-30

## 2018-08-30 NOTE — TELEPHONE ENCOUNTER
Complete Voice Message left - appt scheduled for tomorrow Friday at 2;45 in [facility]

## 2018-08-30 NOTE — ED PROVIDER NOTES
Patient Seen in: [facility] AND [facility] Emergency Department    History   Patient presents with:  Memory Loss: a few days ago    Stated Complaint: Alter mental status     HPI    79-year-old female on peritoneal dialysis for several weeks who had a short loss HISTORY      Comment: pd cath    No date: TONSILLECTOMY        Smoking status: Current Every Day Smoker                                                   Packs/day: 0.50      Years: 53.00        Types: Cigarettes     Start date: 6/1/1963  Smokeless tobacco Notable for the following:        Result Value    Sodium 131 (*)     BUN 52 (*)     Creatinine 4.40 (*)     Calcium, Total 7.6 (*)     GFR, Non- 9 (*)     GFR, -American 10 (*)     All other components within normal limits   URINALYS difficulty    Disposition:  Discharge  8/29/2018  7:25 pm    Follow-up:  MD Katherine Smith [facility] [phone]  [phone]    Schedule an appointment as soon as possible for a visit in 2 days      We recommend that you schedule follow

## 2018-08-30 NOTE — TELEPHONE ENCOUNTER
Patient went to [facility] ER yesterday patient has appointment September 21st patient wants to know if she needs to be seen sooner

## 2018-08-31 ENCOUNTER — OFFICE VISIT (OUTPATIENT)
Dept: INTERNAL MEDICINE CLINIC | Facility: CLINIC | Age: 78
End: 2018-08-31

## 2018-08-31 VITALS
DIASTOLIC BLOOD PRESSURE: 71 MMHG | BODY MASS INDEX: 29.04 KG/M2 | HEART RATE: 61 BPM | SYSTOLIC BLOOD PRESSURE: 118 MMHG | HEIGHT: 59.5 IN | WEIGHT: 146 LBS | OXYGEN SATURATION: 98 % | TEMPERATURE: 98 F

## 2018-08-31 DIAGNOSIS — G93.40 ACUTE ENCEPHALOPATHY: ICD-10-CM

## 2018-08-31 DIAGNOSIS — N30.00 ACUTE CYSTITIS WITHOUT HEMATURIA: Primary | ICD-10-CM

## 2018-08-31 PROCEDURE — 99214 OFFICE O/P EST MOD 30 MIN: CPT | Performed by: INTERNAL MEDICINE

## 2018-08-31 NOTE — PROGRESS NOTES
HPI:    Patient ID: Bienvenido Read is a 66year old female. HPI   Follow up from ER. Usu. qhs does PD. Warm up bag of fluid. Then, does PD. 2 nights ago, could not find bag. Bed and then awaken. Don't remember doing PD.  But was able to withdraw fluid Negative. Negative for chest pain, palpitations and leg swelling. Gastrointestinal: Negative for abdominal distention, abdominal pain, anal bleeding, blood in stool, constipation, diarrhea, nausea, rectal pain and vomiting.    Endocrine: Negative for col Sees Dr. Galileo Torres (renal MD). • Cataract    • Essential hypertension    • History of primary hyperparathyroidism 02/23/2016    S/P removal L superior and inferior parathyroids.     • Hyperlipidemia     Pt. taking 40 mg Pravastatin; states not been told s decreased hearing is noted. Left Ear: Tympanic membrane, external ear and ear canal normal. No lacerations. No drainage, swelling or tenderness. No foreign bodies. No mastoid tenderness.  Tympanic membrane is not injected, not scarred, not perforated, not no rhonchi. She has no rales. She exhibits no tenderness. Abdominal: Soft. Bowel sounds are normal. She exhibits no distension and no mass. There is no tenderness. There is no rebound, no guarding and no CVA tenderness.    Musculoskeletal: She exhibits no Culture, Routine [E]    Meds This Visit:  No prescriptions requested or ordered in this encounter    Imaging & Referrals:  None        #[id number]

## 2018-08-31 NOTE — PATIENT INSTRUCTIONS
ASSESSMENT/PLAN:   Acute cystitis without hematuria  (primary encounter diagnosis)Start antibiotics ASAP and take as directed with food til done. Take probiotics while on antibiotics if can to prevent yeast infections. Urine shows UTI. Increase fluids.  Ena Correia

## 2018-09-03 PROBLEM — H26.9 CATARACT: Status: ACTIVE | Noted: 2018-09-03

## 2018-09-03 PROBLEM — H54.7 VISUAL IMPAIRMENT: Status: ACTIVE | Noted: 2018-09-03

## 2018-09-03 PROBLEM — N20.0 CALCULUS OF KIDNEY: Status: ACTIVE | Noted: 2018-09-03

## 2018-09-21 ENCOUNTER — OFFICE VISIT (OUTPATIENT)
Dept: INTERNAL MEDICINE CLINIC | Facility: CLINIC | Age: 78
End: 2018-09-21

## 2018-09-21 VITALS
OXYGEN SATURATION: 95 % | BODY MASS INDEX: 28.65 KG/M2 | HEIGHT: 59.5 IN | SYSTOLIC BLOOD PRESSURE: 112 MMHG | HEART RATE: 71 BPM | TEMPERATURE: 99 F | DIASTOLIC BLOOD PRESSURE: 82 MMHG | WEIGHT: 144 LBS

## 2018-09-21 DIAGNOSIS — E53.8 B12 DEFICIENCY: ICD-10-CM

## 2018-09-21 DIAGNOSIS — N30.00 ACUTE CYSTITIS WITHOUT HEMATURIA: ICD-10-CM

## 2018-09-21 DIAGNOSIS — E87.5 HYPERKALEMIA: ICD-10-CM

## 2018-09-21 DIAGNOSIS — N18.6 END STAGE RENAL DISEASE (HCC): Primary | ICD-10-CM

## 2018-09-21 DIAGNOSIS — I12.9 RENAL HYPERTENSION, STAGE 1-4 OR UNSPECIFIED CHRONIC KIDNEY DISEASE: ICD-10-CM

## 2018-09-21 DIAGNOSIS — E78.2 COMBINED HYPERLIPIDEMIA: ICD-10-CM

## 2018-09-21 LAB
APPEARANCE: CLEAR
GLUCOSE (URINE DIPSTICK): 100 MG/DL
MULTISTIX LOT#: NORMAL NUMERIC
PH, URINE: 5.5 (ref 4.5–8)
PROTEIN (URINE DIPSTICK): 100 MG/DL
SPECIFIC GRAVITY: 1.01 (ref 1–1.03)
URINE-COLOR: YELLOW
UROBILINOGEN,SEMI-QN: 0.2 MG/DL (ref 0–1.9)

## 2018-09-21 PROCEDURE — 81003 URINALYSIS AUTO W/O SCOPE: CPT | Performed by: INTERNAL MEDICINE

## 2018-09-21 PROCEDURE — 90653 IIV ADJUVANT VACCINE IM: CPT | Performed by: INTERNAL MEDICINE

## 2018-09-21 PROCEDURE — G0008 ADMIN INFLUENZA VIRUS VAC: HCPCS | Performed by: INTERNAL MEDICINE

## 2018-09-21 PROCEDURE — 99214 OFFICE O/P EST MOD 30 MIN: CPT | Performed by: INTERNAL MEDICINE

## 2018-09-21 RX ORDER — LEVOFLOXACIN 250 MG/1
250 TABLET ORAL DAILY
Qty: 10 TABLET | Refills: 0 | Status: SHIPPED | OUTPATIENT
Start: 2018-09-21 | End: 2018-10-01

## 2018-09-21 NOTE — PROGRESS NOTES
HPI:    Patient ID: Meera De Los Santos is a 66year old female. HPI   No more vaginal itching. No Sx. Hypertension  Patient is here for follow up of hypertension. BP at home: not check. Has been compliant with medications.   Exercise level: somewhat volume, difficulty urinating, dysuria, flank pain, frequency, hematuria, urgency, vaginal bleeding, vaginal discharge and vaginal pain. Neurological: Positive for numbness (More witting. Rubs and goes away. R lateral leg. X several months. ).  Negative f CATARACT EXTRACTION; Right      Comment:  Dr. Maren Hernandez.   No date: D & C  7/10/2018: LAPAROSCOPIC PERITONEAL DIALYSIS CATH INSERTION; N/A      Comment:  Performed by Cyndi Savage MD at [facility]  No date: LAPAROSCOPY, SURGICAL; ABLATION, RENAL MASS LESION(S side, and 2+ on the left side. Posterior tibial pulses are 2+ on the right side, and 2+ on the left side. Pulmonary/Chest: Effort normal and breath sounds normal. No accessory muscle usage. No apnea, no tachypnea and no bradypnea.  No respiratory d 6-18.     Hyperkalemia Resolved. Combined hyperlipidemia Good level in 6-22-18. Renal hypertension, stage 1-4 or unspecified Good control. Careful with diet and excercise at least 30 minutes 3-4 times a week.  Check blood pressures at different time

## 2018-09-21 NOTE — PATIENT INSTRUCTIONS
ASSESSMENT/PLAN:   End stage renal disease (hcc)  (primary encounter diagnosis) On PD. Follow up with renal.     B12 deficiency Doing good 6-18. Hyperkalemia Resolved. Combined hyperlipidemia Good level in 6-22-18.      Renal hypertension, stage 1

## 2018-09-22 ENCOUNTER — TELEPHONE (OUTPATIENT)
Dept: INTERNAL MEDICINE CLINIC | Facility: CLINIC | Age: 78
End: 2018-09-22

## 2018-10-03 ENCOUNTER — TELEPHONE (OUTPATIENT)
Dept: INTERNAL MEDICINE CLINIC | Facility: CLINIC | Age: 78
End: 2018-10-03

## 2018-10-03 DIAGNOSIS — N30.90 CYSTITIS: Primary | ICD-10-CM

## 2018-10-04 NOTE — TELEPHONE ENCOUNTER
OK for either in network. Bernardo Lin MD             Hi Dr. Javier Bonilla wants patient redirected to [facility], Maryam Godoy or Peg Nice as the have a contact with [facility] and Dr. Rafi Brock doesn't.      Ple

## 2018-11-01 ENCOUNTER — TELEPHONE (OUTPATIENT)
Dept: NEPHROLOGY | Facility: CLINIC | Age: 78
End: 2018-11-01

## 2018-11-01 DIAGNOSIS — T85.611A PERITONEAL DIALYSIS CATHETER DYSFUNCTION, INITIAL ENCOUNTER (HCC): Primary | ICD-10-CM

## 2018-11-26 RX ORDER — OXYBUTYNIN CHLORIDE 5 MG/1
5 TABLET ORAL 2 TIMES DAILY
Qty: 180 TABLET | Refills: 0 | Status: SHIPPED | OUTPATIENT
Start: 2018-11-26 | End: 2019-01-30

## 2018-11-26 RX ORDER — ATENOLOL 50 MG/1
50 TABLET ORAL
Qty: 90 TABLET | Refills: 0 | OUTPATIENT
Start: 2018-11-26

## 2018-11-26 RX ORDER — ATENOLOL 50 MG/1
TABLET ORAL
Qty: 90 TABLET | Refills: 0 | OUTPATIENT
Start: 2018-11-26

## 2018-11-26 RX ORDER — OXYBUTYNIN CHLORIDE 5 MG/1
TABLET ORAL
Qty: 180 TABLET | Refills: 0 | OUTPATIENT
Start: 2018-11-26

## 2018-11-26 RX ORDER — PRAVASTATIN SODIUM 40 MG
TABLET ORAL
Qty: 90 TABLET | Refills: 0 | OUTPATIENT
Start: 2018-11-26

## 2018-11-26 RX ORDER — OXYBUTYNIN CHLORIDE 5 MG/1
5 TABLET ORAL 2 TIMES DAILY
Qty: 180 TABLET | Refills: 0 | OUTPATIENT
Start: 2018-11-26

## 2018-11-26 RX ORDER — PRAVASTATIN SODIUM 40 MG
40 TABLET ORAL NIGHTLY
Qty: 90 TABLET | Refills: 0 | Status: SHIPPED | OUTPATIENT
Start: 2018-11-26 | End: 2019-01-30

## 2018-11-26 RX ORDER — ATENOLOL 50 MG/1
50 TABLET ORAL
Qty: 90 TABLET | Refills: 0 | Status: SHIPPED | OUTPATIENT
Start: 2018-11-26 | End: 2019-01-30

## 2018-12-04 ENCOUNTER — PATIENT OUTREACH (OUTPATIENT)
Dept: CASE MANAGEMENT | Age: 78
End: 2018-12-04

## 2018-12-04 NOTE — PROGRESS NOTES
Outreached to patient in regards to scheduling Medicare Annual exam (AHA). Left message for patient to return my call at ext. [phone]. Patient is eligible February 2019. Thank you.

## 2018-12-06 NOTE — PROGRESS NOTES
Patient returned my call in regards to scheduling her Medicare Annual Exam, appt scheduled with her PCP for 03/15/2019. Thank you.

## 2019-01-01 ENCOUNTER — APPOINTMENT (OUTPATIENT)
Dept: GENERAL RADIOLOGY | Facility: HOSPITAL | Age: 79
DRG: 356 | End: 2019-01-01
Attending: INTERNAL MEDICINE
Payer: MEDICARE

## 2019-01-01 ENCOUNTER — HOSPITAL ENCOUNTER (INPATIENT)
Facility: HOSPITAL | Age: 79
LOS: 8 days | Discharge: NURSING, REHAB OR LONG-TERM CARE | DRG: 286 | End: 2019-01-01
Attending: EMERGENCY MEDICINE | Admitting: INTERNAL MEDICINE
Payer: MEDICARE

## 2019-01-01 ENCOUNTER — TELEPHONE (OUTPATIENT)
Dept: INTERNAL MEDICINE CLINIC | Facility: CLINIC | Age: 79
End: 2019-01-01

## 2019-01-01 ENCOUNTER — TELEPHONE (OUTPATIENT)
Dept: NEPHROLOGY | Facility: CLINIC | Age: 79
End: 2019-01-01

## 2019-01-01 ENCOUNTER — APPOINTMENT (OUTPATIENT)
Dept: GENERAL RADIOLOGY | Facility: HOSPITAL | Age: 79
DRG: 871 | End: 2019-01-01
Attending: CLINICAL NURSE SPECIALIST
Payer: MEDICARE

## 2019-01-01 ENCOUNTER — APPOINTMENT (OUTPATIENT)
Dept: GENERAL RADIOLOGY | Facility: HOSPITAL | Age: 79
DRG: 871 | End: 2019-01-01
Attending: INTERNAL MEDICINE
Payer: MEDICARE

## 2019-01-01 ENCOUNTER — ANESTHESIA EVENT (OUTPATIENT)
Dept: ENDOSCOPY | Facility: HOSPITAL | Age: 79
DRG: 356 | End: 2019-01-01
Payer: MEDICARE

## 2019-01-01 ENCOUNTER — APPOINTMENT (OUTPATIENT)
Dept: CT IMAGING | Facility: HOSPITAL | Age: 79
End: 2019-01-01
Attending: EMERGENCY MEDICINE
Payer: MEDICARE

## 2019-01-01 ENCOUNTER — APPOINTMENT (OUTPATIENT)
Dept: GENERAL RADIOLOGY | Facility: HOSPITAL | Age: 79
DRG: 286 | End: 2019-01-01
Attending: INTERNAL MEDICINE
Payer: MEDICARE

## 2019-01-01 ENCOUNTER — APPOINTMENT (OUTPATIENT)
Dept: CT IMAGING | Facility: HOSPITAL | Age: 79
DRG: 871 | End: 2019-01-01
Attending: INTERNAL MEDICINE
Payer: MEDICARE

## 2019-01-01 ENCOUNTER — LAB ENCOUNTER (OUTPATIENT)
Dept: LAB | Facility: HOSPITAL | Age: 79
DRG: 356 | End: 2019-01-01
Attending: INTERNAL MEDICINE
Payer: MEDICARE

## 2019-01-01 ENCOUNTER — APPOINTMENT (OUTPATIENT)
Dept: GENERAL RADIOLOGY | Facility: HOSPITAL | Age: 79
DRG: 377 | End: 2019-01-01
Attending: EMERGENCY MEDICINE
Payer: MEDICARE

## 2019-01-01 ENCOUNTER — OFFICE VISIT (OUTPATIENT)
Dept: NEPHROLOGY | Facility: CLINIC | Age: 79
End: 2019-01-01
Payer: MEDICARE

## 2019-01-01 ENCOUNTER — NURSE TRIAGE (OUTPATIENT)
Dept: INTERNAL MEDICINE CLINIC | Facility: CLINIC | Age: 79
End: 2019-01-01

## 2019-01-01 ENCOUNTER — APPOINTMENT (OUTPATIENT)
Dept: INTERVENTIONAL RADIOLOGY/VASCULAR | Facility: HOSPITAL | Age: 79
DRG: 286 | End: 2019-01-01
Attending: NURSE PRACTITIONER
Payer: MEDICARE

## 2019-01-01 ENCOUNTER — APPOINTMENT (OUTPATIENT)
Dept: CT IMAGING | Facility: HOSPITAL | Age: 79
DRG: 871 | End: 2019-01-01
Attending: CLINICAL NURSE SPECIALIST
Payer: MEDICARE

## 2019-01-01 ENCOUNTER — TELEPHONE (OUTPATIENT)
Dept: FAMILY MEDICINE CLINIC | Facility: CLINIC | Age: 79
End: 2019-01-01

## 2019-01-01 ENCOUNTER — NURSE TRIAGE (OUTPATIENT)
Dept: OTHER | Age: 79
End: 2019-01-01

## 2019-01-01 ENCOUNTER — SNF VISIT (OUTPATIENT)
Dept: INTERNAL MEDICINE CLINIC | Facility: SKILLED NURSING FACILITY | Age: 79
End: 2019-01-01

## 2019-01-01 ENCOUNTER — SNF DISCHARGE (OUTPATIENT)
Dept: INTERNAL MEDICINE CLINIC | Facility: SKILLED NURSING FACILITY | Age: 79
End: 2019-01-01

## 2019-01-01 ENCOUNTER — HOSPITAL ENCOUNTER (INPATIENT)
Facility: HOSPITAL | Age: 79
LOS: 9 days | Discharge: NURSING, REHAB OR LONG-TERM CARE | DRG: 377 | End: 2019-01-01
Attending: EMERGENCY MEDICINE | Admitting: INTERNAL MEDICINE
Payer: MEDICARE

## 2019-01-01 ENCOUNTER — APPOINTMENT (OUTPATIENT)
Dept: GENERAL RADIOLOGY | Facility: HOSPITAL | Age: 79
DRG: 286 | End: 2019-01-01
Attending: EMERGENCY MEDICINE
Payer: MEDICARE

## 2019-01-01 ENCOUNTER — APPOINTMENT (OUTPATIENT)
Dept: MRI IMAGING | Facility: HOSPITAL | Age: 79
DRG: 871 | End: 2019-01-01
Attending: INTERNAL MEDICINE
Payer: MEDICARE

## 2019-01-01 ENCOUNTER — APPOINTMENT (OUTPATIENT)
Dept: ULTRASOUND IMAGING | Facility: HOSPITAL | Age: 79
DRG: 871 | End: 2019-01-01
Attending: INTERNAL MEDICINE
Payer: MEDICARE

## 2019-01-01 ENCOUNTER — HOSPITAL ENCOUNTER (EMERGENCY)
Facility: HOSPITAL | Age: 79
Discharge: HOME | End: 2019-01-01
Attending: EMERGENCY MEDICINE
Payer: MEDICARE

## 2019-01-01 ENCOUNTER — APPOINTMENT (OUTPATIENT)
Dept: GENERAL RADIOLOGY | Facility: HOSPITAL | Age: 79
DRG: 871 | End: 2019-01-01
Attending: EMERGENCY MEDICINE
Payer: MEDICARE

## 2019-01-01 ENCOUNTER — EXTERNAL FACILITY (OUTPATIENT)
Dept: FAMILY MEDICINE CLINIC | Facility: CLINIC | Age: 79
End: 2019-01-01

## 2019-01-01 ENCOUNTER — MED REC SCAN ONLY (OUTPATIENT)
Dept: INTERNAL MEDICINE CLINIC | Facility: CLINIC | Age: 79
End: 2019-01-01

## 2019-01-01 ENCOUNTER — APPOINTMENT (OUTPATIENT)
Dept: GENERAL RADIOLOGY | Facility: HOSPITAL | Age: 79
End: 2019-01-01
Attending: EMERGENCY MEDICINE
Payer: MEDICARE

## 2019-01-01 ENCOUNTER — ANESTHESIA (OUTPATIENT)
Dept: ENDOSCOPY | Facility: HOSPITAL | Age: 79
DRG: 377 | End: 2019-01-01
Payer: MEDICARE

## 2019-01-01 ENCOUNTER — APPOINTMENT (OUTPATIENT)
Dept: GENERAL RADIOLOGY | Facility: HOSPITAL | Age: 79
DRG: 268 | End: 2019-01-01
Attending: INTERNAL MEDICINE
Payer: MEDICARE

## 2019-01-01 ENCOUNTER — APPOINTMENT (OUTPATIENT)
Dept: CT IMAGING | Facility: HOSPITAL | Age: 79
DRG: 377 | End: 2019-01-01
Attending: EMERGENCY MEDICINE
Payer: MEDICARE

## 2019-01-01 ENCOUNTER — HOSPITAL ENCOUNTER (INPATIENT)
Facility: HOSPITAL | Age: 79
LOS: 18 days | Discharge: NURSING, REHAB OR LONG-TERM CARE | DRG: 871 | End: 2019-01-01
Attending: EMERGENCY MEDICINE | Admitting: INTERNAL MEDICINE
Payer: MEDICARE

## 2019-01-01 ENCOUNTER — TELEPHONE (OUTPATIENT)
Dept: OTHER | Age: 79
End: 2019-01-01

## 2019-01-01 ENCOUNTER — APPOINTMENT (OUTPATIENT)
Dept: PICC SERVICES | Facility: HOSPITAL | Age: 79
DRG: 871 | End: 2019-01-01
Attending: INTERNAL MEDICINE
Payer: MEDICARE

## 2019-01-01 ENCOUNTER — ANESTHESIA (OUTPATIENT)
Dept: INTERVENTIONAL RADIOLOGY/VASCULAR | Facility: HOSPITAL | Age: 79
DRG: 268 | End: 2019-01-01
Payer: MEDICARE

## 2019-01-01 ENCOUNTER — TELEPHONE (OUTPATIENT)
Dept: MEDSURG UNIT | Facility: HOSPITAL | Age: 79
End: 2019-01-01

## 2019-01-01 ENCOUNTER — OFFICE VISIT (OUTPATIENT)
Dept: INTERNAL MEDICINE CLINIC | Facility: CLINIC | Age: 79
End: 2019-01-01
Payer: MEDICARE

## 2019-01-01 ENCOUNTER — ANESTHESIA EVENT (OUTPATIENT)
Dept: ENDOSCOPY | Facility: HOSPITAL | Age: 79
DRG: 377 | End: 2019-01-01
Payer: MEDICARE

## 2019-01-01 ENCOUNTER — APPOINTMENT (OUTPATIENT)
Dept: CT IMAGING | Facility: HOSPITAL | Age: 79
DRG: 871 | End: 2019-01-01
Attending: EMERGENCY MEDICINE
Payer: MEDICARE

## 2019-01-01 ENCOUNTER — HOSPITAL ENCOUNTER (INPATIENT)
Facility: HOSPITAL | Age: 79
LOS: 6 days | Discharge: HOME HEALTH | DRG: 356 | End: 2019-01-01
Attending: EMERGENCY MEDICINE | Admitting: INTERNAL MEDICINE
Payer: MEDICARE

## 2019-01-01 ENCOUNTER — PATIENT OUTREACH (OUTPATIENT)
Dept: CASE MANAGEMENT | Age: 79
End: 2019-01-01

## 2019-01-01 ENCOUNTER — ANESTHESIA (OUTPATIENT)
Dept: ENDOSCOPY | Facility: HOSPITAL | Age: 79
DRG: 356 | End: 2019-01-01
Payer: MEDICARE

## 2019-01-01 ENCOUNTER — ANESTHESIA EVENT (OUTPATIENT)
Dept: SURGERY | Facility: HOSPITAL | Age: 79
DRG: 356 | End: 2019-01-01
Payer: MEDICARE

## 2019-01-01 ENCOUNTER — APPOINTMENT (OUTPATIENT)
Dept: CT IMAGING | Facility: HOSPITAL | Age: 79
DRG: 286 | End: 2019-01-01
Attending: INTERNAL MEDICINE
Payer: MEDICARE

## 2019-01-01 ENCOUNTER — SNF ADMIT/H&P (OUTPATIENT)
Dept: INTERNAL MEDICINE CLINIC | Facility: SKILLED NURSING FACILITY | Age: 79
End: 2019-01-01

## 2019-01-01 ENCOUNTER — HOSPITAL ENCOUNTER (INPATIENT)
Dept: INTERVENTIONAL RADIOLOGY/VASCULAR | Facility: HOSPITAL | Age: 79
LOS: 2 days | Discharge: HOME HEALTH | DRG: 268 | End: 2019-01-01
Attending: SURGERY | Admitting: SURGERY
Payer: MEDICARE

## 2019-01-01 ENCOUNTER — APPOINTMENT (OUTPATIENT)
Dept: CT IMAGING | Facility: HOSPITAL | Age: 79
DRG: 871 | End: 2019-01-01
Attending: Other
Payer: MEDICARE

## 2019-01-01 ENCOUNTER — APPOINTMENT (OUTPATIENT)
Dept: GENERAL RADIOLOGY | Facility: HOSPITAL | Age: 79
End: 2019-01-01
Payer: MEDICARE

## 2019-01-01 ENCOUNTER — ANESTHESIA (OUTPATIENT)
Dept: SURGERY | Facility: HOSPITAL | Age: 79
DRG: 356 | End: 2019-01-01
Payer: MEDICARE

## 2019-01-01 ENCOUNTER — APPOINTMENT (OUTPATIENT)
Dept: CV DIAGNOSTICS | Facility: HOSPITAL | Age: 79
DRG: 286 | End: 2019-01-01
Attending: INTERNAL MEDICINE
Payer: MEDICARE

## 2019-01-01 VITALS
RESPIRATION RATE: 15 BRPM | TEMPERATURE: 98 F | HEIGHT: 63 IN | BODY MASS INDEX: 23.51 KG/M2 | OXYGEN SATURATION: 92 % | SYSTOLIC BLOOD PRESSURE: 126 MMHG | WEIGHT: 132.69 LBS | DIASTOLIC BLOOD PRESSURE: 65 MMHG | HEART RATE: 73 BPM

## 2019-01-01 VITALS
WEIGHT: 134 LBS | TEMPERATURE: 98 F | BODY MASS INDEX: 29 KG/M2 | DIASTOLIC BLOOD PRESSURE: 94 MMHG | HEART RATE: 57 BPM | SYSTOLIC BLOOD PRESSURE: 196 MMHG | OXYGEN SATURATION: 96 %

## 2019-01-01 VITALS
SYSTOLIC BLOOD PRESSURE: 115 MMHG | RESPIRATION RATE: 18 BRPM | DIASTOLIC BLOOD PRESSURE: 61 MMHG | HEART RATE: 63 BPM | WEIGHT: 147.13 LBS | TEMPERATURE: 100 F | OXYGEN SATURATION: 95 % | BODY MASS INDEX: 29.66 KG/M2 | HEIGHT: 59 IN

## 2019-01-01 VITALS
TEMPERATURE: 98 F | OXYGEN SATURATION: 97 % | WEIGHT: 123 LBS | DIASTOLIC BLOOD PRESSURE: 64 MMHG | HEART RATE: 74 BPM | SYSTOLIC BLOOD PRESSURE: 106 MMHG | HEIGHT: 57 IN | BODY MASS INDEX: 26.54 KG/M2 | RESPIRATION RATE: 18 BRPM

## 2019-01-01 VITALS
DIASTOLIC BLOOD PRESSURE: 81 MMHG | TEMPERATURE: 99 F | RESPIRATION RATE: 22 BRPM | HEART RATE: 57 BPM | OXYGEN SATURATION: 90 % | SYSTOLIC BLOOD PRESSURE: 159 MMHG

## 2019-01-01 VITALS
HEART RATE: 69 BPM | DIASTOLIC BLOOD PRESSURE: 82 MMHG | RESPIRATION RATE: 17 BRPM | SYSTOLIC BLOOD PRESSURE: 150 MMHG | WEIGHT: 133 LBS | BODY MASS INDEX: 29 KG/M2 | OXYGEN SATURATION: 98 % | TEMPERATURE: 98 F

## 2019-01-01 VITALS
TEMPERATURE: 98 F | HEART RATE: 63 BPM | SYSTOLIC BLOOD PRESSURE: 150 MMHG | WEIGHT: 147 LBS | DIASTOLIC BLOOD PRESSURE: 82 MMHG | BODY MASS INDEX: 30 KG/M2

## 2019-01-01 VITALS
HEART RATE: 72 BPM | BODY MASS INDEX: 23 KG/M2 | TEMPERATURE: 98 F | DIASTOLIC BLOOD PRESSURE: 71 MMHG | SYSTOLIC BLOOD PRESSURE: 136 MMHG | WEIGHT: 130 LBS | OXYGEN SATURATION: 97 % | RESPIRATION RATE: 18 BRPM

## 2019-01-01 VITALS
RESPIRATION RATE: 18 BRPM | TEMPERATURE: 98 F | WEIGHT: 129.69 LBS | OXYGEN SATURATION: 100 % | DIASTOLIC BLOOD PRESSURE: 63 MMHG | SYSTOLIC BLOOD PRESSURE: 109 MMHG | HEIGHT: 63 IN | BODY MASS INDEX: 22.98 KG/M2 | HEART RATE: 68 BPM

## 2019-01-01 VITALS
HEIGHT: 59 IN | BODY MASS INDEX: 29.23 KG/M2 | HEART RATE: 67 BPM | WEIGHT: 145 LBS | SYSTOLIC BLOOD PRESSURE: 136 MMHG | DIASTOLIC BLOOD PRESSURE: 82 MMHG | TEMPERATURE: 99 F

## 2019-01-01 VITALS
SYSTOLIC BLOOD PRESSURE: 158 MMHG | OXYGEN SATURATION: 94 % | WEIGHT: 132.38 LBS | HEIGHT: 57.01 IN | BODY MASS INDEX: 28.56 KG/M2 | HEART RATE: 60 BPM | RESPIRATION RATE: 16 BRPM | TEMPERATURE: 98 F | DIASTOLIC BLOOD PRESSURE: 76 MMHG

## 2019-01-01 VITALS
HEART RATE: 63 BPM | RESPIRATION RATE: 20 BRPM | BODY MASS INDEX: 29 KG/M2 | OXYGEN SATURATION: 96 % | SYSTOLIC BLOOD PRESSURE: 168 MMHG | TEMPERATURE: 98 F | DIASTOLIC BLOOD PRESSURE: 93 MMHG | WEIGHT: 132.25 LBS

## 2019-01-01 DIAGNOSIS — N18.6 ESRD (END STAGE RENAL DISEASE) ON DIALYSIS (HCC): ICD-10-CM

## 2019-01-01 DIAGNOSIS — A41.9 SEPSIS DUE TO UNDETERMINED ORGANISM (HCC): Primary | ICD-10-CM

## 2019-01-01 DIAGNOSIS — N30.01 ACUTE CYSTITIS WITH HEMATURIA: ICD-10-CM

## 2019-01-01 DIAGNOSIS — Z09 FOLLOW UP: ICD-10-CM

## 2019-01-01 DIAGNOSIS — R51.9 HEADACHE ABOVE THE EYE REGION: ICD-10-CM

## 2019-01-01 DIAGNOSIS — Z71.89 ENCOUNTER FOR MEDICATION REVIEW AND COUNSELING: ICD-10-CM

## 2019-01-01 DIAGNOSIS — R51.9 HEADACHE, TEMPORAL: ICD-10-CM

## 2019-01-01 DIAGNOSIS — J96.01 ACUTE RESPIRATORY FAILURE WITH HYPOXIA (HCC): ICD-10-CM

## 2019-01-01 DIAGNOSIS — I71.4 ABDOMINAL AORTIC ANEURYSM WITHOUT RUPTURE (HCC): ICD-10-CM

## 2019-01-01 DIAGNOSIS — R29.6 RECURRENT FALLS: Primary | ICD-10-CM

## 2019-01-01 DIAGNOSIS — D64.9 ANEMIA, UNSPECIFIED TYPE: Primary | ICD-10-CM

## 2019-01-01 DIAGNOSIS — J44.9 CHRONIC OBSTRUCTIVE PULMONARY DISEASE, UNSPECIFIED COPD TYPE (HCC): ICD-10-CM

## 2019-01-01 DIAGNOSIS — D62 ACUTE BLOOD LOSS ANEMIA: ICD-10-CM

## 2019-01-01 DIAGNOSIS — G93.40 ACUTE ENCEPHALOPATHY: ICD-10-CM

## 2019-01-01 DIAGNOSIS — N18.4 CKD (CHRONIC KIDNEY DISEASE), STAGE IV (HCC): ICD-10-CM

## 2019-01-01 DIAGNOSIS — J81.0 ACUTE PULMONARY EDEMA (HCC): Primary | ICD-10-CM

## 2019-01-01 DIAGNOSIS — K92.0 HEMATEMESIS WITH NAUSEA: ICD-10-CM

## 2019-01-01 DIAGNOSIS — D63.1 ANEMIA DUE TO STAGE 4 CHRONIC KIDNEY DISEASE (HCC): ICD-10-CM

## 2019-01-01 DIAGNOSIS — R11.0 NAUSEA: ICD-10-CM

## 2019-01-01 DIAGNOSIS — I10 ESSENTIAL HYPERTENSION: ICD-10-CM

## 2019-01-01 DIAGNOSIS — Z51.89 ENCOUNTER FOR MEDICATION ADJUSTMENT: ICD-10-CM

## 2019-01-01 DIAGNOSIS — N18.6 ANEMIA DUE TO CHRONIC KIDNEY DISEASE, ON CHRONIC DIALYSIS (HCC): ICD-10-CM

## 2019-01-01 DIAGNOSIS — E53.8 VITAMIN B 12 DEFICIENCY: ICD-10-CM

## 2019-01-01 DIAGNOSIS — I71.4 AAA (ABDOMINAL AORTIC ANEURYSM) WITHOUT RUPTURE (HCC): Primary | ICD-10-CM

## 2019-01-01 DIAGNOSIS — E87.5 HYPERKALEMIA: ICD-10-CM

## 2019-01-01 DIAGNOSIS — I10 ESSENTIAL HYPERTENSION: Primary | ICD-10-CM

## 2019-01-01 DIAGNOSIS — T85.611A PERITONEAL DIALYSIS CATHETER DYSFUNCTION, INITIAL ENCOUNTER (HCC): Primary | ICD-10-CM

## 2019-01-01 DIAGNOSIS — D64.9 ANEMIA, UNSPECIFIED TYPE: ICD-10-CM

## 2019-01-01 DIAGNOSIS — Z99.2 ANEMIA DUE TO CHRONIC KIDNEY DISEASE, ON CHRONIC DIALYSIS (HCC): ICD-10-CM

## 2019-01-01 DIAGNOSIS — J43.9 PULMONARY EMPHYSEMA, UNSPECIFIED EMPHYSEMA TYPE (HCC): ICD-10-CM

## 2019-01-01 DIAGNOSIS — E78.2 MIXED HYPERLIPIDEMIA: ICD-10-CM

## 2019-01-01 DIAGNOSIS — N18.6 END STAGE RENAL DISEASE (HCC): Primary | ICD-10-CM

## 2019-01-01 DIAGNOSIS — R41.0 EPISODE OF CONFUSION: Primary | ICD-10-CM

## 2019-01-01 DIAGNOSIS — R51.9 HEADACHE DISORDER: ICD-10-CM

## 2019-01-01 DIAGNOSIS — Z99.2 ESRD ON HEMODIALYSIS (HCC): ICD-10-CM

## 2019-01-01 DIAGNOSIS — J96.02 ACUTE RESPIRATORY FAILURE WITH HYPERCAPNIA (HCC): ICD-10-CM

## 2019-01-01 DIAGNOSIS — R06.02 SOB (SHORTNESS OF BREATH): Primary | ICD-10-CM

## 2019-01-01 DIAGNOSIS — A41.9 SEPSIS DUE TO UNDETERMINED ORGANISM (HCC): ICD-10-CM

## 2019-01-01 DIAGNOSIS — Z72.0 TOBACCO USE: Primary | ICD-10-CM

## 2019-01-01 DIAGNOSIS — J81.0 ACUTE PULMONARY EDEMA (HCC): ICD-10-CM

## 2019-01-01 DIAGNOSIS — D63.1 ANEMIA DUE TO CHRONIC KIDNEY DISEASE, ON CHRONIC DIALYSIS (HCC): ICD-10-CM

## 2019-01-01 DIAGNOSIS — Z99.2 ESRD (END STAGE RENAL DISEASE) ON DIALYSIS (HCC): ICD-10-CM
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
## 2019-01-01 DIAGNOSIS — Z02.9 ENCOUNTERS FOR ADMINISTRATIVE PURPOSE: ICD-10-CM

## 2019-01-01 DIAGNOSIS — J44.1 COPD EXACERBATION (HCC): ICD-10-CM

## 2019-01-01 DIAGNOSIS — T14.8XXA HEMATOMA: ICD-10-CM

## 2019-01-01 DIAGNOSIS — N18.6 ESRD ON HEMODIALYSIS (HCC): ICD-10-CM

## 2019-01-01 DIAGNOSIS — K92.1 MELENA: ICD-10-CM

## 2019-01-01 DIAGNOSIS — J43.2 CENTRILOBULAR EMPHYSEMA (HCC): ICD-10-CM

## 2019-01-01 DIAGNOSIS — N18.6 ESRD (END STAGE RENAL DISEASE) ON DIALYSIS (HCC): Primary | ICD-10-CM

## 2019-01-01 DIAGNOSIS — Z99.2 ESRD (END STAGE RENAL DISEASE) ON DIALYSIS (HCC): Primary | ICD-10-CM

## 2019-01-01 DIAGNOSIS — R35.0 FREQUENCY OF URINATION: ICD-10-CM

## 2019-01-01 DIAGNOSIS — N18.4 CKD (CHRONIC KIDNEY DISEASE) STAGE 4, GFR 15-29 ML/MIN (HCC): Primary | ICD-10-CM

## 2019-01-01 DIAGNOSIS — T85.611A PERITONEAL DIALYSIS CATHETER DYSFUNCTION (HCC): ICD-10-CM

## 2019-01-01 DIAGNOSIS — R51.9 ACUTE NONINTRACTABLE HEADACHE, UNSPECIFIED HEADACHE TYPE: Primary | ICD-10-CM

## 2019-01-01 DIAGNOSIS — K92.2 UPPER GI BLEED: ICD-10-CM

## 2019-01-01 DIAGNOSIS — Z02.9 DISCHARGE PLANNING ISSUES: ICD-10-CM

## 2019-01-01 DIAGNOSIS — N18.4 CKD (CHRONIC KIDNEY DISEASE) STAGE 4, GFR 15-29 ML/MIN (HCC): ICD-10-CM

## 2019-01-01 DIAGNOSIS — R07.9 CHEST PAIN OF UNCERTAIN ETIOLOGY: Primary | ICD-10-CM

## 2019-01-01 DIAGNOSIS — J45.21 MILD INTERMITTENT REACTIVE AIRWAY DISEASE WITH ACUTE EXACERBATION: ICD-10-CM

## 2019-01-01 DIAGNOSIS — Z79.899 ENCOUNTER FOR MEDICATION REVIEW: ICD-10-CM

## 2019-01-01 DIAGNOSIS — R41.82 ALTERED MENTAL STATUS, UNSPECIFIED ALTERED MENTAL STATUS TYPE: ICD-10-CM

## 2019-01-01 DIAGNOSIS — R09.02 HYPOXIA: ICD-10-CM

## 2019-01-01 DIAGNOSIS — I71.4 ABDOMINAL AORTIC ANEURYSM (AAA) WITHOUT RUPTURE (HCC): ICD-10-CM

## 2019-01-01 DIAGNOSIS — N18.4 ANEMIA DUE TO STAGE 4 CHRONIC KIDNEY DISEASE (HCC): ICD-10-CM

## 2019-01-01 DIAGNOSIS — R19.7 DIARRHEA, UNSPECIFIED TYPE: ICD-10-CM

## 2019-01-01 LAB
ALBUMIN SERPL-MCNC: 2.4 G/DL (ref 3.4–5)
ALBUMIN SERPL-MCNC: 2.7 G/DL (ref 3.4–5)
ALBUMIN SERPL-MCNC: 2.8 G/DL (ref 3.4–5)
ALBUMIN SERPL-MCNC: 2.9 G/DL (ref 3.4–5)
ALBUMIN SERPL-MCNC: 2.9 G/DL (ref 3.4–5)
ALBUMIN SERPL-MCNC: 3 G/DL (ref 3.4–5)
ALBUMIN SERPL-MCNC: 3.2 G/DL (ref 3.4–5)
ALBUMIN/GLOB SERPL: 1 {RATIO} (ref 1–2)
ALBUMIN/GLOB SERPL: 1 {RATIO} (ref 1–2)
ALBUMIN/GLOB SERPL: 1.1 {RATIO} (ref 1–2)
ALBUMIN/GLOB SERPL: 1.3 {RATIO} (ref 1–2)
ALP LIVER SERPL-CCNC: 49 U/L (ref 55–142)
ALP LIVER SERPL-CCNC: 52 U/L (ref 55–142)
ALP LIVER SERPL-CCNC: 54 U/L (ref 55–142)
ALP LIVER SERPL-CCNC: 56 U/L (ref 55–142)
ALP LIVER SERPL-CCNC: 60 U/L (ref 55–142)
ALP LIVER SERPL-CCNC: 68 U/L (ref 55–142)
ALT SERPL-CCNC: 10 U/L (ref 13–56)
ALT SERPL-CCNC: 12 U/L (ref 13–56)
ALT SERPL-CCNC: 12 U/L (ref 13–56)
ALT SERPL-CCNC: 18 U/L (ref 13–56)
ALT SERPL-CCNC: 19 U/L (ref 13–56)
ALT SERPL-CCNC: 21 U/L (ref 13–56)
AMMONIA PLAS-MCNC: 18 UMOL/L (ref 11–32)
ANION GAP SERPL CALC-SCNC: 10 MMOL/L (ref 0–18)
ANION GAP SERPL CALC-SCNC: 11 MMOL/L (ref 0–18)
ANION GAP SERPL CALC-SCNC: 12 MMOL/L (ref 0–18)
ANION GAP SERPL CALC-SCNC: 13 MMOL/L (ref 0–18)
ANION GAP SERPL CALC-SCNC: 13 MMOL/L (ref 0–18)
ANION GAP SERPL CALC-SCNC: 3 MMOL/L (ref 0–18)
ANION GAP SERPL CALC-SCNC: 6 MMOL/L (ref 0–18)
ANION GAP SERPL CALC-SCNC: 7 MMOL/L (ref 0–18)
ANION GAP SERPL CALC-SCNC: 7 MMOL/L (ref 0–18)
ANION GAP SERPL CALC-SCNC: 8 MMOL/L (ref 0–18)
ANION GAP SERPL CALC-SCNC: 9 MMOL/L (ref 0–18)
ANTIBODY SCREEN: NEGATIVE
APTT PPP: 30.8 SECONDS (ref 23.2–35.3)
AST SERPL-CCNC: 12 U/L (ref 15–37)
AST SERPL-CCNC: 13 U/L (ref 15–37)
AST SERPL-CCNC: 13 U/L (ref 15–37)
AST SERPL-CCNC: 16 U/L (ref 15–37)
AST SERPL-CCNC: 20 U/L (ref 15–37)
AST SERPL-CCNC: 29 U/L (ref 15–37)
BASE EXCESS BLD CALC-SCNC: -2.3 MMOL/L (ref ?–2)
BASE EXCESS BLD CALC-SCNC: -5.2 MMOL/L (ref ?–2)
BASE EXCESS BLD CALC-SCNC: 0 MMOL/L (ref ?–2)
BASE EXCESS BLD CALC-SCNC: 0.7 MMOL/L (ref ?–2)
BASE EXCESS BLD CALC-SCNC: 1.3 MMOL/L (ref ?–2)
BASOPHILS # BLD AUTO: 0.04 X10(3) UL (ref 0–0.2)
BASOPHILS # BLD AUTO: 0.05 X10(3) UL (ref 0–0.2)
BASOPHILS # BLD AUTO: 0.06 X10(3) UL (ref 0–0.2)
BASOPHILS # BLD AUTO: 0.07 X10(3) UL (ref 0–0.2)
BASOPHILS # BLD AUTO: 0.08 X10(3) UL (ref 0–0.2)
BASOPHILS # BLD AUTO: 0.08 X10(3) UL (ref 0–0.2)
BASOPHILS # BLD AUTO: 0.1 X10(3) UL (ref 0–0.2)
BASOPHILS # BLD: 0 X10(3) UL (ref 0–0.2)
BASOPHILS # BLD: 0.06 X10(3) UL (ref 0–0.2)
BASOPHILS # BLD: 0.08 X10(3) UL (ref 0–0.2)
BASOPHILS # BLD: 0.09 X10(3) UL (ref 0–0.2)
BASOPHILS # BLD: 0.1 X10(3) UL (ref 0–0.2)
BASOPHILS # BLD: 0.23 X10(3) UL (ref 0–0.2)
BASOPHILS # BLD: 0.36 X10(3) UL (ref 0–0.2)
BASOPHILS NFR BLD AUTO: 0.5 %
BASOPHILS NFR BLD AUTO: 0.6 %
BASOPHILS NFR BLD AUTO: 0.7 %
BASOPHILS NFR BLD AUTO: 0.8 %
BASOPHILS NFR BLD AUTO: 0.9 %
BASOPHILS NFR BLD AUTO: 1 %
BASOPHILS NFR BLD AUTO: 1.1 %
BASOPHILS NFR BLD AUTO: 1.3 %
BASOPHILS NFR BLD: 0 %
BASOPHILS NFR BLD: 1 %
BASOPHILS NFR BLD: 3 %
BASOPHILS NFR BLD: 4 %
BILIRUB SERPL-MCNC: 0.3 MG/DL (ref 0.1–2)
BILIRUB SERPL-MCNC: 0.3 MG/DL (ref 0.1–2)
BILIRUB SERPL-MCNC: 0.4 MG/DL (ref 0.1–2)
BILIRUB SERPL-MCNC: 0.5 MG/DL (ref 0.1–2)
BILIRUB UR QL: NEGATIVE
BLOOD GAS EPAP: 6 CM H2O
BLOOD GAS IPAP: 14 CM H2O
BLOOD TYPE BARCODE: 6200
BUN BLD-MCNC: 17 MG/DL (ref 7–18)
BUN BLD-MCNC: 21 MG/DL (ref 7–18)
BUN BLD-MCNC: 21 MG/DL (ref 7–18)
BUN BLD-MCNC: 23 MG/DL (ref 7–18)
BUN BLD-MCNC: 25 MG/DL (ref 7–18)
BUN BLD-MCNC: 27 MG/DL (ref 7–18)
BUN BLD-MCNC: 27 MG/DL (ref 7–18)
BUN BLD-MCNC: 28 MG/DL (ref 7–18)
BUN BLD-MCNC: 29 MG/DL (ref 7–18)
BUN BLD-MCNC: 30 MG/DL (ref 7–18)
BUN BLD-MCNC: 31 MG/DL (ref 7–18)
BUN BLD-MCNC: 37 MG/DL (ref 7–18)
BUN BLD-MCNC: 38 MG/DL (ref 7–18)
BUN BLD-MCNC: 41 MG/DL (ref 7–18)
BUN BLD-MCNC: 42 MG/DL (ref 7–18)
BUN BLD-MCNC: 46 MG/DL (ref 7–18)
BUN BLD-MCNC: 48 MG/DL (ref 7–18)
BUN BLD-MCNC: 49 MG/DL (ref 7–18)
BUN BLD-MCNC: 50 MG/DL (ref 7–18)
BUN BLD-MCNC: 54 MG/DL (ref 7–18)
BUN BLD-MCNC: 57 MG/DL (ref 7–18)
BUN BLD-MCNC: 59 MG/DL (ref 7–18)
BUN BLD-MCNC: 60 MG/DL (ref 7–18)
BUN BLD-MCNC: 65 MG/DL (ref 7–18)
BUN BLD-MCNC: 68 MG/DL (ref 7–18)
BUN BLD-MCNC: 73 MG/DL (ref 7–18)
BUN BLD-MCNC: 76 MG/DL (ref 7–18)
BUN BLD-MCNC: 80 MG/DL (ref 7–18)
BUN/CREAT SERPL: 10 (ref 10–20)
BUN/CREAT SERPL: 11.1 (ref 10–20)
BUN/CREAT SERPL: 4.2 (ref 10–20)
BUN/CREAT SERPL: 4.3 (ref 10–20)
BUN/CREAT SERPL: 4.3 (ref 10–20)
BUN/CREAT SERPL: 4.9 (ref 10–20)
BUN/CREAT SERPL: 5 (ref 10–20)
BUN/CREAT SERPL: 5 (ref 10–20)
BUN/CREAT SERPL: 5.2 (ref 10–20)
BUN/CREAT SERPL: 5.3 (ref 10–20)
BUN/CREAT SERPL: 5.7 (ref 10–20)
BUN/CREAT SERPL: 5.9 (ref 10–20)
BUN/CREAT SERPL: 6 (ref 10–20)
BUN/CREAT SERPL: 6.3 (ref 10–20)
BUN/CREAT SERPL: 6.5 (ref 10–20)
BUN/CREAT SERPL: 6.5 (ref 10–20)
BUN/CREAT SERPL: 7.3 (ref 10–20)
BUN/CREAT SERPL: 7.4 (ref 10–20)
BUN/CREAT SERPL: 7.5 (ref 10–20)
BUN/CREAT SERPL: 7.6 (ref 10–20)
BUN/CREAT SERPL: 8 (ref 10–20)
BUN/CREAT SERPL: 8.5 (ref 10–20)
BUN/CREAT SERPL: 9.2 (ref 10–20)
BUN/CREAT SERPL: 9.6 (ref 10–20)
CALCIUM BLD-MCNC: 7.2 MG/DL (ref 8.5–10.1)
CALCIUM BLD-MCNC: 7.4 MG/DL (ref 8.5–10.1)
CALCIUM BLD-MCNC: 7.5 MG/DL (ref 8.5–10.1)
CALCIUM BLD-MCNC: 7.7 MG/DL (ref 8.5–10.1)
CALCIUM BLD-MCNC: 7.7 MG/DL (ref 8.5–10.1)
CALCIUM BLD-MCNC: 7.8 MG/DL (ref 8.5–10.1)
CALCIUM BLD-MCNC: 7.9 MG/DL (ref 8.5–10.1)
CALCIUM BLD-MCNC: 8.1 MG/DL (ref 8.5–10.1)
CALCIUM BLD-MCNC: 8.2 MG/DL (ref 8.5–10.1)
CALCIUM BLD-MCNC: 8.3 MG/DL (ref 8.5–10.1)
CALCIUM BLD-MCNC: 8.4 MG/DL (ref 8.5–10.1)
CALCIUM BLD-MCNC: 8.5 MG/DL (ref 8.5–10.1)
CALCIUM BLD-MCNC: 8.6 MG/DL (ref 8.5–10.1)
CALCIUM BLD-MCNC: 8.7 MG/DL (ref 8.5–10.1)
CALCIUM BLD-MCNC: 8.9 MG/DL (ref 8.5–10.1)
CALCIUM BLD-MCNC: 9.7 MG/DL (ref 8.5–10.1)
CHLORIDE SERPL-SCNC: 101 MMOL/L (ref 98–112)
CHLORIDE SERPL-SCNC: 102 MMOL/L (ref 98–112)
CHLORIDE SERPL-SCNC: 103 MMOL/L (ref 98–112)
CHLORIDE SERPL-SCNC: 104 MMOL/L (ref 98–112)
CHLORIDE SERPL-SCNC: 105 MMOL/L (ref 98–112)
CHLORIDE SERPL-SCNC: 106 MMOL/L (ref 98–112)
CHLORIDE SERPL-SCNC: 106 MMOL/L (ref 98–112)
CHLORIDE SERPL-SCNC: 107 MMOL/L (ref 98–112)
CHLORIDE SERPL-SCNC: 107 MMOL/L (ref 98–112)
CHLORIDE SERPL-SCNC: 108 MMOL/L (ref 98–112)
CHLORIDE SERPL-SCNC: 108 MMOL/L (ref 98–112)
CHLORIDE SERPL-SCNC: 109 MMOL/L (ref 98–112)
CHLORIDE SERPL-SCNC: 109 MMOL/L (ref 98–112)
CHLORIDE SERPL-SCNC: 99 MMOL/L (ref 98–112)
CHLORIDE SERPL-SCNC: 99 MMOL/L (ref 98–112)
CK SERPL-CCNC: 67 U/L (ref 26–192)
CO2 SERPL-SCNC: 23 MMOL/L (ref 21–32)
CO2 SERPL-SCNC: 23 MMOL/L (ref 21–32)
CO2 SERPL-SCNC: 24 MMOL/L (ref 21–32)
CO2 SERPL-SCNC: 24 MMOL/L (ref 21–32)
CO2 SERPL-SCNC: 25 MMOL/L (ref 21–32)
CO2 SERPL-SCNC: 26 MMOL/L (ref 21–32)
CO2 SERPL-SCNC: 27 MMOL/L (ref 21–32)
CO2 SERPL-SCNC: 28 MMOL/L (ref 21–32)
CO2 SERPL-SCNC: 30 MMOL/L (ref 21–32)
CO2 SERPL-SCNC: 30 MMOL/L (ref 21–32)
CO2 SERPL-SCNC: 31 MMOL/L (ref 21–32)
CO2 SERPL-SCNC: 32 MMOL/L (ref 21–32)
CO2 SERPL-SCNC: 33 MMOL/L (ref 21–32)
CO2 SERPL-SCNC: 33 MMOL/L (ref 21–32)
CO2 SERPL-SCNC: 35 MMOL/L (ref 21–32)
COLOR UR: YELLOW
CREAT BLD-MCNC: 3.93 MG/DL (ref 0.55–1.02)
CREAT BLD-MCNC: 4.01 MG/DL (ref 0.55–1.02)
CREAT BLD-MCNC: 4.13 MG/DL (ref 0.55–1.02)
CREAT BLD-MCNC: 4.22 MG/DL (ref 0.55–1.02)
CREAT BLD-MCNC: 4.37 MG/DL (ref 0.55–1.02)
CREAT BLD-MCNC: 4.72 MG/DL (ref 0.55–1.02)
CREAT BLD-MCNC: 4.88 MG/DL (ref 0.55–1.02)
CREAT BLD-MCNC: 4.98 MG/DL (ref 0.55–1.02)
CREAT BLD-MCNC: 5.5 MG/DL (ref 0.55–1.02)
CREAT BLD-MCNC: 5.54 MG/DL (ref 0.55–1.02)
CREAT BLD-MCNC: 5.85 MG/DL (ref 0.55–1.02)
CREAT BLD-MCNC: 6.19 MG/DL (ref 0.55–1.02)
CREAT BLD-MCNC: 6.21 MG/DL (ref 0.55–1.02)
CREAT BLD-MCNC: 6.23 MG/DL (ref 0.55–1.02)
CREAT BLD-MCNC: 6.59 MG/DL (ref 0.55–1.02)
CREAT BLD-MCNC: 6.59 MG/DL (ref 0.55–1.02)
CREAT BLD-MCNC: 6.64 MG/DL (ref 0.55–1.02)
CREAT BLD-MCNC: 6.74 MG/DL (ref 0.55–1.02)
CREAT BLD-MCNC: 6.8 MG/DL (ref 0.55–1.02)
CREAT BLD-MCNC: 7.16 MG/DL (ref 0.55–1.02)
CREAT BLD-MCNC: 7.34 MG/DL (ref 0.55–1.02)
CREAT BLD-MCNC: 7.35 MG/DL (ref 0.55–1.02)
CREAT BLD-MCNC: 7.59 MG/DL (ref 0.55–1.02)
CREAT BLD-MCNC: 7.73 MG/DL (ref 0.55–1.02)
CREAT BLD-MCNC: 7.89 MG/DL (ref 0.55–1.02)
CREAT BLD-MCNC: 8 MG/DL (ref 0.55–1.02)
CREAT BLD-MCNC: 8.71 MG/DL (ref 0.55–1.02)
CREAT BLD-MCNC: 8.99 MG/DL (ref 0.55–1.02)
DEPRECATED HBV CORE AB SER IA-ACNC: 136.2 NG/ML (ref 18–340)
DEPRECATED RDW RBC AUTO: 50.6 FL (ref 35.1–46.3)
DEPRECATED RDW RBC AUTO: 58 FL (ref 35.1–46.3)
DEPRECATED RDW RBC AUTO: 58.3 FL (ref 35.1–46.3)
DEPRECATED RDW RBC AUTO: 59.5 FL (ref 35.1–46.3)
DEPRECATED RDW RBC AUTO: 60 FL (ref 35.1–46.3)
DEPRECATED RDW RBC AUTO: 60.5 FL (ref 35.1–46.3)
DEPRECATED RDW RBC AUTO: 61.1 FL (ref 35.1–46.3)
DEPRECATED RDW RBC AUTO: 61.1 FL (ref 35.1–46.3)
DEPRECATED RDW RBC AUTO: 61.7 FL (ref 35.1–46.3)
DEPRECATED RDW RBC AUTO: 62.5 FL (ref 35.1–46.3)
DEPRECATED RDW RBC AUTO: 63 FL (ref 35.1–46.3)
DEPRECATED RDW RBC AUTO: 64.5 FL (ref 35.1–46.3)
DEPRECATED RDW RBC AUTO: 64.9 FL (ref 35.1–46.3)
DEPRECATED RDW RBC AUTO: 65 FL (ref 35.1–46.3)
DEPRECATED RDW RBC AUTO: 65.1 FL (ref 35.1–46.3)
DEPRECATED RDW RBC AUTO: 65.4 FL (ref 35.1–46.3)
DEPRECATED RDW RBC AUTO: 65.8 FL (ref 35.1–46.3)
DEPRECATED RDW RBC AUTO: 66.1 FL (ref 35.1–46.3)
DEPRECATED RDW RBC AUTO: 66.1 FL (ref 35.1–46.3)
DEPRECATED RDW RBC AUTO: 70 FL (ref 35.1–46.3)
DEPRECATED RDW RBC AUTO: 73 FL (ref 35.1–46.3)
DEPRECATED RDW RBC AUTO: 73.8 FL (ref 35.1–46.3)
DEPRECATED RDW RBC AUTO: 74.7 FL (ref 35.1–46.3)
DEPRECATED RDW RBC AUTO: 74.8 FL (ref 35.1–46.3)
DEPRECATED RDW RBC AUTO: 75.6 FL (ref 35.1–46.3)
DEPRECATED RDW RBC AUTO: 76.2 FL (ref 35.1–46.3)
DEPRECATED RDW RBC AUTO: 76.4 FL (ref 35.1–46.3)
DEPRECATED RDW RBC AUTO: 76.5 FL (ref 35.1–46.3)
DEPRECATED RDW RBC AUTO: 78.5 FL (ref 35.1–46.3)
DEPRECATED RDW RBC AUTO: 78.8 FL (ref 35.1–46.3)
DEPRECATED RDW RBC AUTO: 79.2 FL (ref 35.1–46.3)
DEPRECATED RDW RBC AUTO: 79.6 FL (ref 35.1–46.3)
DEPRECATED RDW RBC AUTO: 82.4 FL (ref 35.1–46.3)
EOSINOPHIL # BLD AUTO: 0.01 X10(3) UL (ref 0–0.7)
EOSINOPHIL # BLD AUTO: 0.09 X10(3) UL (ref 0–0.7)
EOSINOPHIL # BLD AUTO: 0.11 X10(3) UL (ref 0–0.7)
EOSINOPHIL # BLD AUTO: 0.11 X10(3) UL (ref 0–0.7)
EOSINOPHIL # BLD AUTO: 0.14 X10(3) UL (ref 0–0.7)
EOSINOPHIL # BLD AUTO: 0.14 X10(3) UL (ref 0–0.7)
EOSINOPHIL # BLD AUTO: 0.15 X10(3) UL (ref 0–0.7)
EOSINOPHIL # BLD AUTO: 0.16 X10(3) UL (ref 0–0.7)
EOSINOPHIL # BLD AUTO: 0.17 X10(3) UL (ref 0–0.7)
EOSINOPHIL # BLD AUTO: 0.18 X10(3) UL (ref 0–0.7)
EOSINOPHIL # BLD AUTO: 0.21 X10(3) UL (ref 0–0.7)
EOSINOPHIL # BLD AUTO: 0.22 X10(3) UL (ref 0–0.7)
EOSINOPHIL # BLD AUTO: 0.23 X10(3) UL (ref 0–0.7)
EOSINOPHIL # BLD AUTO: 0.23 X10(3) UL (ref 0–0.7)
EOSINOPHIL # BLD AUTO: 0.24 X10(3) UL (ref 0–0.7)
EOSINOPHIL # BLD AUTO: 0.33 X10(3) UL (ref 0–0.7)
EOSINOPHIL # BLD: 0 X10(3) UL (ref 0–0.7)
EOSINOPHIL # BLD: 0.08 X10(3) UL (ref 0–0.7)
EOSINOPHIL # BLD: 0.08 X10(3) UL (ref 0–0.7)
EOSINOPHIL # BLD: 0.21 X10(3) UL (ref 0–0.7)
EOSINOPHIL # BLD: 0.25 X10(3) UL (ref 0–0.7)
EOSINOPHIL # BLD: 0.36 X10(3) UL (ref 0–0.7)
EOSINOPHIL NFR BLD AUTO: 0.2 %
EOSINOPHIL NFR BLD AUTO: 1.3 %
EOSINOPHIL NFR BLD AUTO: 1.8 %
EOSINOPHIL NFR BLD AUTO: 1.9 %
EOSINOPHIL NFR BLD AUTO: 2 %
EOSINOPHIL NFR BLD AUTO: 2.2 %
EOSINOPHIL NFR BLD AUTO: 2.3 %
EOSINOPHIL NFR BLD AUTO: 2.3 %
EOSINOPHIL NFR BLD AUTO: 2.5 %
EOSINOPHIL NFR BLD AUTO: 2.6 %
EOSINOPHIL NFR BLD AUTO: 2.7 %
EOSINOPHIL NFR BLD AUTO: 2.8 %
EOSINOPHIL NFR BLD AUTO: 2.9 %
EOSINOPHIL NFR BLD AUTO: 3.1 %
EOSINOPHIL NFR BLD AUTO: 3.2 %
EOSINOPHIL NFR BLD AUTO: 3.3 %
EOSINOPHIL NFR BLD AUTO: 3.5 %
EOSINOPHIL NFR BLD AUTO: 3.6 %
EOSINOPHIL NFR BLD AUTO: 4.8 %
EOSINOPHIL NFR BLD: 0 %
EOSINOPHIL NFR BLD: 1 %
EOSINOPHIL NFR BLD: 1 %
EOSINOPHIL NFR BLD: 2 %
EOSINOPHIL NFR BLD: 3 %
EOSINOPHIL NFR BLD: 4 %
ERYTHROCYTE [DISTWIDTH] IN BLOOD BY AUTOMATED COUNT: 13.4 % (ref 11–15)
ERYTHROCYTE [DISTWIDTH] IN BLOOD BY AUTOMATED COUNT: 14.8 % (ref 11–15)
ERYTHROCYTE [DISTWIDTH] IN BLOOD BY AUTOMATED COUNT: 15.1 % (ref 11–15)
ERYTHROCYTE [DISTWIDTH] IN BLOOD BY AUTOMATED COUNT: 15.2 % (ref 11–15)
ERYTHROCYTE [DISTWIDTH] IN BLOOD BY AUTOMATED COUNT: 15.3 % (ref 11–15)
ERYTHROCYTE [DISTWIDTH] IN BLOOD BY AUTOMATED COUNT: 15.3 % (ref 11–15)
ERYTHROCYTE [DISTWIDTH] IN BLOOD BY AUTOMATED COUNT: 15.5 % (ref 11–15)
ERYTHROCYTE [DISTWIDTH] IN BLOOD BY AUTOMATED COUNT: 15.5 % (ref 11–15)
ERYTHROCYTE [DISTWIDTH] IN BLOOD BY AUTOMATED COUNT: 15.6 % (ref 11–15)
ERYTHROCYTE [DISTWIDTH] IN BLOOD BY AUTOMATED COUNT: 16.1 % (ref 11–15)
ERYTHROCYTE [DISTWIDTH] IN BLOOD BY AUTOMATED COUNT: 16.2 % (ref 11–15)
ERYTHROCYTE [DISTWIDTH] IN BLOOD BY AUTOMATED COUNT: 16.6 % (ref 11–15)
ERYTHROCYTE [DISTWIDTH] IN BLOOD BY AUTOMATED COUNT: 16.7 % (ref 11–15)
ERYTHROCYTE [DISTWIDTH] IN BLOOD BY AUTOMATED COUNT: 17 % (ref 11–15)
ERYTHROCYTE [DISTWIDTH] IN BLOOD BY AUTOMATED COUNT: 17.1 % (ref 11–15)
ERYTHROCYTE [DISTWIDTH] IN BLOOD BY AUTOMATED COUNT: 17.1 % (ref 11–15)
ERYTHROCYTE [DISTWIDTH] IN BLOOD BY AUTOMATED COUNT: 17.2 % (ref 11–15)
ERYTHROCYTE [DISTWIDTH] IN BLOOD BY AUTOMATED COUNT: 18.5 % (ref 11–15)
ERYTHROCYTE [DISTWIDTH] IN BLOOD BY AUTOMATED COUNT: 19 % (ref 11–15)
ERYTHROCYTE [DISTWIDTH] IN BLOOD BY AUTOMATED COUNT: 19.5 % (ref 11–15)
ERYTHROCYTE [DISTWIDTH] IN BLOOD BY AUTOMATED COUNT: 19.8 % (ref 11–15)
ERYTHROCYTE [DISTWIDTH] IN BLOOD BY AUTOMATED COUNT: 20.2 % (ref 11–15)
ERYTHROCYTE [DISTWIDTH] IN BLOOD BY AUTOMATED COUNT: 20.4 % (ref 11–15)
ERYTHROCYTE [DISTWIDTH] IN BLOOD BY AUTOMATED COUNT: 20.7 % (ref 11–15)
ERYTHROCYTE [DISTWIDTH] IN BLOOD BY AUTOMATED COUNT: 21 % (ref 11–15)
ERYTHROCYTE [DISTWIDTH] IN BLOOD BY AUTOMATED COUNT: 21 % (ref 11–15)
ERYTHROCYTE [DISTWIDTH] IN BLOOD BY AUTOMATED COUNT: 21.1 % (ref 11–15)
ERYTHROCYTE [DISTWIDTH] IN BLOOD BY AUTOMATED COUNT: 21.2 % (ref 11–15)
ERYTHROCYTE [DISTWIDTH] IN BLOOD BY AUTOMATED COUNT: 21.6 % (ref 11–15)
ERYTHROCYTE [DISTWIDTH] IN BLOOD BY AUTOMATED COUNT: 21.7 % (ref 11–15)
EST. AVERAGE GLUCOSE BLD GHB EST-MCNC: 91 MG/DL (ref 68–126)
EST. AVERAGE GLUCOSE BLD GHB EST-MCNC: 94 MG/DL (ref 68–126)
GLOBULIN PLAS-MCNC: 2.2 G/DL (ref 2.8–4.4)
GLOBULIN PLAS-MCNC: 2.5 G/DL (ref 2.8–4.4)
GLOBULIN PLAS-MCNC: 2.6 G/DL (ref 2.8–4.4)
GLOBULIN PLAS-MCNC: 2.7 G/DL (ref 2.8–4.4)
GLOBULIN PLAS-MCNC: 2.8 G/DL (ref 2.8–4.4)
GLOBULIN PLAS-MCNC: 3.3 G/DL (ref 2.8–4.4)
GLUCOSE BLD-MCNC: 101 MG/DL (ref 70–99)
GLUCOSE BLD-MCNC: 104 MG/DL (ref 70–99)
GLUCOSE BLD-MCNC: 105 MG/DL (ref 70–99)
GLUCOSE BLD-MCNC: 112 MG/DL (ref 70–99)
GLUCOSE BLD-MCNC: 131 MG/DL (ref 70–99)
GLUCOSE BLD-MCNC: 180 MG/DL (ref 70–99)
GLUCOSE BLD-MCNC: 50 MG/DL (ref 70–99)
GLUCOSE BLD-MCNC: 68 MG/DL (ref 70–99)
GLUCOSE BLD-MCNC: 70 MG/DL (ref 70–99)
GLUCOSE BLD-MCNC: 71 MG/DL (ref 70–99)
GLUCOSE BLD-MCNC: 73 MG/DL (ref 70–99)
GLUCOSE BLD-MCNC: 74 MG/DL (ref 70–99)
GLUCOSE BLD-MCNC: 75 MG/DL (ref 70–99)
GLUCOSE BLD-MCNC: 76 MG/DL (ref 70–99)
GLUCOSE BLD-MCNC: 76 MG/DL (ref 70–99)
GLUCOSE BLD-MCNC: 81 MG/DL (ref 70–99)
GLUCOSE BLD-MCNC: 81 MG/DL (ref 70–99)
GLUCOSE BLD-MCNC: 82 MG/DL (ref 70–99)
GLUCOSE BLD-MCNC: 82 MG/DL (ref 70–99)
GLUCOSE BLD-MCNC: 83 MG/DL (ref 70–99)
GLUCOSE BLD-MCNC: 84 MG/DL (ref 70–99)
GLUCOSE BLD-MCNC: 88 MG/DL (ref 70–99)
GLUCOSE BLD-MCNC: 92 MG/DL (ref 70–99)
GLUCOSE BLD-MCNC: 93 MG/DL (ref 70–99)
GLUCOSE BLD-MCNC: 94 MG/DL (ref 70–99)
GLUCOSE BLD-MCNC: 96 MG/DL (ref 70–99)
GLUCOSE BLD-MCNC: 98 MG/DL (ref 70–99)
GLUCOSE BLDC GLUCOMTR-MCNC: 100 MG/DL (ref 70–99)
GLUCOSE BLDC GLUCOMTR-MCNC: 101 MG/DL (ref 70–99)
GLUCOSE BLDC GLUCOMTR-MCNC: 103 MG/DL (ref 70–99)
GLUCOSE BLDC GLUCOMTR-MCNC: 103 MG/DL (ref 70–99)
GLUCOSE BLDC GLUCOMTR-MCNC: 104 MG/DL (ref 70–99)
GLUCOSE BLDC GLUCOMTR-MCNC: 105 MG/DL (ref 70–99)
GLUCOSE BLDC GLUCOMTR-MCNC: 105 MG/DL (ref 70–99)
GLUCOSE BLDC GLUCOMTR-MCNC: 107 MG/DL (ref 70–99)
GLUCOSE BLDC GLUCOMTR-MCNC: 108 MG/DL (ref 70–99)
GLUCOSE BLDC GLUCOMTR-MCNC: 109 MG/DL (ref 70–99)
GLUCOSE BLDC GLUCOMTR-MCNC: 111 MG/DL (ref 70–99)
GLUCOSE BLDC GLUCOMTR-MCNC: 113 MG/DL (ref 70–99)
GLUCOSE BLDC GLUCOMTR-MCNC: 114 MG/DL (ref 70–99)
GLUCOSE BLDC GLUCOMTR-MCNC: 120 MG/DL (ref 70–99)
GLUCOSE BLDC GLUCOMTR-MCNC: 136 MG/DL (ref 70–99)
GLUCOSE BLDC GLUCOMTR-MCNC: 147 MG/DL (ref 70–99)
GLUCOSE BLDC GLUCOMTR-MCNC: 159 MG/DL (ref 70–99)
GLUCOSE BLDC GLUCOMTR-MCNC: 72 MG/DL (ref 70–99)
GLUCOSE BLDC GLUCOMTR-MCNC: 75 MG/DL (ref 70–99)
GLUCOSE BLDC GLUCOMTR-MCNC: 84 MG/DL (ref 70–99)
GLUCOSE BLDC GLUCOMTR-MCNC: 84 MG/DL (ref 70–99)
GLUCOSE BLDC GLUCOMTR-MCNC: 85 MG/DL (ref 70–99)
GLUCOSE BLDC GLUCOMTR-MCNC: 88 MG/DL (ref 70–99)
GLUCOSE BLDC GLUCOMTR-MCNC: 90 MG/DL (ref 70–99)
GLUCOSE BLDC GLUCOMTR-MCNC: 90 MG/DL (ref 70–99)
GLUCOSE BLDC GLUCOMTR-MCNC: 91 MG/DL (ref 70–99)
GLUCOSE BLDC GLUCOMTR-MCNC: 92 MG/DL (ref 70–99)
GLUCOSE BLDC GLUCOMTR-MCNC: 94 MG/DL (ref 70–99)
GLUCOSE BLDC GLUCOMTR-MCNC: 96 MG/DL (ref 70–99)
GLUCOSE BLDC GLUCOMTR-MCNC: 98 MG/DL (ref 70–99)
GLUCOSE BLDC GLUCOMTR-MCNC: 98 MG/DL (ref 70–99)
GLUCOSE BLDC GLUCOMTR-MCNC: 99 MG/DL (ref 70–99)
GLUCOSE UR-MCNC: NEGATIVE MG/DL
H PYLORI AG STL QL IA: POSITIVE
HAV IGM SER QL: 2.1 MG/DL (ref 1.6–2.6)
HAV IGM SER QL: 2.1 MG/DL (ref 1.6–2.6)
HAV IGM SER QL: 2.5 MG/DL (ref 1.6–2.6)
HAV IGM SER QL: 2.5 MG/DL (ref 1.6–2.6)
HBA1C MFR BLD HPLC: 4.8 % (ref ?–5.7)
HBA1C MFR BLD HPLC: 4.9 % (ref ?–5.7)
HBV CORE AB SERPL QL IA: NONREACTIVE
HBV SURFACE AB SER QL: NONREACTIVE
HBV SURFACE AB SERPL IA-ACNC: <3.1 MIU/ML
HBV SURFACE AG SER-ACNC: <0.1 [IU]/L
HBV SURFACE AG SERPL QL IA: NONREACTIVE
HCO3 BLDA-SCNC: 20.8 MEQ/L (ref 21–27)
HCO3 BLDA-SCNC: 23.1 MEQ/L (ref 21–27)
HCO3 BLDA-SCNC: 24.9 MEQ/L (ref 21–27)
HCO3 BLDA-SCNC: 25.4 MEQ/L (ref 21–27)
HCO3 BLDA-SCNC: 25.9 MEQ/L (ref 21–27)
HCT VFR BLD AUTO: 21.1 % (ref 35–48)
HCT VFR BLD AUTO: 21.3 % (ref 35–48)
HCT VFR BLD AUTO: 21.7 % (ref 35–48)
HCT VFR BLD AUTO: 22.1 % (ref 35–48)
HCT VFR BLD AUTO: 23.1 % (ref 35–48)
HCT VFR BLD AUTO: 23.1 % (ref 35–48)
HCT VFR BLD AUTO: 23.3 % (ref 35–48)
HCT VFR BLD AUTO: 23.4 % (ref 35–48)
HCT VFR BLD AUTO: 23.5 % (ref 35–48)
HCT VFR BLD AUTO: 23.5 % (ref 35–48)
HCT VFR BLD AUTO: 23.8 % (ref 35–48)
HCT VFR BLD AUTO: 23.9 % (ref 35–48)
HCT VFR BLD AUTO: 24.2 % (ref 35–48)
HCT VFR BLD AUTO: 24.4 % (ref 35–48)
HCT VFR BLD AUTO: 24.4 % (ref 35–48)
HCT VFR BLD AUTO: 24.6 % (ref 35–48)
HCT VFR BLD AUTO: 24.6 % (ref 35–48)
HCT VFR BLD AUTO: 24.8 % (ref 35–48)
HCT VFR BLD AUTO: 24.9 % (ref 35–48)
HCT VFR BLD AUTO: 25.4 % (ref 35–48)
HCT VFR BLD AUTO: 25.6 % (ref 35–48)
HCT VFR BLD AUTO: 25.7 % (ref 35–48)
HCT VFR BLD AUTO: 25.9 % (ref 35–48)
HCT VFR BLD AUTO: 26 % (ref 35–48)
HCT VFR BLD AUTO: 26 % (ref 35–48)
HCT VFR BLD AUTO: 26.4 % (ref 35–48)
HCT VFR BLD AUTO: 26.6 % (ref 35–48)
HCT VFR BLD AUTO: 26.8 % (ref 35–48)
HCT VFR BLD AUTO: 26.9 % (ref 35–48)
HCT VFR BLD AUTO: 27.2 % (ref 35–48)
HCT VFR BLD AUTO: 27.4 % (ref 35–48)
HCT VFR BLD AUTO: 27.4 % (ref 35–48)
HCT VFR BLD AUTO: 27.6 % (ref 35–48)
HCT VFR BLD AUTO: 27.7 % (ref 35–48)
HCT VFR BLD AUTO: 27.8 % (ref 35–48)
HCT VFR BLD AUTO: 28 % (ref 35–48)
HCT VFR BLD AUTO: 28.6 % (ref 35–48)
HCT VFR BLD AUTO: 28.7 % (ref 35–48)
HCT VFR BLD AUTO: 28.8 % (ref 35–48)
HCT VFR BLD AUTO: 31.2 % (ref 35–48)
HCT VFR BLD AUTO: 32.8 % (ref 35–48)
HCT VFR BLD AUTO: 35.4 % (ref 35–48)
HEMOCCULT STL QL: POSITIVE
HEPARIN AB PPP QL PL AGG: NEGATIVE
HGB BLD-MCNC: 10 G/DL (ref 12–16)
HGB BLD-MCNC: 10.2 G/DL (ref 12–16)
HGB BLD-MCNC: 6.5 G/DL (ref 12–16)
HGB BLD-MCNC: 6.5 G/DL (ref 12–16)
HGB BLD-MCNC: 6.7 G/DL (ref 12–16)
HGB BLD-MCNC: 6.9 G/DL (ref 12–16)
HGB BLD-MCNC: 7 G/DL (ref 12–16)
HGB BLD-MCNC: 7.1 G/DL (ref 12–16)
HGB BLD-MCNC: 7.2 G/DL (ref 12–16)
HGB BLD-MCNC: 7.3 G/DL (ref 12–16)
HGB BLD-MCNC: 7.5 G/DL (ref 12–16)
HGB BLD-MCNC: 7.6 G/DL (ref 12–16)
HGB BLD-MCNC: 7.7 G/DL (ref 12–16)
HGB BLD-MCNC: 7.8 G/DL (ref 12–16)
HGB BLD-MCNC: 7.8 G/DL (ref 12–16)
HGB BLD-MCNC: 7.9 G/DL (ref 12–16)
HGB BLD-MCNC: 8 G/DL (ref 12–16)
HGB BLD-MCNC: 8 G/DL (ref 12–16)
HGB BLD-MCNC: 8.2 G/DL (ref 12–16)
HGB BLD-MCNC: 8.2 G/DL (ref 12–16)
HGB BLD-MCNC: 8.3 G/DL (ref 12–16)
HGB BLD-MCNC: 8.4 G/DL (ref 12–16)
HGB BLD-MCNC: 8.5 G/DL (ref 12–16)
HGB BLD-MCNC: 8.6 G/DL (ref 12–16)
HGB BLD-MCNC: 8.8 G/DL (ref 12–16)
HGB BLD-MCNC: 8.9 G/DL (ref 12–16)
HGB BLD-MCNC: 9.7 G/DL (ref 12–16)
IMM GRANULOCYTES # BLD AUTO: 0.05 X10(3) UL (ref 0–1)
IMM GRANULOCYTES # BLD AUTO: 0.06 X10(3) UL (ref 0–1)
IMM GRANULOCYTES # BLD AUTO: 0.07 X10(3) UL (ref 0–1)
IMM GRANULOCYTES # BLD AUTO: 0.07 X10(3) UL (ref 0–1)
IMM GRANULOCYTES # BLD AUTO: 0.08 X10(3) UL (ref 0–1)
IMM GRANULOCYTES # BLD AUTO: 0.08 X10(3) UL (ref 0–1)
IMM GRANULOCYTES # BLD AUTO: 0.09 X10(3) UL (ref 0–1)
IMM GRANULOCYTES # BLD AUTO: 0.1 X10(3) UL (ref 0–1)
IMM GRANULOCYTES # BLD AUTO: 0.14 X10(3) UL (ref 0–1)
IMM GRANULOCYTES # BLD AUTO: 0.15 X10(3) UL (ref 0–1)
IMM GRANULOCYTES # BLD AUTO: 0.17 X10(3) UL (ref 0–1)
IMM GRANULOCYTES # BLD AUTO: 0.22 X10(3) UL (ref 0–1)
IMM GRANULOCYTES # BLD AUTO: 0.23 X10(3) UL (ref 0–1)
IMM GRANULOCYTES # BLD AUTO: 0.24 X10(3) UL (ref 0–1)
IMM GRANULOCYTES # BLD AUTO: 0.38 X10(3) UL (ref 0–1)
IMM GRANULOCYTES NFR BLD: 0.7 %
IMM GRANULOCYTES NFR BLD: 0.8 %
IMM GRANULOCYTES NFR BLD: 0.9 %
IMM GRANULOCYTES NFR BLD: 1 %
IMM GRANULOCYTES NFR BLD: 1 %
IMM GRANULOCYTES NFR BLD: 1.1 %
IMM GRANULOCYTES NFR BLD: 1.1 %
IMM GRANULOCYTES NFR BLD: 1.2 %
IMM GRANULOCYTES NFR BLD: 1.2 %
IMM GRANULOCYTES NFR BLD: 1.3 %
IMM GRANULOCYTES NFR BLD: 1.4 %
IMM GRANULOCYTES NFR BLD: 1.5 %
IMM GRANULOCYTES NFR BLD: 1.9 %
IMM GRANULOCYTES NFR BLD: 2 %
IMM GRANULOCYTES NFR BLD: 2.6 %
IMM GRANULOCYTES NFR BLD: 3.3 %
IMM GRANULOCYTES NFR BLD: 3.8 %
IMM GRANULOCYTES NFR BLD: 4 %
IMM GRANULOCYTES NFR BLD: 4.8 %
INR BLD: 1.33 (ref 0.9–1.2)
IRON SATURATION: 16 % (ref 15–50)
IRON SERPL-MCNC: 41 UG/DL (ref 50–170)
KETONES UR-MCNC: NEGATIVE MG/DL
LACTATE SERPL-SCNC: 0.4 MMOL/L (ref 0.4–2)
LACTATE SERPL-SCNC: 0.8 MMOL/L (ref 0.4–2)
LACTIC ACID (BLOOD GAS): 0.6 MMOL/L (ref 0.5–2.2)
LYMPHOCYTES # BLD AUTO: 0.55 X10(3) UL (ref 1–4)
LYMPHOCYTES # BLD AUTO: 0.7 X10(3) UL (ref 1–4)
LYMPHOCYTES # BLD AUTO: 0.74 X10(3) UL (ref 1–4)
LYMPHOCYTES # BLD AUTO: 0.76 X10(3) UL (ref 1–4)
LYMPHOCYTES # BLD AUTO: 0.77 X10(3) UL (ref 1–4)
LYMPHOCYTES # BLD AUTO: 0.91 X10(3) UL (ref 1–4)
LYMPHOCYTES # BLD AUTO: 0.97 X10(3) UL (ref 1–4)
LYMPHOCYTES # BLD AUTO: 1.07 X10(3) UL (ref 1–4)
LYMPHOCYTES # BLD AUTO: 1.12 X10(3) UL (ref 1–4)
LYMPHOCYTES # BLD AUTO: 1.13 X10(3) UL (ref 1–4)
LYMPHOCYTES # BLD AUTO: 1.15 X10(3) UL (ref 1–4)
LYMPHOCYTES # BLD AUTO: 1.16 X10(3) UL (ref 1–4)
LYMPHOCYTES # BLD AUTO: 1.17 X10(3) UL (ref 1–4)
LYMPHOCYTES # BLD AUTO: 1.26 X10(3) UL (ref 1–4)
LYMPHOCYTES # BLD AUTO: 1.28 X10(3) UL (ref 1–4)
LYMPHOCYTES # BLD AUTO: 1.33 X10(3) UL (ref 1–4)
LYMPHOCYTES # BLD AUTO: 1.38 X10(3) UL (ref 1–4)
LYMPHOCYTES # BLD AUTO: 1.38 X10(3) UL (ref 1–4)
LYMPHOCYTES # BLD AUTO: 1.5 X10(3) UL (ref 1–4)
LYMPHOCYTES # BLD AUTO: 1.64 X10(3) UL (ref 1–4)
LYMPHOCYTES # BLD AUTO: 1.7 X10(3) UL (ref 1–4)
LYMPHOCYTES NFR BLD AUTO: 10.6 %
LYMPHOCYTES NFR BLD AUTO: 10.7 %
LYMPHOCYTES NFR BLD AUTO: 11.2 %
LYMPHOCYTES NFR BLD AUTO: 11.9 %
LYMPHOCYTES NFR BLD AUTO: 14 %
LYMPHOCYTES NFR BLD AUTO: 15.4 %
LYMPHOCYTES NFR BLD AUTO: 15.6 %
LYMPHOCYTES NFR BLD AUTO: 15.7 %
LYMPHOCYTES NFR BLD AUTO: 16.7 %
LYMPHOCYTES NFR BLD AUTO: 18.1 %
LYMPHOCYTES NFR BLD AUTO: 18.6 %
LYMPHOCYTES NFR BLD AUTO: 19.6 %
LYMPHOCYTES NFR BLD AUTO: 19.8 %
LYMPHOCYTES NFR BLD AUTO: 19.8 %
LYMPHOCYTES NFR BLD AUTO: 20.5 %
LYMPHOCYTES NFR BLD AUTO: 20.8 %
LYMPHOCYTES NFR BLD AUTO: 20.8 %
LYMPHOCYTES NFR BLD AUTO: 20.9 %
LYMPHOCYTES NFR BLD AUTO: 23.5 %
LYMPHOCYTES NFR BLD AUTO: 23.8 %
LYMPHOCYTES NFR BLD AUTO: 9.9 %
LYMPHOCYTES NFR BLD: 0.33 X10(3) UL (ref 1–4)
LYMPHOCYTES NFR BLD: 0.39 X10(3) UL (ref 1–4)
LYMPHOCYTES NFR BLD: 0.53 X10(3) UL (ref 1–4)
LYMPHOCYTES NFR BLD: 0.62 X10(3) UL (ref 1–4)
LYMPHOCYTES NFR BLD: 0.7 X10(3) UL (ref 1–4)
LYMPHOCYTES NFR BLD: 0.7 X10(3) UL (ref 1–4)
LYMPHOCYTES NFR BLD: 0.83 X10(3) UL (ref 1–4)
LYMPHOCYTES NFR BLD: 1.01 X10(3) UL (ref 1–4)
LYMPHOCYTES NFR BLD: 1.24 X10(3) UL (ref 1–4)
LYMPHOCYTES NFR BLD: 1.25 X10(3) UL (ref 1–4)
LYMPHOCYTES NFR BLD: 10 %
LYMPHOCYTES NFR BLD: 11 %
LYMPHOCYTES NFR BLD: 12 %
LYMPHOCYTES NFR BLD: 12 %
LYMPHOCYTES NFR BLD: 2 %
LYMPHOCYTES NFR BLD: 5 %
LYMPHOCYTES NFR BLD: 6 %
LYMPHOCYTES NFR BLD: 6 %
LYMPHOCYTES NFR BLD: 8 %
LYMPHOCYTES NFR BLD: 9 %
M PROTEIN MFR SERPL ELPH: 5 G/DL (ref 6.4–8.2)
M PROTEIN MFR SERPL ELPH: 5.3 G/DL (ref 6.4–8.2)
M PROTEIN MFR SERPL ELPH: 5.4 G/DL (ref 6.4–8.2)
M PROTEIN MFR SERPL ELPH: 5.5 G/DL (ref 6.4–8.2)
M PROTEIN MFR SERPL ELPH: 5.6 G/DL (ref 6.4–8.2)
M PROTEIN MFR SERPL ELPH: 6.5 G/DL (ref 6.4–8.2)
MCH RBC QN AUTO: 31.8 PG (ref 26–34)
MCH RBC QN AUTO: 32 PG (ref 26–34)
MCH RBC QN AUTO: 32.2 PG (ref 26–34)
MCH RBC QN AUTO: 32.6 PG (ref 26–34)
MCH RBC QN AUTO: 32.6 PG (ref 26–34)
MCH RBC QN AUTO: 32.7 PG (ref 26–34)
MCH RBC QN AUTO: 32.7 PG (ref 26–34)
MCH RBC QN AUTO: 32.8 PG (ref 26–34)
MCH RBC QN AUTO: 32.9 PG (ref 26–34)
MCH RBC QN AUTO: 33 PG (ref 26–34)
MCH RBC QN AUTO: 33 PG (ref 26–34)
MCH RBC QN AUTO: 33.1 PG (ref 26–34)
MCH RBC QN AUTO: 33.2 PG (ref 26–34)
MCH RBC QN AUTO: 33.3 PG (ref 26–34)
MCH RBC QN AUTO: 33.5 PG (ref 26–34)
MCH RBC QN AUTO: 33.6 PG (ref 26–34)
MCH RBC QN AUTO: 33.6 PG (ref 26–34)
MCH RBC QN AUTO: 34.2 PG (ref 26–34)
MCHC RBC AUTO-ENTMCNC: 28.8 G/DL (ref 31–37)
MCHC RBC AUTO-ENTMCNC: 29 G/DL (ref 31–37)
MCHC RBC AUTO-ENTMCNC: 29.1 G/DL (ref 31–37)
MCHC RBC AUTO-ENTMCNC: 29.4 G/DL (ref 31–37)
MCHC RBC AUTO-ENTMCNC: 29.5 G/DL (ref 31–37)
MCHC RBC AUTO-ENTMCNC: 29.8 G/DL (ref 31–37)
MCHC RBC AUTO-ENTMCNC: 30.2 G/DL (ref 31–37)
MCHC RBC AUTO-ENTMCNC: 30.2 G/DL (ref 31–37)
MCHC RBC AUTO-ENTMCNC: 30.3 G/DL (ref 31–37)
MCHC RBC AUTO-ENTMCNC: 30.4 G/DL (ref 31–37)
MCHC RBC AUTO-ENTMCNC: 30.4 G/DL (ref 31–37)
MCHC RBC AUTO-ENTMCNC: 30.5 G/DL (ref 31–37)
MCHC RBC AUTO-ENTMCNC: 30.6 G/DL (ref 31–37)
MCHC RBC AUTO-ENTMCNC: 30.6 G/DL (ref 31–37)
MCHC RBC AUTO-ENTMCNC: 30.7 G/DL (ref 31–37)
MCHC RBC AUTO-ENTMCNC: 30.8 G/DL (ref 31–37)
MCHC RBC AUTO-ENTMCNC: 30.8 G/DL (ref 31–37)
MCHC RBC AUTO-ENTMCNC: 30.9 G/DL (ref 31–37)
MCHC RBC AUTO-ENTMCNC: 31.1 G/DL (ref 31–37)
MCHC RBC AUTO-ENTMCNC: 31.1 G/DL (ref 31–37)
MCHC RBC AUTO-ENTMCNC: 31.2 G/DL (ref 31–37)
MCHC RBC AUTO-ENTMCNC: 31.3 G/DL (ref 31–37)
MCV RBC AUTO: 102.3 FL (ref 80–100)
MCV RBC AUTO: 102.6 FL (ref 80–100)
MCV RBC AUTO: 103.1 FL (ref 80–100)
MCV RBC AUTO: 104.5 FL (ref 80–100)
MCV RBC AUTO: 104.8 FL (ref 80–100)
MCV RBC AUTO: 105.4 FL (ref 80–100)
MCV RBC AUTO: 105.6 FL (ref 80–100)
MCV RBC AUTO: 106 FL (ref 80–100)
MCV RBC AUTO: 106.4 FL (ref 80–100)
MCV RBC AUTO: 107 FL (ref 80–100)
MCV RBC AUTO: 107.6 FL (ref 80–100)
MCV RBC AUTO: 107.8 FL (ref 80–100)
MCV RBC AUTO: 108.4 FL (ref 80–100)
MCV RBC AUTO: 108.4 FL (ref 80–100)
MCV RBC AUTO: 108.5 FL (ref 80–100)
MCV RBC AUTO: 108.5 FL (ref 80–100)
MCV RBC AUTO: 108.8 FL (ref 80–100)
MCV RBC AUTO: 108.9 FL (ref 80–100)
MCV RBC AUTO: 109.2 FL (ref 80–100)
MCV RBC AUTO: 109.4 FL (ref 80–100)
MCV RBC AUTO: 109.6 FL (ref 80–100)
MCV RBC AUTO: 109.7 FL (ref 80–100)
MCV RBC AUTO: 109.7 FL (ref 80–100)
MCV RBC AUTO: 109.9 FL (ref 80–100)
MCV RBC AUTO: 110.1 FL (ref 80–100)
MCV RBC AUTO: 110.8 FL (ref 80–100)
MCV RBC AUTO: 111.3 FL (ref 80–100)
MCV RBC AUTO: 112.4 FL (ref 80–100)
MCV RBC AUTO: 112.6 FL (ref 80–100)
MCV RBC AUTO: 112.7 FL (ref 80–100)
MCV RBC AUTO: 112.9 FL (ref 80–100)
MCV RBC AUTO: 114 FL (ref 80–100)
MCV RBC AUTO: 115.7 FL (ref 80–100)
METAMYELOCYTES # BLD: 0.08 X10(3) UL
METAMYELOCYTES # BLD: 0.08 X10(3) UL
METAMYELOCYTES # BLD: 0.41 X10(3) UL
METAMYELOCYTES NFR BLD: 1 %
METAMYELOCYTES NFR BLD: 1 %
METAMYELOCYTES NFR BLD: 2 %
MODIFIED ALLEN TEST: POSITIVE
MONOCYTES # BLD AUTO: 0.33 X10(3) UL (ref 0.1–1)
MONOCYTES # BLD AUTO: 0.43 X10(3) UL (ref 0.1–1)
MONOCYTES # BLD AUTO: 0.46 X10(3) UL (ref 0.1–1)
MONOCYTES # BLD AUTO: 0.57 X10(3) UL (ref 0.1–1)
MONOCYTES # BLD AUTO: 0.61 X10(3) UL (ref 0.1–1)
MONOCYTES # BLD AUTO: 0.63 X10(3) UL (ref 0.1–1)
MONOCYTES # BLD AUTO: 0.65 X10(3) UL (ref 0.1–1)
MONOCYTES # BLD AUTO: 0.66 X10(3) UL (ref 0.1–1)
MONOCYTES # BLD AUTO: 0.68 X10(3) UL (ref 0.1–1)
MONOCYTES # BLD AUTO: 0.71 X10(3) UL (ref 0.1–1)
MONOCYTES # BLD AUTO: 0.71 X10(3) UL (ref 0.1–1)
MONOCYTES # BLD AUTO: 0.73 X10(3) UL (ref 0.1–1)
MONOCYTES # BLD AUTO: 0.74 X10(3) UL (ref 0.1–1)
MONOCYTES # BLD AUTO: 0.76 X10(3) UL (ref 0.1–1)
MONOCYTES # BLD AUTO: 0.76 X10(3) UL (ref 0.1–1)
MONOCYTES # BLD AUTO: 0.77 X10(3) UL (ref 0.1–1)
MONOCYTES # BLD AUTO: 0.77 X10(3) UL (ref 0.1–1)
MONOCYTES # BLD: 0.15 X10(3) UL (ref 0.1–1)
MONOCYTES # BLD: 0.23 X10(3) UL (ref 0.1–1)
MONOCYTES # BLD: 0.25 X10(3) UL (ref 0.1–1)
MONOCYTES # BLD: 0.26 X10(3) UL (ref 0.1–1)
MONOCYTES # BLD: 0.33 X10(3) UL (ref 0.1–1)
MONOCYTES # BLD: 0.5 X10(3) UL (ref 0.1–1)
MONOCYTES # BLD: 0.62 X10(3) UL (ref 0.1–1)
MONOCYTES # BLD: 0.62 X10(3) UL (ref 0.1–1)
MONOCYTES # BLD: 1.04 X10(3) UL (ref 0.1–1)
MONOCYTES # BLD: 1.15 X10(3) UL (ref 0.1–1)
MONOCYTES NFR BLD AUTO: 10 %
MONOCYTES NFR BLD AUTO: 10.1 %
MONOCYTES NFR BLD AUTO: 10.3 %
MONOCYTES NFR BLD AUTO: 10.3 %
MONOCYTES NFR BLD AUTO: 10.4 %
MONOCYTES NFR BLD AUTO: 10.5 %
MONOCYTES NFR BLD AUTO: 10.6 %
MONOCYTES NFR BLD AUTO: 10.9 %
MONOCYTES NFR BLD AUTO: 11.2 %
MONOCYTES NFR BLD AUTO: 11.4 %
MONOCYTES NFR BLD AUTO: 11.6 %
MONOCYTES NFR BLD AUTO: 11.8 %
MONOCYTES NFR BLD AUTO: 12.1 %
MONOCYTES NFR BLD AUTO: 6 %
MONOCYTES NFR BLD AUTO: 6.4 %
MONOCYTES NFR BLD AUTO: 7.5 %
MONOCYTES NFR BLD AUTO: 7.9 %
MONOCYTES NFR BLD AUTO: 8.5 %
MONOCYTES NFR BLD AUTO: 9.3 %
MONOCYTES NFR BLD AUTO: 9.3 %
MONOCYTES NFR BLD AUTO: 9.7 %
MONOCYTES NFR BLD: 2 %
MONOCYTES NFR BLD: 3 %
MONOCYTES NFR BLD: 4 %
MONOCYTES NFR BLD: 5 %
MONOCYTES NFR BLD: 6 %
MONOCYTES NFR BLD: 7 %
MONOCYTES NFR BLD: 7 %
MONOCYTES NFR BLD: 9 %
MORPHOLOGY: NORMAL
MORPHOLOGY: NORMAL
MRSA DNA SPEC QL NAA+PROBE: NEGATIVE
MYELOCYTES # BLD: 0.06 X10(3) UL
MYELOCYTES # BLD: 0.21 X10(3) UL
MYELOCYTES # BLD: 0.27 X10(3) UL
MYELOCYTES NFR BLD: 1 %
MYELOCYTES NFR BLD: 1 %
MYELOCYTES NFR BLD: 3 %
NEUTROPHILS # BLD AUTO: 13.75 X10 (3) UL (ref 1.5–7.7)
NEUTROPHILS # BLD AUTO: 3.48 X10 (3) UL (ref 1.5–7.7)
NEUTROPHILS # BLD AUTO: 3.48 X10(3) UL (ref 1.5–7.7)
NEUTROPHILS # BLD AUTO: 3.58 X10 (3) UL (ref 1.5–7.7)
NEUTROPHILS # BLD AUTO: 3.58 X10(3) UL (ref 1.5–7.7)
NEUTROPHILS # BLD AUTO: 3.6 X10 (3) UL (ref 1.5–7.7)
NEUTROPHILS # BLD AUTO: 3.6 X10(3) UL (ref 1.5–7.7)
NEUTROPHILS # BLD AUTO: 3.65 X10 (3) UL (ref 1.5–7.7)
NEUTROPHILS # BLD AUTO: 3.65 X10(3) UL (ref 1.5–7.7)
NEUTROPHILS # BLD AUTO: 3.67 X10 (3) UL (ref 1.5–7.7)
NEUTROPHILS # BLD AUTO: 3.67 X10(3) UL (ref 1.5–7.7)
NEUTROPHILS # BLD AUTO: 3.89 X10 (3) UL (ref 1.5–7.7)
NEUTROPHILS # BLD AUTO: 3.92 X10 (3) UL (ref 1.5–7.7)
NEUTROPHILS # BLD AUTO: 3.92 X10(3) UL (ref 1.5–7.7)
NEUTROPHILS # BLD AUTO: 4.2 X10 (3) UL (ref 1.5–7.7)
NEUTROPHILS # BLD AUTO: 4.2 X10(3) UL (ref 1.5–7.7)
NEUTROPHILS # BLD AUTO: 4.36 X10 (3) UL (ref 1.5–7.7)
NEUTROPHILS # BLD AUTO: 4.36 X10(3) UL (ref 1.5–7.7)
NEUTROPHILS # BLD AUTO: 4.38 X10 (3) UL (ref 1.5–7.7)
NEUTROPHILS # BLD AUTO: 4.38 X10(3) UL (ref 1.5–7.7)
NEUTROPHILS # BLD AUTO: 4.39 X10 (3) UL (ref 1.5–7.7)
NEUTROPHILS # BLD AUTO: 4.39 X10 (3) UL (ref 1.5–7.7)
NEUTROPHILS # BLD AUTO: 4.39 X10(3) UL (ref 1.5–7.7)
NEUTROPHILS # BLD AUTO: 4.39 X10(3) UL (ref 1.5–7.7)
NEUTROPHILS # BLD AUTO: 4.4 X10 (3) UL (ref 1.5–7.7)
NEUTROPHILS # BLD AUTO: 4.4 X10(3) UL (ref 1.5–7.7)
NEUTROPHILS # BLD AUTO: 4.46 X10 (3) UL (ref 1.5–7.7)
NEUTROPHILS # BLD AUTO: 4.46 X10(3) UL (ref 1.5–7.7)
NEUTROPHILS # BLD AUTO: 4.47 X10 (3) UL (ref 1.5–7.7)
NEUTROPHILS # BLD AUTO: 4.66 X10 (3) UL (ref 1.5–7.7)
NEUTROPHILS # BLD AUTO: 4.66 X10(3) UL (ref 1.5–7.7)
NEUTROPHILS # BLD AUTO: 4.73 X10 (3) UL (ref 1.5–7.7)
NEUTROPHILS # BLD AUTO: 4.73 X10(3) UL (ref 1.5–7.7)
NEUTROPHILS # BLD AUTO: 4.82 X10 (3) UL (ref 1.5–7.7)
NEUTROPHILS # BLD AUTO: 4.82 X10(3) UL (ref 1.5–7.7)
NEUTROPHILS # BLD AUTO: 4.88 X10 (3) UL (ref 1.5–7.7)
NEUTROPHILS # BLD AUTO: 4.88 X10(3) UL (ref 1.5–7.7)
NEUTROPHILS # BLD AUTO: 5.34 X10 (3) UL (ref 1.5–7.7)
NEUTROPHILS # BLD AUTO: 5.45 X10 (3) UL (ref 1.5–7.7)
NEUTROPHILS # BLD AUTO: 5.45 X10(3) UL (ref 1.5–7.7)
NEUTROPHILS # BLD AUTO: 5.58 X10 (3) UL (ref 1.5–7.7)
NEUTROPHILS # BLD AUTO: 5.58 X10(3) UL (ref 1.5–7.7)
NEUTROPHILS # BLD AUTO: 5.67 X10 (3) UL (ref 1.5–7.7)
NEUTROPHILS # BLD AUTO: 5.67 X10(3) UL (ref 1.5–7.7)
NEUTROPHILS # BLD AUTO: 5.79 X10 (3) UL (ref 1.5–7.7)
NEUTROPHILS # BLD AUTO: 6.05 X10 (3) UL (ref 1.5–7.7)
NEUTROPHILS # BLD AUTO: 6.08 X10 (3) UL (ref 1.5–7.7)
NEUTROPHILS # BLD AUTO: 6.09 X10 (3) UL (ref 1.5–7.7)
NEUTROPHILS # BLD AUTO: 6.34 X10 (3) UL (ref 1.5–7.7)
NEUTROPHILS # BLD AUTO: 6.34 X10(3) UL (ref 1.5–7.7)
NEUTROPHILS # BLD AUTO: 6.65 X10 (3) UL (ref 1.5–7.7)
NEUTROPHILS NFR BLD AUTO: 57.4 %
NEUTROPHILS NFR BLD AUTO: 61.3 %
NEUTROPHILS NFR BLD AUTO: 61.6 %
NEUTROPHILS NFR BLD AUTO: 62.7 %
NEUTROPHILS NFR BLD AUTO: 63.1 %
NEUTROPHILS NFR BLD AUTO: 63.1 %
NEUTROPHILS NFR BLD AUTO: 64.1 %
NEUTROPHILS NFR BLD AUTO: 64.5 %
NEUTROPHILS NFR BLD AUTO: 65.5 %
NEUTROPHILS NFR BLD AUTO: 66.4 %
NEUTROPHILS NFR BLD AUTO: 66.6 %
NEUTROPHILS NFR BLD AUTO: 67.2 %
NEUTROPHILS NFR BLD AUTO: 67.5 %
NEUTROPHILS NFR BLD AUTO: 69.3 %
NEUTROPHILS NFR BLD AUTO: 71.9 %
NEUTROPHILS NFR BLD AUTO: 72.1 %
NEUTROPHILS NFR BLD AUTO: 73.2 %
NEUTROPHILS NFR BLD AUTO: 74.7 %
NEUTROPHILS NFR BLD AUTO: 75.7 %
NEUTROPHILS NFR BLD AUTO: 79.2 %
NEUTROPHILS NFR BLD AUTO: 79.5 %
NEUTROPHILS NFR BLD: 67 %
NEUTROPHILS NFR BLD: 74 %
NEUTROPHILS NFR BLD: 76 %
NEUTROPHILS NFR BLD: 77 %
NEUTROPHILS NFR BLD: 79 %
NEUTROPHILS NFR BLD: 80 %
NEUTROPHILS NFR BLD: 82 %
NEUTROPHILS NFR BLD: 83 %
NEUTROPHILS NFR BLD: 84 %
NEUTROPHILS NFR BLD: 91 %
NEUTS BAND NFR BLD: 11 %
NEUTS BAND NFR BLD: 2 %
NEUTS BAND NFR BLD: 3 %
NEUTS BAND NFR BLD: 3 %
NEUTS BAND NFR BLD: 4 %
NEUTS BAND NFR BLD: 5 %
NEUTS BAND NFR BLD: 6 %
NEUTS HYPERSEG # BLD: 14.92 X10(3) UL (ref 1.5–7.7)
NEUTS HYPERSEG # BLD: 17.8 X10(3) UL (ref 1.5–7.7)
NEUTS HYPERSEG # BLD: 4.37 X10(3) UL (ref 1.5–7.7)
NEUTS HYPERSEG # BLD: 6.72 X10(3) UL (ref 1.5–7.7)
NEUTS HYPERSEG # BLD: 6.76 X10(3) UL (ref 1.5–7.7)
NEUTS HYPERSEG # BLD: 6.78 X10(3) UL (ref 1.5–7.7)
NEUTS HYPERSEG # BLD: 6.79 X10(3) UL (ref 1.5–7.7)
NEUTS HYPERSEG # BLD: 7.14 X10(3) UL (ref 1.5–7.7)
NEUTS HYPERSEG # BLD: 7.74 X10(3) UL (ref 1.5–7.7)
NEUTS HYPERSEG # BLD: 8.22 X10(3) UL (ref 1.5–7.7)
NITRITE UR QL STRIP.AUTO: NEGATIVE
NRBC BLD MANUAL-RTO: 1 %
NRBC BLD MANUAL-RTO: 2 %
NRBC BLD MANUAL-RTO: 2 %
NRBC BLD MANUAL-RTO: 3 %
O2 CT BLD-SCNC: 11 VOL% (ref 15–23)
O2 CT BLD-SCNC: 11.4 VOL% (ref 15–23)
O2 CT BLD-SCNC: 12.7 VOL% (ref 15–23)
O2 CT BLD-SCNC: 12.9 VOL% (ref 15–23)
O2 CT BLD-SCNC: 13.7 VOL% (ref 15–23)
O2 CT BLD-SCNC: 9.4 VOL% (ref 15–23)
O2/TOTAL GAS SETTING VFR VENT: 100 %
O2/TOTAL GAS SETTING VFR VENT: 50 %
OSMOLALITY SERPL CALC.SUM OF ELEC: 286 MOSM/KG (ref 275–295)
OSMOLALITY SERPL CALC.SUM OF ELEC: 290 MOSM/KG (ref 275–295)
OSMOLALITY SERPL CALC.SUM OF ELEC: 290 MOSM/KG (ref 275–295)
OSMOLALITY SERPL CALC.SUM OF ELEC: 293 MOSM/KG (ref 275–295)
OSMOLALITY SERPL CALC.SUM OF ELEC: 294 MOSM/KG (ref 275–295)
OSMOLALITY SERPL CALC.SUM OF ELEC: 295 MOSM/KG (ref 275–295)
OSMOLALITY SERPL CALC.SUM OF ELEC: 297 MOSM/KG (ref 275–295)
OSMOLALITY SERPL CALC.SUM OF ELEC: 298 MOSM/KG (ref 275–295)
OSMOLALITY SERPL CALC.SUM OF ELEC: 299 MOSM/KG (ref 275–295)
OSMOLALITY SERPL CALC.SUM OF ELEC: 301 MOSM/KG (ref 275–295)
OSMOLALITY SERPL CALC.SUM OF ELEC: 301 MOSM/KG (ref 275–295)
OSMOLALITY SERPL CALC.SUM OF ELEC: 302 MOSM/KG (ref 275–295)
OSMOLALITY SERPL CALC.SUM OF ELEC: 303 MOSM/KG (ref 275–295)
OSMOLALITY SERPL CALC.SUM OF ELEC: 304 MOSM/KG (ref 275–295)
OSMOLALITY SERPL CALC.SUM OF ELEC: 308 MOSM/KG (ref 275–295)
OSMOLALITY SERPL CALC.SUM OF ELEC: 309 MOSM/KG (ref 275–295)
OSMOLALITY SERPL CALC.SUM OF ELEC: 312 MOSM/KG (ref 275–295)
OSMOLALITY SERPL CALC.SUM OF ELEC: 312 MOSM/KG (ref 275–295)
OSMOLALITY SERPL CALC.SUM OF ELEC: 314 MOSM/KG (ref 275–295)
OSMOLALITY SERPL CALC.SUM OF ELEC: 314 MOSM/KG (ref 275–295)
OSMOLALITY SERPL CALC.SUM OF ELEC: 319 MOSM/KG (ref 275–295)
OXYGEN LITERS/MINUTE: 2 L/MIN
OXYGEN LITERS/MINUTE: 3 L/MIN
OXYGEN LITERS/MINUTE: 4 L/MIN
OXYGEN LITERS/MINUTE: 5 L/MIN
PATIENT FASTING: NO
PATIENT FASTING: YES
PCO2 BLDA: 119 MM HG (ref 35–45)
PCO2 BLDA: 49 MM HG (ref 35–45)
PCO2 BLDA: 56 MM HG (ref 35–45)
PCO2 BLDA: 57 MM HG (ref 35–45)
PCO2 BLDA: 63 MM HG (ref 35–45)
PCO2 BLDA: 70 MM HG (ref 35–45)
PEEP SETTING VENT: 5 CM H2O
PH BLDA: 6.9 [PH] (ref 7.35–7.45)
PH BLDA: 7.21 [PH] (ref 7.35–7.45)
PH BLDA: 7.22 [PH] (ref 7.35–7.45)
PH BLDA: 7.22 [PH] (ref 7.35–7.45)
PH BLDA: 7.3 [PH] (ref 7.35–7.45)
PH BLDA: 7.35 [PH] (ref 7.35–7.45)
PH UR: 5 [PH] (ref 5–8)
PHOSPHATE SERPL-MCNC: 2.3 MG/DL (ref 2.5–4.9)
PHOSPHATE SERPL-MCNC: 2.4 MG/DL (ref 2.5–4.9)
PHOSPHATE SERPL-MCNC: 2.9 MG/DL (ref 2.5–4.9)
PHOSPHATE SERPL-MCNC: 3.3 MG/DL (ref 2.5–4.9)
PHOSPHATE SERPL-MCNC: 3.4 MG/DL (ref 2.5–4.9)
PHOSPHATE SERPL-MCNC: 4.4 MG/DL (ref 2.5–4.9)
PHOSPHATE SERPL-MCNC: 5.6 MG/DL (ref 2.5–4.9)
PHOSPHATE SERPL-MCNC: 7.6 MG/DL (ref 2.5–4.9)
PLATELET # BLD AUTO: 100 10(3)UL (ref 150–450)
PLATELET # BLD AUTO: 101 10(3)UL (ref 150–450)
PLATELET # BLD AUTO: 104 10(3)UL (ref 150–450)
PLATELET # BLD AUTO: 111 10(3)UL (ref 150–450)
PLATELET # BLD AUTO: 112 10(3)UL (ref 150–450)
PLATELET # BLD AUTO: 112 10(3)UL (ref 150–450)
PLATELET # BLD AUTO: 113 10(3)UL (ref 150–450)
PLATELET # BLD AUTO: 118 10(3)UL (ref 150–450)
PLATELET # BLD AUTO: 118 10(3)UL (ref 150–450)
PLATELET # BLD AUTO: 119 10(3)UL (ref 150–450)
PLATELET # BLD AUTO: 132 10(3)UL (ref 150–450)
PLATELET # BLD AUTO: 139 10(3)UL (ref 150–450)
PLATELET # BLD AUTO: 41 10(3)UL (ref 150–450)
PLATELET # BLD AUTO: 44 10(3)UL (ref 150–450)
PLATELET # BLD AUTO: 50 10(3)UL (ref 150–450)
PLATELET # BLD AUTO: 50 10(3)UL (ref 150–450)
PLATELET # BLD AUTO: 54 10(3)UL (ref 150–450)
PLATELET # BLD AUTO: 60 10(3)UL (ref 150–450)
PLATELET # BLD AUTO: 63 10(3)UL (ref 150–450)
PLATELET # BLD AUTO: 69 10(3)UL (ref 150–450)
PLATELET # BLD AUTO: 71 10(3)UL (ref 150–450)
PLATELET # BLD AUTO: 73 10(3)UL (ref 150–450)
PLATELET # BLD AUTO: 76 10(3)UL (ref 150–450)
PLATELET # BLD AUTO: 78 10(3)UL (ref 150–450)
PLATELET # BLD AUTO: 81 10(3)UL (ref 150–450)
PLATELET # BLD AUTO: 81 10(3)UL (ref 150–450)
PLATELET # BLD AUTO: 83 10(3)UL (ref 150–450)
PLATELET # BLD AUTO: 86 10(3)UL (ref 150–450)
PLATELET # BLD AUTO: 86 10(3)UL (ref 150–450)
PLATELET # BLD AUTO: 88 10(3)UL (ref 150–450)
PLATELET # BLD AUTO: 89 10(3)UL (ref 150–450)
PLATELET # BLD AUTO: 91 10(3)UL (ref 150–450)
PLATELET # BLD AUTO: 92 10(3)UL (ref 150–450)
PLATELET MORPHOLOGY: NORMAL
PO2 BLDA: 118 MM HG (ref 80–100)
PO2 BLDA: 138 MM HG (ref 80–100)
PO2 BLDA: 61 MM HG (ref 80–100)
PO2 BLDA: 73 MM HG (ref 80–100)
PO2 BLDA: 88 MM HG (ref 80–100)
PO2 BLDA: 98 MM HG (ref 80–100)
POTASSIUM SERPL-SCNC: 3.3 MMOL/L (ref 3.5–5.1)
POTASSIUM SERPL-SCNC: 3.5 MMOL/L (ref 3.5–5.1)
POTASSIUM SERPL-SCNC: 3.6 MMOL/L (ref 3.5–5.1)
POTASSIUM SERPL-SCNC: 3.6 MMOL/L (ref 3.5–5.1)
POTASSIUM SERPL-SCNC: 3.7 MMOL/L (ref 3.5–5.1)
POTASSIUM SERPL-SCNC: 3.8 MMOL/L (ref 3.5–5.1)
POTASSIUM SERPL-SCNC: 3.8 MMOL/L (ref 3.5–5.1)
POTASSIUM SERPL-SCNC: 3.9 MMOL/L (ref 3.5–5.1)
POTASSIUM SERPL-SCNC: 4 MMOL/L (ref 3.5–5.1)
POTASSIUM SERPL-SCNC: 4.1 MMOL/L (ref 3.5–5.1)
POTASSIUM SERPL-SCNC: 4.1 MMOL/L (ref 3.5–5.1)
POTASSIUM SERPL-SCNC: 4.2 MMOL/L (ref 3.5–5.1)
POTASSIUM SERPL-SCNC: 4.3 MMOL/L (ref 3.5–5.1)
POTASSIUM SERPL-SCNC: 4.4 MMOL/L (ref 3.5–5.1)
POTASSIUM SERPL-SCNC: 4.6 MMOL/L (ref 3.5–5.1)
POTASSIUM SERPL-SCNC: 4.7 MMOL/L (ref 3.5–5.1)
POTASSIUM SERPL-SCNC: 4.9 MMOL/L (ref 3.5–5.1)
POTASSIUM SERPL-SCNC: 5 MMOL/L (ref 3.5–5.1)
POTASSIUM SERPL-SCNC: 5.1 MMOL/L (ref 3.5–5.1)
POTASSIUM SERPL-SCNC: 6.8 MMOL/L (ref 3.5–5.1)
PROCALCITONIN SERPL-MCNC: 0.8 NG/ML
PROT UR-MCNC: >=500 MG/DL
PROTHROMBIN TIME: 16.4 SECONDS (ref 11.8–14.5)
PUNCTURE CHARGE: YES
RBC # BLD AUTO: 2.02 X10(6)UL (ref 3.8–5.3)
RBC # BLD AUTO: 2.02 X10(6)UL (ref 3.8–5.3)
RBC # BLD AUTO: 2.04 X10(6)UL (ref 3.8–5.3)
RBC # BLD AUTO: 2.05 X10(6)UL (ref 3.8–5.3)
RBC # BLD AUTO: 2.15 X10(6)UL (ref 3.8–5.3)
RBC # BLD AUTO: 2.15 X10(6)UL (ref 3.8–5.3)
RBC # BLD AUTO: 2.17 X10(6)UL (ref 3.8–5.3)
RBC # BLD AUTO: 2.18 X10(6)UL (ref 3.8–5.3)
RBC # BLD AUTO: 2.26 X10(6)UL (ref 3.8–5.3)
RBC # BLD AUTO: 2.28 X10(6)UL (ref 3.8–5.3)
RBC # BLD AUTO: 2.28 X10(6)UL (ref 3.8–5.3)
RBC # BLD AUTO: 2.31 X10(6)UL (ref 3.8–5.3)
RBC # BLD AUTO: 2.32 X10(6)UL (ref 3.8–5.3)
RBC # BLD AUTO: 2.33 X10(6)UL (ref 3.8–5.3)
RBC # BLD AUTO: 2.34 X10(6)UL (ref 3.8–5.3)
RBC # BLD AUTO: 2.36 X10(6)UL (ref 3.8–5.3)
RBC # BLD AUTO: 2.37 X10(6)UL (ref 3.8–5.3)
RBC # BLD AUTO: 2.38 X10(6)UL (ref 3.8–5.3)
RBC # BLD AUTO: 2.39 X10(6)UL (ref 3.8–5.3)
RBC # BLD AUTO: 2.46 X10(6)UL (ref 3.8–5.3)
RBC # BLD AUTO: 2.5 X10(6)UL (ref 3.8–5.3)
RBC # BLD AUTO: 2.5 X10(6)UL (ref 3.8–5.3)
RBC # BLD AUTO: 2.51 X10(6)UL (ref 3.8–5.3)
RBC # BLD AUTO: 2.51 X10(6)UL (ref 3.8–5.3)
RBC # BLD AUTO: 2.53 X10(6)UL (ref 3.8–5.3)
RBC # BLD AUTO: 2.55 X10(6)UL (ref 3.8–5.3)
RBC # BLD AUTO: 2.58 X10(6)UL (ref 3.8–5.3)
RBC # BLD AUTO: 2.58 X10(6)UL (ref 3.8–5.3)
RBC # BLD AUTO: 2.64 X10(6)UL (ref 3.8–5.3)
RBC # BLD AUTO: 2.73 X10(6)UL (ref 3.8–5.3)
RBC # BLD AUTO: 2.98 X10(6)UL (ref 3.8–5.3)
RBC # BLD AUTO: 3.05 X10(6)UL (ref 3.8–5.3)
RBC # BLD AUTO: 3.06 X10(6)UL (ref 3.8–5.3)
RBC #/AREA URNS AUTO: 209 /HPF
RESP RATE: 20 BPM
RH BLOOD TYPE: POSITIVE
SAO2 % BLDA: 94.2 % (ref 94–100)
SAO2 % BLDA: 96.2 % (ref 94–100)
SAO2 % BLDA: 97.3 % (ref 94–100)
SAO2 % BLDA: 98.2 % (ref 94–100)
SAO2 % BLDA: 98.3 % (ref 94–100)
SAO2 % BLDA: 98.8 % (ref 94–100)
SODIUM SERPL-SCNC: 134 MMOL/L (ref 136–145)
SODIUM SERPL-SCNC: 135 MMOL/L (ref 136–145)
SODIUM SERPL-SCNC: 135 MMOL/L (ref 136–145)
SODIUM SERPL-SCNC: 136 MMOL/L (ref 136–145)
SODIUM SERPL-SCNC: 138 MMOL/L (ref 136–145)
SODIUM SERPL-SCNC: 139 MMOL/L (ref 136–145)
SODIUM SERPL-SCNC: 140 MMOL/L (ref 136–145)
SODIUM SERPL-SCNC: 141 MMOL/L (ref 136–145)
SODIUM SERPL-SCNC: 142 MMOL/L (ref 136–145)
SODIUM SERPL-SCNC: 143 MMOL/L (ref 136–145)
SODIUM SERPL-SCNC: 144 MMOL/L (ref 136–145)
SP GR UR STRIP: 1.02 (ref 1–1.03)
SPECIMEN VOL 24H UR: 450 ML
T PALLIDUM AB SER QL: NEGATIVE
T4 FREE SERPL-MCNC: 1 NG/DL (ref 0.8–1.7)
THYROGLOB SERPL-MCNC: <15 U/ML (ref ?–60)
THYROPEROXIDASE AB SERPL-ACNC: <28 U/ML (ref ?–60)
THYROXINE BINDING GLOBULIN: 13.7 UG/ML
TOTAL CELLS COUNTED: 100
TOTAL IRON BINDING CAPACITY: 258 UG/DL (ref 240–450)
TRANSFERRIN SERPL-MCNC: 173 MG/DL (ref 200–360)
TROPONIN I SERPL-MCNC: <0.045 NG/ML (ref ?–0.04)
TSH RECEPTOR AB: <0.9 IU/L
TSI SER-ACNC: 4.16 MIU/ML (ref 0.36–3.74)
UROBILINOGEN UR STRIP-ACNC: <2
VIT B12 SERPL-MCNC: >2000 PG/ML (ref 193–986)
VIT B12 SERPL-MCNC: >2000 PG/ML (ref 193–986)
VIT C UR-MCNC: NEGATIVE MG/DL
WBC # BLD AUTO: 10.4 X10(3) UL (ref 4–11)
WBC # BLD AUTO: 16.4 X10(3) UL (ref 4–11)
WBC # BLD AUTO: 20.7 X10(3) UL (ref 4–11)
WBC # BLD AUTO: 3.5 X10(3) UL (ref 4–11)
WBC # BLD AUTO: 5.4 X10(3) UL (ref 4–11)
WBC # BLD AUTO: 5.5 X10(3) UL (ref 4–11)
WBC # BLD AUTO: 5.5 X10(3) UL (ref 4–11)
WBC # BLD AUTO: 5.6 X10(3) UL (ref 4–11)
WBC # BLD AUTO: 5.7 X10(3) UL (ref 4–11)
WBC # BLD AUTO: 5.7 X10(3) UL (ref 4–11)
WBC # BLD AUTO: 5.8 X10(3) UL (ref 4–11)
WBC # BLD AUTO: 6.2 X10(3) UL (ref 4–11)
WBC # BLD AUTO: 6.2 X10(3) UL (ref 4–11)
WBC # BLD AUTO: 6.3 X10(3) UL (ref 4–11)
WBC # BLD AUTO: 6.4 X10(3) UL (ref 4–11)
WBC # BLD AUTO: 6.4 X10(3) UL (ref 4–11)
WBC # BLD AUTO: 6.9 X10(3) UL (ref 4–11)
WBC # BLD AUTO: 7 X10(3) UL (ref 4–11)
WBC # BLD AUTO: 7 X10(3) UL (ref 4–11)
WBC # BLD AUTO: 7.1 X10(3) UL (ref 4–11)
WBC # BLD AUTO: 7.1 X10(3) UL (ref 4–11)
WBC # BLD AUTO: 7.2 X10(3) UL (ref 4–11)
WBC # BLD AUTO: 7.3 X10(3) UL (ref 4–11)
WBC # BLD AUTO: 7.5 X10(3) UL (ref 4–11)
WBC # BLD AUTO: 7.6 X10(3) UL (ref 4–11)
WBC # BLD AUTO: 7.7 X10(3) UL (ref 4–11)
WBC # BLD AUTO: 7.8 X10(3) UL (ref 4–11)
WBC # BLD AUTO: 7.9 X10(3) UL (ref 4–11)
WBC # BLD AUTO: 8.3 X10(3) UL (ref 4–11)
WBC # BLD AUTO: 8.4 X10(3) UL (ref 4–11)
WBC # BLD AUTO: 8.8 X10(3) UL (ref 4–11)
WBC # BLD AUTO: 8.8 X10(3) UL (ref 4–11)
WBC # BLD AUTO: 8.9 X10(3) UL (ref 4–11)
WBC #/AREA URNS AUTO: 1812 /HPF

## 2019-01-01 PROCEDURE — 93306 TTE W/DOPPLER COMPLETE: CPT | Performed by: INTERNAL MEDICINE

## 2019-01-01 PROCEDURE — 99223 1ST HOSP IP/OBS HIGH 75: CPT | Performed by: INTERNAL MEDICINE

## 2019-01-01 PROCEDURE — 99232 SBSQ HOSP IP/OBS MODERATE 35: CPT | Performed by: INTERNAL MEDICINE

## 2019-01-01 PROCEDURE — 3E0G8GC INTRODUCTION OF OTHER THERAPEUTIC SUBSTANCE INTO UPPER GI, VIA NATURAL OR ARTIFICIAL OPENING ENDOSCOPIC: ICD-10-PCS | Performed by: INTERNAL MEDICINE

## 2019-01-01 PROCEDURE — 5A1D70Z PERFORMANCE OF URINARY FILTRATION, INTERMITTENT, LESS THAN 6 HOURS PER DAY: ICD-10-PCS | Performed by: INTERNAL MEDICINE

## 2019-01-01 PROCEDURE — 99233 SBSQ HOSP IP/OBS HIGH 50: CPT | Performed by: INTERNAL MEDICINE

## 2019-01-01 PROCEDURE — 71046 X-RAY EXAM CHEST 2 VIEWS: CPT | Performed by: INTERNAL MEDICINE

## 2019-01-01 PROCEDURE — 85025 COMPLETE CBC W/AUTO DIFF WBC: CPT

## 2019-01-01 PROCEDURE — 0D568ZZ DESTRUCTION OF STOMACH, VIA NATURAL OR ARTIFICIAL OPENING ENDOSCOPIC: ICD-10-PCS | Performed by: INTERNAL MEDICINE

## 2019-01-01 PROCEDURE — 0W3P8ZZ CONTROL BLEEDING IN GASTROINTESTINAL TRACT, VIA NATURAL OR ARTIFICIAL OPENING ENDOSCOPIC: ICD-10-PCS | Performed by: INTERNAL MEDICINE

## 2019-01-01 PROCEDURE — 99291 CRITICAL CARE FIRST HOUR: CPT | Performed by: INTERNAL MEDICINE

## 2019-01-01 PROCEDURE — 99315 NF DSCHRG MGMT 30 MIN/LESS: CPT | Performed by: FAMILY MEDICINE

## 2019-01-01 PROCEDURE — 80048 BASIC METABOLIC PNL TOTAL CA: CPT | Performed by: EMERGENCY MEDICINE

## 2019-01-01 PROCEDURE — 74177 CT ABD & PELVIS W/CONTRAST: CPT | Performed by: INTERNAL MEDICINE

## 2019-01-01 PROCEDURE — 70450 CT HEAD/BRAIN W/O DYE: CPT | Performed by: EMERGENCY MEDICINE

## 2019-01-01 PROCEDURE — 99239 HOSP IP/OBS DSCHRG MGMT >30: CPT | Performed by: INTERNAL MEDICINE

## 2019-01-01 PROCEDURE — 0DB68ZX EXCISION OF STOMACH, VIA NATURAL OR ARTIFICIAL OPENING ENDOSCOPIC, DIAGNOSTIC: ICD-10-PCS | Performed by: INTERNAL MEDICINE

## 2019-01-01 PROCEDURE — 70450 CT HEAD/BRAIN W/O DYE: CPT | Performed by: CLINICAL NURSE SPECIALIST

## 2019-01-01 PROCEDURE — 71045 X-RAY EXAM CHEST 1 VIEW: CPT | Performed by: INTERNAL MEDICINE

## 2019-01-01 PROCEDURE — 99232 SBSQ HOSP IP/OBS MODERATE 35: CPT | Performed by: OTHER

## 2019-01-01 PROCEDURE — 71045 X-RAY EXAM CHEST 1 VIEW: CPT | Performed by: CLINICAL NURSE SPECIALIST

## 2019-01-01 PROCEDURE — 99307 SBSQ NF CARE SF MDM 10: CPT | Performed by: FAMILY MEDICINE

## 2019-01-01 PROCEDURE — 73000 X-RAY EXAM OF COLLAR BONE: CPT | Performed by: INTERNAL MEDICINE

## 2019-01-01 PROCEDURE — 99308 SBSQ NF CARE LOW MDM 20: CPT | Performed by: FAMILY MEDICINE

## 2019-01-01 PROCEDURE — 99238 HOSP IP/OBS DSCHRG MGMT 30/<: CPT | Performed by: INTERNAL MEDICINE

## 2019-01-01 PROCEDURE — 99231 SBSQ HOSP IP/OBS SF/LOW 25: CPT | Performed by: OTHER

## 2019-01-01 PROCEDURE — 73060 X-RAY EXAM OF HUMERUS: CPT | Performed by: INTERNAL MEDICINE

## 2019-01-01 PROCEDURE — 72125 CT NECK SPINE W/O DYE: CPT | Performed by: EMERGENCY MEDICINE

## 2019-01-01 PROCEDURE — 5A09357 ASSISTANCE WITH RESPIRATORY VENTILATION, LESS THAN 24 CONSECUTIVE HOURS, CONTINUOUS POSITIVE AIRWAY PRESSURE: ICD-10-PCS | Performed by: INTERNAL MEDICINE

## 2019-01-01 PROCEDURE — 70496 CT ANGIOGRAPHY HEAD: CPT | Performed by: INTERNAL MEDICINE

## 2019-01-01 PROCEDURE — 85025 COMPLETE CBC W/AUTO DIFF WBC: CPT | Performed by: EMERGENCY MEDICINE

## 2019-01-01 PROCEDURE — 73590 X-RAY EXAM OF LOWER LEG: CPT | Performed by: INTERNAL MEDICINE

## 2019-01-01 PROCEDURE — 73501 X-RAY EXAM HIP UNI 1 VIEW: CPT | Performed by: INTERNAL MEDICINE

## 2019-01-01 PROCEDURE — 93005 ELECTROCARDIOGRAM TRACING: CPT

## 2019-01-01 PROCEDURE — 72125 CT NECK SPINE W/O DYE: CPT | Performed by: CLINICAL NURSE SPECIALIST

## 2019-01-01 PROCEDURE — 99306 1ST NF CARE HIGH MDM 50: CPT | Performed by: FAMILY MEDICINE

## 2019-01-01 PROCEDURE — 94640 AIRWAY INHALATION TREATMENT: CPT

## 2019-01-01 PROCEDURE — 99214 OFFICE O/P EST MOD 30 MIN: CPT | Performed by: INTERNAL MEDICINE

## 2019-01-01 PROCEDURE — 99285 EMERGENCY DEPT VISIT HI MDM: CPT

## 2019-01-01 PROCEDURE — 02HV33Z INSERTION OF INFUSION DEVICE INTO SUPERIOR VENA CAVA, PERCUTANEOUS APPROACH: ICD-10-PCS | Performed by: INTERNAL MEDICINE

## 2019-01-01 PROCEDURE — 99309 SBSQ NF CARE MODERATE MDM 30: CPT | Performed by: NURSE PRACTITIONER

## 2019-01-01 PROCEDURE — 99310 SBSQ NF CARE HIGH MDM 45: CPT | Performed by: NURSE PRACTITIONER

## 2019-01-01 PROCEDURE — 04VK3DZ RESTRICTION OF RIGHT FEMORAL ARTERY WITH INTRALUMINAL DEVICE, PERCUTANEOUS APPROACH: ICD-10-PCS | Performed by: SURGERY

## 2019-01-01 PROCEDURE — 71045 X-RAY EXAM CHEST 1 VIEW: CPT | Performed by: EMERGENCY MEDICINE

## 2019-01-01 PROCEDURE — 04VL3DZ RESTRICTION OF LEFT FEMORAL ARTERY WITH INTRALUMINAL DEVICE, PERCUTANEOUS APPROACH: ICD-10-PCS | Performed by: SURGERY

## 2019-01-01 PROCEDURE — 70450 CT HEAD/BRAIN W/O DYE: CPT | Performed by: INTERNAL MEDICINE

## 2019-01-01 PROCEDURE — 0WPG03Z REMOVAL OF INFUSION DEVICE FROM PERITONEAL CAVITY, OPEN APPROACH: ICD-10-PCS | Performed by: SURGERY

## 2019-01-01 PROCEDURE — 93010 ELECTROCARDIOGRAM REPORT: CPT | Performed by: EMERGENCY MEDICINE

## 2019-01-01 PROCEDURE — 0BH17EZ INSERTION OF ENDOTRACHEAL AIRWAY INTO TRACHEA, VIA NATURAL OR ARTIFICIAL OPENING: ICD-10-PCS | Performed by: INTERNAL MEDICINE

## 2019-01-01 PROCEDURE — 0DC68ZZ EXTIRPATION OF MATTER FROM STOMACH, VIA NATURAL OR ARTIFICIAL OPENING ENDOSCOPIC: ICD-10-PCS | Performed by: INTERNAL MEDICINE

## 2019-01-01 PROCEDURE — B41C1ZZ FLUOROSCOPY OF PELVIC ARTERIES USING LOW OSMOLAR CONTRAST: ICD-10-PCS | Performed by: SURGERY

## 2019-01-01 PROCEDURE — 5A1D70Z PERFORMANCE OF URINARY FILTRATION, INTERMITTENT, LESS THAN 6 HOURS PER DAY: ICD-10-PCS | Performed by: SURGERY

## 2019-01-01 PROCEDURE — 36415 COLL VENOUS BLD VENIPUNCTURE: CPT

## 2019-01-01 PROCEDURE — 04V03DZ RESTRICTION OF ABDOMINAL AORTA WITH INTRALUMINAL DEVICE, PERCUTANEOUS APPROACH: ICD-10-PCS | Performed by: SURGERY

## 2019-01-01 PROCEDURE — 80048 BASIC METABOLIC PNL TOTAL CA: CPT

## 2019-01-01 PROCEDURE — 99283 EMERGENCY DEPT VISIT LOW MDM: CPT

## 2019-01-01 PROCEDURE — 99308 SBSQ NF CARE LOW MDM 20: CPT | Performed by: NURSE PRACTITIONER

## 2019-01-01 PROCEDURE — 99305 1ST NF CARE MODERATE MDM 35: CPT | Performed by: FAMILY MEDICINE

## 2019-01-01 PROCEDURE — 30233N1 TRANSFUSION OF NONAUTOLOGOUS RED BLOOD CELLS INTO PERIPHERAL VEIN, PERCUTANEOUS APPROACH: ICD-10-PCS | Performed by: INTERNAL MEDICINE

## 2019-01-01 PROCEDURE — 99223 1ST HOSP IP/OBS HIGH 75: CPT | Performed by: OTHER

## 2019-01-01 PROCEDURE — 99226 SUBSEQUENT OBSERVATION CARE: CPT | Performed by: INTERNAL MEDICINE

## 2019-01-01 PROCEDURE — 31500 INSERT EMERGENCY AIRWAY: CPT | Performed by: INTERNAL MEDICINE

## 2019-01-01 PROCEDURE — 5A1945Z RESPIRATORY VENTILATION, 24-96 CONSECUTIVE HOURS: ICD-10-PCS | Performed by: INTERNAL MEDICINE

## 2019-01-01 PROCEDURE — 70486 CT MAXILLOFACIAL W/O DYE: CPT | Performed by: CLINICAL NURSE SPECIALIST

## 2019-01-01 PROCEDURE — 90935 HEMODIALYSIS ONE EVALUATION: CPT | Performed by: INTERNAL MEDICINE

## 2019-01-01 PROCEDURE — 70486 CT MAXILLOFACIAL W/O DYE: CPT | Performed by: EMERGENCY MEDICINE

## 2019-01-01 PROCEDURE — 99222 1ST HOSP IP/OBS MODERATE 55: CPT | Performed by: INTERNAL MEDICINE

## 2019-01-01 PROCEDURE — 36592 COLLECT BLOOD FROM PICC: CPT

## 2019-01-01 PROCEDURE — 76700 US EXAM ABDOM COMPLETE: CPT | Performed by: INTERNAL MEDICINE

## 2019-01-01 PROCEDURE — B2111ZZ FLUOROSCOPY OF MULTIPLE CORONARY ARTERIES USING LOW OSMOLAR CONTRAST: ICD-10-PCS | Performed by: INTERNAL MEDICINE

## 2019-01-01 PROCEDURE — 4A023N8 MEASUREMENT OF CARDIAC SAMPLING AND PRESSURE, BILATERAL, PERCUTANEOUS APPROACH: ICD-10-PCS | Performed by: INTERNAL MEDICINE

## 2019-01-01 PROCEDURE — 70551 MRI BRAIN STEM W/O DYE: CPT | Performed by: INTERNAL MEDICINE

## 2019-01-01 PROCEDURE — 71260 CT THORAX DX C+: CPT | Performed by: EMERGENCY MEDICINE

## 2019-01-01 PROCEDURE — B41F1ZZ FLUOROSCOPY OF RIGHT LOWER EXTREMITY ARTERIES USING LOW OSMOLAR CONTRAST: ICD-10-PCS | Performed by: INTERNAL MEDICINE

## 2019-01-01 PROCEDURE — 84484 ASSAY OF TROPONIN QUANT: CPT | Performed by: EMERGENCY MEDICINE

## 2019-01-01 PROCEDURE — B4101ZZ FLUOROSCOPY OF ABDOMINAL AORTA USING LOW OSMOLAR CONTRAST: ICD-10-PCS | Performed by: SURGERY

## 2019-01-01 PROCEDURE — 90792 PSYCH DIAG EVAL W/MED SRVCS: CPT | Performed by: OTHER

## 2019-01-01 PROCEDURE — 99284 EMERGENCY DEPT VISIT MOD MDM: CPT

## 2019-01-01 PROCEDURE — 99219 INITIAL OBSERVATION CARE,LEVL II: CPT | Performed by: INTERNAL MEDICINE

## 2019-01-01 PROCEDURE — 1111F DSCHRG MED/CURRENT MED MERGE: CPT | Performed by: INTERNAL MEDICINE

## 2019-01-01 PROCEDURE — 1111F DSCHRG MED/CURRENT MED MERGE: CPT

## 2019-01-01 PROCEDURE — 71101 X-RAY EXAM UNILAT RIBS/CHEST: CPT | Performed by: EMERGENCY MEDICINE

## 2019-01-01 PROCEDURE — 99215 OFFICE O/P EST HI 40 MIN: CPT | Performed by: INTERNAL MEDICINE

## 2019-01-01 PROCEDURE — 99495 TRANSJ CARE MGMT MOD F2F 14D: CPT | Performed by: INTERNAL MEDICINE

## 2019-01-01 PROCEDURE — 70450 CT HEAD/BRAIN W/O DYE: CPT | Performed by: OTHER

## 2019-01-01 RX ORDER — PROCHLORPERAZINE EDISYLATE 5 MG/ML
5 INJECTION INTRAMUSCULAR; INTRAVENOUS ONCE AS NEEDED
Status: DISCONTINUED | OUTPATIENT
Start: 2019-01-01 | End: 2019-01-01 | Stop reason: HOSPADM

## 2019-01-01 RX ORDER — CARVEDILOL 6.25 MG/1
6.25 TABLET ORAL 2 TIMES DAILY WITH MEALS
Status: DISCONTINUED | OUTPATIENT
Start: 2019-01-01 | End: 2019-01-01

## 2019-01-01 RX ORDER — HEPARIN SODIUM 1000 [USP'U]/ML
5000 INJECTION, SOLUTION INTRAVENOUS; SUBCUTANEOUS
Status: DISCONTINUED | OUTPATIENT
Start: 2019-01-01 | End: 2019-01-01

## 2019-01-01 RX ORDER — CIPROFLOXACIN 250 MG/1
250 TABLET, FILM COATED ORAL 2 TIMES DAILY
Qty: 10 TABLET | Refills: 0 | Status: SHIPPED | OUTPATIENT
Start: 2019-01-01 | End: 2019-01-01

## 2019-01-01 RX ORDER — ATORVASTATIN CALCIUM 20 MG/1
20 TABLET, FILM COATED ORAL NIGHTLY
Qty: 30 TABLET | Refills: 2 | Status: SHIPPED | OUTPATIENT
Start: 2019-01-01

## 2019-01-01 RX ORDER — VALSARTAN 320 MG/1
320 TABLET ORAL DAILY
Status: DISCONTINUED | OUTPATIENT
Start: 2019-01-01 | End: 2019-01-01

## 2019-01-01 RX ORDER — HEPARIN SODIUM 1000 [USP'U]/ML
INJECTION, SOLUTION INTRAVENOUS; SUBCUTANEOUS
Status: COMPLETED
Start: 2019-01-01 | End: 2019-01-01

## 2019-01-01 RX ORDER — CALCIUM ACETATE 667 MG/1
1 CAPSULE ORAL
Status: DISCONTINUED | OUTPATIENT
Start: 2019-01-01 | End: 2019-01-01

## 2019-01-01 RX ORDER — ONDANSETRON 2 MG/ML
4 INJECTION INTRAMUSCULAR; INTRAVENOUS ONCE AS NEEDED
Status: DISCONTINUED | OUTPATIENT
Start: 2019-01-01 | End: 2019-01-01 | Stop reason: HOSPADM

## 2019-01-01 RX ORDER — SODIUM CHLORIDE 0.9 % (FLUSH) 0.9 %
3 SYRINGE (ML) INJECTION AS NEEDED
Status: DISCONTINUED | OUTPATIENT
Start: 2019-01-01 | End: 2019-01-01

## 2019-01-01 RX ORDER — PHENYLEPHRINE HCL 10 MG/ML
VIAL (ML) INJECTION AS NEEDED
Status: DISCONTINUED | OUTPATIENT
Start: 2019-01-01 | End: 2019-01-01 | Stop reason: SURG

## 2019-01-01 RX ORDER — ASPIRIN 81 MG/1
324 TABLET, CHEWABLE ORAL ONCE
Status: COMPLETED | OUTPATIENT
Start: 2019-01-01 | End: 2019-01-01

## 2019-01-01 RX ORDER — MORPHINE SULFATE 4 MG/ML
4 INJECTION, SOLUTION INTRAMUSCULAR; INTRAVENOUS EVERY 10 MIN PRN
Status: DISCONTINUED | OUTPATIENT
Start: 2019-01-01 | End: 2019-01-01 | Stop reason: HOSPADM

## 2019-01-01 RX ORDER — ALBUMIN (HUMAN) 12.5 G/50ML
100 SOLUTION INTRAVENOUS AS NEEDED
Status: DISCONTINUED | OUTPATIENT
Start: 2019-01-01 | End: 2019-01-01

## 2019-01-01 RX ORDER — ONDANSETRON 2 MG/ML
4 INJECTION INTRAMUSCULAR; INTRAVENOUS EVERY 4 HOURS PRN
Status: DISCONTINUED | OUTPATIENT
Start: 2019-01-01 | End: 2019-01-01 | Stop reason: ALTCHOICE

## 2019-01-01 RX ORDER — ALBUTEROL SULFATE 90 UG/1
2 AEROSOL, METERED RESPIRATORY (INHALATION) EVERY 4 HOURS PRN
Qty: 1 INHALER | Refills: 0 | Status: SHIPPED | OUTPATIENT
Start: 2019-01-01 | End: 2019-01-01

## 2019-01-01 RX ORDER — ACETAMINOPHEN 325 MG/1
TABLET ORAL
Status: COMPLETED
Start: 2019-01-01 | End: 2019-01-01

## 2019-01-01 RX ORDER — ATENOLOL 25 MG/1
25 TABLET ORAL DAILY
Qty: 30 TABLET | Refills: 0 | Status: ON HOLD | OUTPATIENT
Start: 2019-01-01 | End: 2019-01-01

## 2019-01-01 RX ORDER — ATORVASTATIN CALCIUM 10 MG/1
10 TABLET, FILM COATED ORAL NIGHTLY
Status: DISCONTINUED | OUTPATIENT
Start: 2019-01-01 | End: 2019-01-01

## 2019-01-01 RX ORDER — NALOXONE HYDROCHLORIDE 0.4 MG/ML
0.08 INJECTION, SOLUTION INTRAMUSCULAR; INTRAVENOUS; SUBCUTANEOUS
Status: DISCONTINUED | OUTPATIENT
Start: 2019-01-01 | End: 2019-01-01

## 2019-01-01 RX ORDER — LIDOCAINE HYDROCHLORIDE 10 MG/ML
0.5 INJECTION, SOLUTION INFILTRATION; PERINEURAL ONCE AS NEEDED
Status: ACTIVE | OUTPATIENT
Start: 2019-01-01 | End: 2019-01-01

## 2019-01-01 RX ORDER — IPRATROPIUM BROMIDE AND ALBUTEROL SULFATE 2.5; .5 MG/3ML; MG/3ML
3 SOLUTION RESPIRATORY (INHALATION)
Status: DISCONTINUED | OUTPATIENT
Start: 2019-01-01 | End: 2019-01-01

## 2019-01-01 RX ORDER — IPRATROPIUM BROMIDE AND ALBUTEROL SULFATE 2.5; .5 MG/3ML; MG/3ML
3 SOLUTION RESPIRATORY (INHALATION) ONCE
Status: COMPLETED | OUTPATIENT
Start: 2019-01-01 | End: 2019-01-01

## 2019-01-01 RX ORDER — HYDROMORPHONE HYDROCHLORIDE 1 MG/ML
0.6 INJECTION, SOLUTION INTRAMUSCULAR; INTRAVENOUS; SUBCUTANEOUS EVERY 5 MIN PRN
Status: DISCONTINUED | OUTPATIENT
Start: 2019-01-01 | End: 2019-01-01 | Stop reason: HOSPADM

## 2019-01-01 RX ORDER — HYDRALAZINE HYDROCHLORIDE 25 MG/1
TABLET, FILM COATED ORAL
Qty: 270 TABLET | Refills: 1 | Status: SHIPPED | OUTPATIENT
Start: 2019-01-01

## 2019-01-01 RX ORDER — FAMOTIDINE 10 MG/ML
20 INJECTION, SOLUTION INTRAVENOUS DAILY
Status: DISCONTINUED | OUTPATIENT
Start: 2019-01-01 | End: 2019-01-01

## 2019-01-01 RX ORDER — ONDANSETRON 2 MG/ML
4 INJECTION INTRAMUSCULAR; INTRAVENOUS EVERY 6 HOURS PRN
Status: DISCONTINUED | OUTPATIENT
Start: 2019-01-01 | End: 2019-01-01

## 2019-01-01 RX ORDER — IPRATROPIUM BROMIDE AND ALBUTEROL SULFATE 2.5; .5 MG/3ML; MG/3ML
3 SOLUTION RESPIRATORY (INHALATION) EVERY 6 HOURS PRN
Status: DISCONTINUED | OUTPATIENT
Start: 2019-01-01 | End: 2019-01-01

## 2019-01-01 RX ORDER — ASPIRIN 81 MG/1
81 TABLET, CHEWABLE ORAL DAILY
Status: DISCONTINUED | OUTPATIENT
Start: 2019-01-01 | End: 2019-01-01

## 2019-01-01 RX ORDER — ROCURONIUM BROMIDE 10 MG/ML
INJECTION, SOLUTION INTRAVENOUS AS NEEDED
Status: DISCONTINUED | OUTPATIENT
Start: 2019-01-01 | End: 2019-01-01 | Stop reason: SURG

## 2019-01-01 RX ORDER — METOPROLOL TARTRATE 5 MG/5ML
2.5 INJECTION INTRAVENOUS ONCE
Status: DISCONTINUED | OUTPATIENT
Start: 2019-01-01 | End: 2019-01-01 | Stop reason: HOSPADM

## 2019-01-01 RX ORDER — HEPARIN SODIUM 1000 [USP'U]/ML
1.5 INJECTION, SOLUTION INTRAVENOUS; SUBCUTANEOUS ONCE
Status: DISCONTINUED | OUTPATIENT
Start: 2019-01-01 | End: 2019-01-01 | Stop reason: ALTCHOICE

## 2019-01-01 RX ORDER — CLARITHROMYCIN 500 MG/1
250 TABLET, COATED ORAL EVERY 12 HOURS SCHEDULED
Status: DISCONTINUED | OUTPATIENT
Start: 2019-01-01 | End: 2019-01-01

## 2019-01-01 RX ORDER — NEOSTIGMINE METHYLSULFATE 0.5 MG/ML
INJECTION INTRAVENOUS AS NEEDED
Status: DISCONTINUED | OUTPATIENT
Start: 2019-01-01 | End: 2019-01-01 | Stop reason: SURG

## 2019-01-01 RX ORDER — HALOPERIDOL 5 MG/ML
0.5 INJECTION INTRAMUSCULAR EVERY 2 HOUR PRN
Status: DISCONTINUED | OUTPATIENT
Start: 2019-01-01 | End: 2019-01-01

## 2019-01-01 RX ORDER — HYDRALAZINE HYDROCHLORIDE 50 MG/1
50 TABLET, FILM COATED ORAL 3 TIMES DAILY
Qty: 90 TABLET | Refills: 1 | Status: ON HOLD | OUTPATIENT
Start: 2019-01-01 | End: 2019-01-01

## 2019-01-01 RX ORDER — HYDRALAZINE HYDROCHLORIDE 50 MG/1
50 TABLET, FILM COATED ORAL 3 TIMES DAILY
Status: DISCONTINUED | OUTPATIENT
Start: 2019-01-01 | End: 2019-01-01

## 2019-01-01 RX ORDER — SODIUM CHLORIDE 0.9 % (FLUSH) 0.9 %
10 SYRINGE (ML) INJECTION AS NEEDED
Status: DISCONTINUED | OUTPATIENT
Start: 2019-01-01 | End: 2019-01-01

## 2019-01-01 RX ORDER — ALBUTEROL SULFATE 2.5 MG/3ML
SOLUTION RESPIRATORY (INHALATION)
Status: DISPENSED
Start: 2019-01-01 | End: 2019-01-01

## 2019-01-01 RX ORDER — SODIUM CHLORIDE 9 MG/ML
INJECTION, SOLUTION INTRAVENOUS ONCE
Status: COMPLETED | OUTPATIENT
Start: 2019-01-01 | End: 2019-01-01

## 2019-01-01 RX ORDER — HEPARIN SODIUM 1000 [USP'U]/ML
INJECTION, SOLUTION INTRAVENOUS; SUBCUTANEOUS
Status: DISPENSED
Start: 2019-01-01 | End: 2019-01-01

## 2019-01-01 RX ORDER — CALCIUM GLUCONATE 94 MG/ML
1 INJECTION, SOLUTION INTRAVENOUS ONCE
Status: DISCONTINUED | OUTPATIENT
Start: 2019-01-01 | End: 2019-01-01

## 2019-01-01 RX ORDER — DEXTROSE MONOHYDRATE 25 G/50ML
50 INJECTION, SOLUTION INTRAVENOUS AS NEEDED
Status: DISCONTINUED | OUTPATIENT
Start: 2019-01-01 | End: 2019-01-01

## 2019-01-01 RX ORDER — HYDRALAZINE HYDROCHLORIDE 50 MG/1
50 TABLET, FILM COATED ORAL 2 TIMES DAILY
Qty: 60 TABLET | Refills: 1 | Status: SHIPPED | OUTPATIENT
Start: 2019-01-01 | End: 2019-01-01

## 2019-01-01 RX ORDER — HYDROMORPHONE HYDROCHLORIDE 1 MG/ML
0.4 INJECTION, SOLUTION INTRAMUSCULAR; INTRAVENOUS; SUBCUTANEOUS EVERY 5 MIN PRN
Status: DISCONTINUED | OUTPATIENT
Start: 2019-01-01 | End: 2019-01-01 | Stop reason: HOSPADM

## 2019-01-01 RX ORDER — LIDOCAINE HYDROCHLORIDE 10 MG/ML
INJECTION, SOLUTION EPIDURAL; INFILTRATION; INTRACAUDAL; PERINEURAL AS NEEDED
Status: DISCONTINUED | OUTPATIENT
Start: 2019-01-01 | End: 2019-01-01

## 2019-01-01 RX ORDER — ACETAMINOPHEN 325 MG/1
650 TABLET ORAL EVERY 6 HOURS PRN
Status: DISCONTINUED | OUTPATIENT
Start: 2019-01-01 | End: 2019-01-01

## 2019-01-01 RX ORDER — METHYLPREDNISOLONE SODIUM SUCCINATE 125 MG/2ML
60 INJECTION, POWDER, LYOPHILIZED, FOR SOLUTION INTRAMUSCULAR; INTRAVENOUS ONCE
Status: COMPLETED | OUTPATIENT
Start: 2019-01-01 | End: 2019-01-01

## 2019-01-01 RX ORDER — FAMOTIDINE 20 MG/1
20 TABLET ORAL ONCE
Status: DISCONTINUED | OUTPATIENT
Start: 2019-01-01 | End: 2019-01-01

## 2019-01-01 RX ORDER — POTASSIUM CHLORIDE 20 MEQ/1
20 TABLET, EXTENDED RELEASE ORAL ONCE
Status: COMPLETED | OUTPATIENT
Start: 2019-01-01 | End: 2019-01-01

## 2019-01-01 RX ORDER — ONDANSETRON 2 MG/ML
INJECTION INTRAMUSCULAR; INTRAVENOUS AS NEEDED
Status: DISCONTINUED | OUTPATIENT
Start: 2019-01-01 | End: 2019-01-01 | Stop reason: SURG

## 2019-01-01 RX ORDER — HALOPERIDOL 5 MG/ML
0.25 INJECTION INTRAMUSCULAR ONCE AS NEEDED
Status: DISCONTINUED | OUTPATIENT
Start: 2019-01-01 | End: 2019-01-01 | Stop reason: HOSPADM

## 2019-01-01 RX ORDER — ACETAMINOPHEN 500 MG
TABLET ORAL
Status: DISPENSED
Start: 2019-01-01 | End: 2019-01-01

## 2019-01-01 RX ORDER — MORPHINE SULFATE 10 MG/ML
6 INJECTION, SOLUTION INTRAMUSCULAR; INTRAVENOUS EVERY 10 MIN PRN
Status: DISCONTINUED | OUTPATIENT
Start: 2019-01-01 | End: 2019-01-01 | Stop reason: HOSPADM

## 2019-01-01 RX ORDER — ESCITALOPRAM OXALATE 10 MG/1
5 TABLET ORAL DAILY
Status: DISCONTINUED | OUTPATIENT
Start: 2019-01-01 | End: 2019-01-01

## 2019-01-01 RX ORDER — NALOXONE HYDROCHLORIDE 0.4 MG/ML
80 INJECTION, SOLUTION INTRAMUSCULAR; INTRAVENOUS; SUBCUTANEOUS AS NEEDED
Status: DISCONTINUED | OUTPATIENT
Start: 2019-01-01 | End: 2019-01-01 | Stop reason: HOSPADM

## 2019-01-01 RX ORDER — IPRATROPIUM BROMIDE AND ALBUTEROL SULFATE 2.5; .5 MG/3ML; MG/3ML
3 SOLUTION RESPIRATORY (INHALATION) 2 TIMES DAILY
Status: DISCONTINUED | OUTPATIENT
Start: 2019-01-01 | End: 2019-01-01

## 2019-01-01 RX ORDER — ASPIRIN 81 MG/1
81 TABLET ORAL DAILY
Status: DISCONTINUED | OUTPATIENT
Start: 2019-01-01 | End: 2019-01-01 | Stop reason: SDUPTHER

## 2019-01-01 RX ORDER — MORPHINE SULFATE 2 MG/ML
2 INJECTION, SOLUTION INTRAMUSCULAR; INTRAVENOUS EVERY 10 MIN PRN
Status: DISCONTINUED | OUTPATIENT
Start: 2019-01-01 | End: 2019-01-01 | Stop reason: HOSPADM

## 2019-01-01 RX ORDER — LIDOCAINE HYDROCHLORIDE 10 MG/ML
INJECTION, SOLUTION EPIDURAL; INFILTRATION; INTRACAUDAL; PERINEURAL AS NEEDED
Status: DISCONTINUED | OUTPATIENT
Start: 2019-01-01 | End: 2019-01-01 | Stop reason: SURG

## 2019-01-01 RX ORDER — HEPARIN SODIUM 5000 [USP'U]/ML
5000 INJECTION, SOLUTION INTRAVENOUS; SUBCUTANEOUS EVERY 12 HOURS SCHEDULED
Status: DISCONTINUED | OUTPATIENT
Start: 2019-01-01 | End: 2019-01-01

## 2019-01-01 RX ORDER — ALBUTEROL SULFATE 2.5 MG/3ML
2.5 SOLUTION RESPIRATORY (INHALATION)
Status: DISCONTINUED | OUTPATIENT
Start: 2019-01-01 | End: 2019-01-01

## 2019-01-01 RX ORDER — IBUPROFEN 400 MG/1
400 TABLET ORAL ONCE
Status: COMPLETED | OUTPATIENT
Start: 2019-01-01 | End: 2019-01-01

## 2019-01-01 RX ORDER — CALCIUM ACETATE 667 MG/1
1 CAPSULE ORAL
Status: ON HOLD | COMMUNITY
End: 2019-01-01

## 2019-01-01 RX ORDER — PANTOPRAZOLE SODIUM 40 MG/1
40 TABLET, DELAYED RELEASE ORAL
Qty: 30 TABLET | Refills: 1 | Status: SHIPPED | OUTPATIENT
Start: 2019-01-01

## 2019-01-01 RX ORDER — DEXTROSE AND SODIUM CHLORIDE 5; .9 G/100ML; G/100ML
INJECTION, SOLUTION INTRAVENOUS CONTINUOUS
Status: DISCONTINUED | OUTPATIENT
Start: 2019-01-01 | End: 2019-01-01

## 2019-01-01 RX ORDER — ALBUMIN, HUMAN INJ 5% 5 %
500 SOLUTION INTRAVENOUS ONCE
Status: COMPLETED | OUTPATIENT
Start: 2019-01-01 | End: 2019-01-01

## 2019-01-01 RX ORDER — ATENOLOL 50 MG/1
50 TABLET ORAL
Status: DISCONTINUED | OUTPATIENT
Start: 2019-01-01 | End: 2019-01-01

## 2019-01-01 RX ORDER — GLYCOPYRROLATE 0.2 MG/ML
INJECTION INTRAMUSCULAR; INTRAVENOUS AS NEEDED
Status: DISCONTINUED | OUTPATIENT
Start: 2019-01-01 | End: 2019-01-01 | Stop reason: SURG

## 2019-01-01 RX ORDER — HYDRALAZINE HYDROCHLORIDE 25 MG/1
25 TABLET, FILM COATED ORAL 3 TIMES DAILY PRN
Qty: 90 TABLET | Refills: 1 | Status: SHIPPED | OUTPATIENT
Start: 2019-01-01 | End: 2019-01-01

## 2019-01-01 RX ORDER — IPRATROPIUM BROMIDE AND ALBUTEROL SULFATE 2.5; .5 MG/3ML; MG/3ML
3 SOLUTION RESPIRATORY (INHALATION)
Status: DISCONTINUED | OUTPATIENT
Start: 2019-01-01 | End: 2019-01-01 | Stop reason: ALTCHOICE

## 2019-01-01 RX ORDER — PANTOPRAZOLE SODIUM 40 MG/1
40 TABLET, DELAYED RELEASE ORAL
Status: DISCONTINUED | OUTPATIENT
Start: 2019-01-01 | End: 2019-01-01

## 2019-01-01 RX ORDER — ACETAMINOPHEN 325 MG/1
650 TABLET ORAL EVERY 8 HOURS PRN
Status: DISCONTINUED | OUTPATIENT
Start: 2019-01-01 | End: 2019-01-01

## 2019-01-01 RX ORDER — ONDANSETRON 4 MG/1
4 TABLET, FILM COATED ORAL EVERY 8 HOURS PRN
COMMUNITY
End: 2019-01-01

## 2019-01-01 RX ORDER — LIDOCAINE HYDROCHLORIDE 20 MG/ML
INJECTION, SOLUTION EPIDURAL; INFILTRATION; INTRACAUDAL; PERINEURAL
Status: COMPLETED
Start: 2019-01-01 | End: 2019-01-01

## 2019-01-01 RX ORDER — ACETAMINOPHEN 325 MG/1
650 TABLET ORAL EVERY 4 HOURS PRN
Status: DISCONTINUED | OUTPATIENT
Start: 2019-01-01 | End: 2019-01-01

## 2019-01-01 RX ORDER — FOLIC ACID 1 MG/1
1 TABLET ORAL DAILY
Qty: 30 TABLET | Refills: 2 | Status: SHIPPED | OUTPATIENT
Start: 2019-01-01

## 2019-01-01 RX ORDER — LOPERAMIDE HYDROCHLORIDE 2 MG/1
2 CAPSULE ORAL EVERY 6 HOURS PRN
COMMUNITY
End: 2020-01-01 | Stop reason: ALTCHOICE

## 2019-01-01 RX ORDER — ALBUTEROL SULFATE 90 UG/1
2 AEROSOL, METERED RESPIRATORY (INHALATION) EVERY 4 HOURS PRN
Status: DISCONTINUED | OUTPATIENT
Start: 2019-01-01 | End: 2019-01-01

## 2019-01-01 RX ORDER — BISACODYL 10 MG
10 SUPPOSITORY, RECTAL RECTAL
Status: DISCONTINUED | OUTPATIENT
Start: 2019-01-01 | End: 2019-01-01

## 2019-01-01 RX ORDER — SODIUM CHLORIDE 9 MG/ML
INJECTION, SOLUTION INTRAVENOUS CONTINUOUS
Status: DISCONTINUED | OUTPATIENT
Start: 2019-01-01 | End: 2019-01-01 | Stop reason: HOSPADM

## 2019-01-01 RX ORDER — METOCLOPRAMIDE 10 MG/1
5 TABLET ORAL ONCE
Status: DISCONTINUED | OUTPATIENT
Start: 2019-01-01 | End: 2019-01-01

## 2019-01-01 RX ORDER — ASPIRIN 81 MG/1
81 TABLET ORAL DAILY
Status: DISCONTINUED | OUTPATIENT
Start: 2019-01-01 | End: 2019-01-01

## 2019-01-01 RX ORDER — LORAZEPAM 2 MG/ML
0.5 INJECTION INTRAMUSCULAR ONCE
Status: COMPLETED | OUTPATIENT
Start: 2019-01-01 | End: 2019-01-01

## 2019-01-01 RX ORDER — HYDROCODONE BITARTRATE AND ACETAMINOPHEN 5; 325 MG/1; MG/1
1 TABLET ORAL AS NEEDED
Status: DISCONTINUED | OUTPATIENT
Start: 2019-01-01 | End: 2019-01-01 | Stop reason: HOSPADM

## 2019-01-01 RX ORDER — HEPARIN SODIUM 5000 [USP'U]/ML
INJECTION, SOLUTION INTRAVENOUS; SUBCUTANEOUS
Status: DISCONTINUED
Start: 2019-01-01 | End: 2019-01-01 | Stop reason: WASHOUT

## 2019-01-01 RX ORDER — AMOXICILLIN 500 MG/1
1000 CAPSULE ORAL DAILY
Status: DISCONTINUED | OUTPATIENT
Start: 2019-01-01 | End: 2019-01-01

## 2019-01-01 RX ORDER — HYDRALAZINE HYDROCHLORIDE 50 MG/1
TABLET, FILM COATED ORAL
Qty: 180 TABLET | Refills: 1 | OUTPATIENT
Start: 2019-01-01

## 2019-01-01 RX ORDER — CLARITHROMYCIN 250 MG/1
250 TABLET, FILM COATED ORAL EVERY 12 HOURS SCHEDULED
Qty: 28 TABLET | Refills: 0 | Status: SHIPPED | OUTPATIENT
Start: 2019-01-01 | End: 2019-01-01

## 2019-01-01 RX ORDER — MORPHINE SULFATE 4 MG/ML
2 INJECTION, SOLUTION INTRAMUSCULAR; INTRAVENOUS EVERY 10 MIN PRN
Status: DISCONTINUED | OUTPATIENT
Start: 2019-01-01 | End: 2019-01-01 | Stop reason: HOSPADM

## 2019-01-01 RX ORDER — CARVEDILOL 3.12 MG/1
3.12 TABLET ORAL 2 TIMES DAILY WITH MEALS
Qty: 60 TABLET | Refills: 2 | Status: SHIPPED | OUTPATIENT
Start: 2019-01-01 | End: 2020-01-01 | Stop reason: DRUGHIGH

## 2019-01-01 RX ORDER — SODIUM CHLORIDE 9 MG/ML
INJECTION, SOLUTION INTRAVENOUS CONTINUOUS PRN
Status: DISCONTINUED | OUTPATIENT
Start: 2019-01-01 | End: 2019-01-01 | Stop reason: SURG

## 2019-01-01 RX ORDER — CHLORHEXIDINE GLUCONATE 4 G/100ML
30 SOLUTION TOPICAL
Status: COMPLETED | OUTPATIENT
Start: 2019-01-01 | End: 2019-01-01

## 2019-01-01 RX ORDER — FAMOTIDINE 10 MG/ML
20 INJECTION, SOLUTION INTRAVENOUS 2 TIMES DAILY
Status: DISCONTINUED | OUTPATIENT
Start: 2019-01-01 | End: 2019-01-01

## 2019-01-01 RX ORDER — ACETAMINOPHEN 325 MG/1
650 TABLET ORAL EVERY 8 HOURS PRN
COMMUNITY
End: 2020-01-01 | Stop reason: ALTCHOICE

## 2019-01-01 RX ORDER — MORPHINE SULFATE 2 MG/ML
1 INJECTION, SOLUTION INTRAMUSCULAR; INTRAVENOUS EVERY 4 HOURS PRN
Status: DISCONTINUED | OUTPATIENT
Start: 2019-01-01 | End: 2019-01-01

## 2019-01-01 RX ORDER — UBIDECARENONE 75 MG
100 CAPSULE ORAL DAILY
Status: DISCONTINUED | OUTPATIENT
Start: 2019-01-01 | End: 2019-01-01

## 2019-01-01 RX ORDER — MIDODRINE HYDROCHLORIDE 5 MG/1
2.5 TABLET ORAL 3 TIMES DAILY
Status: DISCONTINUED | OUTPATIENT
Start: 2019-01-01 | End: 2019-01-01

## 2019-01-01 RX ORDER — DEXTROSE AND SODIUM CHLORIDE 5; .45 G/100ML; G/100ML
INJECTION, SOLUTION INTRAVENOUS CONTINUOUS
Status: DISCONTINUED | OUTPATIENT
Start: 2019-01-01 | End: 2019-01-01

## 2019-01-01 RX ORDER — HYDROCODONE BITARTRATE AND ACETAMINOPHEN 5; 325 MG/1; MG/1
2 TABLET ORAL EVERY 4 HOURS PRN
Status: DISCONTINUED | OUTPATIENT
Start: 2019-01-01 | End: 2019-01-01

## 2019-01-01 RX ORDER — CARVEDILOL 3.12 MG/1
3.12 TABLET ORAL 2 TIMES DAILY WITH MEALS
Status: DISCONTINUED | OUTPATIENT
Start: 2019-01-01 | End: 2019-01-01

## 2019-01-01 RX ORDER — ATENOLOL 25 MG/1
50 TABLET ORAL
Status: DISCONTINUED | OUTPATIENT
Start: 2019-01-01 | End: 2019-01-01

## 2019-01-01 RX ORDER — METOCLOPRAMIDE HYDROCHLORIDE 5 MG/ML
10 INJECTION INTRAMUSCULAR; INTRAVENOUS EVERY 8 HOURS PRN
Status: DISCONTINUED | OUTPATIENT
Start: 2019-01-01 | End: 2019-01-01

## 2019-01-01 RX ORDER — CEFAZOLIN SODIUM/WATER 2 G/20 ML
SYRINGE (ML) INTRAVENOUS AS NEEDED
Status: DISCONTINUED | OUTPATIENT
Start: 2019-01-01 | End: 2019-01-01 | Stop reason: SURG

## 2019-01-01 RX ORDER — ALBUMIN (HUMAN) 12.5 G/50ML
100 SOLUTION INTRAVENOUS AS NEEDED
Status: DISCONTINUED | OUTPATIENT
Start: 2019-01-01 | End: 2019-01-01 | Stop reason: ALTCHOICE

## 2019-01-01 RX ORDER — FOLIC ACID 1 MG/1
1 TABLET ORAL DAILY
Status: DISCONTINUED | OUTPATIENT
Start: 2019-01-01 | End: 2019-01-01

## 2019-01-01 RX ORDER — ATENOLOL 50 MG/1
25 TABLET ORAL DAILY
Status: DISCONTINUED | OUTPATIENT
Start: 2019-01-01 | End: 2019-01-01

## 2019-01-01 RX ORDER — DEXTROSE MONOHYDRATE 25 G/50ML
50 INJECTION, SOLUTION INTRAVENOUS
Status: DISCONTINUED | OUTPATIENT
Start: 2019-01-01 | End: 2019-01-01 | Stop reason: HOSPADM

## 2019-01-01 RX ORDER — CALCIUM ACETATE 667 MG/1
667 CAPSULE ORAL 2 TIMES DAILY
Status: DISCONTINUED | OUTPATIENT
Start: 2019-01-01 | End: 2019-01-01

## 2019-01-01 RX ORDER — HEPARIN SODIUM 1000 [USP'U]/ML
INJECTION, SOLUTION INTRAVENOUS; SUBCUTANEOUS AS NEEDED
Status: DISCONTINUED | OUTPATIENT
Start: 2019-01-01 | End: 2019-01-01 | Stop reason: SURG

## 2019-01-01 RX ORDER — ATENOLOL 50 MG/1
50 TABLET ORAL
Qty: 90 TABLET | Refills: 1 | Status: ON HOLD | OUTPATIENT
Start: 2019-01-01 | End: 2019-01-01

## 2019-01-01 RX ORDER — ALBUTEROL SULFATE 90 UG/1
2 AEROSOL, METERED RESPIRATORY (INHALATION) EVERY 4 HOURS PRN
Qty: 1 INHALER | Refills: 0 | Status: ON HOLD | OUTPATIENT
Start: 2019-01-01 | End: 2019-01-01

## 2019-01-01 RX ORDER — ALBUMIN (HUMAN) 12.5 G/50ML
12.5 SOLUTION INTRAVENOUS ONCE
Status: COMPLETED | OUTPATIENT
Start: 2019-01-01 | End: 2019-01-01

## 2019-01-01 RX ORDER — CHOLECALCIFEROL (VITAMIN D3) 125 MCG
2000 CAPSULE ORAL DAILY
Status: DISCONTINUED | OUTPATIENT
Start: 2019-01-01 | End: 2019-01-01

## 2019-01-01 RX ORDER — SODIUM CHLORIDE 9 MG/ML
INJECTION, SOLUTION INTRAVENOUS ONCE
Status: DISCONTINUED | OUTPATIENT
Start: 2019-01-01 | End: 2019-01-01

## 2019-01-01 RX ORDER — DIPHENHYDRAMINE HCL 50 MG
CAPSULE ORAL
Status: COMPLETED
Start: 2019-01-01 | End: 2019-01-01

## 2019-01-01 RX ORDER — METOCLOPRAMIDE HYDROCHLORIDE 5 MG/ML
5 INJECTION INTRAMUSCULAR; INTRAVENOUS EVERY 8 HOURS PRN
Status: DISCONTINUED | OUTPATIENT
Start: 2019-01-01 | End: 2019-01-01

## 2019-01-01 RX ORDER — CEFAZOLIN SODIUM/WATER 2 G/20 ML
SYRINGE (ML) INTRAVENOUS
Status: COMPLETED
Start: 2019-01-01 | End: 2019-01-01

## 2019-01-01 RX ORDER — ESCITALOPRAM OXALATE 5 MG/1
5 TABLET ORAL DAILY
Qty: 30 TABLET | Refills: 0 | Status: SHIPPED | OUTPATIENT
Start: 2019-01-01

## 2019-01-01 RX ORDER — MORPHINE SULFATE 2 MG/ML
1 INJECTION, SOLUTION INTRAMUSCULAR; INTRAVENOUS EVERY 2 HOUR PRN
Status: DISCONTINUED | OUTPATIENT
Start: 2019-01-01 | End: 2019-01-01

## 2019-01-01 RX ORDER — CEFAZOLIN SODIUM/WATER 2 G/20 ML
2 SYRINGE (ML) INTRAVENOUS EVERY 8 HOURS
Status: COMPLETED | OUTPATIENT
Start: 2019-01-01 | End: 2019-01-01

## 2019-01-01 RX ORDER — ATORVASTATIN CALCIUM 20 MG/1
20 TABLET, FILM COATED ORAL NIGHTLY
Status: DISCONTINUED | OUTPATIENT
Start: 2019-01-01 | End: 2019-01-01

## 2019-01-01 RX ORDER — SODIUM CHLORIDE, SODIUM LACTATE, POTASSIUM CHLORIDE, CALCIUM CHLORIDE 600; 310; 30; 20 MG/100ML; MG/100ML; MG/100ML; MG/100ML
INJECTION, SOLUTION INTRAVENOUS CONTINUOUS
Status: DISCONTINUED | OUTPATIENT
Start: 2019-01-01 | End: 2019-01-01

## 2019-01-01 RX ORDER — MORPHINE SULFATE 4 MG/ML
4 INJECTION, SOLUTION INTRAMUSCULAR; INTRAVENOUS EVERY 2 HOUR PRN
Status: DISCONTINUED | OUTPATIENT
Start: 2019-01-01 | End: 2019-01-01

## 2019-01-01 RX ORDER — MORPHINE SULFATE 2 MG/ML
2 INJECTION, SOLUTION INTRAMUSCULAR; INTRAVENOUS EVERY 2 HOUR PRN
Status: DISCONTINUED | OUTPATIENT
Start: 2019-01-01 | End: 2019-01-01

## 2019-01-01 RX ORDER — GARLIC EXTRACT 500 MG
1 CAPSULE ORAL DAILY
COMMUNITY
End: 2020-01-01 | Stop reason: ALTCHOICE

## 2019-01-01 RX ORDER — IPRATROPIUM BROMIDE AND ALBUTEROL SULFATE 2.5; .5 MG/3ML; MG/3ML
3 SOLUTION RESPIRATORY (INHALATION) 2 TIMES DAILY
Status: ON HOLD | COMMUNITY
End: 2019-01-01

## 2019-01-01 RX ORDER — POLYETHYLENE GLYCOL 3350 17 G/17G
17 POWDER, FOR SOLUTION ORAL DAILY PRN
Status: DISCONTINUED | OUTPATIENT
Start: 2019-01-01 | End: 2019-01-01

## 2019-01-01 RX ORDER — METHYLPREDNISOLONE SODIUM SUCCINATE 40 MG/ML
40 INJECTION, POWDER, LYOPHILIZED, FOR SOLUTION INTRAMUSCULAR; INTRAVENOUS ONCE
Status: COMPLETED | OUTPATIENT
Start: 2019-01-01 | End: 2019-01-01

## 2019-01-01 RX ORDER — DIPHENHYDRAMINE HCL 50 MG
50 CAPSULE ORAL ONCE
Status: DISCONTINUED | OUTPATIENT
Start: 2019-01-01 | End: 2019-01-01 | Stop reason: ALTCHOICE

## 2019-01-01 RX ORDER — IPRATROPIUM BROMIDE AND ALBUTEROL SULFATE 2.5; .5 MG/3ML; MG/3ML
3 SOLUTION RESPIRATORY (INHALATION) EVERY 6 HOURS PRN
Qty: 30 VIAL | Refills: 0 | Status: SHIPPED | OUTPATIENT
Start: 2019-01-01

## 2019-01-01 RX ORDER — HYDROMORPHONE HYDROCHLORIDE 1 MG/ML
0.2 INJECTION, SOLUTION INTRAMUSCULAR; INTRAVENOUS; SUBCUTANEOUS EVERY 5 MIN PRN
Status: DISCONTINUED | OUTPATIENT
Start: 2019-01-01 | End: 2019-01-01 | Stop reason: HOSPADM

## 2019-01-01 RX ORDER — HYDROCODONE BITARTRATE AND ACETAMINOPHEN 5; 325 MG/1; MG/1
2 TABLET ORAL AS NEEDED
Status: DISCONTINUED | OUTPATIENT
Start: 2019-01-01 | End: 2019-01-01 | Stop reason: HOSPADM

## 2019-01-01 RX ORDER — AMOXICILLIN 500 MG/1
1000 CAPSULE ORAL DAILY
Qty: 28 CAPSULE | Refills: 0 | Status: SHIPPED | OUTPATIENT
Start: 2019-01-01 | End: 2019-01-01

## 2019-01-01 RX ORDER — ALBUTEROL SULFATE 2.5 MG/3ML
2.5 SOLUTION RESPIRATORY (INHALATION) 3 TIMES DAILY
Status: DISCONTINUED | OUTPATIENT
Start: 2019-01-01 | End: 2019-01-01

## 2019-01-01 RX ORDER — VALSARTAN 320 MG/1
160 TABLET ORAL DAILY
Status: DISCONTINUED | OUTPATIENT
Start: 2019-01-01 | End: 2019-01-01

## 2019-01-01 RX ORDER — SODIUM CHLORIDE 9 MG/ML
INJECTION, SOLUTION INTRAVENOUS
Status: COMPLETED | OUTPATIENT
Start: 2019-01-01 | End: 2019-01-01

## 2019-01-01 RX ORDER — ETOMIDATE 2 MG/ML
INJECTION INTRAVENOUS AS NEEDED
Status: DISCONTINUED | OUTPATIENT
Start: 2019-01-01 | End: 2019-01-01 | Stop reason: SURG

## 2019-01-01 RX ORDER — GENTAMICIN SULFATE 1 MG/G
CREAM TOPICAL DAILY
Status: ON HOLD | COMMUNITY
End: 2019-01-01

## 2019-01-01 RX ORDER — ROCURONIUM BROMIDE 10 MG/ML
INJECTION, SOLUTION INTRAVENOUS
Status: DISPENSED
Start: 2019-01-01 | End: 2019-01-01

## 2019-01-01 RX ORDER — HYDROCODONE BITARTRATE AND ACETAMINOPHEN 5; 325 MG/1; MG/1
1 TABLET ORAL EVERY 4 HOURS PRN
Status: DISCONTINUED | OUTPATIENT
Start: 2019-01-01 | End: 2019-01-01

## 2019-01-01 RX ORDER — SODIUM CHLORIDE, SODIUM LACTATE, POTASSIUM CHLORIDE, CALCIUM CHLORIDE 600; 310; 30; 20 MG/100ML; MG/100ML; MG/100ML; MG/100ML
INJECTION, SOLUTION INTRAVENOUS CONTINUOUS
Status: DISCONTINUED | OUTPATIENT
Start: 2019-01-01 | End: 2019-01-01 | Stop reason: HOSPADM

## 2019-01-01 RX ORDER — ACETAMINOPHEN 500 MG
1000 TABLET ORAL ONCE
Status: COMPLETED | OUTPATIENT
Start: 2019-01-01 | End: 2019-01-01

## 2019-01-01 RX ORDER — METHYLPREDNISOLONE SODIUM SUCCINATE 125 MG/2ML
125 INJECTION, POWDER, LYOPHILIZED, FOR SOLUTION INTRAMUSCULAR; INTRAVENOUS ONCE
Status: COMPLETED | OUTPATIENT
Start: 2019-01-01 | End: 2019-01-01

## 2019-01-01 RX ORDER — MIDAZOLAM HYDROCHLORIDE 1 MG/ML
INJECTION INTRAMUSCULAR; INTRAVENOUS
Status: COMPLETED
Start: 2019-01-01 | End: 2019-01-01

## 2019-01-01 RX ORDER — ESCITALOPRAM OXALATE 5 MG/1
5 TABLET ORAL DAILY
Status: DISCONTINUED | OUTPATIENT
Start: 2019-01-01 | End: 2019-01-01

## 2019-01-01 RX ORDER — GARLIC EXTRACT 500 MG
1 CAPSULE ORAL DAILY
Status: DISCONTINUED | OUTPATIENT
Start: 2019-01-01 | End: 2019-01-01

## 2019-01-01 RX ORDER — ATENOLOL 25 MG/1
25 TABLET ORAL DAILY
Status: DISCONTINUED | OUTPATIENT
Start: 2019-01-01 | End: 2019-01-01

## 2019-01-01 RX ORDER — SODIUM CHLORIDE 9 MG/ML
INJECTION, SOLUTION INTRAVENOUS
Status: COMPLETED
Start: 2019-01-01 | End: 2019-01-01

## 2019-01-01 RX ORDER — FUROSEMIDE 10 MG/ML
80 INJECTION INTRAMUSCULAR; INTRAVENOUS ONCE
Status: COMPLETED | OUTPATIENT
Start: 2019-01-01 | End: 2019-01-01

## 2019-01-01 RX ORDER — HEPARIN SODIUM 5000 [USP'U]/ML
5000 INJECTION, SOLUTION INTRAVENOUS; SUBCUTANEOUS EVERY 8 HOURS SCHEDULED
Status: DISCONTINUED | OUTPATIENT
Start: 2019-01-01 | End: 2019-01-01

## 2019-01-01 RX ADMIN — LIDOCAINE HYDROCHLORIDE 10 MG: 10 INJECTION, SOLUTION EPIDURAL; INFILTRATION; INTRACAUDAL; PERINEURAL at 19:04:00

## 2019-01-01 RX ADMIN — SODIUM CHLORIDE: 9 INJECTION, SOLUTION INTRAVENOUS at 14:37:00

## 2019-01-01 RX ADMIN — HYDROCODONE BITARTRATE AND ACETAMINOPHEN 1 TABLET: 5; 325 TABLET ORAL at 00:53:00

## 2019-01-01 RX ADMIN — HEPARIN SODIUM 5000 UNITS: 5000 INJECTION, SOLUTION INTRAVENOUS; SUBCUTANEOUS at 08:28:00

## 2019-01-01 RX ADMIN — CEFAZOLIN SODIUM/WATER 2 G: 2 G/20 ML SYRINGE (ML) INTRAVENOUS at 09:35:00

## 2019-01-01 RX ADMIN — DEXTROSE AND SODIUM CHLORIDE 100 ML/HR: 5; .45 INJECTION, SOLUTION INTRAVENOUS at 21:57:00

## 2019-01-01 RX ADMIN — DEXTROSE AND SODIUM CHLORIDE: 5; .45 INJECTION, SOLUTION INTRAVENOUS at 07:58:00

## 2019-01-01 RX ADMIN — SODIUM CHLORIDE: 9 INJECTION, SOLUTION INTRAVENOUS at 14:27:00

## 2019-01-01 RX ADMIN — NEOSTIGMINE METHYLSULFATE 3 MG: 0.5 INJECTION INTRAVENOUS at 11:46:00

## 2019-01-01 RX ADMIN — HEPARIN SODIUM 5000 UNITS: 5000 INJECTION, SOLUTION INTRAVENOUS; SUBCUTANEOUS at 21:49:00

## 2019-01-01 RX ADMIN — PHENYLEPHRINE HCL 50 MCG: 10 MG/ML VIAL (ML) INJECTION at 10:04:00

## 2019-01-01 RX ADMIN — ATORVASTATIN CALCIUM 20 MG: 20 TABLET, FILM COATED ORAL at 21:17:00

## 2019-01-01 RX ADMIN — PHENYLEPHRINE HCL 50 MCG: 10 MG/ML VIAL (ML) INJECTION at 10:09:00

## 2019-01-01 RX ADMIN — ETOMIDATE 6 MG: 2 INJECTION INTRAVENOUS at 14:49:00

## 2019-01-01 RX ADMIN — HEPARIN SODIUM 5000 UNITS: 5000 INJECTION, SOLUTION INTRAVENOUS; SUBCUTANEOUS at 20:53:00

## 2019-01-01 RX ADMIN — ONDANSETRON 4 MG: 2 INJECTION INTRAMUSCULAR; INTRAVENOUS at 19:25:00

## 2019-01-01 RX ADMIN — ACETAMINOPHEN 1000 MG: 500 MG TABLET ORAL at 07:31:00

## 2019-01-01 RX ADMIN — ONDANSETRON 4 MG: 2 INJECTION INTRAMUSCULAR; INTRAVENOUS at 13:42:00

## 2019-01-01 RX ADMIN — CALCIUM ACETATE 667 MG: 667 CAPSULE ORAL at 10:53:00

## 2019-01-01 RX ADMIN — CEFAZOLIN SODIUM/WATER 2 G: 2 G/20 ML SYRINGE (ML) INTRAVENOUS at 14:43:00

## 2019-01-01 RX ADMIN — ONDANSETRON 4 MG: 2 INJECTION INTRAMUSCULAR; INTRAVENOUS at 10:43:00

## 2019-01-01 RX ADMIN — HYDROCODONE BITARTRATE AND ACETAMINOPHEN 1 TABLET: 5; 325 TABLET ORAL at 20:53:00

## 2019-01-01 RX ADMIN — CEFAZOLIN SODIUM/WATER 2 G: 2 G/20 ML SYRINGE (ML) INTRAVENOUS at 17:43:00

## 2019-01-01 RX ADMIN — HEPARIN SODIUM 4000 UNITS: 1000 INJECTION, SOLUTION INTRAVENOUS; SUBCUTANEOUS at 10:31:00

## 2019-01-01 RX ADMIN — SODIUM CHLORIDE: 9 INJECTION, SOLUTION INTRAVENOUS at 19:03:00

## 2019-01-01 RX ADMIN — PHENYLEPHRINE HCL 50 MCG: 10 MG/ML VIAL (ML) INJECTION at 10:30:00

## 2019-01-01 RX ADMIN — SODIUM CHLORIDE: 9 INJECTION, SOLUTION INTRAVENOUS at 08:40:00

## 2019-01-01 RX ADMIN — SODIUM CHLORIDE: 9 INJECTION, SOLUTION INTRAVENOUS at 08:34:00

## 2019-01-01 RX ADMIN — ONDANSETRON 4 MG: 2 INJECTION INTRAMUSCULAR; INTRAVENOUS at 05:41:00

## 2019-01-01 RX ADMIN — CEFAZOLIN SODIUM/WATER 2 G: 2 G/20 ML SYRINGE (ML) INTRAVENOUS at 00:53:00

## 2019-01-01 RX ADMIN — LIDOCAINE HYDROCHLORIDE 40 MG: 10 INJECTION, SOLUTION EPIDURAL; INFILTRATION; INTRACAUDAL; PERINEURAL at 08:36:00

## 2019-01-01 RX ADMIN — ETOMIDATE 6 MG: 2 INJECTION INTRAVENOUS at 14:51:00

## 2019-01-01 RX ADMIN — ATENOLOL 50 MG: 50 TABLET ORAL at 08:28:00

## 2019-01-01 RX ADMIN — GLYCOPYRROLATE 0.4 MG: 0.2 INJECTION INTRAMUSCULAR; INTRAVENOUS at 11:46:00

## 2019-01-01 RX ADMIN — DEXTROSE AND SODIUM CHLORIDE: 5; .45 INJECTION, SOLUTION INTRAVENOUS at 14:15:00

## 2019-01-01 RX ADMIN — ROCURONIUM BROMIDE 50 MG: 10 INJECTION, SOLUTION INTRAVENOUS at 09:28:00

## 2019-01-18 ENCOUNTER — OFFICE VISIT (OUTPATIENT)
Dept: INTERNAL MEDICINE CLINIC | Facility: CLINIC | Age: 79
End: 2019-01-18
Payer: MEDICARE

## 2019-01-18 ENCOUNTER — TELEPHONE (OUTPATIENT)
Dept: INTERNAL MEDICINE CLINIC | Facility: CLINIC | Age: 79
End: 2019-01-18

## 2019-01-18 VITALS
HEIGHT: 59.5 IN | DIASTOLIC BLOOD PRESSURE: 76 MMHG | HEART RATE: 64 BPM | SYSTOLIC BLOOD PRESSURE: 114 MMHG | WEIGHT: 143 LBS | OXYGEN SATURATION: 98 % | TEMPERATURE: 98 F | BODY MASS INDEX: 28.45 KG/M2

## 2019-01-18 DIAGNOSIS — Z99.2 CKD (CHRONIC KIDNEY DISEASE) STAGE V REQUIRING CHRONIC DIALYSIS (HCC): ICD-10-CM

## 2019-01-18 DIAGNOSIS — I10 ESSENTIAL HYPERTENSION: Primary | ICD-10-CM

## 2019-01-18 DIAGNOSIS — N18.6 CKD (CHRONIC KIDNEY DISEASE) STAGE V REQUIRING CHRONIC DIALYSIS (HCC): ICD-10-CM

## 2019-01-18 DIAGNOSIS — R63.0 DECREASED APPETITE: ICD-10-CM

## 2019-01-18 PROCEDURE — 99214 OFFICE O/P EST MOD 30 MIN: CPT | Performed by: INTERNAL MEDICINE

## 2019-01-18 NOTE — PATIENT INSTRUCTIONS
ASSESSMENT/PLAN:   Essential hypertension  (primary encounter diagnosis) Good control. Careful with diet and excercise at least 30 minutes 3-4 times a week. Check blood pressures at different times on different days.  Can purchase own blood pressure monitor

## 2019-01-18 NOTE — PROGRESS NOTES
HPI:    Patient ID: Dieudonne Camp is a 66year old female. HPI   Saw urologist. Told not infection. Gave oint. Has F/U with urologist. NO appetite X several months. Denies GERD. ? Maybe lazy. Not easy ot cook for 1 person. ? Start with PD.  Never eat problem, drooling, ear discharge, ear pain, facial swelling, hearing loss, mouth sores, nosebleeds, postnasal drip, rhinorrhea, sinus pressure, sneezing, sore throat, tinnitus, trouble swallowing and voice change.     Eyes: Negative for photophobia, pain, d Tab Take 1 tablet (5 mg total) by mouth 2 (two) times daily. Disp: 180 tablet Rfl: 0     Allergies:No Known Allergies    HISTORY:  Past Medical History:   Diagnosis Date   • Aortic aneurysm ([facility] [address]     Check q6months.     • Calculus of kidney     L kidney is distress. HENT:   Right Ear: Tympanic membrane, external ear and ear canal normal. No lacerations. No drainage, swelling or tenderness. No foreign bodies. No mastoid tenderness.  Tympanic membrane is not injected, not scarred, not perforated, not erythema Dorsalis pedis pulses are 2+ on the right side, and 2+ on the left side. Posterior tibial pulses are 2+ on the right side, and 2+ on the left side. Pulmonary/Chest: Effort normal and breath sounds normal. No accessory muscle usage.  No apnea, no t salt. Use other seasonings. Decreased appetite Check urine. Try adding more shakes but check pot. Content for 1 meal. Hold counselor and meds. Per pt.      Ckd (chronic kidney disease) stage v requiring chronic dialysis (hcc) Follow up with renal. Hold

## 2019-01-31 RX ORDER — OXYBUTYNIN CHLORIDE 5 MG/1
TABLET ORAL
Qty: 180 TABLET | Refills: 0 | Status: SHIPPED | OUTPATIENT
Start: 2019-01-31 | End: 2019-05-20

## 2019-01-31 RX ORDER — ATENOLOL 50 MG/1
TABLET ORAL
Qty: 90 TABLET | Refills: 0 | Status: SHIPPED | OUTPATIENT
Start: 2019-01-31 | End: 2019-05-20

## 2019-01-31 RX ORDER — PRAVASTATIN SODIUM 40 MG
TABLET ORAL
Qty: 90 TABLET | Refills: 0 | Status: SHIPPED | OUTPATIENT
Start: 2019-01-31 | End: 2019-05-20

## 2019-02-28 VITALS
BODY MASS INDEX: 27.82 KG/M2 | HEIGHT: 59 IN | SYSTOLIC BLOOD PRESSURE: 126 MMHG | HEART RATE: 62 BPM | WEIGHT: 138 LBS | RESPIRATION RATE: 18 BRPM | DIASTOLIC BLOOD PRESSURE: 84 MMHG

## 2019-03-01 VITALS
SYSTOLIC BLOOD PRESSURE: 110 MMHG | HEIGHT: 59 IN | WEIGHT: 137 LBS | HEART RATE: 60 BPM | DIASTOLIC BLOOD PRESSURE: 68 MMHG | BODY MASS INDEX: 27.62 KG/M2 | RESPIRATION RATE: 18 BRPM

## 2019-03-01 VITALS
WEIGHT: 139 LBS | DIASTOLIC BLOOD PRESSURE: 70 MMHG | HEIGHT: 59 IN | RESPIRATION RATE: 18 BRPM | BODY MASS INDEX: 28.02 KG/M2 | SYSTOLIC BLOOD PRESSURE: 122 MMHG | HEART RATE: 65 BPM

## 2019-03-01 VITALS
HEIGHT: 59 IN | HEART RATE: 73 BPM | RESPIRATION RATE: 16 BRPM | SYSTOLIC BLOOD PRESSURE: 112 MMHG | DIASTOLIC BLOOD PRESSURE: 74 MMHG | BODY MASS INDEX: 28.02 KG/M2 | WEIGHT: 139 LBS

## 2019-03-14 ENCOUNTER — MA CHART PREP (OUTPATIENT)
Dept: FAMILY MEDICINE CLINIC | Facility: CLINIC | Age: 79
End: 2019-03-14

## 2019-03-14 PROBLEM — R91.1 NODULE OF UPPER LOBE OF RIGHT LUNG: Status: ACTIVE | Noted: 2019-03-14

## 2019-03-15 ENCOUNTER — OFFICE VISIT (OUTPATIENT)
Dept: INTERNAL MEDICINE CLINIC | Facility: CLINIC | Age: 79
End: 2019-03-15
Payer: MEDICARE

## 2019-03-15 VITALS
WEIGHT: 146 LBS | TEMPERATURE: 99 F | DIASTOLIC BLOOD PRESSURE: 65 MMHG | OXYGEN SATURATION: 98 % | BODY MASS INDEX: 29 KG/M2 | SYSTOLIC BLOOD PRESSURE: 109 MMHG | HEART RATE: 50 BPM

## 2019-03-15 DIAGNOSIS — Z12.39 BREAST CANCER SCREENING: ICD-10-CM

## 2019-03-15 DIAGNOSIS — Z72.0 TOBACCO USE: ICD-10-CM

## 2019-03-15 DIAGNOSIS — Z00.00 ENCOUNTER FOR MEDICARE ANNUAL WELLNESS EXAM: ICD-10-CM

## 2019-03-15 DIAGNOSIS — I70.0 CALCIFICATION OF AORTA (HCC): ICD-10-CM

## 2019-03-15 DIAGNOSIS — IMO0002 MASS: ICD-10-CM

## 2019-03-15 DIAGNOSIS — Z00.00 ENCOUNTER FOR ANNUAL HEALTH EXAMINATION: ICD-10-CM

## 2019-03-15 DIAGNOSIS — E87.5 HYPERKALEMIA: ICD-10-CM

## 2019-03-15 DIAGNOSIS — Z86.39 HISTORY OF PRIMARY HYPERPARATHYROIDISM: ICD-10-CM

## 2019-03-15 DIAGNOSIS — R31.21 ASYMPTOMATIC MICROSCOPIC HEMATURIA: ICD-10-CM

## 2019-03-15 DIAGNOSIS — E78.2 MIXED HYPERLIPIDEMIA: ICD-10-CM

## 2019-03-15 DIAGNOSIS — Z13.6 ENCOUNTER FOR ABDOMINAL AORTIC ANEURYSM SCREENING: ICD-10-CM

## 2019-03-15 DIAGNOSIS — M81.0 AGE-RELATED OSTEOPOROSIS WITHOUT CURRENT PATHOLOGICAL FRACTURE: ICD-10-CM

## 2019-03-15 DIAGNOSIS — B35.1 TOENAIL FUNGUS: ICD-10-CM

## 2019-03-15 DIAGNOSIS — Z78.0 POSTMENOPAUSAL: ICD-10-CM

## 2019-03-15 DIAGNOSIS — I10 ESSENTIAL HYPERTENSION: Primary | ICD-10-CM

## 2019-03-15 DIAGNOSIS — H53.8 BLURRING OF VISION: ICD-10-CM

## 2019-03-15 DIAGNOSIS — E53.8 VITAMIN B 12 DEFICIENCY: ICD-10-CM

## 2019-03-15 DIAGNOSIS — B07.8 OTHER VIRAL WARTS: ICD-10-CM

## 2019-03-15 DIAGNOSIS — R73.9 HYPERGLYCEMIA: ICD-10-CM

## 2019-03-15 LAB
APPEARANCE: CLEAR
BACTERIA UR QL AUTO: NEGATIVE /HPF
BILIRUB UR QL: NEGATIVE
CLARITY UR: CLEAR
COLOR UR: YELLOW
GLUCOSE (URINE DIPSTICK): 100 MG/DL
GLUCOSE UR-MCNC: 150 MG/DL
HGB UR QL STRIP.AUTO: NEGATIVE
KETONES UR-MCNC: NEGATIVE MG/DL
MULTISTIX LOT#: NORMAL NUMERIC
NITRITE UR QL STRIP.AUTO: NEGATIVE
PH UR: 5 [PH] (ref 5–8)
PH, URINE: 5.5 (ref 4.5–8)
PROT UR-MCNC: 30 MG/DL
PROTEIN (URINE DIPSTICK): 30 MG/DL
RBC #/AREA URNS AUTO: 1 /HPF
SP GR UR STRIP: 1.03 (ref 1–1.03)
SPECIFIC GRAVITY: 1.01 (ref 1–1.03)
URINE-COLOR: YELLOW
UROBILINOGEN UR STRIP-ACNC: <2
UROBILINOGEN,SEMI-QN: 0.2 MG/DL (ref 0–1.9)
VIT C UR-MCNC: NEGATIVE MG/DL
WBC #/AREA URNS AUTO: 1 /HPF

## 2019-03-15 PROCEDURE — 81003 URINALYSIS AUTO W/O SCOPE: CPT | Performed by: INTERNAL MEDICINE

## 2019-03-15 PROCEDURE — G0439 PPPS, SUBSEQ VISIT: HCPCS | Performed by: INTERNAL MEDICINE

## 2019-03-15 PROCEDURE — 96160 PT-FOCUSED HLTH RISK ASSMT: CPT | Performed by: INTERNAL MEDICINE

## 2019-03-15 PROCEDURE — 99397 PER PM REEVAL EST PAT 65+ YR: CPT | Performed by: INTERNAL MEDICINE

## 2019-03-15 NOTE — PATIENT INSTRUCTIONS
[facility] SCREENING SCHEDULE   Tests on this list are recommended by your physician but may not be covered, or covered at this frequency, by your insurer. Please check with your insurance carrier before scheduling to verify coverage.    Kareemell Decree Screening  Covered up to Age 76     Colonoscopy Screen   Covered every 10 years- more often if abnormal There are no preventive care reminders to display for this patient.  Update Health Maintenance if applicable    Flex Sigmoidoscopy Screen  Covered every Pneumococcal 13 (Prevnar)  Covered Once after 65 Orders placed or performed in visit on 06/30/17   • PNEUMOCOCCAL VACC, 13 ALLEY IM    Please get once after your 65th birthday    Pneumococcal 23 (Pneumovax)  Covered Once after 65 Orders placed or performed i diet and excercise at least 30 minutes 3-4 times a week. Check blood pressures at different times on different days. Can purchase own blood pressure monitor. If not, check at local pharmacy. Bake foods more and grill occasionally. Avoid fried foods.  No sal

## 2019-03-15 NOTE — PROGRESS NOTES
HPI:    Patient ID: Olive Godinez is a 66year old female. HPI   Olive Godinez is a 66year old female who presents for a complete physical exam.   HPI:   Patient presents with:  Wellness Visit: Medicare Physical exam     Eye exam done > 1 yr.  A Cyanocobalamin (VITAMIN B-12 ER) 2000 MCG Oral Tab CR Take 2,000 mcg by mouth daily. Disp:  Rfl:    aspirin 81 MG Oral Chew Tab Chew by mouth daily. Disp:  Rfl:       Past Medical History:   Diagnosis Date   • Aortic aneurysm ([facility] [address]     Check q6months.     • Smokeless tobacco: Never Used    Substance and Sexual Activity      Alcohol use:  Yes        Alcohol/week: 0.0 oz        Comment: rarely      Drug use: No        Obstetric History     T0    L0    SAB0  TAB0  Ectopic0  Multiple0  Live Births0 Eyes: Positive for visual disturbance (During right eye. Slowly progressive. For several months. Has not seen eye doctor. Needs to schedule. ). Negative for photophobia, pain, discharge, redness and itching. Respiratory: Negative.   Negative for apnea, Allergies:No Known Allergies    HISTORY:  Past Medical History:   Diagnosis Date   • Aortic aneurysm ([address]     Check q6months. 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 Sees Dr. Victor Manuel Vasquez (renal MD).     • Cataract    • Essential hypertensi Head: Normocephalic and atraumatic. Right Ear: Tympanic membrane, external ear and ear canal normal. No lacerations. No drainage, swelling or tenderness. No foreign bodies. No mastoid tenderness.  Tympanic membrane is not injected, not scarred, not perfor Eyes: Conjunctivae, EOM and lids are normal. Pupils are equal, round, and reactive to light. Right eye exhibits no chemosis, no discharge, no exudate and no hordeolum. No foreign body present in the right eye.  Left eye exhibits no chemosis, no discharge, n Abdominal: Soft. Bowel sounds are normal. She exhibits no distension, no fluid wave, no ascites and no mass. There is no hepatosplenomegaly, splenomegaly or hepatomegaly. There is no tenderness.  There is no rigidity, no rebound, no guarding and no CVA tend Encounter for medicare annual wellness exam  Postmenopausal  Blurring of vision  Breast cancer screening  Other viral warts  Mass  Toenail fungus  Age-related osteoporosis without current pathological fracture  Vitamin b 12 deficiency  Hyperglycemia  Asymp Depression Screening (PHQ-2/PHQ-9): Over the LAST 2 WEEKS   Little interest or pleasure in doing things (over the last two weeks)?: Not at all  Feeling down, depressed, or hopeless (over the last two weeks)?: Several days  PHQ-2 SCORE: 1     Advanced Direc Calculus of kidney     Visual impairment     Decreased appetite     Nodule of upper lobe of right lung    Wt Readings from Last 3 Encounters:  03/15/19 : 146 lb (66.2 kg)  01/18/19 : 143 lb (64.9 kg)  09/21/18 : 144 lb (65.3 kg)     Last Cholesterol Lab Her family history includes Diabetes in her brother, maternal aunt, and maternal grandmother; Heart Attack (age of onset: 43) in her mother; Heart Attack (age of onset: 54) in her father; Heart Disorder (age of onset: 37) in her brother; Heart Disorder (ag Calcification of aorta (HCC)  -     US ABDOMINAL AORTIC ANEURYSM SCREENING (CPT=76706);  Future    Tobacco use    History of primary hyperparathyroidism  -     COMP METABOLIC PANEL (14)    Hyperkalemia  -     COMP METABOLIC PANEL (14)    Encounter for abdom HbgA1C   Annually Lab Results   Component Value Date    A1C 5.7 07/10/2018    No flowsheet data found.     Fasting Blood Sugar (FSB)Annually Glucose (mg/dL)   Date Value   08/29/2018 94     GLUCOSE (mg/dL)   Date Value   06/27/2018 121 (H)          Cardiov Covered Once after 65 10/06/2017 Please get once after your 65th birthday    Hepatitis B for Moderate/High Risk No vaccine history found Medium/high risk factors:   End-stage renal disease   Hemophiliacs who received Factor VIII or IX concentrates   Client Skin: Negative for rash. Allergic/Immunologic: Negative for environmental allergies. Neurological: Negative for dizziness, tremors, seizures, syncope, facial asymmetry, speech difficulty, weakness, light-headedness, numbness and headaches.    Hematologi Left Ear: Tympanic membrane, external ear and ear canal normal. No lacerations. No drainage, swelling or tenderness. No foreign bodies. No mastoid tenderness.  Tympanic membrane is not injected, not scarred, not perforated, not erythematous, not retracted a Neck: Trachea normal and phonation normal. Neck supple. Normal carotid pulses, no hepatojugular reflux and no JVD present. No tracheal tenderness present. No tracheal deviation present. No thyroid mass and no thyromegaly present.    Cardiovascular: Normal r Head (left side): No submental, no submandibular, no tonsillar, no preauricular, no posterior auricular and no occipital adenopathy present. She has no cervical adenopathy.         Right cervical: No superficial cervical, no deep cervical and no po Essential hypertension Good control. Careful with diet and excercise at least 30 minutes 3-4 times a week. Check blood pressures at different times on different days. Can purchase own blood pressure monitor. If not, check at local pharmacy.  Bake foods more How would you describe your daily physical activity?: Light  How would you describe your current health state?: Fair  How do you maintain positive mental well-being?: Social Interaction     PREVENTATIVE SERVICES  INDICATIONS AND SCHEDULE Internal Lab or Pr Mammogram  Annually to 76, then as discussed There are no preventive care reminders to display for this patient.  Update Health Maintenance if applicable     Immunizations (Update Immunization Activity if applicable)     Influenza  Covered Annually 09/21/2

## 2019-03-21 PROBLEM — R63.0 DECREASED APPETITE: Status: RESOLVED | Noted: 2019-01-18 | Resolved: 2019-03-21

## 2019-05-20 RX ORDER — OXYBUTYNIN CHLORIDE 5 MG/1
TABLET ORAL
Qty: 180 TABLET | Refills: 1 | Status: ON HOLD | OUTPATIENT
Start: 2019-05-20 | End: 2019-01-01

## 2019-05-20 RX ORDER — ATENOLOL 50 MG/1
TABLET ORAL
Qty: 90 TABLET | Refills: 1 | Status: ON HOLD | OUTPATIENT
Start: 2019-05-20 | End: 2019-01-01

## 2019-05-20 RX ORDER — PRAVASTATIN SODIUM 40 MG
TABLET ORAL
Qty: 90 TABLET | Refills: 1 | Status: ON HOLD | OUTPATIENT
Start: 2019-05-20 | End: 2019-01-01

## 2019-05-30 ENCOUNTER — MED REC SCAN ONLY (OUTPATIENT)
Dept: INTERNAL MEDICINE CLINIC | Facility: CLINIC | Age: 79
End: 2019-05-30

## 2019-06-01 ENCOUNTER — HOSPITAL ENCOUNTER (OUTPATIENT)
Dept: ULTRASOUND IMAGING | Facility: HOSPITAL | Age: 79
Discharge: HOME | End: 2019-06-01
Attending: INTERNAL MEDICINE
Payer: MEDICARE

## 2019-06-01 DIAGNOSIS — I70.0 CALCIFICATION OF AORTA (HCC): ICD-10-CM

## 2019-06-01 DIAGNOSIS — Z13.6 ENCOUNTER FOR ABDOMINAL AORTIC ANEURYSM SCREENING: ICD-10-CM

## 2019-06-01 PROCEDURE — 76706 US ABDL AORTA SCREEN AAA: CPT | Performed by: INTERNAL MEDICINE

## 2019-06-01 PROCEDURE — 76770 US EXAM ABDO BACK WALL COMP: CPT | Performed by: INTERNAL MEDICINE

## 2019-06-03 ENCOUNTER — TELEPHONE (OUTPATIENT)
Dept: INTERNAL MEDICINE CLINIC | Facility: CLINIC | Age: 79
End: 2019-06-03

## 2019-06-03 ENCOUNTER — APPOINTMENT (OUTPATIENT)
Dept: CT IMAGING | Facility: HOSPITAL | Age: 79
End: 2019-06-03
Attending: EMERGENCY MEDICINE
Payer: MEDICARE

## 2019-06-03 ENCOUNTER — TELEPHONE (OUTPATIENT)
Dept: NEPHROLOGY | Facility: CLINIC | Age: 79
End: 2019-06-03

## 2019-06-03 ENCOUNTER — HOSPITAL ENCOUNTER (EMERGENCY)
Facility: HOSPITAL | Age: 79
Discharge: HOME | End: 2019-06-03
Attending: EMERGENCY MEDICINE
Payer: MEDICARE

## 2019-06-03 VITALS
HEIGHT: 59 IN | TEMPERATURE: 98 F | RESPIRATION RATE: 20 BRPM | SYSTOLIC BLOOD PRESSURE: 112 MMHG | WEIGHT: 140 LBS | DIASTOLIC BLOOD PRESSURE: 69 MMHG | OXYGEN SATURATION: 94 % | BODY MASS INDEX: 28.22 KG/M2 | HEART RATE: 55 BPM

## 2019-06-03 DIAGNOSIS — I71.4 ABDOMINAL AORTIC ANEURYSM (AAA) GREATER THAN 5.0 CM IN DIAMETER IN FEMALE (HCC): Primary | ICD-10-CM

## 2019-06-03 PROCEDURE — 99284 EMERGENCY DEPT VISIT MOD MDM: CPT

## 2019-06-03 PROCEDURE — 71275 CT ANGIOGRAPHY CHEST: CPT | Performed by: EMERGENCY MEDICINE

## 2019-06-03 PROCEDURE — 36415 COLL VENOUS BLD VENIPUNCTURE: CPT

## 2019-06-03 PROCEDURE — 85025 COMPLETE CBC W/AUTO DIFF WBC: CPT | Performed by: EMERGENCY MEDICINE

## 2019-06-03 PROCEDURE — 74174 CTA ABD&PLVS W/CONTRAST: CPT | Performed by: EMERGENCY MEDICINE

## 2019-06-03 PROCEDURE — 80048 BASIC METABOLIC PNL TOTAL CA: CPT | Performed by: EMERGENCY MEDICINE

## 2019-06-03 NOTE — TELEPHONE ENCOUNTER
Dr. Willem Hilario nurse called to ask--patient needs a CT abdomen/pelvis with contrast for aneurysm and wanted to know if patient can have this test with contrast (PD patient) Encounter routed to Dr. Eusebio Vivas to advise.

## 2019-06-03 NOTE — ED NOTES
Received pt a/ox3, clear speech, nad, no resp distress, ambulatory with steady gait  Sent by PCP for eval of bilateral rib pain approx 2 weeks ago. Pt has hx of aortic aneurysm, had scan on Saturday and told size is increasing.   Pt currently denies pain at

## 2019-06-03 NOTE — ED NOTES
20 G PIV established to R AC. Flushes well, no s/s of infiltration noted. Labs sent for processing.      Will monitor Normal vision: sees adequately in most situations; can see medication labels, newsprint

## 2019-06-03 NOTE — TELEPHONE ENCOUNTER
Nurse from dialysis center called asking for a copy of most recent H&P and Immunization records. Faxed over to [phone]  Received \"success\" confirmation.

## 2019-06-03 NOTE — TELEPHONE ENCOUNTER
Spoke with patient regarding test results. Had received a call from doctor at hospital to notify her of the ultrasound findings. Is concerned. Patient's brother had aneurysm that ruptured and almost .    Patient had back pain right side then left manan

## 2019-06-03 NOTE — ED INITIAL ASSESSMENT (HPI)
Patient complains of back pain x2 weeks, states she has a known aortic aneurysm, denies pain/discomfort at this time, ambulatory

## 2019-06-04 ENCOUNTER — TELEPHONE (OUTPATIENT)
Dept: INTERNAL MEDICINE CLINIC | Facility: CLINIC | Age: 79
End: 2019-06-04

## 2019-06-04 NOTE — ED PROVIDER NOTES
Patient Seen in: [facility] AND [facility]    History   Patient presents with:  Back Pain (musculoskeletal)    Stated Complaint: discomfort     HPI    The patient is a 79-year-old female with a history of an abdominal aortic aneurysm.   She h 53.00        Pack years: 26.5        Types: Cigarettes        Start date: 6/1/1963      Smokeless tobacco: Never Used    Alcohol use:  Yes      Alcohol/week: 0.0 oz      Comment: rarely    Drug use: No      Review of Systems    Positive for stated complaint GFR, -American 8 (*)     All other components within normal limits   CBC W/ DIFFERENTIAL - Abnormal; Notable for the following components:    .9 (*)     MCHC 30.8 (*)     RDW-SD 51.0 (*)     All other components within normal limits   CBC WIT CT findings to suggest active rupture or significant periaortic hematoma. There is, however, subtle anterior periaortic stranding in the infrarenal region with mildly displaced intimal plaque (series 3, image 196 for example).   This finding raises suspici up                Disposition and Plan     Clinical Impression:  Abdominal aortic aneurysm (AAA) greater than 5.0 cm in diameter in female [facility])  (primary encounter diagnosis)    Disposition:  Discharge  6/3/2019  8:19 pm    Follow-up:  Norm Leigh,

## 2019-06-04 NOTE — TELEPHONE ENCOUNTER
Patient calling was in [address] [facility] ER yesterday was told she has aneurysm and told to follow up with Dr Devin López or his nurse doctor does not have anything available until end of month and nurse is on vacation patient is looking for your recommendation for another d

## 2019-06-06 ENCOUNTER — TELEPHONE (OUTPATIENT)
Dept: NEPHROLOGY | Facility: CLINIC | Age: 79
End: 2019-06-06

## 2019-06-06 DIAGNOSIS — Z99.2 ESRD (END STAGE RENAL DISEASE) ON DIALYSIS (HCC): Primary | ICD-10-CM

## 2019-06-06 DIAGNOSIS — N18.6 ESRD (END STAGE RENAL DISEASE) ON DIALYSIS (HCC): Primary | ICD-10-CM

## 2019-06-07 ENCOUNTER — OFFICE VISIT (OUTPATIENT)
Dept: NEPHROLOGY | Facility: CLINIC | Age: 79
End: 2019-06-07
Payer: MEDICARE

## 2019-06-07 VITALS
SYSTOLIC BLOOD PRESSURE: 127 MMHG | WEIGHT: 145.19 LBS | DIASTOLIC BLOOD PRESSURE: 82 MMHG | HEART RATE: 61 BPM | HEIGHT: 59 IN | BODY MASS INDEX: 29.27 KG/M2 | TEMPERATURE: 98 F

## 2019-06-07 DIAGNOSIS — Z99.2 ESRD (END STAGE RENAL DISEASE) ON DIALYSIS (HCC): Primary | ICD-10-CM

## 2019-06-07 DIAGNOSIS — I71.4 ABDOMINAL AORTIC ANEURYSM (AAA) WITHOUT RUPTURE (HCC): ICD-10-CM

## 2019-06-07 DIAGNOSIS — N18.6 ESRD (END STAGE RENAL DISEASE) ON DIALYSIS (HCC): Primary | ICD-10-CM

## 2019-06-07 PROCEDURE — 99215 OFFICE O/P EST HI 40 MIN: CPT | Performed by: INTERNAL MEDICINE

## 2019-06-07 PROCEDURE — G0463 HOSPITAL OUTPT CLINIC VISIT: HCPCS | Performed by: INTERNAL MEDICINE

## 2019-06-07 NOTE — PROGRESS NOTES
Progress Note:     Dena Casey is a 68 yr old female with pmh of left kidney cyst s/p removal, HL, AAA with 3 cm, tobacoo abuse, HTN, ESRD on PD who presented today for follow up      Denies any urinary complaints - has stress incontinence for whi Diabetes Brother    • Diabetes Maternal Grandmother    • Diabetes Maternal Aunt       Social History: Social History    Tobacco Use      Smoking status: Current Every Day Smoker        Packs/day: 0.50        Years: 53.00        Pack years: 26.5        Type normal extraocular motion is intact  Nose/Mouth/Throat:mucous membranes are moist   Neck/Thyroid: neck is supple without adenopathy  Lymphatic: no abnormal cervical, supraclavicular adenopathy is noted  Respiratory:  lungs are clear to auscultation bilater & Referrals:  None     6/7/2019  Hemalatha Nick MD

## 2019-06-11 ENCOUNTER — TELEPHONE (OUTPATIENT)
Dept: INTERNAL MEDICINE CLINIC | Facility: CLINIC | Age: 79
End: 2019-06-11

## 2019-06-11 DIAGNOSIS — I70.0 CALCIFICATION OF AORTA (HCC): ICD-10-CM

## 2019-06-11 DIAGNOSIS — E78.2 MIXED HYPERLIPIDEMIA: ICD-10-CM

## 2019-06-11 DIAGNOSIS — I71.4 ABDOMINAL AORTIC ANEURYSM (AAA) WITHOUT RUPTURE (HCC): Primary | ICD-10-CM

## 2019-06-11 DIAGNOSIS — I10 ESSENTIAL HYPERTENSION: ICD-10-CM

## 2019-06-11 NOTE — TELEPHONE ENCOUNTER
Called [facility] attempting to get appointment for cardiac clearance for patient. Was on phone for 30 minutes and transferred 3 times. Could not find availability for this week.    Scheduling will leave message for office if they can find an opening f

## 2019-06-12 NOTE — TELEPHONE ENCOUNTER
Spoke to [facility] they can do it appt  Monday 6/17/19 2:30 pm with Dr. Maegan Barrios left for patient and to please call me back.     Dr. Gardenia Jeter please place referral .  Antonio Mcclure for you to sign

## 2019-06-13 ENCOUNTER — HOSPITAL ENCOUNTER (OUTPATIENT)
Dept: INTERVENTIONAL RADIOLOGY/VASCULAR | Facility: HOSPITAL | Age: 79
Discharge: HOME | End: 2019-06-13
Attending: INTERNAL MEDICINE | Admitting: INTERNAL MEDICINE
Payer: MEDICARE

## 2019-06-13 VITALS
SYSTOLIC BLOOD PRESSURE: 104 MMHG | RESPIRATION RATE: 16 BRPM | BODY MASS INDEX: 28 KG/M2 | DIASTOLIC BLOOD PRESSURE: 64 MMHG | WEIGHT: 141 LBS | HEART RATE: 62 BPM | OXYGEN SATURATION: 90 %

## 2019-06-13 DIAGNOSIS — Z99.2 ESRD (END STAGE RENAL DISEASE) ON DIALYSIS (HCC): ICD-10-CM

## 2019-06-13 DIAGNOSIS — N18.6 ESRD (END STAGE RENAL DISEASE) ON DIALYSIS (HCC): ICD-10-CM

## 2019-06-13 PROCEDURE — 36415 COLL VENOUS BLD VENIPUNCTURE: CPT

## 2019-06-13 PROCEDURE — 99152 MOD SED SAME PHYS/QHP 5/>YRS: CPT

## 2019-06-13 PROCEDURE — 85610 PROTHROMBIN TIME: CPT | Performed by: RADIOLOGY

## 2019-06-13 PROCEDURE — 77001 FLUOROGUIDE FOR VEIN DEVICE: CPT

## 2019-06-13 PROCEDURE — 36558 INSERT TUNNELED CV CATH: CPT

## 2019-06-13 PROCEDURE — 05HM33Z INSERTION OF INFUSION DEVICE INTO RIGHT INTERNAL JUGULAR VEIN, PERCUTANEOUS APPROACH: ICD-10-PCS | Performed by: RADIOLOGY

## 2019-06-13 RX ORDER — HEPARIN SODIUM 1000 [USP'U]/ML
INJECTION, SOLUTION INTRAVENOUS; SUBCUTANEOUS
Status: COMPLETED
Start: 2019-06-13 | End: 2019-06-13

## 2019-06-13 RX ORDER — SODIUM CHLORIDE 9 MG/ML
INJECTION, SOLUTION INTRAVENOUS
Status: DISCONTINUED
Start: 2019-06-13 | End: 2019-06-13

## 2019-06-13 RX ORDER — CEFAZOLIN SODIUM/WATER 2 G/20 ML
SYRINGE (ML) INTRAVENOUS
Status: COMPLETED
Start: 2019-06-13 | End: 2019-06-13

## 2019-06-13 RX ORDER — LIDOCAINE HYDROCHLORIDE 20 MG/ML
INJECTION, SOLUTION EPIDURAL; INFILTRATION; INTRACAUDAL; PERINEURAL
Status: COMPLETED
Start: 2019-06-13 | End: 2019-06-13

## 2019-06-13 RX ORDER — SODIUM CHLORIDE 9 MG/ML
INJECTION, SOLUTION INTRAVENOUS CONTINUOUS
Status: DISCONTINUED | OUTPATIENT
Start: 2019-06-13 | End: 2019-06-13

## 2019-06-13 RX ORDER — MIDAZOLAM HYDROCHLORIDE 1 MG/ML
INJECTION INTRAMUSCULAR; INTRAVENOUS
Status: COMPLETED
Start: 2019-06-13 | End: 2019-06-13

## 2019-06-13 NOTE — PROGRESS NOTES
Pt is able to sit up and ambulate without any difficulty. Procedure site remains dry and intact with no signs of bleeding and hematoma. Pt tolerated food/fluid. Instructions given. Pt/Family verbalized understanding. Permacath intact.

## 2019-06-14 RX ORDER — PRAVASTATIN SODIUM 40 MG
TABLET ORAL
COMMUNITY
Start: 2017-02-16 | End: 2020-02-17

## 2019-06-14 RX ORDER — AMLODIPINE BESYLATE 10 MG/1
10 TABLET ORAL
COMMUNITY
Start: 2017-02-16

## 2019-06-14 RX ORDER — SODIUM POLYSTYRENE SULFONATE 15 G/60ML
SUSPENSION ORAL; RECTAL
COMMUNITY
Start: 2017-12-05 | End: 2020-02-17

## 2019-06-14 RX ORDER — GUAIFENESIN 400 MG
2000 TABLET ORAL
COMMUNITY
End: 2020-02-17

## 2019-06-14 RX ORDER — CALCITRIOL 0.25 UG/1
CAPSULE, LIQUID FILLED ORAL
COMMUNITY
Start: 2017-12-05 | End: 2020-02-17

## 2019-06-14 RX ORDER — CALCIUM ACETATE 667 MG/1
667 CAPSULE ORAL
COMMUNITY
Start: 2019-05-29 | End: 2020-02-17

## 2019-06-14 RX ORDER — ATENOLOL 50 MG/1
TABLET ORAL
COMMUNITY
Start: 2017-02-16 | End: 2020-02-17

## 2019-06-14 RX ORDER — OXYBUTYNIN CHLORIDE 5 MG/1
TABLET ORAL
COMMUNITY
Start: 2019-05-20 | End: 2020-02-17

## 2019-06-14 RX ORDER — ASPIRIN 81 MG/1
81 TABLET ORAL
COMMUNITY
Start: 2017-02-16 | End: 2020-02-17

## 2019-06-14 RX ORDER — SODIUM CHLORIDE, SODIUM LACTATE, POTASSIUM CHLORIDE, CALCIUM CHLORIDE 600; 310; 30; 20 MG/100ML; MG/100ML; MG/100ML; MG/100ML
INJECTION, SOLUTION INTRAVENOUS CONTINUOUS
Status: CANCELLED | OUTPATIENT
Start: 2019-06-14

## 2019-06-17 ENCOUNTER — MED REC SCAN ONLY (OUTPATIENT)
Dept: INTERNAL MEDICINE CLINIC | Facility: CLINIC | Age: 79
End: 2019-06-17

## 2019-06-17 ENCOUNTER — OFFICE VISIT (OUTPATIENT)
Dept: CARDIOLOGY | Age: 79
End: 2019-06-17

## 2019-06-17 ENCOUNTER — TELEPHONE (OUTPATIENT)
Dept: NEPHROLOGY | Facility: CLINIC | Age: 79
End: 2019-06-17

## 2019-06-17 VITALS
HEIGHT: 59 IN | SYSTOLIC BLOOD PRESSURE: 110 MMHG | HEART RATE: 62 BPM | WEIGHT: 144 LBS | OXYGEN SATURATION: 96 % | DIASTOLIC BLOOD PRESSURE: 60 MMHG | BODY MASS INDEX: 29.03 KG/M2

## 2019-06-17 DIAGNOSIS — E78.00 PURE HYPERCHOLESTEROLEMIA: ICD-10-CM

## 2019-06-17 DIAGNOSIS — Z72.0 TOBACCO ABUSE: Primary | ICD-10-CM

## 2019-06-17 DIAGNOSIS — I10 ESSENTIAL HYPERTENSION: ICD-10-CM

## 2019-06-17 DIAGNOSIS — I71.40 ABDOMINAL AORTIC ANEURYSM (AAA) WITHOUT RUPTURE (CMD): ICD-10-CM

## 2019-06-17 PROCEDURE — 99203 OFFICE O/P NEW LOW 30 MIN: CPT | Performed by: INTERNAL MEDICINE

## 2019-06-17 PROCEDURE — 3078F DIAST BP <80 MM HG: CPT | Performed by: INTERNAL MEDICINE

## 2019-06-17 PROCEDURE — 3074F SYST BP LT 130 MM HG: CPT | Performed by: INTERNAL MEDICINE

## 2019-06-17 PROCEDURE — 93000 ELECTROCARDIOGRAM COMPLETE: CPT | Performed by: INTERNAL MEDICINE

## 2019-06-17 NOTE — TELEPHONE ENCOUNTER
Pt saon st he was returning call from [facility] states pt is all set for Wed - [facility] would know

## 2019-06-18 ENCOUNTER — LAB ENCOUNTER (OUTPATIENT)
Dept: LAB | Facility: HOSPITAL | Age: 79
DRG: 268 | End: 2019-06-18
Attending: SURGERY
Payer: MEDICARE

## 2019-06-18 DIAGNOSIS — I71.4 AAA (ABDOMINAL AORTIC ANEURYSM) WITHOUT RUPTURE (HCC): ICD-10-CM

## 2019-06-18 PROCEDURE — 86850 RBC ANTIBODY SCREEN: CPT

## 2019-06-18 PROCEDURE — 86901 BLOOD TYPING SEROLOGIC RH(D): CPT

## 2019-06-18 PROCEDURE — 36415 COLL VENOUS BLD VENIPUNCTURE: CPT

## 2019-06-18 PROCEDURE — 87641 MR-STAPH DNA AMP PROBE: CPT

## 2019-06-18 PROCEDURE — 86900 BLOOD TYPING SEROLOGIC ABO: CPT

## 2019-06-19 DIAGNOSIS — Z72.0 TOBACCO ABUSE: ICD-10-CM

## 2019-06-19 PROBLEM — I71.40 AAA (ABDOMINAL AORTIC ANEURYSM) WITHOUT RUPTURE (HCC): Status: ACTIVE | Noted: 2019-01-01

## 2019-06-19 PROBLEM — I71.4 AAA (ABDOMINAL AORTIC ANEURYSM) WITHOUT RUPTURE (HCC): Status: ACTIVE | Noted: 2019-01-01

## 2019-06-19 NOTE — ANESTHESIA PROCEDURE NOTES
Arterial Line  Performed by: Madhavi Lin MD  Authorized by: Madhavi Lin MD     Procedure Start:  6/19/2019 9:23 AM  Procedure End:  6/19/2019 9:25 AM  Site Identification: surface landmarks    Patient Location:  OR  Indication: continuous b

## 2019-06-19 NOTE — ANESTHESIA PREPROCEDURE EVALUATION
Anesthesia PreOp Note    HPI:     Sukumar Chapman is a 78year old female who presents for preoperative consultation requested by: Ciera Jameson MD    Date of Surgery: 6/19/2019    Procedure(s):  ABDOMINAL AORTIC ANEURYSM ENDOSCOPIC  Indication: abdom Pravastatin; states not been told she has high cholesterol   • Visual impairment        Past Surgical History:   Procedure Laterality Date   •      • CATARACT     • CATARACT EXTRACTION Right 11/15/2017    Dr. Valentín Palma.    • D & C     • LAPAROSCOPIC PER Genoveva Ellis MD    HYDROcodone-acetaminophen [facility]) 5-325 MG per tab 1 tablet 1 tablet Oral PRN Genoveva Ellis MD    Or        HYDROcodone-acetaminophen (NORCO) 5-325 MG per tab 2 tablet 2 tablet Oral PRN Genoveva Ellis MD    fentaNYL citrat Herat aneuyrism ruptured   • Heart Surgery Brother    • Heart Disorder Brother 54        CAD S/P CABG.     • Diabetes Brother    • Diabetes Maternal Grandmother    • Diabetes Maternal Aunt      Social History    Socioeconomic History      Marital sta HCT 48.0 06/03/2019    .9 (H) 06/03/2019    MCH 32.0 06/03/2019    MCHC 30.8 (L) 06/03/2019    RDW 13.2 06/03/2019    .0 06/03/2019     Lab Results   Component Value Date     06/03/2019    K 5.0 06/03/2019     06/03/2019    CO2 27

## 2019-06-19 NOTE — BRIEF OP NOTE
Patients Name: Lindle Schilder  Attending Physician: Ajit Mtz MD  Operating Physician: Sarah Shaver MD  CSN: [id number]     Location:  OR  MRN: [id number]    YOB: 1940  Admission Date: 6/19/2019  Operation Date: 6/19/2019    Leeanna Ortega

## 2019-06-19 NOTE — ANESTHESIA POSTPROCEDURE EVALUATION
Patient: Miguel Suárez    Procedure Summary     Date:  06/19/19 Room / Location:  [facility] OR  / [facility] OR; [facility]    Anesthesia Start:  0915 Anesthesia Stop:  0762    Procedures:       ABDOMINAL AORTIC ANEURYSM END

## 2019-06-19 NOTE — ANESTHESIA PROCEDURE NOTES
Airway  Date/Time: 6/19/2019 9:35 AM  Urgency: elective    Airway not difficult    General Information and Staff    Patient location during procedure: OR  Anesthesiologist: Timmy Henry MD  Performed: anesthesiologist     Indications and Patient Cond

## 2019-06-19 NOTE — ANESTHESIA PROCEDURE NOTES
Peripheral IV  Date/Time: 6/19/2019 9:36 AM  Inserted by: Genet Brantley MD    Placement  Needle size: 19 G  Laterality: left  Location: arm  Local anesthetic: none  Site prep: alcohol  Technique: anatomical landmarks  Attempts: 1

## 2019-06-20 PROBLEM — Z99.2 ESRD (END STAGE RENAL DISEASE) ON DIALYSIS (HCC): Status: ACTIVE | Noted: 2018-06-29

## 2019-06-20 PROBLEM — N18.6 ESRD (END STAGE RENAL DISEASE) ON DIALYSIS (HCC): Status: ACTIVE | Noted: 2018-06-29

## 2019-06-20 NOTE — TELEPHONE ENCOUNTER
Message left for pt that Home Health orders will be signed tomorrow when she sees her at the hospital.

## 2019-06-20 NOTE — H&P
[facility]    History and Physical    Stephanie Ruiz Patient Status:  Inpatient    3/31/1940 MRN [id number]   Location [facility] 2W/SW Attending Mi Murillo MD   Hosp Day # 1 PCP Jake Mercedes.  Devon Harris MD     Date:  2019 Smoking status: Current Every Day Smoker        Packs/day: 0.50        Years: 53.00        Pack years: 26.5        Types: Cigarettes        Start date: 6/1/1963      Smokeless tobacco: Never Used    Alcohol use:  Yes      Alcohol/week: 0.0 oz      Comme Neurological: Negative for dizziness, tremors, seizures, syncope, facial asymmetry, speech difficulty, weakness, light-headedness, numbness and headaches. Psychiatric/Behavioral: Negative for sleep disturbance.    All other systems reviewed and are negati Neck: Trachea normal and phonation normal. Neck supple. No tracheal tenderness present. No tracheal deviation present. No thyroid mass and no thyromegaly present. Cardiovascular: Normal rate and regular rhythm.     Pulses:       Radial pulses are 2+ on th HGB 11.2 (L) 06/20/2019    HCT 35.8 06/20/2019    PLT 77.0 (L) 06/20/2019    CREATSERUM 4.91 (H) 06/20/2019    BUN 37 (H) 06/20/2019     (L) 06/20/2019    K 4.3 06/20/2019     06/20/2019    CO2 25.0 06/20/2019     (H) 06/20/2019    CA 6

## 2019-06-20 NOTE — PHYSICAL THERAPY NOTE
PHYSICAL THERAPY EVALUATION - INPATIENT     Room Number: 204/204-A  Evaluation Date: 6/20/2019  Type of Evaluation: Initial   Physician Order: PT Eval and Treat    Presenting Problem: AAA repair  Reason for Therapy: Mobility Dysfunction and Discharge Plan DISCHARGE RECOMMENDATIONS  PT Discharge Recommendations: Home(Intermittent assistance)    PLAN  PT Treatment Plan: Bed mobility; Endurance; Energy conservation;Patient education; Family education;Gait training;Strengthening;Stair training;Transfer tra Yes    Lives With: Alone  Drives: Yes  Patient Owned Equipment: None  Patient Regularly Uses: Glasses    Prior Level of [place]: Independent with mobility and ADL's without an AD, active    SUBJECTIVE  \"I just feel so tired, I can't go home today. \" ABILITY STATUS  Gait Assessment   Gait Assistance: (SBA)  Distance (ft): 50  Assistive Device: Rolling walker  Pattern: (Slow roxane but steady without any gross LOB)  Stoop/Curb Assistance: Not tested(due to fatigue, drowsiness)       Bed Mobility: Super

## 2019-06-20 NOTE — PLAN OF CARE
Removed arterial line per Md. Monitored for signs of bleeding. Treated patient's nausea via medication (see mar). Patient received hemodialysis and 1L fluid was removed. Educated patient regarding incentive spirometer use. Lomas catheter removed.  Positione nutritional consult as needed  - Evaluate fluid balance  Outcome: Progressing     Problem: METABOLIC/FLUID AND ELECTROLYTES - ADULT  Goal: Electrolytes maintained within normal limits  Description  INTERVENTIONS:  - Monitor labs and rhythm and assess patie

## 2019-06-20 NOTE — HOME CARE LIAISON
[facility] Met with patient and family at the bedside. Patient is agreeable to Residential Home Health services upon discharge. Patient given brochure and liaison card.  All questions and concerns were addr

## 2019-06-20 NOTE — PLAN OF CARE
Problem: GASTROINTESTINAL - ADULT  Goal: Minimal or absence of nausea and vomiting  Description  INTERVENTIONS:  - Maintain adequate hydration with IV or PO as ordered and tolerated  - Nasogastric tube to low intermittent suction as ordered  - Evaluate e for patient's volume status, including labs, urine output, blood pressure (other measures as available)  - Encourage oral intake as appropriate  - Instruct patient on fluid and nutrition restrictions as appropriate  Outcome: Progressing      Well rested w/

## 2019-06-20 NOTE — PROGRESS NOTES
Received patient from PACU. Alert and oriented. Family at bedside. Incisions are clean, dry intact. Patient kept [address] flat for 2 hours and no changes in incision. Pulses by doppler and as charted. CMS intact.

## 2019-06-20 NOTE — OCCUPATIONAL THERAPY NOTE
OCCUPATIONAL THERAPY EVALUATION - INPATIENT     Room Number: 204/204-A  Evaluation Date: 6/20/2019  Type of Evaluation: Initial  Presenting Problem: AAA repair    Physician Order: IP Consult to Occupational Therapy  Reason for Therapy: ADL/IADL Dysfunction care/supervision  OT Device Recommendations: TBD    PLAN  OT Treatment Plan: Patient/Family education;Patient/Family training; Endurance training;Functional transfer training;ADL training    OCCUPATIONAL THERAPY MEDICAL/SOCIAL HISTORY     Problem List  Acti ambulation and driving. Pt denied need for ad or dme items.     SUBJECTIVE  \"I think I want to stay one more day\"    OCCUPATIONAL THERAPY EXAMINATION      OBJECTIVE  Precautions: (surgical)  Fall Risk: Standard fall risk    PAIN ASSESSMENT  Ratin    A addressed; Family present    OT Goals  Patients self stated goal is: to return home     Patient will complete functional transfer with supervision  Comment:     Patient will complete le dressing w/ supervision  Comment:     Patient will complete grooming ta

## 2019-06-20 NOTE — PROGRESS NOTES
Family questioning dialysis plans. Call to Najjar and Lalit Jimenez put on consult. Dr Celeste Pike also made aware of patient and plans to see her tomorrow. Najjar aware of plan.

## 2019-06-20 NOTE — TELEPHONE ENCOUNTER
She will not be going home today. Will sign orders for when she does go home. I will see her tomorrow.

## 2019-06-20 NOTE — PROGRESS NOTES
[facility] HOSP - [facility]     Vascular Surgery Progress Note    Bryanshanthi Fernandez Patient Status:  Inpatient    3/31/1940 MRN [id number]   Location [facility] 2W/SW Attending Marin Mccullough MD   Hosp Day # 1 PCP Martín Maxwell.  Olga Barger MD     Object Nebulization Daily PRN   PEG 3350 (MIRALAX) powder packet 17 g 17 g Oral Daily PRN   bisacodyl (DULCOLAX) rectal suppository 10 mg 10 mg Rectal Daily PRN   ondansetron HCl (ZOFRAN) injection 4 mg 4 mg Intravenous Q6H PRN   Metoclopramide HCl (REGLAN) injec

## 2019-06-20 NOTE — PLAN OF CARE
Patient ok to transfer per Dr. Sherine Zavala to medical floor with remote tele. Transferring to room 557. Son, Karolina Khan, updated over the phone. Report given to [facility], RN, all questions answered. Room is dirty. Will endorse transport to night shift RN.  All bel

## 2019-06-21 NOTE — TELEPHONE ENCOUNTER
Paxton Pt daughter requesting an appt with Dr Eli Navarro for her mom   Pt had surgery couple days ago  She said

## 2019-06-21 NOTE — PROGRESS NOTES
Double RN skin check done prior to transfer off Unit. Skin check performed by this RN and Ross Strange RN. Wounds are as follows: None    Will remain available for any further questions or concerns.

## 2019-06-21 NOTE — CM/SW NOTE
RN informed SW that pt is medically stable to discharge today. Long 78 orders and F2F have been entered. SW notified Vane Gil from [facility] regarding pt's discharge today.      Hadley Hurst

## 2019-06-21 NOTE — PLAN OF CARE
Problem: Patient Centered Care  Goal: Patient preferences are identified and integrated in the patient's plan of care  Description  Interventions:  - What would you like us to know as we care for you? I live at home by myself.     - Provide timely, co mobilization and activity  - Obtain nutritional consult as needed  - Establish a toileting routine/schedule  - Consider collaborating with pharmacy to review patient's medication profile  Outcome: Progressing     Problem: METABOLIC/FLUID AND ELECTROLYTES -

## 2019-06-21 NOTE — DISCHARGE SUMMARY
DISCHARGE SUMMARY     Angelina Law Patient Status:  Inpatient    3/31/1940 MRN [id number]   Location [facility] 5SW/SE Attending Yevgeniy Porras MD   Hosp Day # 2 PCP Juana Baugh.  Sheila Costa MD     Date of Admission: 2019  Date of Discharge: cooperative  Pulmonary: clear to auscultation bilaterally  Cardiovascular: S1, S2 normal  Abdominal: soft, non-tender; bowel sounds normal  Extremities: no cyanosis or edema    Procedures during hospitalization:   •     Incidental or significant findings a chart    Eunice Bang MD 6/21/2019    Time spent:  30 to 74 minutes

## 2019-06-24 NOTE — PAYOR COMM NOTE
--------------  DISCHARGE REVIEW    Kip Machado MA [facility]  Subscriber #:  [id number]  Authorization Number: [id number]    Admit date: 6/19/19  Admit time:  5  Discharge Date: 6/21/2019 11:33 AM     Admitting Physician: Marce Marsh MD  Attending Physic after discharge. Then patient will resume him PD and will make a decision. Still very low urine output. H/O hypoglycemia. Higher A1c. Dietary to see patient. Sliding scale insulin, hyperglycemia protocol.       Elevated white blood cell count. Oral Tab  TAKE 1 TABLET EVERY DAY    Cyanocobalamin (VITAMIN B-12 ER) 2000 MCG Oral Tab CR  Take 2,000 mcg by mouth daily. aspirin 81 MG Oral Tab  Take by mouth daily.                 Discharge Diet: Diabetic diet    Discharge Activity: As tolerated    F

## 2019-06-24 NOTE — TELEPHONE ENCOUNTER
Patients daughter Kristina Bonds called [phone] requesting an appointment for hospital follow up from surgery.   Prefers this Thursday or Friday

## 2019-06-24 NOTE — PROGRESS NOTES
TCM OUTREACH    Book By Date 7/5/19    My chart message sent to patient requesting call back to review discharge instructions, medications and schedule TCM apt .

## 2019-06-28 NOTE — OPERATIVE REPORT
[facility]     PATIENTS NAME: [place] Iram  ATTENDING PHYSICIAN: No att. providers found  OPERATING PHYSICIAN: Gaurang Martinez MD  CSN: [id number]     LOCATION:  OR  MRN: [id number]    YOB: 1940  ADMISSION DATE: 6/19/2019  OPERATI were deployed at 90° angles, but the sutures were left loose . 8-Fr sheaths were placed on both sides, and the patient was anticoagulated with 5000 units of heparin.   The sheaths were then exchanged for a 16-Hebrew sheath on the right and 12-Fr sheath on was then advanced from the contralateral side over a Agarwal catheter and gate was cannulated.    The marker pigtail catheter was reintroduced through the left femoral sheath over an Amplatz wire and a retrograde left iliac angiogram was obtained to [place]

## 2019-06-28 NOTE — TELEPHONE ENCOUNTER
Jered Oneil, [facility] ([phone]) calling to let you know that patient refused home health today, states she is not feeling well and has an appointment to see you this afternoon. She is do for a CMP. Home Health can draw next week, just let them know.

## 2019-06-28 NOTE — PROGRESS NOTES
HPI:    Patient ID: Fara Ruff is a 78year old female.     HPI   AA (abdominal aortic aneurysm) without rupture (HCC)  Status post repair.  Vascular following patient.         CKD (chronic kidney disease) stage 3, GFR 30-59 ml/min ([facility])  Renal follo CREATSERUM 5.21 (H) 06/21/2019 07:36 AM    CA 7.0 (L) 06/21/2019 07:36 AM    AST 18 06/21/2019 07:36 AM    ALT <6 (L) 06/21/2019 07:36 AM    TSH 1.51 06/22/2018 12:39 PM        Lab Results   Component Value Date/Time    CHOLEST 170 06/22/2018 12:39 PM    H (three) times daily with meals.  Disp:  Rfl:    PRAVASTATIN SODIUM 40 MG Oral Tab TAKE 1 TABLET EVERY NIGHT Disp: 90 tablet Rfl: 1   ATENOLOL 50 MG Oral Tab TAKE 1 TABLET EVERY DAY Disp: 90 tablet Rfl: 1   Cyanocobalamin (VITAMIN B-12 ER) 2000 MCG Oral Tab Smoker        Packs/day: 0.50        Years: 53.00        Pack years: 26.5        Types: Cigarettes        Start date: 6/1/1963      Smokeless tobacco: Never Used    Alcohol use:  Yes      Alcohol/week: 0.0 oz      Comment: rarely    Drug use: No       PHYSI oropharyngeal exudate, posterior oropharyngeal edema, posterior oropharyngeal erythema or tonsillar abscesses. Eyes: Conjunctivae are normal. No scleral icterus. Neck: Trachea normal and phonation normal. Neck supple.  No thyroid mass and no thyromegaly Skin: Skin is warm and dry. She is not diaphoretic. Psychiatric: She has a normal mood and affect. Her behavior is normal.   Nursing note and vitals reviewed. ASSESSMENT/PLAN:   Essential hypertension  (primary encounter diagnosis) ? Control. within the last 30 days. The discharge medication list has been reconciled with the patient's current medication list and reviewed by me.   See medication list for additions of new medication, and changes to current doses of medications and discontinued me does not use drugs. ROS:   Review of Systems    PHYSICAL EXAM:   No LMP recorded. Patient is postmenopausal. Estimated body mass index is 29.69 kg/m² as calculated from the following:    Height as of 6/14/19: 4' 11\" (1.499 m).     Weight as of this enc

## 2019-06-30 PROBLEM — E83.52 HYPERCALCEMIA: Status: ACTIVE | Noted: 2019-01-01

## 2019-07-01 NOTE — TELEPHONE ENCOUNTER
No.     At dialysis we are checking regular vitamin D/calcium and PTH and providing recommendations.      thanks

## 2019-07-02 NOTE — PROGRESS NOTES
Several attempts made to reach the patient with no return call. Patient completed HFU on 6/28/2019. Closing encounter.

## 2019-07-08 NOTE — TELEPHONE ENCOUNTER
Home Health calling regarding patient. Per patient, she is too depressed to go out and have labs drawn. Also complains of feeling down, not eating, not sleeping very well. Home Health states they will draw ordered labs.  Patient willing to be seen for babar

## 2019-07-09 NOTE — TELEPHONE ENCOUNTER
Okay that is fine. Have tried to call patient twice yesterday and today and phone ringing and left message. She has not call me back.

## 2019-07-09 NOTE — TELEPHONE ENCOUNTER
Attempt to call patient no answer   Left message on voice mail Dr. Christiano Marie trying to call her and no answer. Please call office.

## 2019-07-09 NOTE — TELEPHONE ENCOUNTER
Navya Cash, [facility], [phone] asking if she could get an order for social work? Thinking they can assist with resources for patient's depression.  Per home health, patient won't leave house and is selective as to whose phone calls she answe

## 2019-07-10 NOTE — TELEPHONE ENCOUNTER
Is make sure his fax to the kidney doctor. With all the labs. She is doing PD right now and she is actually doing hemodialysis.

## 2019-07-10 NOTE — TELEPHONE ENCOUNTER
Sylvia with [facility] reporting critical lab value on this patient. Creatinine is 8.49. She does not have detailed full lab report yet. States it should be faxed to [facility] if it hasn't been already.

## 2019-07-11 NOTE — TELEPHONE ENCOUNTER
I was covering for Nephro RN, received call from [facility] at [address] with critical labs that mistakenly were sent to Dr Kayli Pleitez 7/9. She faxed to me    Creat 8:49 and BUN 55. Results given to Dr Jason Daily in office.

## 2019-07-11 NOTE — TELEPHONE ENCOUNTER
Spoke with Melissa Neville left message stating to make sure labs are faxed to nephrologist  Dr. Edison Alejo   Patient is doing hemodialysis now.

## 2019-07-11 NOTE — TELEPHONE ENCOUNTER
RN from [facility] calling in with critical results for Creatinin levels, which is 8.49 on 7/9/19. I transferred the call to RN.

## 2019-07-12 NOTE — PROGRESS NOTES
Progress Note:     Sarita Cogan is a 68 yr old female with pmh of left kidney cyst s/p removal, HL, AAA s/p , tobacoo abuse, HTN, ESRD on PD who presented today for follow up      Denies any urinary complaints - has stress incontinence for which sh aneuyrism ruptured   • Heart Surgery Brother    • Heart Disorder Brother 54        CAD S/P CABG.     • Diabetes Brother    • Diabetes Maternal Grandmother    • Diabetes Maternal Aunt       Social History: Social History    Tobacco Use      Smoking status: F Constitutional: appears well hydrated alert and responsive  Head/Face: normocephalic  Eyes/Vision: normal extraocular motion is intact  Nose/Mouth/Throat:mucous membranes are moist   Neck/Thyroid: neck is supple without adenopathy  Lymphatic: no abnormal

## 2019-07-12 NOTE — TELEPHONE ENCOUNTER
Gregg from home health requesting a verbal order from  to move social work evaluation from this week to next week. Ok to leave voicemail.

## 2019-07-15 NOTE — TELEPHONE ENCOUNTER
[facility] faxed over a request for referral for Dialysis and In Home Care    Faxed Request to Dr Jasmin Nixon for reference    No referral required with [facility] plan only order from pcp    Thanks,    Sean -Managed care

## 2019-07-15 NOTE — ED INITIAL ASSESSMENT (HPI)
Pt admitted [facility] for AAA in late June. Denies back, abdomen or chest pain at this time.

## 2019-07-15 NOTE — ED INITIAL ASSESSMENT (HPI)
CP with SOB today. Pt had some difficulty finishing her PT at home today r/t MARLEY. ASA given by EMS.

## 2019-07-15 NOTE — ED PROVIDER NOTES
Patient Seen in: [facility] Emergency Department    History   Patient presents with:  Chest Pain Angina (cardiovascular)  Dyspnea MARLEY SOB (respiratory)    Stated Complaint: CP    HPI    78year old female with multiple medical problems including A SURGICAL; ABLATION, RENAL MASS LESION(S) Left     6-7 year ago had lesion removed from left kidney   • OTHER SURGICAL HISTORY  2016    Parathyroid sx.     • OTHER SURGICAL HISTORY  07/10/2018    pd cath     • TONSILLECTOMY             Social History    [employer] [facility] Neurological: She is alert and oriented to person, place, and time. She has normal strength. No sensory deficit. Skin: Skin is warm and dry. No rash noted. She is not diaphoretic. Psychiatric: She has a normal mood and affect.  Her behavior is normal. bibasilar pleural effusions.     Dictated by (CST): Ramonita Yoder MD on 7/15/2019 at 13:18     Approved by (CST): Drea Trinh MD on 7/15/2019 at 13:20          Ct Chest Pain/pe (iv Only) (cpt=71260)    Result Date: 7/15/2019  CONCLUSIO will use inhaler/spacer at home, return for any worsening sx.      Disposition and Plan     Clinical Impression:  Chest pain of uncertain etiology  (primary encounter diagnosis)  Mild intermittent reactive airway disease with acute exacerbation    Dispositi

## 2019-07-15 NOTE — TELEPHONE ENCOUNTER
Suzie from [address] [facility] called. She received the notification that pt is in ED. She states pt must be confused because she isn't getting PD at this time. She is getting HD at [address] [facility] so no one was scheduled to come to her house.  She states pt gets confused an

## 2019-07-15 NOTE — TELEPHONE ENCOUNTER
Received call from [facility] at [facility] that pt was sent to ER for chest heaviness and SOB. Reviewed chart and she is currentely in [address] [place] [address] ED. [facility] states pt was suppose to have someone come to her home today to help set up PD.  I called [facility] and notified the

## 2019-07-15 NOTE — TELEPHONE ENCOUNTER
Gabby Noel RN from [facility] states that pt has had chest heaviness and pain since last night, RN called paramedics, they arrived and pt is refusing to go to ER. ALEXANDREI for physicians.

## 2019-07-18 NOTE — TELEPHONE ENCOUNTER
from [facility] health is asking to get verbal order to change her eval visit until next week

## 2019-07-24 NOTE — TELEPHONE ENCOUNTER
Carly De Jesus from [address] [facility] calling wants to know if order request for dialysis has been received.

## 2019-07-26 NOTE — TELEPHONE ENCOUNTER
Clarissa Otero called regard to pt to have more visits from [facility].    Contact number [phone]

## 2019-07-29 NOTE — TELEPHONE ENCOUNTER
They need an order faxed to  [address] [facility] for dialysis services  Fax # [phone]

## 2019-07-31 NOTE — TELEPHONE ENCOUNTER
Called facility, no answer. Left VM to give us a call back to let us know which order they need from us, HD or PD.

## 2019-08-01 NOTE — TELEPHONE ENCOUNTER
Janet Phan with [address] [facility], [place], [place] ([phone]) called requesting referral for patient for Dialysis treatment. Please sign off if agree.      Renal Care  [address] [place] [address]  [address] [facility] Fax [phone]    Than

## 2019-08-02 NOTE — TELEPHONE ENCOUNTER
[facility] worker for Carrie Hernandez ([phone]) calling to let you know plan of care. They can do a one-time evaluation with social work offering community resources. They were out today, offerred a counselor that can come to house.  Sh

## 2019-08-06 PROBLEM — N30.01 ACUTE CYSTITIS WITH HEMATURIA: Status: ACTIVE | Noted: 2019-01-01

## 2019-08-06 PROBLEM — J81.0 ACUTE PULMONARY EDEMA (HCC): Status: ACTIVE | Noted: 2019-01-01

## 2019-08-06 PROBLEM — J96.01 ACUTE RESPIRATORY FAILURE WITH HYPOXIA (HCC): Status: ACTIVE | Noted: 2019-01-01

## 2019-08-06 PROBLEM — A41.9 SEPSIS DUE TO UNDETERMINED ORGANISM (HCC): Status: ACTIVE | Noted: 2019-01-01

## 2019-08-06 NOTE — ED INITIAL ASSESSMENT (HPI)
Pt arrived per EMS from Dialysis, had 1/2 hour to go. States was 75%, up to 85% SaPO2 at dialysis center. States was not answering question when EMS arrived to . Pt is on 15L NRB at this time.  EMS states pt is now a/o x 4, but states pt is [employer]

## 2019-08-06 NOTE — ED NOTES
Pt's son at bedside. States she was like this after dialysis treatment last week, but not as severe. States once she drank and ate, it got better.

## 2019-08-07 NOTE — PLAN OF CARE
ABG done  AT 0800 AM on BIPAP 14/6,50%FIO2-Ph 6.90,PCO2 119,PO2 98. Patient intubated by DR VELÁZQUEZ and placed on vent- A/C 20, ML,100%,PEEP 5 AT 0820. ABG at 0950 ph 7.21,pco2 57,po2 138,bicarb 20.8,be -5.2.   ET tube secured at 21 cm at the lip at 09

## 2019-08-07 NOTE — PROGRESS NOTES
[facility]    Progress Note    Maliha Worthington Patient Status:  Inpatient    3/31/1940 MRN [id number]   Location [facility] 2W/SW Attending Deborah Sears MD   [facility] Day # 1 PCP Carlie Velez.  Vivien Larkin MD        Subjective:     U intra-abdominal process with benign abdominal exam    -Continue with meropenem and vancomycin  ID consult  Culture still pending     3- altered mental status secondary to sepsis , metabolic and hypercapnic Encephalopathy  Supportive care  Now patient intub in the anterior and posterior circulations.        Dictated by (CST): Rita Montejo MD on 8/06/2019 at 21:06     Approved by (CST): Rita Montejo MD on 8/06/2019 at 21:08          Xr Chest Ap Portable  (cpt=71045)    Result Date: 8/7/2019  CONCLUSION:  1. Suc

## 2019-08-07 NOTE — PROGRESS NOTES
[facility] Pharmacy Note:  Renal Dose Adjustment for Metoclopramide (REGLAN)    Fab Snell has been prescribed Metoclopramide (REGLAN) 10 mg every  8 hours as needed for n/v.    Estimated Creatinine Clearance: 8.3 mL/min (A) (based on SCr of 4.72 mg/dL (H)

## 2019-08-07 NOTE — OCCUPATIONAL THERAPY NOTE
OT orders rcvd; per discussion with RN patient is not appropriate at this time; currently intubated. Requested new orders when patient is appropriate.      Richard Pereira, OTR/L   [facility]

## 2019-08-07 NOTE — TELEPHONE ENCOUNTER
Residential  tried to see patient today was unable to see her no one answered the door and tried calling 2x

## 2019-08-07 NOTE — PLAN OF CARE
Patient is drowsy, lethargic and not able to follow commands. Patient on bipap fiO2 titrated down to 40%. Patient normal sinus to sinus seb with stable blood pressures. Patient diminished urine production ESRD on HD. Patient NPO. Skin intact.  Patient in safety  and administer oxygen as ordered  - Monitor patient for seizure activity, document and report duration and description of seizure to MD/LIP  - If seizure occurs, turn patient to side and suction secretions as needed  - Reorient patient post seizure

## 2019-08-07 NOTE — PROGRESS NOTES
[facility] Dosing Service    Initial Pharmacokinetic Consult for Vancomycin Dosing     Stephanie Ruiz is a 78year old female who is being treated for sepsis and UTI.   Pharmacy has been asked to dose Vancomycin by Dr. Edgar Momin    She is allergic to ace

## 2019-08-07 NOTE — CONSULTS
[facility]    Report of Consultation    Rhesa Prudent Patient Status:  Emergency    3/31/1940 MRN [id number]   Location [address] Attending Wayne Tristan MD   [facility] Day # 0 PCP William Schmitt.  Alphonso Washington MD Cataract    • Coronary atherosclerosis    • Dialysis patient [facility])    • Essential hypertension    • High cholesterol    • History of primary hyperparathyroidism 02/23/2016    S/P removal L superior and inferior parathyroids.     • Hyperlipidemia     Pt. ursula hospital admission)    Allergies    Ace Inhibitors          OTHER (SEE COMMENTS)    Review of Systems:    Pertinent items are noted in HPI.     Physical Exam:   Blood pressure 119/76, pulse 65, temperature 96.5 °F (35.8 °C), temperature source Temporal, res Impression:     1-  Sepsis due to undetermined organism [facility])  ? Source   Possible line sepsis   ?  UTI /patient usually makes minimal amount of urine  Could be aspiration pneumonia  Doubt intra-abdominal process with benign abdominal exam    2- alter

## 2019-08-07 NOTE — DIETARY NOTE
ADULT NUTRITION INITIAL ASSESSMENT    Pt is at high nutrition risk. Pt does not meet malnutrition criteria. RECOMMENDATIONS TO MD:  CPM tube feeds as ordered.  Enteral order set in place- labs for am.      NUTRITION DIAGNOSIS/PROBLEM:  Inadequate prot with family. PERTINENT PAST MEDICAL HISTORY:   Past Medical History:   Diagnosis Date   • Aortic aneurysm ([address]     Check q6months. 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 Sees Dr. Marya Villegas (renal MD).     • Cataract    • Coronary at ipratropium-albuterol  3 mL Nebulization QID   • pantoprazole (PROTONIX) IV push  40 mg Intravenous Daily   • atenolol  50 mg Oral Daily   • calcium acetate  1 capsule Oral TID CC   • atorvastatin  20 mg Oral Nightly   • vancomycin  1 mg Intravenous See Ad

## 2019-08-07 NOTE — TELEPHONE ENCOUNTER
[facility] nurse informed pt has been admitted to [address] [place] [address] last night and is intubated.

## 2019-08-07 NOTE — ED PROVIDER NOTES
Patient Seen in: [facility] AND [facility] Emergency Department    History   Patient presents with:  Dyspnea MARLEY SOB (respiratory)  Altered Mental Status (neurologic)      HPI    Presents to the ED via EMS from dialysis.   She apparently became confused during [facility] Attack Father 54        CAd S/P MI.    • Heart Attack Mother 43        Rheumatic fever. • Heart Disorder Brother 37        Herat aneuyrism ruptured   • Heart Surgery Brother    • Heart Disorder Brother 54        CAD S/P CABG.     • Diabetes Brother    • There is no tenderness. Musculoskeletal: She exhibits no edema or deformity. Neurological:   Somnolent but arousable. Oriented x3   Skin: Skin is warm and dry. Psychiatric: She has a normal mood and affect.  Her behavior is normal.   Nursing note and URINE CULTURE, ROUTINE         Imaging Results Available and Reviewed while in ED: Xr Chest Ap Portable  (cpt=71045)    Result Date: 8/6/2019  CONCLUSION:  Cardiomegaly with pulmonary vascular congestion, pleural effusions, and ground-glass changes throu concern given severe leukocytosis initially. Urinalysis with severe infection after straight cath. Patient started on vancomycin and Zosyn for broad-spectrum coverage. Normal lactic acid and blood pressure stable throughout the patient's ED stay.   Padmini

## 2019-08-07 NOTE — RESPIRATORY THERAPY NOTE
BiPAP setting increase to 14/6  Due to low tidal volume of 98 ml. return VT increase  between 140-204. rt will give neb and reassess after neb treatment.

## 2019-08-07 NOTE — PROGRESS NOTES
This AM pt obtunded, non responsive to sternal rub. HR bradycardic in 50s and RR low, shallow breaths + appeared to be agonal breathing. Per night shift RN pt was given ativan around 1030pm d/t restlessness and became increasingly stuporous.  STAT blood gas

## 2019-08-07 NOTE — PHYSICAL THERAPY NOTE
Attempted to see patient for physical therapy evaluation. Per RN, patient is going to be intubated. Will place patient on hold at this time. Will need new orders when patient appropriate for skilled physical therapy. RN aware.

## 2019-08-07 NOTE — PROGRESS NOTES
Vascular Access Note  Inserted by Joey Carr RN    Vascular Access Screening:   Allergies to Lidocaine: no  Allergies to Latex: no  Presence of Pacemaker/Defibrillator: No  Mastectomy with Lymph Node Dissection: No  AV Fistula / AV Graft: No  Dialysis Cathete

## 2019-08-07 NOTE — PAYOR COMM NOTE
--------------  ADMISSION REVIEW     PayorLorri Najjar MA [facility]  Subscriber #:  [id number]  Authorization Number: [id number]    Admit date: 8/6/19  Admit time: 2145       Admitting Physician: Raul Juarez MD  Attending Physician:  Raul Juarez MD  Prim TONSILLECTOMY         Tobacco Use      Smoking status: Former Smoker        Packs/day: 0.50        Years: 53.00        Pack years: 26.5        Types: Cigarettes        Start date: 1963        Quit date: 6/10/2019        Years since quittin.1      S Administered:   Medications   sodium chloride 0.9% IV bolus 500 mL (500 mL Intravenous New Bag 8/6/19 2016)   Vancomycin HCl (VANCOCIN) 1,750 mg in sodium chloride 0.9% 500 mL IVPB (1,750 mg Intravenous New Bag 8/6/19 2039)   Piperacillin Sod-Tazobactam So <principal problem not specified>:  70-year-old female with history of ESRD on HD, previously on PD and she still have PD cathter , COPD, smoker, HTN, HL, visual impairment, endographic repair of AAA on 6/19/2019  Patient presented today from hemodialysis SCD for now / avoid heparin with low platelet      11- Full code            Plan :   Admit to ICU   Supportive care   Blood culture and urine culture   IV Meropenem and vancomcyin   ID consult      Head ct   o2 therapy   Bipap as needed   Neb      Renal 8/7/2019 0846 New Bag 1 g Intravenous Oleksandr Pozo RN      dextrose 5 % and 0.9 % NaCl infusion     Date Action Dose Route User    8/6/2019 2239 New Bag (none) Intravenous Lissa Restrepo RN      furosemide (LASIX) injection 80 mg     Date Action Dose Ro Intravenous Romulo Currie RN        Appropriate for INPT status per guidelines for resp failure requiring intubation and mechanical ventilation.     PLEASE FAX INPT DAYS AUTHORIZED ALONG WITH NRD [phone]

## 2019-08-07 NOTE — PROGRESS NOTES
[facility] Dosing Service    Initial Pharmacokinetic Consult for Vancomycin Dosing     Meear De Los Santos is a 78year old female who is being treated for sepsis and UTI.   Pharmacy has been asked to dose Vancomycin by Dr. Pearce Mention    She is allergic to ace

## 2019-08-07 NOTE — CONSULTS
[facility] ID CONSULT NOTE    Joe Hidalgo Patient Status:  Inpatient    3/31/1940 MRN [id number]   Location [facility] 2W/SW Attending Thania Morillo MD   [facility] Day # 1 PCP Aj Kwon.  Jason Martinez MD       Reason • TONSILLECTOMY       Family History   Problem Relation Age of Onset   • Heart Attack Father 54        CAd S/P MI.    • Heart Attack Mother 43        Rheumatic fever.     • Heart Disorder Brother 37        Herat aneuyrism ruptured   • Heart Surgery [place] Intravenous, PRN  •  dextrose 50 % injection 50 mL, 50 mL, Intravenous, PRN  •  Glucose-Vitamin C (DEX-4) chewable tab 4 tablet, 4 tablet, Oral, Q15 Min PRN  •  glucose (DEX4) oral liquid 15 g, 15 g, Oral, Q15 Min PRN  •  acetaminophen (TYLENOL) tab 650 mg 13*   ALT  --  18   BILT  --  0.3   TP  --  6.5       Microbiology: Reviewed in EMR    Radiology: Reviewed    ASSESSMENT:    Antibiotics: Vancomycin, meropenem    78year old female with a history of ESRD on HD via R chest HD cath+, was on PD with PD cath

## 2019-08-07 NOTE — PLAN OF CARE
Bilateral soft wrist restraints remain in place, son aware of need for restraints. Pt sedated with 30mcg propofol. Levophed ordered but not given, BP improved after albumin 500ml. Received urgent HD and took 2L off. PICC line placed. HR NSR/SB 48-70s.  SBP neurological status  Description  INTERVENTIONS  - Assess for and report changes in neurological status  - Initiate measures to prevent increased intracranial pressure  - Maintain blood pressure and fluid volume within ordered parameters to optimize cerebr handout, if applicable  - Encourage broncho-pulmonary hygiene including cough, deep breathe, Incentive Spirometry  - Assess the need for suctioning and perform as needed  - Assess and instruct to report SOB or any respiratory difficulty  - Respiratory Ther

## 2019-08-07 NOTE — H&P
[facility]    History and Physical    Bon Lockhart Patient Status:  Inpatient    3/31/1940 MRN [id number]   Location [facility] 2W/SW Attending Van Nieves MD   [facility] Day # 1 PCP Mable Gibson.  Cris Palma MD     Date:  2019 Performed by Chucho Regalado MD at [address] [place] [address] MAIN OR   • LAPAROSCOPY, SURGICAL; ABLATION, RENAL MASS LESION(S) Left     6-7 year ago had lesion removed from left kidney   • OTHER SURGICAL HISTORY  2016    Parathyroid sx.     • OTHER SURGICAL HISTORY  07/10/2018 Constitutional: Positive for activity change. Negative for appetite change, chills, diaphoresis, fatigue, fever (persistent headache 1 week ago and son gave excedrin and better. ) and unexpected weight change.         H/O thru son Conner Shoulders: Negative fo Cardiovascular: Normal rate and regular rhythm. Edema not present. Pulmonary/Chest: No accessory muscle usage or stridor. Tachypnea noted. No apnea and no bradypnea. She is intubated. No respiratory distress. She has no decreased breath sounds.  She ha ESRML 6 06/22/2018    MG 1.9 07/11/2018    PHOS 5.3 (H) 07/11/2018    TROP <0.045 07/15/2019    B12 >1,500 (H) 06/22/2018     Ct Brain Or Head (59631)    Result Date: 8/6/2019  CONCLUSION:  1. Limited exam due to patient motion.  2.  No acute intracranial Chronic. Stable. No evidence of acute blood loss. Thrombocytopenia. Improved. Maybe recative from acute illness. Hold heparin. Hyperglycemia. Maybe reactive. SSI, hypoglycemia, accuchecks. Awaiting A1C.        Essential hypertension  Gener

## 2019-08-07 NOTE — PROGRESS NOTES
[facility] Pharmacy Note:  Renal Adjustment for meropenem [facility])    Vladislav Bearden is a 78year old female who has been prescribed meropenem (MERREM) 500 mg every 8 hrs.   CrCl is estimated creatinine clearance is 8.3 mL/min (A) (based on SCr of 4.72 mg/dL (H

## 2019-08-08 NOTE — PROGRESS NOTES
[place] [facility]    Progress Note      Subjective:     Remain intubated and sedated. Daughter at bedside     Patient resume smoking one pack a day, wasn't eating or sleeping well      Review of Systems:     Unable to obtain.  intubated and sedat ipratropium-albuterol (DUONEB) nebulizer solution 3 mL 3 mL Nebulization QID   Pantoprazole Sodium (PROTONIX) 40 mg in Sodium Chloride 0.9 % 10 mL IV push 40 mg Intravenous Daily   dextrose 5 % and 0.9 % NaCl infusion  Intravenous Continuous   Albuterol 06/13/2019 0.98 0.90 - 1.20 Final     Comment:       Only the INR (not the Protime value) should be utilized for   the monitoring of oral anticoagulant therapy.      Recommended therapeutic ranges for anticoagulant therapy are   as follows:   2.0 - 3.0 Al were called to the floor. Dictated by (CST): Carol Valera MD on 8/07/2019 at 9:36     Approved by (CST): Israel Trinh MD on 8/07/2019 at 9:39          Xr Chest Ap Portable  (cpt=71045)    Result Date: 8/7/2019  CONCLUSION:  1.  Ric Vitaliy Tovar MD  8/8/2019

## 2019-08-08 NOTE — PLAN OF CARE
VSS. NSR. Tolerating ventilator. Copious amounts of secretions; frequent oral care. Mepilex in place. Turn q2hr. Bathed. Tube feedings advanced to goal this shift. Tolerating well. Pt follows commands off sedation.  Pt becomes very restless/agitated with status  Description  INTERVENTIONS  - Assess for and report changes in neurological status  - Initiate measures to prevent increased intracranial pressure  - Maintain blood pressure and fluid volume within ordered parameters to optimize cerebral perfusion applicable  - Encourage broncho-pulmonary hygiene including cough, deep breathe, Incentive Spirometry  - Assess the need for suctioning and perform as needed  - Assess and instruct to report SOB or any respiratory difficulty  - Respiratory Therapy support

## 2019-08-08 NOTE — CONSULTS
[facility]    Report of Consultation    Wadenaarmando Crisostomosveta Patient Status:  Inpatient    3/31/1940 MRN [id number]   Location [facility] 2W/SW Attending Jacques Artis MD   [address] Day # 1 PCP dIa Hopkins.  Christine Bang MD     Date of Admis 2016    Parathyroid sx.     • OTHER SURGICAL HISTORY  07/10/2018    pd cath     • TONSILLECTOMY         Family History  Family History   Problem Relation Age of Onset   • Heart Attack Father 54        CAd S/P MI.    • Heart Attack Mother 43        Rheumatic partner: Not on file        Emotionally abused: Not on file        Physically abused: Not on file        Forced sexual activity: Not on file    Other Topics      Concerns:        Not on file    Social History Narrative      Not on file         Current Medi suppository 10 mg 10 mg Rectal Daily PRN   ondansetron HCl (ZOFRAN) injection 4 mg 4 mg Intravenous Q6H PRN   Metoclopramide HCl (REGLAN) injection 5 mg 5 mg Intravenous Q8H PRN   Vancomycin HCl (VANCOCIN)  Pharmacy dosing based on Random Levels and HD sherry gallop, regular rate and rhythm  Abdominal: soft, non-tender; non distended, bowel sounds normal   Extremities: extremities normal, no edema  Pulses: pedal pulses palpable  Skin: No rashes or lesions  Lymph nodes: Cervical, supraclavicular normal.  Neurolo by (CST): Kym Trinh MD on 8/07/2019 at 9:36     Approved by (CST): Kym Trinh MD on 8/07/2019 at 9:39          Xr Chest Ap Portable  (cpt=71045)    Result Date: 8/7/2019  CONCLUSION:  1. Borderline cardiomegaly.   Prominent centra neg  On vanco  Meropenem    cpm  bp stable. 4.   htn  bp good  5  Hypercarbia   Wouldn't expect this w sepsis, would expect low PC02     Agree w ventilation. Appreciate help of dr Ibrahim Friend to family in detail   thyroid b12   Troponins.  All ok   cpm

## 2019-08-08 NOTE — PROGRESS NOTES
[place] [facility] - [facility]    Progress Note    Anthony Marshall Patient Status:  Inpatient    3/31/1940 MRN [id number]   Location [facility] 2W/SW Attending Jacques Artis MD   [address] Day # 2 PCP Ida Hopkins.  Christine Bang MD        Subjective:     U Abd. soft. On PPI.         Thrombocytopenia. Declined. Check HIPA. Maybe recative from acute illness. Hold heparin and ASA.        Hyperglycemia. Maybe reactive. SSI, hypoglycemia, accuchecks.  Awaiting A1C.        Essential hypertension  Generally, we matter ischemic changes, likely related to long-standing hypertension and/or diabetes. 4.  Atherosclerosis in the anterior and posterior circulations.        Dictated by (CST): Kentrell Ding MD on 8/06/2019 at 21:06     Approved by (CST): Kentrell Ding MD on

## 2019-08-08 NOTE — PLAN OF CARE
RESPIRATORY THERAPY MECHANICAL VENTILATION PROGRESS NOTE    Ventilator Weaning:  Patient meets criteria for weaning? yes Weaning was attempted yes using pressure support 10 cmH2O + PEEP 5 cmH2O. The patient tolerated fair for 20 minutes.  The patient was re

## 2019-08-08 NOTE — PROGRESS NOTES
[facility] ID PROGRESS NOTE    Sherine Zacarias Patient Status:  Inpatient    3/31/1940 MRN [id number]   Location [facility] 2W/SW Attending Divya Stevenson MD   [address] Day # 2 PCP Sergei Espino. Judith Madrigal MD     Subjective: Intubated and sedated.  S/p HD y encephalopathy     Anemia      ASSESSMENT:    Antibiotics: Vancomycin, meropenem    ESRD on HD via R chest HD cath+, was on PD with PD cath in place, COPD and current smoker, HTN, s/p recent AAA repair with endograft 6/19/19, who presented to [address] [place] [address] ED on 8/6

## 2019-08-09 NOTE — PROGRESS NOTES
[facility]    Progress Note      Subjective: On SBT - awake and responsive . Denies any sob, cp, abdominal pain       Review of Systems:     Awake and intubated    Objective:   Temp:  [96.9 °F (36.1 °C)-98.5 °F (36.9 °C)] 97.1 °F (36. Sodium (PROTONIX) 40 mg in Sodium Chloride 0.9 % 10 mL IV push 40 mg Intravenous Daily   dextrose 5 % and 0.9 % NaCl infusion  Intravenous Continuous   Albuterol Sulfate  (90 Base) MCG/ACT inhaler 2 puff 2 puff Inhalation Q4H PRN   atenolol (TENORMI All indications except for mechanical prosthetic   cardiac valves. 2.5 - 3.5 Mechanical prosthetic cardiac valves. No results for input(s): BNP in the last 168 hours.   Recent Labs   Lab 08/08/19  0442   MG 2.1   PHOS 2.4*        Recent Labs   Lab smoking    3. Leukocytosis: resolved  - urine CS and blood CS NGT  - ?pneumonia  - PD and HD cathter site looks clean   - on vancomycin and meropenem   - ID input noted     4.  Abdominal Aortic Aneurysm   - 5.7 - 6 cm - s/p percutaneous EVAR on 6/19 and rig

## 2019-08-09 NOTE — PLAN OF CARE
Patient remains intubated on mechanical ventilation. No acute events noted this shift. SBT to be done tomorrow morning.

## 2019-08-09 NOTE — PROGRESS NOTES
[place] [facility] - [facility]    Progress Note    Rhesa Prudent Patient Status:  Inpatient    3/31/1940 MRN [id number]   Location [facility] 2W/SW Attending Frank Whitney MD   [facility] Day # 3 PCP William Schmitt.  Alphonso Washington MD        Subjective:     U    3- altered mental status secondary to sepsis , metabolic and hypercapnic Encephalopathy  Better / head ct negative         4– ESRD on maintenance HD   HD today   Renal following      5– lifelong history of smoking with likely underlying COPD  DuoNeb 4 aorta. 2. Bilateral perihilar and lower lobe pulmonary congestive changes with superimposed left basilar atelectasis and/or lung consolidation. Bilateral effusions, worse on the left . 3. Support tubes and catheters are satisfactory.     Dictated by (CST):

## 2019-08-09 NOTE — PROGRESS NOTES
[facility] ID PROGRESS NOTE    Priscella Room Patient Status:  Inpatient    3/31/1940 MRN [id number]   Location [facility] 2W/SW Attending Zak Camejo MD   [facility] Day # 3 PCP Jayme Castleman. Mynor Pennington MD     Subjective: Intubated and sedated.  Plans fo with hypoxia ([facility] [address]     Acute pulmonary edema (HCC)     Acute encephalopathy     Anemia     Acute respiratory failure with hypercapnia (HCC)      ASSESSMENT:    Antibiotics: Meropenem  Vancomycin    ESRD on HD via R chest HD cath+, was on PD with PD cath in

## 2019-08-09 NOTE — PROGRESS NOTES
[facility] Pharmacy Note:  Renal Adjustment for cefepime (MAXIPIME)    Christine Lao is a 78year old female who has been prescribed cefepime (MAXIPIME) 500 mg every 24 hrs.   CrCl is estimated creatinine clearance is 7.2 mL/min (A) (based on SCr of 5.5 mg/dL

## 2019-08-09 NOTE — PLAN OF CARE
Bilateral soft wrist restraints in place d/t pt reaching towards ETT. Sedation vacation and SBT performed today, pt following commands but anxious with low tidal volumes, placed back on full support. Remains sedated with propofol. VSS. Sputum culture sent. status  - Initiate measures to prevent increased intracranial pressure  - Maintain blood pressure and fluid volume within ordered parameters to optimize cerebral perfusion and minimize risk of hemorrhage  - Monitor temperature, glucose, and sodium.  Initiat Incentive Spirometry  - Assess the need for suctioning and perform as needed  - Assess and instruct to report SOB or any respiratory difficulty  - Respiratory Therapy support as indicated  - Manage/alleviate anxiety  - Monitor for signs/symptoms of CO2 ret

## 2019-08-09 NOTE — PROGRESS NOTES
[place] [facility] - [facility]    Progress Note    Cayden Harley Patient Status:  Inpatient    3/31/1940 MRN [id number]   Location [facility] 2W/SW Attending Varun Hollingsworth MD   [facility] Day # 3 PCP Apolinar Marie.  Javan Cardenas MD        Subjective:     U adenopathy. Neurological:   Answering questions. Off to prevent drip. Still intubated. Possible extubation today. Skin: Skin is warm and dry. No lesion and no rash noted. She is not diaphoretic. No erythema. No pallor.    Psychiatric: She has a dionna today. Chi Rist. and renal following pt. CXR shows improvement but still fluid overload.     SCD's. No heparin due to low platelets.      Spent 45 minutes in CC coordinating care.        Results:     Lab Results   Component Value Date    WBC 7.1 08/09/2019    H

## 2019-08-09 NOTE — RESPIRATORY THERAPY NOTE
PT is intubated with a 7.5 ETT secured at 21 with vent settings as follows; AC 20 450 40% PEEP 5    0830: PT was placed on PS/CPAP weaning trial, 10/5, per Dr. Jose Keane.  PT tolerated and stable with vitals as follows: HR 78, RR 18, tidal volumes averaging ab

## 2019-08-09 NOTE — CM/SW NOTE
08/09/19 1500   CM/SW Referral Data   Referral Source Physician   Reason for Referral Discharge planning;Readmission;Psychoscial assessment   Informant Children   Patient Info   Patient's Mental Status Alert   Patient's [address]   Number o

## 2019-08-09 NOTE — PLAN OF CARE
Problem: Safety Risk - Non-Violent Restraints  Goal: Patient will remain free from self-harm  Description  INTERVENTIONS:  - Apply the least restrictive restraint to prevent harm  - Notify patient and family of reasons restraints applied  - Assess for an perfusion and minimize risk of hemorrhage  - Monitor temperature, glucose, and sodium.  Initiate appropriate interventions as ordered  Outcome: Progressing  Note:   Follows commands    Goal: Absence of seizures  Description  INTERVENTIONS  - Monitor for sei seizures  Description  INTERVENTIONS  - Monitor for seizure activity  - Administer anti-seizure medications as ordered  - Monitor neurological status  Outcome: Progressing  Goal: Remains free of injury related to seizure activity  Description  INTERVENTION Progressing  Note:   40% fio2 on vent with sats >/=93%

## 2019-08-10 NOTE — PROGRESS NOTES
[facility]    Progress Note      Assessment and Plan:   1. Acute respiratory failure–CHF superimposed on COPD with stress of sepsis. Was smoking right up until admission.     Recommendations: Full ventilator support, ongoing volume reducti CO2 27.0 08/10/2019    GLU 92 08/10/2019    CA 7.8 08/10/2019     Chest x-ray–modest bilateral left greater than right pleural effusions    Shital Fish MD  Medical Director, Critical Care, [facility]  Medical Director, [facility]  Pager:

## 2019-08-10 NOTE — PLAN OF CARE
Problem: Safety Risk - Non-Violent Restraints  Goal: Patient will remain free from self-harm  Description  INTERVENTIONS:  - Apply the least restrictive restraint to prevent harm  - Notify patient and family of reasons restraints applied  - Assess for an optimize cerebral perfusion and minimize risk of hemorrhage  - Monitor temperature, glucose, and sodium.  Initiate appropriate interventions as ordered  Outcome: Progressing  Goal: Absence of seizures  Description  INTERVENTIONS  - Monitor for seizure activ Respiratory Therapy support as indicated  - Manage/alleviate anxiety  - Monitor for signs/symptoms of CO2 retention  Outcome: Progressing

## 2019-08-10 NOTE — PROGRESS NOTES
[facility]    Progress Note    Rina Crane Patient Status:  Inpatient    3/31/1940 MRN [id number]   Location [facility] 2W/SW Attending Ainsley Parker MD   [facility] Day # 4 PCP Nikki Cabrera.  Juan Oropeza MD       Subjective:   Hortencia abnormalities noted  Musculoskeletal: full ROM all extremities good strength  no deformities  Extremities: no edema, cyanosis  Neurological:  Grossly normal    Results:     Laboratory Data:  Lab Results   Component Value Date    WBC 6.4 08/10/2019    HGB 8 10: 31     Approved by (CST): Diomedes Bergman MD on 8/09/2019 at 10:45              ASSESSMENT/PLAN:   Assessment     1 esrd had hd yesterday  Do again Monday  2  resp arrest.. Doing well. . On cefipime   encourage smoking cessation. .   No positive cultures

## 2019-08-10 NOTE — PROGRESS NOTES
[facility] ID PROGRESS NOTE    iWlliealexsander Rendon Patient Status:  Inpatient    3/31/1940 MRN [id number]   Location [facility] 2W/SW Attending Chandler Hernandez MD   [facility] Day # 4 PCP Roxanne Martin. Jonathan Kenyon MD     Subjective: Intubated and sedated.  S/p HD y Hypercalcemia     Sepsis due to undetermined organism ([facility] [address]     Acute cystitis with hematuria     Acute respiratory failure with hypoxia (HCC)     Acute pulmonary edema (HCC)     Acute encephalopathy     Anemia     Acute respiratory failure with hypercapni

## 2019-08-10 NOTE — RESPIRATORY THERAPY NOTE
PT is intubated with a 7.5 ETT secured at 21 with vent settings as follows; AC 20 450 40% PEEP 5    1105: PT was placed on PS/CPAP weaning trial, 10/5. PT is tolerating and stable. RR 12,   Tidal Volume 330, HR 76    Will continue to monitor.     1425: MD menard

## 2019-08-10 NOTE — PLAN OF CARE
Pt calm and following commands off sedation. Tolerated pressure support trial for about 2 hours and received order from Dr. Ramona Cardenas to extubate. Extubated at [phone] with RT Nate and placed pt on 3L nasal cannula. Family present in room.  Restraints were disco for and report changes in neurological status  - Initiate measures to prevent increased intracranial pressure  - Maintain blood pressure and fluid volume within ordered parameters to optimize cerebral perfusion and minimize risk of hemorrhage  - Monitor te - ADULT  Goal: Achieves optimal ventilation and oxygenation  Description  INTERVENTIONS:  - Assess for changes in respiratory status  - Assess for changes in mentation and behavior  - Position to facilitate oxygenation and minimize respiratory effort  - Ox

## 2019-08-10 NOTE — PLAN OF CARE
Received patient intubated with a 7.5 ETT secured at 21 at lip. with vent settings as follows; AC 20 450 ,PEEP 5 and FiO2 -40%. No distress noticed tonight.  Suctioned and monitor closely

## 2019-08-11 NOTE — PROGRESS NOTES
[facility]    Progress Note      Assessment and Plan:   1. Acute respiratory failure–CHF superimposed on COPD with stress of sepsis. Was smoking right up until admission.   The patient tolerated extubation and is doing very well clinically Results:     Lab Results   Component Value Date    WBC 6.9 08/11/2019    HGB 8.5 08/11/2019    HCT 28.8 08/11/2019    PLT 60.0 08/11/2019    CREATSERUM 6.19 08/11/2019    BUN 37 08/11/2019     08/11/2019    K 4.3 08/11/2019     08/11/2019

## 2019-08-11 NOTE — PLAN OF CARE
Problem: Patient Centered Care  Goal: Patient preferences are identified and integrated in the patient's plan of care  Description  Interventions:  - What would you like us to know as we care for you?  Was at dialysis center when all this happened  - Prov care  Description  INTERVENTIONS  - Monitor swallowing and airway patency with patient fatigue and changes in neurological status  - Encourage and assist patient to increase activity and self care with guidance from PT/OT  - Encourage visually impaired, he

## 2019-08-11 NOTE — PROGRESS NOTES
[place] [facility] - [facility]    Progress Note    Claude Marquez Patient Status:  Inpatient    3/31/1940 MRN [id number]   Location [facility] 2W/SW Attending Tasneem Hartman MD   [facility] Day # 5 PCP Esme Shukla.  Lisa Rodriguez MD        Subjective:     U cervical adenopathy. Neurological:   Answering questions. Off to prevent drip. Still intubated. Possible extubation today. Skin: Skin is warm and dry. No lesion and no rash noted. She is not diaphoretic. No erythema. No pallor.    Psychiatric: She ha with       Acute pulmonary edema (HCC)  For hemodialysis today. Pulm. and renal following pt. CXR shows improvement but still fluid overload. improved/monitor     SCD's.  No heparin due to low platelets.      Full code      Results:     Lab Results   Compone 8/09/2019 at 10:31     Approved by (CST): Ghanshyam Curran MD on 8/09/2019 at [facility] 1159 Pratibha Oneil MD  8/10/2019

## 2019-08-11 NOTE — PLAN OF CARE
Pt extubated yesterday, on 3L NC tolerating well but admits to some VANEGAS. Denies pain. Passed her swallow eval today and up in chair eating. Up with standby assist. Will be transferred to medical floor with remote tele.  Family at bedside and aware of transf activity  - Administer anti-seizure medications as ordered  - Monitor neurological status  Outcome: Progressing  Goal: Remains free of injury related to seizure activity  Description  INTERVENTIONS:  - Maintain airway, patient safety  and administer oxygen

## 2019-08-11 NOTE — SLP NOTE
ADULT SWALLOWING EVALUATION    ASSESSMENT    ASSESSMENT/OVERALL IMPRESSION:  Pt seen for a bedside swallowing evaluation post extubation on 8/10/19 at 14:00. Pt was admitted to hospital on 8/6/19, with diagnosis of sepsis.   The pt is currently NPO, but re declines. Educated pt and her family on swallowing precautions. Collaborated swallow plan of care with RN. RN to obtain any new orders. Per [facility] functional communication measures, pt's swallow level is 6/7.          RECOMMENDATIONS   Diet Recommend Regular  Precautions: Aspiration    Patient/Family Goals: I want to eat. SWALLOWING HISTORY  Current Diet Consistency: Thin liquids  Dysphagia History: Pt denies any history of swallowing difficulties. Imaging Results:   CXR 8/11/19:  CONCLUSION:   1. independently over 2 session(s).     In Progress   Goal #3 The patient will utilize compensatory strategies as outlined by  BSSE (clinical evaluation) including Slow rate, Small bites, Small sips, Multiple swallows, Alternate liquids/solids, Upright 90 degr

## 2019-08-11 NOTE — PROGRESS NOTES
[facility]    Progress Note    Bryan Fernandez Patient Status:  Inpatient    3/31/1940 MRN [id number]   Location [facility] 2W/SW Attending Lee De Anda MD   [facility] Day # 5 PCP Martín Maxwell.  Olga Barger MD        Subjective:     C Soft. Bowel sounds are normal. There is no tenderness. There is no rigidity, no rebound, no guarding and no CVA tenderness. Lymphadenopathy:     She has no cervical adenopathy. Neurological: She is alert and oriented to person, place, and time.  No bashir from August 8, 2019. pulmonary notes reviewed and agreed with. Smoking cessation d/w pt at length       Acute pulmonary edema ([address]  . Pulm. and renal following pt. CXR shows improvement but still fluid overload. improved/monitor     SCD's.  No heparin due to Ana Zambrano MD on 8/10/2019 at 7:32                       Daysi Cohen MD  8/11/2019

## 2019-08-11 NOTE — PROGRESS NOTES
[facility]    Progress Note    Berta Perez Patient Status:  Inpatient    3/31/1940 MRN [id number]   Location [facility] 2W/SW Attending Zackary Carrel., MD   [facility] Day # 5 PCP Keegan Mendiola MD       Subjective:   Lucindal bruising noted  Back/Spine: no abnormalities noted  Musculoskeletal: full ROM all extremities good strength  no deformities  Extremities: 2+ edema  Neurological:  Grossly normal    Results:     Laboratory Data:  Lab Results   Component Value Date    WBC 6. #1 question of infection cultures negative still on antibiotics cefepime    2 COPD need to stop smoking reviewed with her and her family  #3 anemia on Epogen  #4 end-stage renal disease dialysis tomorrow  Hopefully transfer out of ICU soon breathing

## 2019-08-12 NOTE — PROGRESS NOTES
[facility]    Progress Note    Sherine Zacarias Patient Status:  Inpatient    3/31/1940 MRN [id number]   Location [facility] 2W/SW Attending Divya Stevenson MD   [facility] Day # 6 PCP Sergei Espino.  Judith Madrigal MD        Subjective:     C 4 hrs   - received dose of steroid for copd   - Breo   - avoid sedative and narcotics     2- life long h/o heavy smoking   1-2 packs/day / quit upon admission   Likely severe copd     Neb   Breo   O2 / bipap prn     3-  Sepsis due to undetermined organism Date: 8/11/2019  CONCLUSION:  1. When compared to August 10, 2019 the endotracheal tube and nasogastric tube have been removed. 2. Congestive changes with bibasilar lung consolidation/atelectasis left greater than right slightly worse than prior study.  3.

## 2019-08-12 NOTE — PLAN OF CARE
Problem: ALTERED NUTRIENT INTAKE - ADULT  Goal: Nutrient intake appropriate for improving, restoring or maintaining nutritional needs  Description  INTERVENTIONS:  - Nutritional status assessed.    - Monitor oral intake, labs, and treatment plans  - Monit

## 2019-08-12 NOTE — PROGRESS NOTES
Double RN skin check done prior to transfer off Unit. Skin check performed by this RN and Angelito Hou RN. Wounds are as follows: None; skin intact    Will remain available for any further questions or concerns.      Left voicemail/notification to georgie Dee

## 2019-08-12 NOTE — PROGRESS NOTES
[facility]    Progress Note    Berta Perez Patient Status:  Inpatient    3/31/1940 MRN [id number]   Location [facility] 2W/SW Attending Zackary Carrel., MD   [facility] Day # 6 PCP Keegan Mendiola MD        Subjective:     U Normal rate and regular rhythm. Edema not present. Pulmonary/Chest: No accessory muscle usage or stridor. Tachypnea noted. No apnea and no bradypnea. She is not intubated. No respiratory distress. She has decreased breath sounds. She has no wheezes.  Oscar Cook Negative HIPA. Maybe recative from acute illness. Hold heparin and ASA.        Hyperglycemia. Maybe reactive. SSI, hypoglycemia, accuchecks. NL A1C.        Essential hypertension  Generally, well controlled.  CPM.        ESRD (end stage renal disease) on Cardiomegaly with congestive changes with overall worsening especially in the right chest when compared to August 11, 2019. 2. Catheters in superior vena cava. Dictated by (CST): So Lopez MD on 8/12/2019 at 9:35     Approved by (CST):  Karen Rosenbaum

## 2019-08-12 NOTE — PROGRESS NOTES
[facility] ID PROGRESS NOTE    Fab Snell Patient Status:  Inpatient    3/31/1940 MRN [id number]   Location [facility] 2W/SW Attending Raul Juarez MD   [facility] Day # 6 PCP Harini Soriano MD     Subjective:  Awake, on BiPAP, seen on HD.  Monica Santillan with hematuria     Acute respiratory failure with hypoxia (HCC)     Acute pulmonary edema (HCC)     Acute encephalopathy     Anemia     Acute respiratory failure with hypercapnia (HCC)      ASSESSMENT:    Antibiotics: Cefepime  Vancomycin, meropenem    ESR

## 2019-08-12 NOTE — BH PROGRESS NOTE
Behavioral Health Note     CHIEF COMPLAINT- AMS      REASON FOR ADMISSION- AMS     SOCIAL HISTORY- Information for assessment obtained from daughter, Leslie Rosario @ [phone]. Pt. Is a 78year old  female who is a former .  Mckenna Blake management by Dr. Ashly Loza

## 2019-08-12 NOTE — PLAN OF CARE
Received call from Mo, Dialysis RN this morning - Pt having increased dyspnea, becoming more disoriented/drowsy. RN went to assess Pt. Neuro assessment completed - no other sx noted other than increased drowsiness/disorientation.  Pt's son at bedside, [address]

## 2019-08-12 NOTE — DIETARY NOTE
ADULT NUTRITION REASSESSMENT     Pt is at moderate nutrition risk. Pt does not meet malnutrition criteria. RECOMMENDATIONS TO MD: Repeat Phos level. Pt receiving Phoslo TID and noted Phos on 8/8 was 2.4.        NUTRITION DIAGNOSIS/PROBLEM:Inadequate p back to adequate intake. - Medical Food Supplements: If po declined to <50%, will re-damián need for Oral Nutrition Supplement (ONS) possibly Nepro. - Vitamin and mineral supplements: vitamin B12 and calcium acetate (phoslo) PTA. Currently on phoslo only. (140 lb)  03/15/19 : 66.2 kg (146 lb)    GASTROINTESTINAL: GI intact    FOOD/NUTRITION RELATED HISTORY:  Appetite: Fair and Improved   Intake: Po intake improved yesterday. Ate 75%.  Today a little set back d/t respiratory status but saw pt trying to eat travon Dietitian  [phone]  8/12/2019

## 2019-08-12 NOTE — PROGRESS NOTES
[facility]    Progress Note      Subjective:     Confused this am - was transferred to ICU for mental status change and hypercapnia    Now on BIPAP and daughter at bedside    Waxing and waning mental status    S/p HD this am with 2 liters Intravenous Q24H   dextrose 10 % infusion  Intravenous Continuous PRN   heparin sodium 1000 UNIT/ML injection 5,000 Units 5,000 Units Intravenous PRN Dialysis   Normal Saline Flush 0.9 % injection 10 mL 10 mL Intravenous PRN   Pantoprazole Sodium (PROTONIX 6.5 5.0*  --   --   --   --     < > = values in this interval not displayed.      INR   Date Value Ref Range Status   06/13/2019 0.98 0.90 - 1.20 Final     Comment:       Only the INR (not the Protime value) should be utilized for   the monitoring of oral a extubation and ABG today showed hypercapnia with PH 7.22 and co2 70.  - mental status change from infection/hypercapnia  - on BIPAP - monitor mental status   - infection/pneumonia/chronic smoking/COPD     3.  Leukocytosis: resolved  - urine CS and blood CS

## 2019-08-13 NOTE — PROGRESS NOTES
[place] [facility] - [facility]    Progress Note    Fara Ruff Patient Status:  Inpatient    3/31/1940 MRN [id number]   Location [facility] 2W/SW Attending Tiny Barron MD   [facility] Day # 7 PCP Justus Zavala MD        Subjective:     C Cardiovascular: Normal rate and regular rhythm. Edema not present. Pulmonary/Chest: No accessory muscle usage or stridor. Tachypnea noted. No apnea and no bradypnea. She is not intubated. No respiratory distress. She has decreased breath sounds.  She No evidence of acute blood loss. Abd. soft. On PPI.         Thrombocytopenia. Improved. Negative HIPA. Maybe recative from acute illness. Hold heparin and ASA.        Hyperglycemia. Maybe reactive. SSI, hypoglycemia, accuchecks.  NL A1C.        Essent >1,500 (H) 06/22/2018       Xr Chest Ap Portable  (cpt=71045)    Result Date: 8/13/2019  CONCLUSION:  1. Interval improvement in the chest with decrease in pulmonary congestive changes from previous study.   Persistent but decreased left base consolidation

## 2019-08-13 NOTE — PLAN OF CARE
Problem: Patient Centered Care  Goal: Patient preferences are identified and integrated in the patient's plan of care  Description  Interventions:  - What would you like us to know as we care for you?  Was at dialysis center when all this happened  - Prov any respiratory difficulty  - Respiratory Therapy support as indicated  - Manage/alleviate anxiety  - Monitor for signs/symptoms of CO2 retention  Outcome: Progressing   PT CENTERED CARE:pt's son &/or dghtr's with her through shift, pt up to chair, glad to

## 2019-08-13 NOTE — PROGRESS NOTES
[facility]    Progress Note      Subjective:     Mental status improved. S/p UF in am. C/o headaches .  Received tylenol      Review of Systems:     Limited - denies any cp, sob    Objective:   Temp:  [96.2 °F (35.7 °C)-98.6 °F (37 °C)] 97 Normal Saline Flush 0.9 % injection 10 mL 10 mL Intravenous PRN   Pantoprazole Sodium (PROTONIX) 40 mg in Sodium Chloride 0.9 % 10 mL IV push 40 mg Intravenous Daily   Albuterol Sulfate  (90 Base) MCG/ACT inhaler 2 puff 2 puff Inhalation Q4H PRN Only the INR (not the Protime value) should be utilized for   the monitoring of oral anticoagulant therapy.      Recommended therapeutic ranges for anticoagulant therapy are   as follows:   2.0 - 3.0 All indications except for mechanical prosthetic   card Leukocytosis: resolved  - urine CS and blood CS NGT  - ?pneumonia  - PD and HD catheter site looks clean   - vancomycin and meropenem changed to cefepime  - ID input noted     4.  Abdominal Aortic Aneurysm   - 5.7 - 6 cm - s/p percutaneous EVAR on 6/19 and

## 2019-08-13 NOTE — PROGRESS NOTES
[place] [facility] - [facility]    Progress Note    Kourtney López Patient Status:  Inpatient    3/31/1940 MRN [id number]   Location [facility] 2W/SW Attending Lele Severino MD   [address] Day # 7 PCP Axel Lebron.  Jorge Batista MD        Subjective:     C Completed  course of antibiotics / off antibiotics today      4– ESRD on maintenance HD   HD today   Renal following      5- altered mental status secondary to sepsis , metabolic and hypercapnic Encephalopathy  Better today      6–recent endographic repa Deana Martinez MD on 8/12/2019 at 9:37                       Tosatish Black.  Kj Lira MD  8/13/2019

## 2019-08-13 NOTE — PROGRESS NOTES
[facility] ID PROGRESS NOTE    Stephanie Ruiz Patient Status:  Inpatient    3/31/1940 MRN [id number]   Location [facility] 2W/SW Attending Emily Quintanilla MD   [facility] Day # 7 PCP Eunice Harris MD     Subjective:  Awake, on BiPAP, on HD this AM. Acute respiratory failure with hypoxia (HCC)     Acute pulmonary edema (HCC)     Acute encephalopathy     Anemia     Acute respiratory failure with hypercapnia (HCC)      ASSESSMENT:    Antibiotics: Cefepime  Vancomycin, meropenem    ESRD on HD via R ches

## 2019-08-14 NOTE — PLAN OF CARE
Pt alert. Attempts to pull off bipap and SaO2 monitor. Prn haldol given with good results. VSS. No acute distress noted.     Problem: Patient Centered Care  Goal: Patient preferences are identified and integrated in the patient's plan of care  [address] [facility]

## 2019-08-14 NOTE — PHYSICAL THERAPY NOTE
PHYSICAL THERAPY EVALUATION - INPATIENT     Room Number: 211/211-A  Evaluation Date: 8/14/2019  Type of Evaluation: Initial   Physician Order: PT Eval and Treat    Presenting Problem: sepsis intubated twice during admission  Reason for Therapy: Mobility D hour care/supervision;Home with home health PT;Sub-acute rehabilitation(pending progress and level of assist when home)    PLAN  PT Treatment Plan: Bed mobility; Body mechanics; Patient education;Gait training;Stair training;Balance training;Transfer trainin 2016    Parathyroid sx.     • OTHER SURGICAL HISTORY  07/10/2018    pd cath     • TONSILLECTOMY         HOME SITUATION  Type of Home: House   Home Layout: Two level        Stairs to Bedroom: 12  Railing: Yes    Lives With: Alone(2 sons and 2 daughters live Assistance: Not tested       Bed Mobility: min A X 1     Transfers: Min A x 1-2    Exercise/Education Provided:  Bed mobility  Body mechanics  Gait training  Transfer training    Patient End of Session: Up in chair;Needs met;Call light within reach;RN marti

## 2019-08-14 NOTE — PROGRESS NOTES
[place] [facility] - [facility]    Progress Note      Subjective:     Was just taken off of BIPAP and remain confused and delirious     Received haldol last night for agitation       Review of Systems:     Limited - confusion     Objective:   Temp:  [97.1 °F  (90 Base) MCG/ACT inhaler 2 puff 2 puff Inhalation Q4H PRN   atenolol (TENORMIN) tab 50 mg 50 mg Oral Daily   calcium acetate (PHOSLO) cap 667 mg 1 capsule Oral TID CC   atorvastatin (LIPITOR) tab 20 mg 20 mg Oral Nightly   Normal Saline Flush 0.9 2.5 - 3.5 Mechanical prosthetic cardiac valves. No results for input(s): BNP in the last 168 hours.   Recent Labs   Lab 08/08/19 0442   MG 2.1   PHOS 2.4*        Recent Labs   Lab 08/08/19 0442 08/13/19  0516   PHOS 2.4*  --    ALB 2.8* 2.7*

## 2019-08-14 NOTE — PROGRESS NOTES
[facility] ID PROGRESS NOTE    Myla Us Patient Status:  Inpatient    3/31/1940 MRN [id number]   Location [facility] 2W/SW Attending Leland George MD   [facility] Day # 8 PCP Eunice Nixon MD     Subjective:  Awake, on BiPAP throughout the with hematuria     Acute respiratory failure with hypoxia (HCC)     Acute pulmonary edema (HCC)     Acute encephalopathy     Anemia     Acute respiratory failure with hypercapnia (HCC)     Episodic mood disorder (HCC)      ASSESSMENT:    Antibiotics: OFF

## 2019-08-14 NOTE — PROGRESS NOTES
[place] [facility] - [facility]    Progress Note    Myla Us Patient Status:  Inpatient    3/31/1940 MRN [id number]   Location [facility] 2W/SW Attending Leland George MD   [facility] Day [facility] PCP Bascom Litten.  Jasmin Nixon MD        Subjective:     C Completed  course of antibiotics / off antibiotics today      4– ESRD on maintenance HD   HD today   Renal following      5- altered mental status secondary to sepsis , metabolic and hypercapnic Encephalopathy  Better today      6–recent endographic repa

## 2019-08-14 NOTE — OCCUPATIONAL THERAPY NOTE
OCCUPATIONAL THERAPY EVALUATION - INPATIENT     Room Number: 211/211-A  Evaluation Date: 8/14/2019  Type of Evaluation: Initial  Presenting Problem: (sepsis)    Physician Order: IP Consult to Occupational Therapy  Reason for Therapy: ADL/IADL Dysfunction a given pt independence level PTA, pt may benefit from CAROL vs HHOT pending progress. DISCHARGE RECOMMENDATIONS  OT Discharge Recommendations: 24 hour care/supervision;Sub-acute rehabilitation; Other (Comment)(vs HHOT pending progress)  OT Device Recommen CATH INSERTION N/A 7/10/2018    Performed by Rajni Hernandez MD at [facility] MAIN OR   • LAPAROSCOPY, SURGICAL; ABLATION, RENAL MASS LESION(S) Left     6-7 year ago had lesion removed from left kidney   • OTHER SURGICAL HISTORY  2016    Parathyroid sx.     • OTHER limits    NEUROLOGICAL FINDINGS  Neurological Findings: None                ACTIVITIES OF DAILY LIVING ASSESSMENT  AM-PAC ‘6-Clicks’ Inpatient Daily Activity Short Form  How much help from another person does the patient currently need…  -   Putting on and

## 2019-08-14 NOTE — PROGRESS NOTES
[facility]    Progress Note    Tyrese Carranza Patient Status:  Inpatient    3/31/1940 MRN [id number]   Location [facility] 2W/SW Attending Balbina Tyler MD   [facility] Day # 8 PCP Desmond Camacho.  Adithya Siddiqi MD        Subjective:     U year.  She did not know the president and she does know her dog. Skin: Skin is warm and dry. No lesion and no rash noted. She is not diaphoretic. No erythema. No pallor. Assessment and Plan:      Acute encephalopathy.  ? Metabolic Vs infection      Acute respiratory failure with hypoxia (HCC)  Improved today. Neb. Rx. Pulmonary following pt. Neb. Rx. And CPT and suctioning. Sputum Cx.  shows normal respiratory ani from August 8, 2019.       Acute pulmonary edema (HCC)  Improved. Pulm.  and re

## 2019-08-14 NOTE — CONSULTS
[place] [facility] - [facility]    Report of Consultation    Jenny Elam Patient Status:  Inpatient    3/31/1940 MRN [id number]   Location [facility] 2W/SW Attending Roxane Ramirez MD   [address] Day # 7 PCP Savanna Chacon.  Valdemar Red MD     Date of Adm confused pulling her IVs.  Patient apparently during this admission according to the RN continue exhibiting alternation in her mood and cognition and episodes of agitation confusion. Family do not have any safety concern.   Educate family and RN about th Heart Disorder Brother 37        Herat aneuyrism ruptured   • Heart Surgery Brother    • Heart Disorder Brother 54        CAD S/P CABG.     • Diabetes Brother    • Diabetes Maternal Grandmother    • Diabetes Maternal Aunt        Social History  Social Histo Oral Q15 Min PRN   acetaminophen (TYLENOL) tab 650 mg 650 mg Oral Q6H PRN   PEG 3350 (MIRALAX) powder packet 17 g 17 g Oral Daily PRN   bisacodyl (DULCOLAX) rectal suppository 10 mg 10 mg Rectal Daily PRN   Metoclopramide HCl (REGLAN) injection 5 mg 5 mg I changes from previous study. Persistent but decreased left base consolidation and moderate-sized left pleural effusion. Continued follow-up is advised.     Dictated by (CST): Trina Bain MD on 8/13/2019 at 10:00     Approved by (CST): Trina Bain MD Impression:      Delirium due to other medical condition. Episodic mood disorder    Discussed risk and benefit, acknowledging the current symptom and severity.   The patient with multiple medical condition demonstrating deterioration in her medical condi Differential With Platelet      Arterial blood gas      Insert PICC/Midline Catheter      Blood Culture FREQ X 2      Urine Culture, Routine Once      MRSA Culture Only Once      Sputum culture Once      Meds This Visit:  Requested Prescriptions      No pr

## 2019-08-14 NOTE — PROGRESS NOTES
Patient still confused, per son, this morning. Patient does not recognize her son. PT/OT working with patient -- patient is pleasant, calm, and cooperative.

## 2019-08-15 NOTE — PLAN OF CARE
MRI ordered per Dr Adithya Siddiqi. After ABG results, Dr Angeline Sanchez requested to keep patient on bipap. Dr Adithya Siddiqi notified of the necessity for the bipap and patient cannot have MRI with bipap mask on.  Recommended a CT scan instead of MRI until patient more hemodynamic

## 2019-08-15 NOTE — PLAN OF CARE
Problem: Patient Centered Care  Goal: Patient preferences are identified and integrated in the patient's plan of care  Description  Interventions:  - What would you like us to know as we care for you?  Was at dialysis center when all this happened  - Prov injury  Description  INTERVENTIONS:  - Assess pt frequently for physical needs  - Identify cognitive and physical deficits and behaviors that affect risk of falls.   - [place] fall precautions as indicated by assessment.  - Educate pt/family on patient sa baseline  Description  INTERVENTIONS:  - Assess for possible contributors to thought disturbance, including medications, impaired vision or hearing, underlying metabolic abnormalities, dehydration, psychiatric diagnoses, and notify attending MD/LIP  - Inst

## 2019-08-15 NOTE — CM/SW NOTE
BENNIE received for rehab placement at [place]. Pt resting, wearing bipap mask during HD treatment. No family present. RODRIGUEZ spoke with son Jose A Lopez [phone] regarding the recommendation for rehab.  RODRIGUEZ discussed the possible option for in-house HD, but will initiat

## 2019-08-15 NOTE — RESPIRATORY THERAPY NOTE
PT OFF BI-PAP UPON RT ARRIVAL. ABG WAS DRAWN PT WILL BE PLACE ON BIPAP AGAIN BECAUSE OF (HIGH CO2 63)  ABG RESULTS.

## 2019-08-15 NOTE — PROGRESS NOTES
Pulmonary/Critical Care Follow Up Note    HPI:   Dieudonne Camp is a 78year old female with Patient presents with:  Dyspnea MARLEY SOB (respiratory)  Altered Mental Status (neurologic)      PCP Eunice Stallings MD  Admission Attending Elizabeth Young (LIPITOR) tab 20 mg, 20 mg, Oral, Nightly  •  Normal Saline Flush 0.9 % injection 3 mL, 3 mL, Intravenous, PRN  •  dextrose 50 % injection 50 mL, 50 mL, Intravenous, PRN  •  Glucose-Vitamin C (DEX-4) chewable tab 4 tablet, 4 tablet, Oral, Q15 Min PRN  •  g antibiotics  Plan follow off abx     ESRD on maintenance HD   HD yesterday  Renal following   Plan remove fluid via HD     Encephalopathy  ? underlying dementia  Better with BiPAP this AM  Plan neuro consult   MRI brain planned       AAA   endograph repair

## 2019-08-15 NOTE — PROGRESS NOTES
[place] [facility] - [facility]    Progress Note      Subjective:     Remain confused . Was just taken off of BIPAP    Son at bedside. No agitation overnight.  Ate little yesterday     Review of Systems:     Limited - confusion     Objective:   Temp:  [96.7 °F (TENORMIN) tab 50 mg 50 mg Oral Daily   calcium acetate (PHOSLO) cap 667 mg 1 capsule Oral TID CC   atorvastatin (LIPITOR) tab 20 mg 20 mg Oral Nightly   Normal Saline Flush 0.9 % injection 3 mL 3 mL Intravenous PRN   dextrose 50 % injection 50 mL 50 mL In PD was switched to HD after aneurysmal repair  - HD today with 2 liters UF as tolerated - will do low BFR .    - plan was to resume PD with cycler - will hold off till clinically improved   - CXR clearing of congestive changes- repeat today   - hypoalbumine

## 2019-08-15 NOTE — PLAN OF CARE
Problem: Patient Centered Care  Goal: Patient preferences are identified and integrated in the patient's plan of care  Description  Interventions:  - What would you like us to know as we care for you?  Was at dialysis center when all this happened  - Prov injury  Description  INTERVENTIONS:  - Assess pt frequently for physical needs  - Identify cognitive and physical deficits and behaviors that affect risk of falls.   - [place] fall precautions as indicated by assessment.  - Educate pt/family on patient sa baseline  Description  INTERVENTIONS:  - Assess for possible contributors to thought disturbance, including medications, impaired vision or hearing, underlying metabolic abnormalities, dehydration, psychiatric diagnoses, and notify attending MD/LIP  - Inst

## 2019-08-15 NOTE — PLAN OF CARE
Dr Judith Madrigal paged three times regarding CT staff wanting to clarify if it is okay to have IV contrast for the CT of her abdomen. Once Dr Judith Madrigal was available to talk to this RN, dialysis RN had already set up equipment and was ready to start dialysis.  Per

## 2019-08-15 NOTE — PHYSICAL THERAPY NOTE
Attempted to see patient for physical therapy session. Per RN, patient on nasal cannula and is de-sating, placed back on bipap. RN concerned about patient's respiratory status. Will attempt to see patient tomorrow for physical therapy session.  RN aware and

## 2019-08-15 NOTE — PLAN OF CARE
Patient able to move around in bed by herself but has been refusing to move off of her left side. Staff has attempted every 2 hours to move her off her side but patient goes straight back to left side. Education given on pressure ulcer prevention.  Further

## 2019-08-15 NOTE — OCCUPATIONAL THERAPY NOTE
Attempted to see pt this morning for follow up occupational therapy treatment session, per RN pt with poor respiratory status this morning, will re-attempt tomorrow.      Anne Marie Turner OTR/L  [facility]   #[id number]

## 2019-08-15 NOTE — PLAN OF CARE
-Patient continues to have confusion. Alert to place and self. Patient did not recognize her son, does not know the date/time (thinks it is March 2013).  Patient does not understand the current situation for hospitalization, but knows she is in the hospital trials  - extubated 8/10  - See additional Care Plan goals for specific interventions   Outcome: Progressing  Note:   Family wishes patient to have a resolution of continued confusion/respiratory distress.      Problem: SAFETY ADULT - FALL  Goal: Free from Monitor for areas of redness and/or skin breakdown  - Initiate interventions, skin care algorithm/standards of care as needed  Outcome: Progressing

## 2019-08-16 NOTE — PROGRESS NOTES
[place] [facility] - [facility]    Progress Note    [facility] Patient Status:  Inpatient    3/31/1940 MRN [id number]   Location [facility] 2W/SW Attending Roxane Ramirez MD   Hosp Day # 10 PCP Savanna Chacon.  Valdemar Red MD       Subjective:   [facility]:  [unfilled]   Ct Abdomen+pelvis(contrast Only)(cpt=74177)    Result Date: 8/16/2019  CONCLUSION:  Suboptimal artifactually compromised examination. Within these parameters: 1.  There is a large abdominal aortic aneurysm with multifocal an not well characterized. 15. Lesser incidental findings as above. A preliminary report was issued by the [address] [place] [address] Radiology teleradiology service. There are no major discrepancies.    Dictated by (CST): Roberto Carlos Sanchez MD on 8/16/2019 at 7:47     Approved she is uremic  #4 COPD severe she still smokes    Spoke with family for 45 minutes prognosis is questionable she is still full code    Will need nutrition address this weekend             8/16/2019  Bennie Casas.  Aneesh Rosenberg MD

## 2019-08-16 NOTE — PROGRESS NOTES
[facility]    Progress Note    Fara Ruff Patient Status:  Inpatient    3/31/1940 MRN [id number]   Location [facility] 2W/SW Attending Tiny Barron MD   Hosp Day # 10 PCP Justus Mckeon.  Sherine Zavala MD       Subjective:   [facility]   Normal Saline Flush 0.9 % injection 10 mL 10 mL Intravenous PRN   Albuterol Sulfate  (90 Base) MCG/ACT inhaler 2 puff 2 puff Inhalation Q4H PRN   atenolol (TENORMIN) tab 50 mg 50 mg Oral Daily   calcium acetate (PHOSLO) cap 667 mg 1 capsu 1.0 08/06/2019    TSH 4.160 (H) 08/06/2019    ESRML 6 06/22/2018    MG 2.5 08/16/2019    PHOS 5.6 (H) 08/16/2019    TROP <0.045 07/15/2019    B12 >2,000 (H) 08/14/2019       Ct Abdomen+pelvis(contrast Only)(cpt=74177)    Result Date: 8/16/2019  CONCLUSION: atrophy with probable cysts demonstrating varying degrees of complexity; several of these may have hemorrhagic/proteinaceous contents, but are not well characterized. 15. Lesser incidental findings as above.     A preliminary report was issued by the [facility]

## 2019-08-16 NOTE — CM/SW NOTE
Clinical updates including therapy notes sent to [facility], per their request.    Plan: ref sent to [facility]-needs ins auth, will continue outpatient HD at [address] Tue/Thur/Sat.     HIREN Talamantes, [address] [place] [address]

## 2019-08-16 NOTE — PROGRESS NOTES
[place] [facility] - [facility]    Progress Note    Rhesa Prudent Patient Status:  Inpatient    3/31/1940 MRN [id number]   Location [facility] 2W/SW Attending Frank Whitney MD   [facility] Day # 9 PCP Eunice Washington MD        Subjective:     U bradypnea. She is not intubated. No respiratory distress. She has decreased breath sounds. She has no wheezes. She has no rhonchi. She has no rales. She exhibits no tenderness. Abdominal: Soft. Bowel sounds are normal. She exhibits no distension.  There i Improved. Negative HIPA. Maybe recative from acute illness. Hold heparin and ASA.       Hyperglycemia. Maybe reactive. SSI, hypoglycemia, accuchecks. NL A1C.         Essential hypertension  Generally, well controlled.  CPM.        ESRD (end stage franny Dictated by (CST): Apoorva Trinh MD on 8/15/2019 at 11:26     Approved by (CST): Apoorva Trinh MD on 8/15/2019 at 11:27                       Sergei Espino.  Judith Madrigal MD  8/15/2019 COUGH/SHORTNESS OF BREATH

## 2019-08-16 NOTE — PHYSICAL THERAPY NOTE
Attempted to see patient for physical therapy session. Per RN, patient not appropriate for skilled physical therapy session. Will attempt to see patient tomorrow for physical therapy session. RN aware and agreeable.

## 2019-08-16 NOTE — OCCUPATIONAL THERAPY NOTE
Attempted to see pt for follow up occupational therapy tx session, per RN Jose Clemens pt is not appropriate for skilled inpatient occupational therapy services on this date. Will follow up 8/17 if appropriate.      Lavonne Lee OTR/L  [facility]

## 2019-08-16 NOTE — PROGRESS NOTES
[facility]    Progress Note    Cayden Harley Patient Status:  Inpatient    3/31/1940 MRN [id number]   Location [facility] 2W/SW Attending Varun Hollingsworth MD   Hosp Day # 10 PCP Apolinar Marie.  Javan Cardenas MD        Subjective: hypercapnic Encephalopathy  Waxing and waning   Head ct negative      6–recent endographic repair of AAA on 6/19/2019   Per primary and vascular      7– thrombocytopenia  Improving      8–HFpEF      9- HTN , HL      10- DVT prophylaxis   scd / off heparin 6. Indeterminate high density punctate foci along the dependent aspect of the gallbladder wall; this could reflect cholelithiasis, but is indeterminate. 7. Probable left ovarian follicular cystic lesion.  In a postmenopausal female, annual sonographic surv vascular calcifications are present in the cavernous and supraclinoid segments of the distal internal carotid arteries. 4. No vascular malformation is detected.   5. Senescent changes of parenchymal volume loss with sequela of chronic microvascular ischemi

## 2019-08-16 NOTE — CONSULTS
Consult dictated. Encephalopathy–multifactorial–respiratory, metabolic, infectious–sepsis. She needs neuroimaging of the brain. Dr. Alphonso Washington ordered MRI of the brain, CTA of the brain. She is on BiPAP.   Spoke with the nurse and she will not be able to ha

## 2019-08-16 NOTE — CONSULTS
Debra Jackson, [address] [facility]  Vascular and Endovascular Surgery  [address] [facility]     VASCULAR SURGERY   CONSULT NOTE      Name: Lala Walden   :   3/31/1940  [id number]     Date of Consultation: 2019       REFERRING PHYSIC Parathyroid sx.     • OTHER SURGICAL HISTORY  07/10/2018    pd cath     • TONSILLECTOMY         MEDICATIONS:     Current Facility-Administered Medications:   •  methylPREDNISolone Sodium Succ (Solu-MEDROL) injection 40 mg, 40 mg, Intravenous, Once  •  dext Patient  reports that she quit smoking about 2 months ago. Her smoking use included cigarettes. She started smoking about 56 years ago. She has a 26.50 pack-year smoking history. She has never used smokeless tobacco. She reports that she drinks alcohol.  Sh No results for input(s): TROP in the last 168 hours.   No results found for: ANAS, SY, ANASCRN  No results for input(s): PCACT, PSACT, AT3ACT, HIPAB, PATHI, STALA, DRVVTRATIO, DRVVT, STACLOT, CARIGG, S0DQ8DFSJK, S7DT2TUTHZ, RA, HAVIGM, HBCIGM, HCVAB, HBSAG CONCLUSION:  1. Limited exam due to patient motion. 2.  No acute intracranial process. 3.  There are moderate microvascular white matter ischemic changes, likely related to long-standing hypertension and/or diabetes.  4.  Atherosclerosis in the anterior an PROCEDURE: CT ABDOMEN + PELVIS (CONTRAST ONLY) (HSJ=13957)  COMPARISON: [facility], CT CHEST PAIN/PE (IV ONLY) (CPT=71260), 7/15/2019, 15:29. Bennie Davison (UGF=18318), 2/22/2017, 12:11.   Emil present. Punctate foci of dependent hyperdensity may reflect tiny calculi, but not well characterized by CT technique. There is no intraluminal dilatation, gallbladder wall thickening, or pericholecystic inflammatory stranding.  Extrahepatic biliary dilatat measures 3.7 x 3.4 cm. More distally, the aneurysm sac measures 4.9 x 5.6 cm. There are areas of hyperdensity within the aneurysm sac, particularly along the right lateral aspect (for example, series 6, images 86-88).  There may be a communicating paravert CONCLUSION:  Suboptimal artifactually compromised examination. Within these parameters: 1. There is a large abdominal aortic aneurysm with multifocal aneurysmal dilatation of the infrarenal segment.   There is curvilinear enhancement of the right aspect of the [facility] Radiology teleradiology service. There are no major discrepancies.    Dictated by (CST): Elle Gardner MD on 8/16/2019 at 7:47     Approved by (CST): Elle Gardner MD on 8/16/2019 at 8:06          Xr Chest Ap Portable  (cpt=71045)    Result D PROCEDURE: XR CHEST AP PORTABLE (CPT=71010) TIME: 0609. COMPARISON: [facility], XR CHEST AP PORTABLE (CPT=71045), 8/12/2019, 9:11. INDICATIONS: Follow up sepsis, aortic aneurysm. History of ESRD. Difficulty breathing.   TECHNIQUE:   [employer] CONCLUSION:  1. Cardiomegaly with congestive changes with overall worsening especially in the right chest when compared to August 11, 2019. 2. Catheters in superior vena cava. Dictated by (CST):  Rose Fonseca MD on 8/12/2019 at 9:35     Approved by PROCEDURE: XR CHEST AP PORTABLE (CPT=71010) TIME: 0603 hours. COMPARISON: [facility], XR CHEST AP PORTABLE (CPT=71045), 8/09/2019, 6:05. INDICATIONS: Essential Hypertension. Dialysis patient Follow up mild fluid overload.   TECHNIQUE: PROCEDURE: XR CHEST AP PORTABLE (CPT=71010)-semi upright TIME: 0609 hours  COMPARISON: [facility], XR CHEST AP PORTABLE (CPT=71045), 8/07/2019, 10:05. [facility], XR CHEST AP PORTABLE (CPT=71045), 8/08/2019, 6:51.   INDICATI PROCEDURE: XR CHEST AP PORTABLE (CPT=71010) TIME: 0651 hours. COMPARISON: [facility], XR CHEST AP PORTABLE (CPT=71045), 8/07/2019, 8:54. [facility], XR CHEST AP PORTABLE (CPT=71045), 8/07/2019, 10:05.   INDICATIONS: Follow CONCLUSION:  1. Successful placement of ET tube.     Dictated by (CST): Shirlene Ramos MD on 8/07/2019 at 10:34     Approved by (CST): Татьяна Trinh MD on 8/07/2019 at 10:35          Xr Chest Ap Portable  (cpt=71045)    Result Date: 8/7/2 CONCLUSION:  1. Borderline cardiomegaly. Prominent central vasculature. 2. Bilateral perihilar and lower lobe pulmonary congestive changes with slight improvement. 3. Small bilateral effusions remain.   Double-lumen central venous catheter with the tip in PROCEDURE: CTA BRAIN (DJS=85703)  COMPARISON: [facility], CT BRAIN OR HEAD (CPT=70450), 8/06/2019, 20:49. INDICATIONS: Transient alteration of awareness.   TECHNIQUE: CT images of the brain were obtained without and with non-ionic intraveno deviation and spur formation are noted. ORBITS: Limited views are notable for postoperative changes of the lenses bilaterally. OTHER: The patient appears largely edentulous on the  view.    CT ANGIOGRAPHY:  ANTERIOR CIRCULATION: DISTAL INTERNAL CAROT The patient is a 78year old female who has had a recent repair of her abdominal aortic aneurysm using a Medtronic stent. My review of her CT angiogram reveals a smaller aneurysm that now measures around 5.8 cm compared to 6.   The type II endoleak is from

## 2019-08-16 NOTE — PROGRESS NOTES
[facility]    Progress Note    Stephanie Calabrese Patient Status:  Inpatient    3/31/1940 MRN [id number]   Location [facility] 2W/SW Attending Alphonso Fowler MD   Hosp Day # 10 PCP Cherelle Caballero.  Anival Alan MD        Subjective: admission. Was extubated then 8-7-19. Extubated 8-10-19. No fever. Worsened today with more lethargic during hemodialysis and more altered mental status.  ABG done.  STAT ABG and labs and CXR.  Spoke with RN had not received any sedatives or narcotics to ex pulmonary edema (HCC)  For hemodialysis today. Pulm. and renal following pt.         Depression. Psych consult. Fabricio Leaver will not be able to do much now with patient's altered mental status.           Results:     Lab Results   Component Value Date    WB left ovarian follicular cystic lesion. In a postmenopausal female, annual sonographic surveillance is recommended. 8. Stable low-density lesion of the spleen, most likely an indolent finding.   9. Indeterminate bilateral adrenal nodules, more conspicuous o Senescent changes of parenchymal volume loss with sequela of chronic microvascular ischemic disease. 6. Lesser incidental findings as above. A preliminary report was issued by the [address] [place] [address] Radiology teleradiology service.  There are no major discrepanci

## 2019-08-16 NOTE — CONSULTS
[facility]    PATIENT'S NAME: Willis Denis   ATTENDING PHYSICIAN: [address]  Efren Cody MD   CONSULTING PHYSICIAN: Kev Locke MD   PATIENT ACCOUNT#:   [id number]    LOCATION:  [address] [place] [address] RECORD #:   [id number]       DATE OF BIR illegal drugs. FAMILY HISTORY:  No family history of stroke or seizure. PHYSICAL EXAMINATION:    VITAL SIGNS:  As recorded in the chart:  Temperature 97.1, pulse 60, respiratory rate 18, blood pressure 135/58, pulse ox 96%.   NEUROLOGIC:  She is very

## 2019-08-16 NOTE — SLP NOTE
SLP attempt for meal assessment. RN reports pt remains on continuous BIPAP and not appropriate for P.O trials at this time. SLP to f/u as schedule permits and pt able to safely participate in oral trials. Thank you.     Sandra Orozco M.S. CCC-SLP  Bree

## 2019-08-17 NOTE — PROGRESS NOTES
[facility]  Nephrology Daily Progress Note    Vladislav Bearden  [id number]  73 year old      HPI:   Vladislav Bearden is a 78year old female. Seems a little more alert as per family. Will answer simple questions. Ate a little. No c/o. skills appropriate for age    Labs:  Lab Results   Component Value Date    WBC 5.5 08/17/2019    HGB 7.5 08/17/2019    HCT 24.4 08/17/2019    .0 08/17/2019    CREATSERUM 7.89 08/17/2019    BUN 60 08/17/2019     08/17/2019    K 4.4 08/17/2019 Lesser incidental findings as above.    Dictated by (CST): Elda Lauren MD on 8/16/2019 at 17:57     Approved by (CST): Elda Lauren MD on 8/16/2019 at 18:05          Ct Abdomen+pelvis(contrast Only)(cpt=74177)    Result Date: 8/16/2019  CONCLUSION: atrophy with probable cysts demonstrating varying degrees of complexity; several of these may have hemorrhagic/proteinaceous contents, but are not well characterized. 15. Lesser incidental findings as above.     A preliminary report was issued by the [facility] mg, Intravenous, Daily  •  epoetin michele-epbx (RETACRIT) 67352 UNIT/ML injection 10,000 Units, 10,000 Units, Subcutaneous, Once per day on Mon Wed Fri  •  Pantoprazole Sodium (PROTONIX) EC tab 40 mg, 40 mg, Oral, QAM AC  •  ipratropium-albuterol (DUONEB) ne Essential hypertension     Hyperlipidemia     Tobacco use     Hyperglycemia     Calcification of aorta (HCC)     Polycythemia     Age-related osteoporosis without current pathological fracture     ESRD (end stage renal disease) on dialysis ([facility]     Shahriar Jackman

## 2019-08-17 NOTE — PROGRESS NOTES
[facility]    Progress Note    Rina Crane Patient Status:  Inpatient    3/31/1940 MRN [id number]   Location [facility] 2W/SW Attending Ainsley Parker MD   [address] Day # 11 PCP Eunice Oropeza MD        Subjective: sepsis , metabolic and hypercapnic Encephalopathy  Waxing and waning   Head ct negative   Neuro following      6–recent endographic repair of AAA on 6/19/2019   Per primary and vascular      7– thrombocytopenia  Resolved      8–HFpEF      9- HTN , HL     (CST): Zeyad Cox MD on 8/16/2019 at 17:57     Approved by (CST): Zeyad Cox MD on 8/16/2019 at 18:05          Ct Abdomen+pelvis(contrast Only)(cpt=74177)    Result Date: 8/16/2019  CONCLUSION:  Suboptimal artifactually compromised examination.  Ingrid Vega degrees of complexity; several of these may have hemorrhagic/proteinaceous contents, but are not well characterized. 15. Lesser incidental findings as above. A preliminary report was issued by the [address] [place] [address] Radiology teleradiology service.  There are no ma

## 2019-08-17 NOTE — PROGRESS NOTES
[facility]    Progress Note    Fara Ruff Patient Status:  Inpatient    3/31/1940 MRN [id number]   Location [facility] 2W/SW Attending Tiny Barron MD   [facility] Day # 11 PCP Eunice Zavala MD        Subjective: yesterday and read as possible type II endoleak, no hematoma or active Extravasation)     As per   Urine culture negative to date from August 6, 2019.  Blood cultures are negative today from August 6, 2019. HD today. Monitor.  CO2 at 119 and H/O tobacco use urinate much.      Acute respiratory failure with hypoxia ([facility] [address]  More SOB today and going to get HD. Neb. Rx. STAT CXR. Pulmonary following pt. Neb. Rx.  And CPT and suctioning. Sputum Cx.  shows normal respiratory ani from August 8, 2019.        Acute 5. Lesser incidental findings as above.    Dictated by (CST): Shaji Brown MD on 8/16/2019 at 17:57     Approved by (CST): Shaji Brown MD on 8/16/2019 at 18:05          Ct Abdomen+pelvis(contrast Only)(cpt=74177)    Result Date: 8/16/2019  CONCLUSION atrophy with probable cysts demonstrating varying degrees of complexity; several of these may have hemorrhagic/proteinaceous contents, but are not well characterized. 15. Lesser incidental findings as above.     A preliminary report was issued by the [facility]

## 2019-08-17 NOTE — PLAN OF CARE
Pt alert, at times answers appropriately. Intermittently follows commands. VSS. Comfortable on BI-PAP. Patient turns herself. No urine production. Bed locked in lowest position, side rail x2. Call light within reach. Personal belongings at the bedside.

## 2019-08-17 NOTE — SLP NOTE
SPEECH DAILY NOTE - INPATIENT    ASSESSMENT & PLAN   ASSESSMENT  Pt seen for ongoing dysphagia therapy. RN reports pt with limited oral intake; however, no overt CSA. Pt repositioned upright 90 degrees in bed for p.o trials.  Pt tolerated limited trials of x1 delayed cough across thin liquids via straw. Recommend no straw use at this time. F/U with full meal assessment x1-2.    In Progress   Goal #2 The patient/family/caregiver will demonstrate understanding and implementation of aspiration precautions and sw

## 2019-08-17 NOTE — PROGRESS NOTES
Neurology Inpatient Follow-up Note      HPI:     Patient being seen in follow-up. Interval records and work-up reviewed. Past Medical Hisotory:  Reviewed    Medications:  Reviewed    Allergies:     Ace Inhibitors          OTHER (SEE COMMENTS)      ROS atherosclerotic vascular calcifications are present in the cavernous and supraclinoid segments of the distal internal carotid arteries. 4. No vascular malformation is detected.      5. Senescent changes of parenchymal volume loss with sequela of chronic

## 2019-08-17 NOTE — PROGRESS NOTES
Called [facility] to schedule 3.5 hr bedside dialysis treatment for 8/17/19 per Dr. Gaston Sa order.

## 2019-08-18 NOTE — PHYSICAL THERAPY NOTE
PHYSICAL THERAPY TREATMENT NOTE - INPATIENT     Room Number: 482/712-K       Presenting Problem: sepsis intubated twice during admission    Problem List  Principal Problem:    Sepsis due to undetermined organism [facility])  Active Problems:     Aortic aneurysm ( facility. DISCHARGE RECOMMENDATIONS  PT Discharge Recommendations: 24 hour care/supervision;Sub-acute rehabilitation     PLAN  PT Treatment Plan: Bed mobility; Body mechanics; Patient education;Gait training;Stair training;Balance training;Transfer traini supervision      Goal #1   Current Status Goal met    Goal #2 Patient is able to demonstrate transfers Sit to/from Stand at assistance level: supervision with walker - rolling      Goal #2  Current Status Goal met    Goal #3 Patient is able to ambulate 100

## 2019-08-18 NOTE — PROGRESS NOTES
Attempted to see patient for dysphagia follow up. Patient off the unit at MRI. Will re-attempt next session.     Evelin Lackey M.S., [address] [place] [address]

## 2019-08-18 NOTE — PROGRESS NOTES
Neurology Inpatient Follow-up Note      HPI:     Patient being seen in follow-up. Interval records and work-up reviewed. Family members at bedside. Past Medical Hisotory:  Reviewed    Medications:  Reviewed    Allergies:     Ace Inhibitors          O

## 2019-08-18 NOTE — PROGRESS NOTES
[facility]    Progress Note    Maliha Worthington Patient Status:  Inpatient    3/31/1940 MRN [id number]   Location [facility] 2W/SW Attending Deborah Sears MD   [address] Day # 12 PCP Carlie Velez.  Vivien Larkin MD        Subjective: status.  ABG done. STAT ABG and labs and CXR.  Spoke with RN had not received any sedatives or narcotics to explain lethargy.  Would continue off sedatives and narcotics.  On steroids for COPD.  And duo nebs.  Ammonia level is normal.  But now concerns stil now with patient's altered mental status.     Will most likely need rehab placement            Results:     Lab Results   Component Value Date    WBC 8.4 08/18/2019    HGB 7.7 (L) 08/18/2019    HCT 24.6 (L) 08/18/2019    PLT 83.0 (L) 08/18/2019    ADRY

## 2019-08-18 NOTE — OCCUPATIONAL THERAPY NOTE
OCCUPATIONAL THERAPY TREATMENT NOTE - INPATIENT        Room Number: 550/174-B  Presenting Problem: (sepsis)    Problem List  Principal Problem:    Sepsis due to undetermined organism [facility])  Active Problems:     Aortic aneurysm (HCC)    Hyperkalemia    Mauricio baseline, she will benefit from a CAROL stay. OT to continue to follow patient while she is here at [facility]. She was left in bedside chair with call light in reach and all needs met.     DISCHARGE RECOMMENDATIONS  OT Discharge Recommendations: 25 ho Dressing: mod a   Lower Extremity Dressing: mod a     Education Provided: Educated patient and family in role of OT and POC      OT Goals:   Patient will complete functional transfer with SPV  Comment: min a     Patient will complete toileting with SPV  Co

## 2019-08-18 NOTE — PROGRESS NOTES
[place] [facility]  Nephrology Daily Progress Note    Cayden Harley  [id number]  89 year old      HPI:   Cayden Harley is a 78year old female. Overall looks better. Up in chair. More alert off BiPap and eating a little better.  SOB improv 8.4 08/18/2019    HGB 7.7 08/18/2019    HCT 24.6 08/18/2019    PLT 83.0 08/18/2019    CREATSERUM 4.22 08/18/2019    BUN 21 08/18/2019     08/18/2019    K 3.3 08/18/2019     08/18/2019    CO2 33.0 08/18/2019    GLU 68 08/18/2019    CA 7.9 08/18/ Senescent changes of parenchymal volume loss with sequela of chronic microangiopathy and large vessel atherosclerosis. 4. Chronic right maxillary sinusitis. Small right mastoid effusion. 5. Lesser incidental findings as above.    Dictated by (CST): Francisco J Indeterminate high density punctate foci along the dependent aspect of the gallbladder wall; this could reflect cholelithiasis, but is indeterminate. 7. Probable left ovarian follicular cystic lesion.  In a postmenopausal female, annual sonographic surveil preliminary report was issued by the [address] [place] [address] Radiology teleradiology service. There are no major discrepancies.    Dictated by (CST): Kika Vasquez MD on 8/16/2019 at 7:14     Approved by (CST): Kika Vasquez MD on 8/16/2019 at 7:21              Danae Trinidad bisacodyl (DULCOLAX) rectal suppository 10 mg, 10 mg, Rectal, Daily PRN  •  Metoclopramide HCl (REGLAN) injection 5 mg, 5 mg, Intravenous, Q8H PRN    Allergies:     Ace Inhibitors          OTHER (SEE COMMENTS)    Input/Output:    Intake/Output Summary (Last

## 2019-08-18 NOTE — PROGRESS NOTES
[place] [facility]    Progress Note    Rishabh Painting Patient Status:  Inpatient    3/31/1940 MRN [id number]   Location [facility] 2W/SW Attending Parth Adams MD   [facility] Day # 12 PCP Jevon Shepard.  Carlie Neville MD        Subjective: maintenance HD   HD today   Renal following      5- altered mental status secondary to sepsis , metabolic and hypercapnic Encephalopathy  Waxing and waning   Head ct negative   Neuro following      6–recent endographic repair of AAA on 6/19/2019   Per prim above.   Dictated by (CST): Mell Hill MD on 8/16/2019 at 17:57     Approved by (CST): Mell Hill MD on 8/16/2019 at 18:05          Mri Brain (cpt=70551)    Result Date: 8/18/2019  CONCLUSION:  1.  Compromised examination because of excessive paige

## 2019-08-19 NOTE — PROGRESS NOTES
Bed ready per [facility], called to give report to Yaz, Receiving RN.   Unavailable at this time to take report, states will call writer back for report    12 Latosha Smith  CCU Pod A

## 2019-08-19 NOTE — PROGRESS NOTES
[place] [facility]    Progress Note      Subjective:     Much more awake and alert - son at bedside     Appetite remain poor. No sob or chest pain     Review of Systems:     No chest pain, sob. No motor deficit.  Improve confusion but still feels sodium 1000 UNIT/ML injection 5,000 Units 5,000 Units Intravenous PRN Dialysis   Normal Saline Flush 0.9 % injection 10 mL 10 mL Intravenous PRN   Albuterol Sulfate  (90 Base) MCG/ACT inhaler 2 puff 2 puff Inhalation Q4H PRN   atenolol (TENORMIN) ta Date Value Ref Range Status   06/13/2019 0.98 0.90 - 1.20 Final     Comment:       Only the INR (not the Protime value) should be utilized for   the monitoring of oral anticoagulant therapy.      Recommended therapeutic ranges for anticoagulant therapy ar NGT  - pneumonia  - PD and HD catheter site looks clean   - off of antibiotics. Some time cefepime can cause confusion     4. Mental status change: improve   -  psychiatrist consult for mental status change - input noted.  Prn haldol  - serum ammonia wnl  -

## 2019-08-19 NOTE — PROGRESS NOTES
Notified [facility] at [phone] of dialysis for tomorrow order from Elías Welch MD

## 2019-08-19 NOTE — DIETARY NOTE
ADULT NUTRITION REASSESSMENT     Pt is at moderate nutrition risk. Pt does not meet malnutrition criteria.       RECOMMENDATIONS TO MD:       NUTRITION DIAGNOSIS/PROBLEM:Inadequate protein energy intake related to intubation/respiratory failure as evidence Encouraged increased PO intake. Non-restrictive diet appropriate at this time to allow to increased po back to adequate intake. - Medical Food Supplements: Trial Nepro BID.    - Vitamin and mineral supplements: vitamin B12 and calcium acetate (phoslo) PTA lb)  06/21/19 : 66.7 kg (147 lb 1.6 oz)  06/12/19 : 64 kg (141 lb)  06/07/19 : 65.9 kg (145 lb 3.2 oz)  06/06/19 : 63.5 kg (140 lb)  06/03/19 : 63.5 kg (140 lb)    GASTROINTESTINAL: GI intact    FOOD/NUTRITION RELATED HISTORY:  Appetite: Poor to fair (8/16

## 2019-08-19 NOTE — OCCUPATIONAL THERAPY NOTE
OCCUPATIONAL THERAPY TREATMENT NOTE - INPATIENT        Room Number: 745/521-D           Presenting Problem: (sepsis)    Problem List  Principal Problem:    Sepsis due to undetermined organism [facility])  Active Problems:     Aortic aneurysm (HCC)    Hyperkalemia reorientation;Patient/Family education;Patient/Family training;Equipment eval/education; Neuromuscluar reeducation; Compensatory technique education    SUBJECTIVE  \"I am done with this\"     OBJECTIVE  Precautions: None    WEIGHT BEARING RESTRICTION  Weight NT          Goals  on: 19  Frequency: 3-5x week     Therese OSUNA/L  [facility]   #[phone]

## 2019-08-19 NOTE — PROGRESS NOTES
Patient son at bedside, notified of room change, verbalized understanding. Awaiting for room to be cleaned. Tele box ordered.

## 2019-08-19 NOTE — PHYSICAL THERAPY NOTE
PHYSICAL THERAPY TREATMENT NOTE - INPATIENT     Room Number: 525/853-P       Presenting Problem: sepsis intubated twice during admission    Problem List  Principal Problem:    Sepsis due to undetermined organism [facility])  Active Problems:     Aortic aneurysm ( clicks' Inpatient Basic Mobility Short Form. Research supports that patients with this level of impairment may benefit from rehab facility.     DISCHARGE RECOMMENDATIONS  PT Discharge Recommendations: 24 hour care/supervision;Sub-acute rehabilitation     P Goals to be met by: 8/30/19  Patient Goal Patient's self-stated goal is: to go home   Goal #1 Patient is able to demonstrate supine - sit EOB @ level: supervision      Goal #1   Current Status Goal met in prior session, min A x 1 today   Goal #2 Patient

## 2019-08-19 NOTE — PROGRESS NOTES
[place] [facility]    Progress Note    Rishabh Painting Patient Status:  Inpatient    3/31/1940 MRN [id number]   Location [facility] 2W/SW Attending Parth Adams MD   [facility] Day # 15 PCP Eunice Neville MD        Subjective: and  Neb         3-  s/p Sepsis due to undetermined organism (HCC) / resolved   UTI   Completed  course of antibiotics / off antibiotics today      4– ESRD on maintenance HD   HD today   Renal following      5- altered mental status secondary to sepsis , m 8/18/2019 at 14:07                       Ingrid Gals.  Terrance Schuster MD  8/19/2019

## 2019-08-19 NOTE — PROGRESS NOTES
Handoff given to Andreas Mukherjee RN all questions answered. Patient packed and ready for transport. Patient transport ordered. Family at bedside. Patient alert and oriented, friendly with no acute distress noted.

## 2019-08-19 NOTE — PROGRESS NOTES
Neurology Inpatient Follow-up Note      HPI:     Patient being seen in follow-up. Interval records reviewed. Patient feeling well without complaints. Per daughter, patient still confused.       Past Medical Hisotory:  Reviewed    Medications:  Reviewed [address] [facility]  [phone]

## 2019-08-19 NOTE — PLAN OF CARE
Patient anxious/restless during first half of shift. Continued to re-orient patient to room/time of day. Lights kept off in room, reinforcing night time. AVAP tolerated fairly overnight. No complaints of pain. Will continue to monitor.     Problem: Patient suctioning and perform as needed  - Assess and instruct to report SOB or any respiratory difficulty  - Respiratory Therapy support as indicated  - Manage/alleviate anxiety  - Monitor for signs/symptoms of CO2 retention  Outcome: Progressing     Problem: SA Assess and document skin integrity  - Monitor for areas of redness and/or skin breakdown  - Initiate interventions, skin care algorithm/standards of care as needed  Outcome: Progressing     Problem: CONFUSION  Goal: Confusion, delirium, dementia or psychos

## 2019-08-20 NOTE — PROGRESS NOTES
[facility]    Progress Note    Williealexsander Rinaldijuan Patient Status:  Inpatient    3/31/1940 MRN [id number]   Location [facility] 2W/SW Attending Chandler Hernandez MD   [facility] Day # 14 PCP Eunice Kenyon MD        Subjective: Neck: No tracheal deviation present. Cardiovascular: Normal rate and regular rhythm. Edema not present. Pulmonary/Chest: No accessory muscle usage or stridor. Tachypnea noted. No apnea and no bradypnea. She is not intubated.  No respiratory distres antibiotics.       Anemia. Chronic. Slight drop but now stable. ? Hydration effect. No evidence of acute blood loss. Abd. soft. On PPI.         Thrombocytopenia. Improved. Negative HIPA. Maybe recative from acute illness. Hold heparin and ASA.     10 (L) 08/18/2019    INR 0.98 06/13/2019    T4F 1.0 08/06/2019    TSH 4.160 (H) 08/06/2019    ESRML 6 06/22/2018    MG 2.5 08/19/2019    PHOS 4.4 08/19/2019    TROP <0.045 07/15/2019    B12 >2,000 (H) 08/17/2019       Mri Brain (cpt=70551)    Result Date:

## 2019-08-20 NOTE — PROGRESS NOTES
[facility]    Progress Note    Jennifer Metcalf Patient Status:  Inpatient    3/31/1940 MRN [id number]   Location [facility] 2W/SW Attending Nolan Polk MD   [facility] Day # 14 PCP Eunice Costa MD        Subjective: no rigidity, no rebound, no guarding and no CVA tenderness. Lymphadenopathy:     She has no cervical adenopathy. Neurological: She is alert and easily aroused. Patient knows she is at some facility.   Does not remember she is at the hospital nor the n A1C.        Essential hypertension  Generally, well controlled. CPM.        ESRD (end stage renal disease) on dialysis ([facility] [address]  On HD.  Renal following pt.  for hemodialysis tomorrow.       History of primary hyperparathyroidism   Renal following pt.        A not be given because of renal dysfunction. There are moderate chronic appearing deep white matter ischemic changes in the periventricular white matter bilaterally. No acute area of infarct or restricted diffusion is seen.   No large infarct or mass is not

## 2019-08-20 NOTE — PROGRESS NOTES
[facility]    Progress Note      Subjective:     Much more awake and alert - son at bedside     Ate good dinner    On HD and tolerating well without headaches     Review of Systems:     No chest pain, sob. No motor deficit.  Improve confus PRN   heparin sodium 1000 UNIT/ML injection 5,000 Units 5,000 Units Intravenous PRN Dialysis   Normal Saline Flush 0.9 % injection 10 mL 10 mL Intravenous PRN   Albuterol Sulfate  (90 Base) MCG/ACT inhaler 2 puff 2 puff Inhalation Q4H PRN   atenolol therapeutic ranges for anticoagulant therapy are   as follows:   2.0 - 3.0 All indications except for mechanical prosthetic   cardiac valves. 2.5 - 3.5 Mechanical prosthetic cardiac valves. No results for input(s): BNP in the last 168 hours.   Rec haldol  - serum ammonia wnl  - improve CO2   - MRI and neuro input noted - toxic metabolic encephalopathy       Discussed with Nursing and son at bedside     Darline Junior MD  8/20/2019

## 2019-08-20 NOTE — PLAN OF CARE
Problem: Patient Centered Care  Goal: Patient preferences are identified and integrated in the patient's plan of care  Description  Interventions:  - What would you like us to know as we care for you?  Was at dialysis center when all this happened  - Prov Progressing     Problem: SAFETY ADULT - FALL  Goal: Free from fall injury  Description  INTERVENTIONS:  - Assess pt frequently for physical needs  - Identify cognitive and physical deficits and behaviors that affect risk of falls.   - [place] fall precaut delirium, dementia or psychosis is improved or at baseline  Description  INTERVENTIONS:  - Assess for possible contributors to thought disturbance, including medications, impaired vision or hearing, underlying metabolic abnormalities, dehydration, psychiat

## 2019-08-20 NOTE — PROGRESS NOTES
Neurology Inpatient Follow-up Note      HPI:     Patient being seen in follow-up. States she feels \"very well. \"      Past Medical Hisotory:  Reviewed    Medications:  Reviewed    Allergies:     Ace Inhibitors          OTHER (SEE COMMENTS)      ROS:   Marco Marie

## 2019-08-20 NOTE — SLP NOTE
SPEECH DAILY NOTE - INPATIENT    ASSESSMENT & PLAN   ASSESSMENT  Patient alert upon arrival, seen for ongoing dysphagia education and diet tolerance.   RN reports patient with minimal PO intake, though patient reports she ate a good breakfast.  Patient repo swallow strategies independently over 2 session(s). Reviewed with patient and RN. No family in the room.    Goal met   Goal #3 The patient will utilize compensatory strategies as outlined by  BSSE (clinical evaluation) including Slow rate, Small bites, Sma

## 2019-08-20 NOTE — PROGRESS NOTES
Double RN skin check done prior to transfer off Unit. Skin check performed by this RN and Dennie Peru, RN CCU. Wounds are as follows: Intact skin    Will remain available for any further questions or concerns.      Handoff report given to Janki Steven RN that pat

## 2019-08-20 NOTE — PROGRESS NOTES
Pt has been picked up by transport and is in route to room 555. Pt is in stable condition at time of transfer with no signs of distress. Friend is with pt  along with pt belongings at time of transport.

## 2019-08-20 NOTE — PROGRESS NOTES
[facility]    Progress Note    Dieudonne Camp Patient Status:  Inpatient    3/31/1940 MRN [id number]   Location [facility] 5SW/SE Attending Dalila Camp MD   [facility] Day # 14 PCP Eunice Stallings MD        Subjective: 6/19/2019   Per primary and vascular      7– thrombocytopenia  Resolved      8–HFpEF      9- HTN , HL      10- DVT prophylaxis   heparin sq      Discharge planing                 Results:     Lab Results   Component Value Date    WBC 7.7 08/19/2019    HGB

## 2019-08-20 NOTE — CM/SW NOTE
Interdisciplinary Rounds: 08/20/19  Admitted: 8/6/2019 LOS: 14  Disciplines in attendance: charge nurse, staff nurse, CM, [address] and discharge plan reviewed. Shannon when medically stable    Active issues needing resolution prior to dis

## 2019-08-20 NOTE — PLAN OF CARE
Spoke with pt and her son Justine Alcala about CT scan that shows endoleak.     IR office will call pt to make apt for outpatient consultation

## 2019-08-20 NOTE — CM/SW NOTE
Updates were sent to [facility] by ISR today 8/20. SW left Dawson Bey from [facility] a voicemail to inform him of updates, possible discharge within 1-2 days, and about needing insurance authorization.      Plan: pt will be discharging to [facility] when medically stabl

## 2019-08-20 NOTE — PLAN OF CARE
Problem: Patient Centered Care  Goal: Patient preferences are identified and integrated in the patient's plan of care  Description  Interventions:  - What would you like us to know as we care for you?  Was at dialysis center when all this happened  - Prov Progressing     Problem: SAFETY ADULT - FALL  Goal: Free from fall injury  Description  INTERVENTIONS:  - Assess pt frequently for physical needs  - Identify cognitive and physical deficits and behaviors that affect risk of falls.   - [place] fall precaut delirium, dementia or psychosis is improved or at baseline  Description  INTERVENTIONS:  - Assess for possible contributors to thought disturbance, including medications, impaired vision or hearing, underlying metabolic abnormalities, dehydration, psychiat

## 2019-08-21 NOTE — PHYSICAL THERAPY NOTE
Attempted to see patient for physical therapy session. New orders had been placed since code blue and patient is okay to see for physical therapy. DUNIA Lima requested for physical therapy to work with patient.  However, patient and her family think that t

## 2019-08-21 NOTE — PLAN OF CARE
Problem: Patient Centered Care  Goal: Patient preferences are identified and integrated in the patient's plan of care  Description  Interventions:  - What would you like us to know as we care for you?  Was at dialysis center when all this happened  - Prov Progressing   Patient's oxygen saturation currently stable on 3 L O2. Will wean as tolerated.    Problem: SAFETY ADULT - FALL  Goal: Free from fall injury  Description  INTERVENTIONS:  - Assess pt frequently for physical needs  - Identify cognitive and phys breakdown  - Initiate interventions, skin care algorithm/standards of care as needed  Outcome: Progressing     Problem: CONFUSION  Goal: Confusion, delirium, dementia or psychosis is improved or at baseline  Description  INTERVENTIONS:  - Assess for possib

## 2019-08-21 NOTE — OCCUPATIONAL THERAPY NOTE
Patient scheduled for OT treatment today; pt was recent code blue- fall after CT scan; t/f back to CCU- will need new orders to resume care.      Babatunde Pereira, OTR/L   [address]

## 2019-08-21 NOTE — CM/SW NOTE
Per nurse request dgtr would like to see SW. This CM went to pt's room dgtr not in room however, her brother was there. He wanted to know where patient will be going for SNF.   This writer noted has already the SNF preferred provider list and explained pr

## 2019-08-21 NOTE — PROGRESS NOTES
[place] [facility] - [facility]    Progress Note    Stephanie Calabrese Patient Status:  Inpatient    3/31/1940 MRN [id number]   Location [facility] 2W/SW Attending Alphonso Fowler MD   [facility] Day # 15 PCP Eunice Alan MD        Subjective: repair of AAA on 6/19/2019   Per primary and vascular      7- DVT prophylaxis   Hold heparin sq today b/o fall and facial ecchymosis   scd     D/w son                Results:     Lab Results   Component Value Date    WBC 7.7 08/19/2019    HGB 7.9 (L) 08/19 Dictated by (CST): Ira Haque MD on 8/21/2019 at 9:13     Approved by (CST): Ira Haque MD on 8/21/2019 at 9:21          Ct Spine Cervical (cpt=72125)    Result Date: 8/21/2019  CONCLUSION:  1.  No acute fracture or subluxation in the cervical spine Vlad Murray.  Karla Clayton MD  8/21/2019

## 2019-08-21 NOTE — PLAN OF CARE
Problem: Patient Centered Care  Goal: Patient preferences are identified and integrated in the patient's plan of care  Description  Interventions:  - What would you like us to know as we care for you?  Was at dialysis center when all this happened  - Provid Provide assistive devices as appropriate  - Consider OT/PT consult to assist with strengthening/mobility  - Encourage toileting schedule  Outcome: Progressing  Received patient from RRT, Xrays done hips, arms, legs due to pain left side and per family comp as appropriate  - Monitor and intervene to maintain adequate nutrition, hydration, elimination, sleep and activity  - Consider the need for a sitter if unable to leave patient unattended  - [address] consult as indicated  - Consult Pharmacy a

## 2019-08-21 NOTE — PROGRESS NOTES
[facility]    Progress Note    Stephanie Ruiz Patient Status:  Inpatient    3/31/1940 MRN [id number]   Location [facility] 2W/SW Attending Emily Quintanilla MD   [facility] Day # 15 PCP Eunice Harris MD        Subjective: HENT:   Head:       Neck: No tracheal deviation present. Cardiovascular: Normal rate and regular rhythm. Edema not present. Pulmonary/Chest: No accessory muscle usage or stridor. Tachypnea noted. No apnea and no bradypnea. She is not intubated.  No r Right knee: She exhibits deformity.  She exhibits normal range of motion, no swelling, no effusion, no ecchymosis, no laceration (OA changes. ), no erythema, normal alignment, no LCL laxity, normal patellar mobility, no bony tenderness, normal meniscus Cx. NTD. ID and critical care following pt.  for pain day. Antibiotics stopped.       Hyperkalemia  Resolved. S/P insulin and albuterol.         Elevated WBC count. Resolved. Due to sepsis. Cultures to date negative.   PD and hemodialysis catheters lo HGB 7.9 (L) 08/21/2019    HCT 26.9 (L) 08/21/2019    .0 (L) 08/21/2019    CREATSERUM 6.59 (H) 08/21/2019    BUN 28 (H) 08/21/2019     08/21/2019    K 4.0 08/21/2019     08/21/2019    CO2 30.0 08/21/2019    GLU 96 08/21/2019    CA 8.7 08 CONCLUSION:  1. No fracture. 2. Demineralization. 3. Catheter is partially visualized. 4. Postop changes left neck. Dictated by (CST): Trista Mata MD on 8/21/2019 at 11:44     Approved by (CST):  Trista Mata MD on 8/21/2019 at 11:45 CONCLUSION:  1. No acute fracture or subluxation in the cervical spine. 2. Multilevel moderate to advanced degenerative disc disease and facet arthrosis.  3. Grade 1 degenerative spondylolisthesis of C4 on C5 and retrolisthesis of C5 on C6.  4. Mild-moderat CONCLUSION:  1. Markings appear less prominent than on August 15, 2019 suggesting slightly improved CHF. 2. Persistent left lower lobe consolidation/atelectasis. 3. Cardiomegaly. 4. Atherosclerosis. 5. Catheters in superior vena cava.  6. Postop changes lef

## 2019-08-21 NOTE — PROGRESS NOTES
Code blue called overhead. This RN contacted [address] and [address] notified this RN that the code was called on [facility] AT [facility]. Went down to [address] and was notified that the pt had fallen. Orders received from responding APRN and paged Dr Kayli Pleitez x 2.

## 2019-08-21 NOTE — CM/SW NOTE
RODRIGUEZ informed by [employer] that the insurance has approved the rehab request. SW relayed the pt was transferred to [facility], so will need to check the pt's medical stability at [place]. Plan: [facility]- ins ok 8/21.     HIREN Cristina, [address] [place] [address]

## 2019-08-21 NOTE — CODE DOCUMENTATION
Responded to Code Blue in [address] [facility] [address] [address]. Pt found supine on the floor next to the bed. Fall unwitnessed, unknown LOC. Bleeding stopped from laceration above left eye. Large amount of periorbital swelling noted to left eye, swollen shut.  Pt responsive to voice, confus

## 2019-08-21 NOTE — OCCUPATIONAL THERAPY NOTE
Occupational therapy orders received, chart review complete, pt participation approved and requested by RN staff, however pt and family requesting to be seen by therapy tomorrow - discussed with rehab manager. Will follow up with pt tomorrow 8/22.      Karen

## 2019-08-22 NOTE — PLAN OF CARE
Problem: Patient/Family Goals  Goal: Patient/Family Long Term Goal  Description  Patient's Long Term Goal: return home    Interventions:  - Continuous O2 per NC  - Hemodialysis  - Monitor for home going needs  - See additional Care Plan goals for specifi Provide assistive devices as appropriate  - Consider OT/PT consult to assist with strengthening/mobility  - Encourage toileting schedule  Outcome: Progressing  Bed in low and locked position, call light within reach, hourly rounding at least, room close to monitor pain and request assistance  - Assess pain using appropriate pain scale  - Administer analgesics based on type and severity of pain and evaluate response  - Implement non-pharmacological measures as appropriate and evaluate response  - Consider cul hydration, elimination, sleep and activity  - Consider the need for a sitter if unable to leave patient unattended  - [address] consult as indicated  - Consult Pharmacy as needed  Outcome: Not Progressing  Pt alert to self and place, confused

## 2019-08-22 NOTE — PHYSICAL THERAPY NOTE
PHYSICAL THERAPY TREATMENT NOTE - INPATIENT     Room Number: 237/237-A       Presenting Problem: sepsis intubated twice during admission    Problem List  Principal Problem:    Sepsis due to undetermined organism [facility])  Active Problems:     Aortic aneurysm ( training;Strengthening    SUBJECTIVE  \"Do you see the birds? \"    OBJECTIVE  Precautions: None    WEIGHT BEARING RESTRICTION  Weight Bearing Restriction: None                PAIN ASSESSMENT   Ratin          BALANCE Goal met in prior session, min A X 1 today   Goal #3 Patient is able to ambulate 100 feet with assist device: walker - rolling at assistance level: supervision   Goal #3   Current Status 2ft x 1 with no assistive device HHA   Goal #4 Patient will negotiate

## 2019-08-22 NOTE — CM/SW NOTE
CM met with dgtr/Kyra informed [facility] accepted and if stable tomorrow will discharge there. Daughter verbalized understanding that [facility] will be [facility] upon discharge, possibly tomorrow.     CM updated Ever/liaison [facility] of possible discharge tomor

## 2019-08-22 NOTE — PROGRESS NOTES
[place] [facility] - [facility]    Progress Note    Myla Us Patient Status:  Inpatient    3/31/1940 MRN [id number]   Location [facility] 2W/SW Attending Leland George MD   [facility] Day # 16 PCP Eunice iNxon MD        Subjective: HENT:   Head:       Eyes: Pupils are equal, round, and reactive to light. EOM are normal. Right eye exhibits no discharge, no exudate and no hordeolum. No foreign body present in the right eye.  Left eye exhibits no chemosis, no discharge, no exudate and no Right wrist: She exhibits normal range of motion, no tenderness, no bony tenderness, no swelling, no effusion, no crepitus, no deformity and no laceration.         Left wrist: She exhibits normal range of motion, no tenderness, no bony tenderness, no s Urine culture negative to date from August 6, 2019. Blood cultures are negative today from August 6, 2019. HD blaze. Monitor. CO2 at 119 and H/O tobacco use on admission. Was extubated then 8-7-19. Extubated 8-10-19. No fever.  Would continue off sedatives Acute pulmonary edema (HCC)  Improved. Pulm. and renal following pt. Depression. Psych consult. Probably will not be able to do much now with patient's altered mental status. Abnormal bladder U/S. Will need cysto.  As outpt           Bili Xr Clavicle, Complete, Left (cpt=73000)    Result Date: 8/21/2019  CONCLUSION: 1. No fracture. 2.                    Minimal osteoarthritis. 3. Demineralization. 4. Right and left-sided catheter is partially visualized.  5. Surgical clips CONCLUSION:  1. Probably small old left paramedian nasal bridge fracture. Old left lamina papyracea fracture. No unequivocal acute fracture. 2. Left periorbital contusion with supraorbital/frontal scalp laceration. 3. Small right mastoid effusion.  4. Chr CONCLUSION:  1. There is focal bladder wall thickening with intraluminal debris or a plaque-like mass at the posterior bladder margin. Follow-up cystoscopy is recommended for further evaluation.   2. Cholelithiasis without sonographic evidence of acute chol CONCLUSION:  1. Demineralization. 2. No fracture on this single view. Dictated by (CST): Chauncey Yin MD on 8/21/2019 at 11:56     Approved by (CST): Chauncey Yin MD on 8/21/2019 at 11:58                       Malathi Holder.  Ade Cruz MD  8/8/2019

## 2019-08-22 NOTE — CONSULTS
[facility]    Report of Consultation    Rishabh Painting Patient Status:  Inpatient    3/31/1940 MRN [id number]   Location [facility] 2W/SW Attending Parth Adams MD   [facility] Day # 16 PCP Eunice Neville MD     Date of Admi OTHER SURGICAL HISTORY  07/10/2018    pd cath     • TONSILLECTOMY       Family History   Problem Relation Age of Onset   • Heart Attack Father 54        CAd S/P MI.    • Heart Attack Mother 43        Rheumatic fever.     • Heart Disorder Brother 43        H and oxygen saturation is 100%. Physical Exam:  She has ecchymosis over the left orbit.   No tenderness to bilateral clavicles, proximal humerus, elbow, or wrist.  She demonstrates PROM bilateral shoulders with full flexion, abduction, and internal rotatio TSH 4.160 (H) 08/06/2019    ESRML 6 06/22/2018    MG 2.5 08/19/2019    PHOS 4.4 08/19/2019    TROP <0.045 07/15/2019    B12 >2,000 (H) 08/17/2019     Xr Tibia + Fibula (2 Views), Left (cpt=73590)    Result Date: 8/21/2019  CONCLUSION:  1. Limited study.  2. intracranial abnormalities 2. Chronic microvascular ischemia cerebral white matter and basal ganglionic regions. .  3. No intracranial hemorrhage 4. Left periorbital/supraorbital preseptal soft tissue swelling hematoma with associated laceration.     Dictat Approved by (CST): Jaswant Graff MD on 8/21/2019 at 9:33          Us Abdomen Complete (cpt=76700)    Result Date: 8/21/2019  CONCLUSION:  1.  There is focal bladder wall thickening with intraluminal debris or a plaque-like mass at the posterior bladder mar 8/21/2019  CONCLUSION:  1. Demineralization. 2. No fracture on this single view. Dictated by (CST): Jose Armando Harrison MD on 8/21/2019 at 11:56     Approved by (CST):  Jose Armando Harrison MD on 8/21/2019 at 11:58                Impression:     Sepsis due to

## 2019-08-22 NOTE — PROGRESS NOTES
[facility]    Progress Note      Subjective:     Sleepy this am - s/p HD with 1 liter UF     No headaches , dizziness or sob    No fever, chills. BP lowish     S/p fall yesterday     Review of Systems:     No chest pain, sob.  No motor def 10,000 Units 10,000 Units Subcutaneous Once per day on Mon Wed Fri   Pantoprazole Sodium (PROTONIX) EC tab 40 mg 40 mg Oral QAM AC   Fluticasone Furoate-Vilanterol (BREO ELLIPTA) 200-25 MCG/INH inhaler 1 puff 1 puff Inhalation Daily   ondansetron HCl (ZOFR - 35.3 seconds Final     INR   Date Value Ref Range Status   08/21/2019 1.33 (H) 0.90 - 1.20 Final     Comment:       Only the INR (not the Protime value) should be utilized for   the monitoring of oral anticoagulant therapy.      Recommended therapeutic ra visualized. 4. Postop changes left neck. Dictated by (CST): Stephenie Blandon MD on 8/21/2019 at 11:44     Approved by (CST):  Stephenie Blandon MD on 8/21/2019 at 11:45          Xr Humerus (min 2 Views), Left (cpt=73060)    Result Date: 8/21/2019  CONCL retrolisthesis of C5 on C6.  4. Mild-moderate central narrowing at C4-5 and C5-6. Severe bilateral C5 and C6 foraminal narrowing. 5. Severe left C7 foraminal narrowing, moderate right C4 foraminal narrowing. 6. Other less pronounced degenerative changes. Approved by (CST): Rosalind Reddy MD on 8/21/2019 at 11:50          Xr Hip W Or Wo Pelvis 1 View, Right (cpt=73501)    Result Date: 8/21/2019  CONCLUSION:  1. No acute disease. 2. Demineralization. 3. Osteoarthritis and scoliosis lower lumbar spine.

## 2019-08-22 NOTE — PROGRESS NOTES
[facility]    Progress Note    [facility] Patient Status:  Inpatient    3/31/1940 MRN [id number]   Location [facility] 2W/SW Attending Deneen Soto MD   [facility] Day # 16 PCP Eunice Simon MD        Subjective: Results:     Lab Results   Component Value Date    WBC 8.8 08/22/2019    HGB 7.1 (L) 08/22/2019    HCT 24.4 (L) 08/22/2019    .0 (L) 08/22/2019    CREATSERUM 7.59 (H) 08/22/2019    BUN 48 (H) 08/22/2019     08/22/2019    K 4.2 08/22/2019 left neck. Dictated by (CST): Sara Reed MD on 8/21/2019 at 11:44     Approved by (CST): Sara Reed MD on 8/21/2019 at 11:45          Xr Humerus (min 2 Views), Left (cpt=73060)    Result Date: 8/21/2019  CONCLUSION:  1. Limited study.  2. D Mild-moderate central narrowing at C4-5 and C5-6. Severe bilateral C5 and C6 foraminal narrowing. 5. Severe left C7 foraminal narrowing, moderate right C4 foraminal narrowing. 6. Other less pronounced degenerative changes.  7. Atherosclerotic vascular calc Jenelle Key MD on 8/21/2019 at 11:50          Xr Hip W Or Wo Pelvis 1 View, Right (cpt=73501)    Result Date: 8/21/2019  CONCLUSION:  1. No acute disease. 2. Demineralization. 3. Osteoarthritis and scoliosis lower lumbar spine.  4. Dialysis catheter i

## 2019-08-22 NOTE — PLAN OF CARE
Problem: Patient Centered Care  Goal: Patient preferences are identified and integrated in the patient's plan of care  Description  Interventions:  - What would you like us to know as we care for you?  Was at dialysis center when all this happened  - Prov Progressing  On O2 at 1-2 L/NC. O 2 sat in the high 90'2. No shortness of breathing noted. Lung sounds clear to ausculation.      Problem: SAFETY ADULT - FALL  Goal: Free from fall injury  Description  INTERVENTIONS:  - Assess pt frequently for physical nee document risk factors for pressure ulcer development  - Assess and document skin integrity  - Monitor for areas of redness and/or skin breakdown  - Initiate interventions, skin care algorithm/standards of care as needed  Outcome: Progressing   Has left orb

## 2019-08-22 NOTE — OCCUPATIONAL THERAPY NOTE
Attempted to see patient this morning for occupational therapy treatment session, however pt was recently back to bed and sleeping, PCT reporting pt only slept 2hrs overnight and requesting therapy to return later. Will re-attempt as schedule permits.

## 2019-08-23 PROBLEM — T07.XXXA MULTIPLE CONTUSIONS: Status: ACTIVE | Noted: 2019-01-01

## 2019-08-23 NOTE — PLAN OF CARE
Problem: Patient Centered Care  Goal: Patient preferences are identified and integrated in the patient's plan of care  Description  Interventions:  - What would you like us to know as we care for you?  Was at dialysis center when all this happened  - Prov Progressing     Problem: SAFETY ADULT - FALL  Goal: Free from fall injury  Description  INTERVENTIONS:  - Assess pt frequently for physical needs  - Identify cognitive and physical deficits and behaviors that affect risk of falls.   - Perry fall precaut delirium, dementia or psychosis is improved or at baseline  Description  INTERVENTIONS:  - Assess for possible contributors to thought disturbance, including medications, impaired vision or hearing, underlying metabolic abnormalities, dehydration, psychiat activity and pre-medicate as appropriate  Outcome: Progressing     Problem: CARDIOVASCULAR - ADULT  Goal: Maintains optimal cardiac output and hemodynamic stability  Description  INTERVENTIONS:  - Monitor vital signs, rhythm, and trends  - Monitor for blee

## 2019-08-23 NOTE — CM/SW NOTE
CM notified family has questions regarding discharge planning    Patient met with patient, sitter noted at bedside for safety. Confirmed with Liaison from [facility] patient will need to be sitter free for 24 hours.  If not appropriate without

## 2019-08-23 NOTE — OCCUPATIONAL THERAPY NOTE
OCCUPATIONAL THERAPY TREATMENT NOTE - INPATIENT        Room Number: 237/237-A           Presenting Problem: (sepsis)    Problem List  Principal Problem:    Sepsis due to undetermined organism [facility])  Active Problems:     Aortic aneurysm (HCC)    Hyperkalemia RECOMMENDATIONS  OT Discharge Recommendations: 24 hour care/supervision;Sub-acute rehabilitation  OT Device Recommendations: TBD     PLAN  OT Treatment Plan: Balance activities; Energy conservation/work simplification techniques;ADL training;Functional marrero Patient will complete item retrieval with SPV  Comment: NT          Goals  on: 19  Frequency: 3-5x week     Roosevelt Yi OTR/L  [facility]   #[id number]

## 2019-08-23 NOTE — PROGRESS NOTES
[facility]    Progress Note      Subjective:     Remain confused - thinks her kids are age 9 yrs. Poor appetite -  No BF this am    Review of Systems:     No chest pain, sob. No motor deficit.      No abdomina pain, NVD    Objective: 40 mg Oral QAM AC   Fluticasone Furoate-Vilanterol (BREO ELLIPTA) 200-25 MCG/INH inhaler 1 puff 1 puff Inhalation Daily   ondansetron HCl (ZOFRAN) injection 4 mg 4 mg Intravenous Q6H PRN   heparin sodium 1000 UNIT/ML injection 5,000 Units 5,000 Units Intra seconds Final     INR   Date Value Ref Range Status   08/21/2019 1.33 (H) 0.90 - 1.20 Final     Comment:       Only the INR (not the Protime value) should be utilized for   the monitoring of oral anticoagulant therapy.      Recommended therapeutic ranges fo region. Dictated by (CST): Mirella Cadena MD on 8/21/2019 at 11:47     Approved by (CST): Mirella Cadena MD on 8/21/2019 at 11:50          Xr Hip W Or Wo Pelvis 1 View, Right (cpt=73501)    Result Date: 8/21/2019  CONCLUSION:  1. No acute disease.

## 2019-08-23 NOTE — PHYSICAL THERAPY NOTE
PHYSICAL THERAPY TREATMENT NOTE - INPATIENT     Room Number: 237/237-A       Presenting Problem: sepsis intubated twice during admission    Problem List  Principal Problem:    Sepsis due to undetermined organism [facility])  Active Problems:     Aortic aneurysm ( Recommendations: 24 hour care/supervision;Sub-acute rehabilitation     PLAN  PT Treatment Plan: Bed mobility; Body mechanics; Patient education;Gait training;Stair training;Transfer training;Balance training    SUBJECTIVE  \"I am at the hospital\"    OBJECTI Status Goal met in prior session, CGA today   Goal #2 Patient is able to demonstrate transfers Sit to/from Stand at assistance level: supervision with walker - rolling      Goal #2  Current Status Goal met in prior session, min A X 1 today   Goal #3 Clare

## 2019-08-23 NOTE — PROGRESS NOTES
[facility]    Progress Note    Stephanie Calabrese Patient Status:  Inpatient    3/31/1940 MRN [id number]   Location [facility] 2W/SW Attending Alphonso Fowler MD   [facility] Day # 16 PCP Eunice Alan MD        Subjective:    4– ESRD on maintenance HD   HD today      5- altered mental status secondary to sepsis , metabolic and hypercapnic Encephalopathy  Better now / likely baseline   Head ct negative      6–recent endographic repair of AAA on 6/19/2019   Per primary and v

## 2019-08-23 NOTE — PROGRESS NOTES
[facility] - [facility]    Progress Note    Stephanie Ruiz Patient Status:  Inpatient    3/31/1940 MRN [id number]   Location [facility] 2W/SW Attending Emily Quintanilla MD   [facility] Day # 16 PCP Eunice Harris MD        Subjective: Head:       Eyes: Pupils are equal, round, and reactive to light. EOM are normal. Right eye exhibits no discharge, no exudate and no hordeolum. No foreign body present in the right eye.  Left eye exhibits no chemosis, no discharge, no exudate and no hordeol Right wrist: She exhibits normal range of motion, no tenderness, no bony tenderness, no swelling, no effusion, no crepitus, no deformity and no laceration.         Left wrist: She exhibits normal range of motion, no tenderness, no bony tenderness, no s Urine culture negative to date from August 6, 2019. Blood cultures are negative today from August 6, 2019. HD blaze. Monitor. CO2 at 119 and H/O tobacco use on admission. Was extubated then 8-7-19. Extubated 8-10-19. No fever.  Would continue off sedatives Acute pulmonary edema (HCC)  Improved. Pulm. and renal following pt. Depression. Psych consult. Probably will not be able to do much now with patient's altered mental status. Abnormal bladder U/S. Will need cysto.  As outpt           Bili CONCLUSION:  1. No obvious fracture deformity. 2. Demineralization. 3. Atherosclerosis. Dictated by (CST): Mirella Cadena MD on 8/21/2019 at 11:58     Approved by (CST):  Mirella Cadena MD on 8/21/2019 at 12:00          Xr Clavicle, Complete, Left CONCLUSION:  1. No acute disease. 2. Demineralization. 3. Osteoarthritis and scoliosis lower lumbar spine. 4. Dialysis catheter in the pelvis. 5. Vascular endograft. 6. Residual contrast in the colon. 7. Vascular calcifications. Dictated by (CST):  En Ridley

## 2019-08-23 NOTE — PLAN OF CARE
Problem: Patient Centered Care  Goal: Patient preferences are identified and integrated in the patient's plan of care  Description  Interventions:  - What would you like us to know as we care for you?  Was at dialysis center when all this happened  - Prov strengthening/mobility  - Encourage toileting schedule  Outcome: Progressing  Note:   Bed locked and in lowest position, bed alarm activated,  pt with sitter at beside at all times     Problem: DISCHARGE PLANNING  Goal: Discharge to home or other facility frequent short contacts to provide reality reorientation, refocusing and direction  - Decrease environmental stimuli, including noise as appropriate  - Monitor and intervene to maintain adequate nutrition, hydration, elimination, sleep and activity  - Cons hemodynamic stability  Description  INTERVENTIONS:  - Monitor vital signs, rhythm, and trends  - Monitor for bleeding, hypotension and signs of decreased cardiac output  - Evaluate effectiveness of vasoactive medications to optimize hemodynamic stability

## 2019-08-24 PROBLEM — R29.6 RECURRENT FALLS: Status: ACTIVE | Noted: 2019-01-01

## 2019-08-24 NOTE — PLAN OF CARE
VSS. Alert to person only. Confused. Frequent reorientation provided. Follows commands. Bed alarm on all time. Pt prefers to be on her left side all time even after repositioning and education provided.    On NC 2 L however, keeps pulling out Nc, frequent oxygen saturation or ABGs  - Provide Smoking Cessation handout, if applicable  - Encourage broncho-pulmonary hygiene including cough, deep breathe, Incentive Spirometry  - Assess the need for suctioning and perform as needed  - Assess and instruct to repor Progressing     Problem: SKIN/TISSUE INTEGRITY - ADULT  Goal: Skin integrity remains intact  Description  INTERVENTIONS  - Assess and document risk factors for pressure ulcer development  - Assess and document skin integrity  - Monitor for areas of redness and severity of pain and evaluate response  - Implement non-pharmacological measures as appropriate and evaluate response  - Consider cultural and social influences on pain and pain management  - Manage/alleviate anxiety  - Utilize distraction and/or relax

## 2019-08-24 NOTE — PROGRESS NOTES
[facility]    Progress Note      Subjective:     More awake and alert. Encouraged her to eat more     Review of Systems:     No chest pain, sob. No motor deficit.      No abdomina pain, NVD     Objective:   Temp:  [96.6 °F (35.9 °C)-98.5 ° Furoate-Vilanterol (BREO ELLIPTA) 200-25 MCG/INH inhaler 1 puff 1 puff Inhalation Daily   ondansetron HCl (ZOFRAN) injection 4 mg 4 mg Intravenous Q6H PRN   heparin sodium 1000 UNIT/ML injection 5,000 Units 5,000 Units Intravenous PRN Dialysis   Normal Sal Value Ref Range Status   08/21/2019 30.8 23.2 - 35.3 seconds Final     INR   Date Value Ref Range Status   08/21/2019 1.33 (H) 0.90 - 1.20 Final     Comment:       Only the INR (not the Protime value) should be utilized for   the monitoring of oral anticoa

## 2019-08-24 NOTE — PLAN OF CARE
Problem: Patient Centered Care  Goal: Patient preferences are identified and integrated in the patient's plan of care  Description  Interventions:  - What would you like us to know as we care for you?  Was at dialysis center when all this happened  - Prov CO2 retention  Outcome: Adequate for Discharge     Problem: SAFETY ADULT - FALL  Goal: Free from fall injury  Description  INTERVENTIONS:  - Assess pt frequently for physical needs  - Identify cognitive and physical deficits and behaviors that affect risk Adequate for Discharge     Problem: CONFUSION  Goal: Confusion, delirium, dementia or psychosis is improved or at baseline  Description  INTERVENTIONS:  - Assess for possible contributors to thought disturbance, including medications, impaired vision or he interventions unsuccessful or patient reports new pain  - Anticipate increased pain with activity and pre-medicate as appropriate  Outcome: Adequate for Discharge     Problem: CARDIOVASCULAR - ADULT  Goal: Maintains optimal cardiac output and hemodynamic s

## 2019-08-24 NOTE — CM/SW NOTE
Contacted by Janeth Childers stated pt medically cleared for discharge to sub acute rehab. This writer spoke with Lele/RN aware of discharge, he will inform family of discharge time to rehab. DCSS notified of dispo.  To [place] ambul

## 2019-08-24 NOTE — DISCHARGE SUMMARY
[facility] - [facility]    Discharge Summary    Fab Snell Patient Status:  Inpatient    3/31/1940 MRN [id number]   Location [facility] [place] 2W/SW Attending Raul Juarez MD   [facility] Day # 25 PCP Harini Soriano MD     Date of Admission patient reports that she is doing better. Her mental status is, alert oriented to name, date of birth, place and to her medical doctor's name. She denies any chest pain or shortness of breath. She denies any abdominal pain nausea vomiting.     Discharge MD DORA Consulting Physician  Aure Waggoner MD Consulting Physician  NEUROLOGY    Elva Johnson MD Consulting Physician  Psychiatry    Aye Ramos MD Consulting Physician  INFECTIOUS DISEASES    Suzanne Hobson MD Consult BiPAP at night. Please eat all 3 meals regularly. Please participate in the physical therapy. Hospital Discharge Diagnoses: AMS, acute respiratory failure, sepsis    Lace+ Score: 62  59-90 High Risk  29-58 Medium Risk  0-28   Low Risk.     TCM Foll

## 2019-08-24 NOTE — PROGRESS NOTES
Pulmonary/Critical Care Follow Up Note    HPI:   Anthony Marshall is a 78year old female with Patient presents with:  Dyspnea MARLEY SOB (respiratory)  Altered Mental Status (neurologic)      PCP Eunice Bang MD  Admission Attending Rheta Getting. Excell Bence injection 4 mg, 4 mg, Intravenous, Q6H PRN  •  heparin sodium 1000 UNIT/ML injection 5,000 Units, 5,000 Units, Intravenous, PRN Dialysis  •  Normal Saline Flush 0.9 % injection 10 mL, 10 mL, Intravenous, PRN  •  Albuterol Sulfate  (90 Base) MCG/ACT antibiotics  Plan follow off abx     ESRD on maintenance HD   HD yesterday  Renal following   Plan RRT per renal     Encephalopathy  ? underlying dementia  Better with BiPAP this AM  Plan follow       AAA   endograph repair 6/19/2019   Plan follow     thro

## 2019-08-25 NOTE — H&P
[facility] - [facility]    History and Physical    Rina Crane Patient Status:  Observation    3/31/1940 MRN [id number]   Location [facility] 4W/SW/SE Attending Zak Mccall MD   Hosp Day # 0 PCP Nikki Cabrera.  Juan Oropeza MD     Date:  / Left     6-7 year ago had lesion removed from left kidney   • OTHER SURGICAL HISTORY  2016    Parathyroid sx.     • OTHER SURGICAL HISTORY  07/10/2018    pd cath     • TONSILLECTOMY       Family History   Problem Relation Age of Onset   • Heart Attack [facility] Oral Tab CR Take 2,000 mcg by mouth daily. aspirin 81 MG Oral Tab Take by mouth daily. Review of Systems:     Constitutional: Positive for activity change. Negative for chills, fatigue and fever.    HENT: Negative for congestion, rhinorrhea and tr 4.3 08/25/2019     08/25/2019    CO2 28.0 08/25/2019    GLU 82 08/25/2019    CA 8.5 08/25/2019    ALB 2.9 (L) 08/24/2019    ALKPHO 54 (L) 08/22/2019    BILT 0.3 08/22/2019    TP 5.4 (L) 08/22/2019    AST 29 08/22/2019    ALT 19 08/22/2019    PTT 30.8 Katelyn Ferguson MD on 8/25/2019 at [address] [facility] Art (GOB=[id number])    Result Date: 8/25/2019  CONCLUSION:  1. No acute fracture or subluxation. 2. Old left lamina papyracea fracture and nasal bridge fracture.  3. Improving left periorbital and frontal so admission with recurrent fall in SNF      Subconjunctival hemorrhage- as a sequela  Of hospital fall will resolve and monitor    DVt px  -in light of low platelets, will keep patient on SCD boots    PLAN    PT to  Assess gait   Monitor mental status closel

## 2019-08-25 NOTE — ED NOTES
notified that patient family would like to speak to them regarding placement to another facility other than [facility].

## 2019-08-25 NOTE — CM/SW NOTE
Spoke with son and daughter at Patients bedside. Pt awake for conversation, but did not add any information. Son states Pt was transferred to [facility] at about 1600 today. She did not have bed rails on her bed and no bed alarm.  Pt had an unwit

## 2019-08-25 NOTE — ED NOTES
Patient and family updated on plan of care. Tele box ordered to floor. Patient on 2L. Comfortable at this time. Report given to DUNIA leroy.

## 2019-08-25 NOTE — ED INITIAL ASSESSMENT (HPI)
Patient presents from [facility] for unwitnessed fall at [facility] [place]. Patient was found on floor in bathroom. Patient PICC line was out at that time. Discharged today from floor today. Patient on 2L @home.

## 2019-08-25 NOTE — PLAN OF CARE
Problem: Patient/Family Goals  Goal: Patient/Family Long Term Goal  Description  Patient's Long Term Goal: NH placement    Interventions:  - SW consult  - See additional Care Plan goals for specific interventions  Outcome: Progressing  Goal: Patient/Fami Problem: DISCHARGE PLANNING  Goal: Discharge to home or other facility with appropriate resources  Description  INTERVENTIONS:  - Identify barriers to discharge w/pt and caregiver  - Include patient/family/discharge partner in discharge Blair Wilcox

## 2019-08-25 NOTE — ED NOTES
Prelim from lab  Neutrophils 74%  Bans 2%  lympho 6%  Mono 7%  Eosinophils 4%  baso 4%  Myelo 3%  Absolute neutrophils 6.76

## 2019-08-25 NOTE — ED PROVIDER NOTES
Patient Seen in: [facility] AND [facility] Emergency Department    History   Patient presents with:  Fall (musculoskeletal, neurologic)    Stated Complaint: unwitnessed fall, pulled picc     HPI    77 yo F with PMH CAD, HTN, HL, ESRD (TTS via permacath), recurrent lesion removed from left kidney   • OTHER SURGICAL HISTORY  2016    Parathyroid sx.     • OTHER SURGICAL HISTORY  07/10/2018    pd cath     • TONSILLECTOMY         Medications :   Albuterol Sulfate  (90 Base) MCG/ACT Inhalation Aero Soln,  Inhale 2 p rarely    Drug use: No      Review of Systems :  Constitutional: As per HPI  Respiratory: Negative for cough and shortness of breath. Cardiovascular: Negative for chest pain and palpitations. Gastrointestinal: Negative for vomiting and abdominal pain. review). Skin: Skin is warm. Left sided periorbital and facial contusion/ecchymoses without associated crepitance or deformity. Psychiatric: Cooperative. Nursing note and vitals reviewed.         ED Course     Labs Reviewed   BASIC METABOLIC PANEL (8) - DATE OF EXAM: 08/24/2019  Patient No:  [id number]  Physician:  Sean Quinn  YOB: 1940    Past Medical History (entered by Technologist):    Reason For Exam (entered by Technologist):     Other Notes (entered by Technologist): destroy any copies or printouts.     Preliminary Radiology Report  [facility], [facility]  [phone] - [address]    NAME: Carlosfide Joseph OF EXAM: 08/24/2019  Patient No:  [id number]  Physician:  Rosita Cuevas  Date of and may be unlawful. If you have received this report in error, please notify the sender immediately at [phone] and permanently delete the original report and destroy any copies or printouts.     Preliminary Radiology Report  [facility]  (5 this report, you are hereby notified that any copying, distribution, dissemination or action taken in relation to the contents of this report is strictly prohibited and may be unlawful.  If you have received this report in error, please notify the sender im

## 2019-08-25 NOTE — ED NOTES
Orders for admission, patient is aware of plan, and ready to go upstairs. Any questions, please call ED  Raheem Villalobos,Second Floor [facility]. Patient presents from [place] [id number] for unwitnessed fall. Family to baseline alertx 1/2. Patient knows birthday/name/year.    Family Mario Alberto Palomares

## 2019-08-25 NOTE — PLAN OF CARE
Pt is A/O only to self. Fall precautions in place as patient is a high fall risk. VS stable. Family at bedside throughout the shift.  Plan to discharge back to rehab- family is refusing that patient gets discharged to [address] [place] [address]    Problem: PAIN Identify discharge learning needs (meds, wound care, etc)  - Arrange for interpreters to assist at discharge as needed  - Consider post-discharge preferences of patient/family/discharge partner  - Complete POLST form as appropriate  - Assess patient's abil

## 2019-08-25 NOTE — CONSULTS
[facility]    Report of Consultation    Date of Admission:  8/24/2019  Date of Consult:  8/25/2019   Reason for Consultation:     ESRD on HD     History of Present Illness:     Zev Current is a 68 yr old female with pmh of left kid pd cath     • TONSILLECTOMY         Family History  Family History   Problem Relation Age of Onset   • Heart Attack Father 54        CAd S/P MI.    • Heart Attack Mother 43        Rheumatic fever.     • Heart Disorder Brother 37        Herat aneuyrism ru ml     Wt Readings from Last 5 Encounters:  08/24/19 : 133 lb 3.2 oz (60.4 kg)  08/24/19 : 132 lb 11.2 oz (60.2 kg)  08/15/19 : 142 lb 3.2 oz (64.5 kg)  07/12/19 : 145 lb (65.8 kg)  07/02/19 : 145 lb (65.8 kg)      General appearance: alert and awake.  calm Protime value) should be utilized for   the monitoring of oral anticoagulant therapy. Recommended therapeutic ranges for anticoagulant therapy are   as follows:   2.0 - 3.0 All indications except for mechanical prosthetic   cardiac valves. 2.5 - 3.

## 2019-08-26 NOTE — PROGRESS NOTES
[facility] Pharmacy Note:  Renal Dose Adjustment    Jamarcus Zuñiga has been prescribed famotidine (PEPCID) 20 mg intravenously every 12 hours. Estimated Creatinine Clearance: 5.6 mL/min (A) (based on SCr of 6.74 mg/dL (H)).     Her calculated creatinine rebekah

## 2019-08-26 NOTE — PROGRESS NOTES
[facility]    Progress Note      Subjective:     awake and alert. Remain confused     Review of Systems:     No chest pain, sob. No motor deficit.      No abdomina pain, NVD     Objective:   Temp:  [97.3 °F (36.3 °C)-98.6 °F (37 °C)] 97.7 6.65  --  6.05  --    WBC 8.8  --  10.4  --  8.9 7.5   .0*  --  81.0*  --  63.0* 101.0*    < > = values in this interval not displayed.      Recent Labs   Lab 08/22/19  0413 08/24/19  1013 08/24/19  2057 08/25/19  0535 08/26/19  0453   GLU 88 101* 10 Fredi Jiang MD on 8/25/2019 at 5:36          Ct Spine Cervical (cpt=72125)    Result Date: 8/25/2019  CONCLUSION:  1. No acute fracture or subluxation. 2. Multilevel moderate to advanced degenerative disc disease and facet arthrosis.  3. Grade 1 degenera Dictated by (CST): Alan Oneal MD on 8/25/2019 at 7:00     Approved by (CST): Alan Oneal MD on 8/25/2019 at 7:02          Ekg 12-lead    Result Date: 8/24/2019  ECG Report  Interpretation  -------------------------- Sinus Rhythm Voltage criteria for

## 2019-08-26 NOTE — PLAN OF CARE
Problem: Patient/Family Goals  Goal: Patient/Family Long Term Goal  Description  Patient's Long Term Goal:     Interventions:  -   - See additional Care Plan goals for specific interventions  Outcome: Progressing  Goal: Patient/Family Short Term Goal  Elda Haynes w/pt and caregiver  - Include patient/family/discharge partner in discharge planning  - Arrange for needed discharge resources and transportation as appropriate  - Identify discharge learning needs (meds, wound care, etc)  - Arrange for interpreters to ass

## 2019-08-26 NOTE — CM/SW NOTE
8/28 854am: RODRIGUEZ received a call from the pt's son Arsh Phoenix who still would prefer [facility] as first choice. Oleksandr Traylor stated they would likely have beds later in the week and will start insurance auth. Updated information sent.   RODRIGUEZ cancelled referr

## 2019-08-26 NOTE — PAYOR COMM NOTE
--------------  DISCHARGE REVIEW    Karissa Hull MA O  Subscriber #:  [id number]  Authorization Number: [id number]    Admit date: 8/6/19  Admit time:  2145  Discharge Date: 8/24/2019  4:23 PM     Admitting Physician: Diana Gatica MD  Attending Physi 22-year-old female with a past medical history of COPD, AAA, CK and hemodialysis was brought into the hospital for confusion patient was found to have UTI and was also hypoxic that was requiring BiPAP.     Patient was admitted to the hospital, pulmonary, ne antibiotics. CKD on hemodialysis-getting hemodialysis today. Anemia-stable continue with Epogen during dialysis. COPD-continue with oxygen use and BiPAP as above.     History of fall while in the hospital-continue with physical therapy in the nursi TABLET EVERY NIGHT    Cyanocobalamin (VITAMIN B-12 ER) 2000 MCG Oral Tab CR  Take 2,000 mcg by mouth daily. aspirin 81 MG Oral Tab  Take by mouth daily.       Please use oxygen during the daytime and BiPAP at night              Discharge Diet: As tolerat

## 2019-08-26 NOTE — PROGRESS NOTES
[facility]    Progress Note    Claude Marquez Patient Status:  Observation    3/31/1940 MRN [id number]   Location [facility] 4W/SW/SE Attending Tasneem Hartman MD   Hosp Day # 0 PCP Esme Shukla.  Lisa Rodriguez MD        Subjective: Cardiovascular: Normal rate and regular rhythm. Edema not present. Pulmonary/Chest: Effort normal. No accessory muscle usage or stridor. No apnea, no tachypnea and no bradypnea. She is not intubated. No respiratory distress.  She has decreased breath s On dialysis right now hopefully to transition to PD when she is capable. At this point she will be getting dialysis blaze. Renal aware of patient. PD was switched to hemodialysis after aneurysmal repair. Toxic metabolic encephalopathy.   Seen by josue CONCLUSION:  1. No acute intracranial finding. 2. Changes of chronic small vessel disease in cerebral white matter-not unexpected for age. 3. Intracranial atherosclerosis cavernous carotid arteries and vertebral arteries.  4. Less pronounced swelling associ CONCLUSION:  1. No acute fracture or subluxation. 2. Old left lamina papyracea fracture and nasal bridge fracture. 3. Improving left periorbital and frontal soft tissue contusion.  4. Intracranial atherosclerosis cavernous carotid arteries and vertebral art

## 2019-08-26 NOTE — PHYSICAL THERAPY NOTE
PHYSICAL THERAPY EVALUATION - INPATIENT     Room Number: 456/456-A  Evaluation Date: 8/26/2019  Type of Evaluation: Initial   Physician Order: PT Eval and Treat    Presenting Problem: Unwitnessed fall at [address] [facility]  Reason for Therapy: Mobility Dysfunction and Kim Belch Mobility Short Form. Research supports that patients with this level of impairment may benefit from sub-acute rehab to optimize functional outcomes.     Patient will benefit from continued IP PT services to address these deficits in preparation for dischar BO on 19    SUBJECTIVE  \"My daughter is a liar\"    PHYSICAL THERAPY EXAMINATION     OBJECTIVE  Precautions: None  Fall Risk: High fall risk    WEIGHT BEARING RESTRICTION  Weight Bearing Restriction: None                PAIN ASSESSMENT  Ratin STATUS  Gait Assessment   Gait Assistance:  Moderate assistance  Distance (ft): 15ft; 30ft  Assistive Device: Rolling walker  Pattern: (unsteady gait pattern; decreased postural control)  Stoop/Curb Assistance: Not tested       Exercise/Education Provided:

## 2019-08-27 PROBLEM — D62 ACUTE BLOOD LOSS ANEMIA: Status: ACTIVE | Noted: 2019-01-01

## 2019-08-27 PROBLEM — K92.2 ACUTE UPPER GI BLEED: Status: ACTIVE | Noted: 2019-01-01

## 2019-08-27 NOTE — PLAN OF CARE
Problem: Patient/Family Goals  Goal: Patient/Family Long Term Goal  Description  Patient's Long Term Goal: To return back to rehab    Interventions:  -Stabilization of current condition  - See additional Care Plan goals for specific interventions  Outcom Discharge to home or other facility with appropriate resources  Description  INTERVENTIONS:  - Identify barriers to discharge w/pt and caregiver  - Include patient/family/discharge partner in discharge planning  - Arrange for needed discharge resources and

## 2019-08-27 NOTE — PROGRESS NOTES
[facility]    Progress Note      Subjective:     More confused today - mental status waxing and waning     Receiving HD     Review of Systems:     No chest pain, sob. No motor deficit.      No abdomina pain, NVD     Objective:   Temp:  [39 (DUONEB) nebulizer solution 3 mL 3 mL Nebulization Q6H PRN        Results:     Recent Labs   Lab 08/23/19  0509  08/24/19  2057 08/26/19  0453 08/26/19  1602 08/27/19  0536   RBC 2.38*  --  2.31* 2.15*  --  2.05*   HGB 7.9*   < > 7.9* 7.2* 7.1* 6.7*   HCT switched to HD after aneurysmal repair  - HD today - tolerating so far .  BP stable   - plan was to resume PD with cycler - will hold off till clinically improved   - hypoalbuminemia - poor oral intake - daily nepro   - encouraged oral intake   - anemia - r

## 2019-08-27 NOTE — ANESTHESIA PREPROCEDURE EVALUATION
Anesthesia PreOp Note    HPI:     Fara Ruff is a 78year old female who presents for preoperative consultation requested by: Idalmis Mayberry DO    Date of Surgery: 8/24/2019 - 8/27/2019    Procedure(s):  ESOPHAGOGASTRODUODENOSCOPY (EGD)  Indication: 11/07/2017      Calcification of aorta ([facility] [address]         Date Noted: 11/07/2017      Polycythemia         Date Noted: 11/07/2017      Hyperkalemia         Date Noted: 04/18/2017      Aortic aneurysm [facility])      History of primary hyperparathyroidism         Dariel as needed. Disp: 30 vial Rfl: 0    atenolol 50 MG Oral Tab Take 1 tablet (50 mg total) by mouth once daily. Hold if HR < 60 or B/P < 110/60.  Disp: 90 tablet Rfl: 1    Spacer/Aero Chamber Mouthpiece Does not apply Misc Use with albuterol inhaler Disp: 1 eac puff at 08/26/19 1336   atorvastatin (LIPITOR) tab 10 mg 10 mg Oral Nightly Abigail Johnston MD 10 mg at 08/26/19 2021   ipratropium-albuterol (DUONEB) nebulizer solution 3 mL 3 mL Nebulization Q6H PRN Abigail Johnston MD 3 mL at 08/27/19 0955     Ronda moreno Active member of club or organization: Not on file        Attends meetings of clubs or organizations: Not on file        Relationship status: Not on file      Intimate partner violence:        Fear of current or ex partner: Not on file        Emotionally Abdominal  - normal exam               Anesthesia Plan:   ASA:  4  Plan:   MAC  Informed Consent Plan and Risks Discussed With:  Patient      I have informed Dieudonne Camp and/or legal guardian or family member of the nature of the anesthetic plan, jenny

## 2019-08-27 NOTE — ANESTHESIA POSTPROCEDURE EVALUATION
Patient: Anjana Drilling    Procedure Summary     Date:  08/27/19 Room / Location:  [facility] ENDOSCOPY 04 / [facility] ENDOSCOPY    Anesthesia Start:  1440 Anesthesia Stop:  0444    Procedure:  ESOPHAGOGASTRODUODENOSCOPY (EGD) (N/A ) Diagnosis:       Melena      (ga

## 2019-08-27 NOTE — PLAN OF CARE
Assisting rn's with admission of patient, patient arrived to floor after receiving hd and blood on floor, when arrived pt at baseline dementia, confused/trying to climb out of bed, bp taken multiple time with sbp ranging from 60's-70's, notified dr. Yasmine Aguero,

## 2019-08-27 NOTE — OR NURSING
Dr Shahriar Brumfield notified of Hgb 6.5 from [phone] draw. Aware that total of 500ml ns bolus and Albumin 25% 12.5gm infusion completed. BP improved to 92/51, HR 66. PT remains lethargic but arousable. Orders received. Anesthesia notified of above .  Continue to monitor

## 2019-08-27 NOTE — PLAN OF CARE
Patient A&Ox 1-2. Very confused and agitated over night. BiPAP in place overnight. Fall precautions maintained. NPO after midnight for possible EGD/colonoscopy. No BM noted. Tele in place, no calls. Plan for hemodialysis in Am.  Will continue to monitor frequently for physical needs  - Identify cognitive and physical deficits and behaviors that affect risk of falls.   - [place] fall precautions as indicated by assessment.  - Educate pt/family on patient safety including physical limitations  - Instruct p

## 2019-08-27 NOTE — PAYOR COMM NOTE
--------------  ADMISSION REVIEW     [facility]  Subscriber #:  [id number]  Authorization Number: [id number]     ADMIT TO OBSERVATION STATUS ON 8/24/19  STATUS CHANGED TO INPATIENT ON 8/27/19       Admitting Physician: Claudio Carbajal MD  Attendin PERITONEAL DIALYSIS CATH INSERTION N/A 7/10/2018    Performed by Tata Marino MD at [facility] MAIN OR   • LAPAROSCOPY, SURGICAL; ABLATION, RENAL MASS LESION(S) Left     6-7 year ago had lesion removed from left kidney   • OTHER SURGICAL HISTORY  2016    Parath Current:/67   Pulse 66   Temp 98.3 °F (36.8 °C) (Oral)   Resp 20   Wt 60.2 kg   SpO2 100%   BMI 23.51 kg/m²      Physical Exam   Constitutional: No distress. Nontoxic, pleasantly conversational.  HEENT: MMM. Head: Normocephalic.   Left periorbit (*)     .3 (*)     MCH 34.2 (*)     MCHC 30.7 (*)     RDW-SD 75.6 (*)     RDW 19.5 (*)     PLT 63.0 (*)     All other components within normal limits   CK CREATINE KINASE (NOT CREATININE) - Normal     EKG SR 62  without ischemic change as intepreted days.    Disposition and Plan   Clinical Impression:  Recurrent falls  (primary encounter diagnosis)    ED CM NOTE:  Spoke with son and daughter at Patients bedside. Pt awake for conversation, but did not add any information.  Son states Pt was transferred date of birth, she knows the name of the hospital, countries, state, [facility] [employer] etc.          Allergies:    Ace Inhibitors          OTHER (SEE COMMENTS)    Medications Prior to Admission:  Albuterol Sulfate  (90 Base) MCG/ACT Inhalation Aero Soln 8.5 08/25/2019    ALB 2.9 (L) 08/24/2019    PHOS 2.9 08/24/2019    CK 67 08/24/2019     Ct Brain Or Head  Result Date: 8/25/2019  CONCLUSION:  1. No acute intracranial finding.  2. Changes of chronic small vessel disease in cerebral white matter-not unexpec Atherosclerotic calcification aorta.         Assessment/Plan:     Recurrent falls - tough situation , her delirium is a big issue , will ask PT to see , Fall precautions discussed     Acute hypercapnic respiratory failure- need to use O2 in Am and Bipap at face.  However she does not remember my name. She does not remember her dog name. Knows her son who is sitting next to her his name. Assessment and Plan:     Recurrent falls  Family is looking at a different location.   Looking at [facility]  Resp:  [18-28] 18  BP: (118-136)/(55-60) 120/56  SpO2: 100 %     - HD today  - hypoalbuminemia - poor oral intake - start on daily nepro     DISCHARGE RECOMMENDATIONS  PT Discharge Recommendations: Sub-acute rehabilitation;24 hour care/supervision    N acute fractures. I have been asked to evaluate the patient for possible GI bleeding. She reportedly has had melena for the last several days and her hemoglobin has dropped.   Patient is currently confused with the I was able to obtain history from the [place] encephalopathy   - ABG pending from today      3.  Recurrent falls   - +ve orthostatic - gave couple of doses of midodrine 2.5 mg   - monitor and repeat with volume expansion post PRBC   - repeat later today   - TSH and B12 wnl       NURSING:  pt at [facility] Normal-appearing     Procedure Performed: Procedure(s):  ESOPHAGOGASTRODUODENOSCOPY (EGD) with Hemoclip x1    Recommendations: continue protonix 40 mg iv bid. Monitor hgb q 12 hours. Clear liquid diet for now. Recommend closer observation in the icu.    MED

## 2019-08-27 NOTE — PROCEDURES
EGD Operative Report    Myla Us Patient Status:  Inpatient    3/31/1940 MRN [id number]   Location [address] [place] [address] descending duodenum and was then withdrawn to examine the duodenal bulb and gastric antrum. The endoscope was then retroflexed to examine the angulus, GE junction, cardia, body and fundus and then withdrawn to examine the esophagus.  The endoscope was then

## 2019-08-27 NOTE — CONSULTS
[facility]    Report of Consultation    Priscella Room Patient Status:  Observation    3/31/1940 MRN [id number]   Location [facility] 4W/SW/SE Attending Zak Camejo MD   Hosp Day # 0 PCP Jayme Castleman.  Mynor Pennington MD     Date of told she has high cholesterol   • Renal disorder    • Visual impairment        Past Surgical History  Past Surgical History:   Procedure Laterality Date   •      • CATARACT     • CATARACT EXTRACTION Right 11/15/2017    Dr. Tiff Little.    • D & C     • LA (LIPITOR) tab 10 mg 10 mg Oral Nightly   ipratropium-albuterol (DUONEB) nebulizer solution 3 mL 3 mL Nebulization Q6H PRN       Medications Prior to Admission:  Albuterol Sulfate  (90 Base) MCG/ACT Inhalation Aero Soln Inhale 2 puffs into the lungs cachexia      Results:     Laboratory Data:  Lab Results   Component Value Date    WBC 7.9 08/27/2019    HGB 6.7 (LL) 08/27/2019    HCT 23.1 (L) 08/27/2019    .0 (L) 08/27/2019    CREATSERUM 8.71 (H) 08/27/2019    BUN 80 (H) 08/27/2019     08/ No evidence of recent fracture or dislocation. There is demineralization of the bony structures. SOFT TISSUES:      Vascular calcifications are noted. CONCLUSION:  1. No obvious fracture deformity. 2. Demineralization. 3. Atherosclerosis.     Dictat Right and left-sided catheters are partially visualized. Surgical clips are seen in the left neck. CONCLUSION:  1. No fracture. 2. Demineralization. 3. Catheter is partially visualized. 4. Postop changes left neck.     Dictated by (CST CEREBELLUM: No edema, hemorrhage, mass, acute infarction, or significant atrophy. BRAINSTEM: No edema, hemorrhage, mass, acute infarction, or significant atrophy. CALVARIUM: No apparent fracture, mass, or other significant visible lesion.   SINUSES: Chron includes the Dose Index Registry. FINDINGS:  CSF SPACES: No hydrocephalus, subarachnoid hemorrhage, or mass. No midline shift. CEREBRUM: Cerebral atrophy. Chronic microvascular ischemic changes in the basal ganglionic regions.  WHITE MATTER: Chronic whi reduction was used. Dose information is transmitted to the [facility] [employer] of Rad[employer]) NRDR ([address]) which includes the Dose Index Registry.   FINDINGS:  CSF SPACES: The ventricles, cisterns, and sulci are dilated, but rem intracranial vasculature, presumably related to previous day contrast enhanced examination is. This degree of contrast retention is considered abnormal and correlation for renal dysfunction/kidney injury is recommended.  2. Subtle linear focus of cortical significant visible lesion. SINUSES: Limited views demonstrate no significant mucosal thickening or fluid. ORBITS: Limited views are unremarkable.    OTHER: There is calcific atherosclerosis in the cavernous segments of the internal carotid arteries and v maxillary sinus and left greater than right ethmoid sinuses. Minimal mucoperiosteal thickening in the sphenoid sinus. Nasal septum deviates towards left with left-sided nasal septal spur. Right-sided belia lamella.  SOFT TISSUES: No suspicious mass, lym Chiari malformation. PARASPINAL AREA: Normal with no visible mass. BONES: Vertebral bodies are intact .   3 mm of grade 1 degenerative spondylolisthesis of C4 on C5 and retrolisthesis of C5 on C6.  OTHER: Hypoattenuating approximately 1 cm right lower pole Atherosclerotic vascular calcification. 7. Small right mastoid effusion-unchanged. Small left pleural effusion-unchanged. 8. Centrilobular emphysema-unchanged. 9. Thyroid nodules-unchanged. 10. Slight image degradation by patient related motion artifact. Small right mastoid effusion. CERVICAL DISC LEVELS: C1-C2: Degeneration between the anterior arch of C1 and the dens. C2-C3:  Left facet arthrosis. No significant stenosis. C3-C4: Spondylotic disc bulge. No significant central narrowing.   Advanced right COMPLETE (CPT=76700)  COMPARISON: [facility], CT ABDOMEN + PELVIS (CONTRAST ONLY) (CPT=74177), 8/15/2019, 21:25. INDICATIONS: Cystitis and gross hematuria; abnormal CT examination. Extrahepatic biliary dilatation.   TECHNIQUE:   The abdomen aorta demonstrates evidence of aneurysmal dilatation on survey imaging with intraluminal echogenicity compatible with aortobi-iliac intraluminal stent graft repair. The aneurysm sac  measures up to 5.3 x 4.3 cm.    Patency and normal hepatopetal flow direct infarction, or inappropriate atrophy. Moderate chronic appearing deep white matter ischemic change in the periventricular white matter bilaterally. CEREBELLUM: No edema, hemorrhage, mass, acute infarction, or inappropriate atrophy.   BRAINSTEM: No edema, h which includes  the Dose Index Registry. FINDINGS:  BONES: Old nasal bridge fracture. No acute fracture or suspicious bone lesion. Moderate-advanced temporomandibular joint degenerative changes. Degenerative changes in the spine.  ORBITS: Old left lami [facility], [address] ABDOMINAL AORTA COMPLETE (EHV=83389), 9/02/2017, 10:06. [facility], [address] ABDOMINAL AORTA COMPLETE (SQY=01405), 6/01/2019, 9:35.  US KIDNEY/BLADDER (VJU=71063), 11/13/2017, 12:44.   US KIDNEY/BL biliary dilatation measures up to 1.0 cm distally. PANCREAS: Main pancreatic ductal dilatation measures up to 0.5 cm. No lesion, fluid collection, or atrophy. SPLEEN: No enlargement.  A well-circumscribed ovoid hypodensity in the spleen measures 1.3 x 1.7 communicating paravertebral collateral vessel. RETROPERITONEUM: No mass or lymphadenopathy is apparent. No retroperitoneal hematoma is detected. There is no asymmetric psoas muscular enlargement. BONES:   Trace anterolisthesis of L4 on L5 is appreciated. cystic lesion. In a postmenopausal female, annual sonographic surveillance is recommended. 8. Stable low-density lesion of the spleen, most likely an indolent finding.   9. Indeterminate bilateral adrenal nodules, more conspicuous on the left, not signific been removed         CONCLUSION:  1. Small left basilar pleural effusion with atelectasis and or pneumonia in the left lung base. No significant change. 2. Stable cardiomegaly. 3. Right-sided dialysis catheter with tip at the RA SVC junction 4.  Atheroscle congestion. MEDIAST/YANNI:   No visible mass or adenopathy. LUNGS/PLEURA: Persistent left retrocardiac consolidation with left basilar pleural effusion. Diffuse interstitial prominence throughout the right lung.   No significant change in the appearance of PORTABLE (CPT=71010) TIME: 09:11  COMPARISON: [facility], XR CHEST AP PORTABLE (CPT=71045), 8/11/2019, 6:15. INDICATIONS: Increased dyspnea and weakness. TECHNIQUE:   Single view. FINDINGS:  CARDIAC/VASC: The heart is mildly enlarged.  [place] endotracheal tube and nasogastric tube have been removed. 2. Congestive changes with bibasilar lung consolidation/atelectasis left greater than right slightly worse than prior study. 3. Catheters in superior vena cava. Dictated by (CST):  Yamel Moya AP PORTABLE (CPT=71010)-semi upright TIME: 0609 hours  COMPARISON: [facility], XR CHEST AP PORTABLE (CPT=71045), 8/07/2019, 10:05. [facility], XR CHEST AP PORTABLE (CPT=71045), 8/08/2019, 6:51.   INDICATIONS: Follow up aorti Nasogastric tube extends into the stomach. Double-lumen central venous catheter with the tip in the distal SVC. Left-sided PICC line with the tip in the  atrial caval junction.  LUNGS/PLEURA: Bilateral mixed alveolar and interstitial opacification in the slightly, approximately 2 cm. Distal nasogastric tube projects in the stomach. CONCLUSION:  1. Distal endotracheal tube projects at the level of the macrina. Findings were called to the floor.     Dictated by (CST): Alejandra Elkins MD on 8 pulmonary vascular congestion. MEDIAST/YANNI:   Atherosclerotic aorta with no visible aneurysm. LUNGS/PLEURA: Small bilateral pleural effusions, larger on the left. Diffuse ground-glass change throughout the right lung.  BONES: Mild degenerative disc disea Left (cpt=73501)    Result Date: 8/21/2019  PROCEDURE: XR HIP W OR WO PELVIS 1 VIEW, LEFT (CPT=73501)  COMPARISON: [facility], XR HIP W OR WO PELVIS 1 VIEW, RIGHT (CPT=73501), 8/21/2019, 10:29.   [id number]  [address] PE of the basal cisterns is appreciated. There is no extra-axial fluid collection. CEREBRUM: No acute intraparenchymal hemorrhage, edema, or cortical sulcal effacement is apparent.  There is no space-occupying lesion, mass effect, or shift of midline structure MALFORMATIONS: None. OTHER: The dural venous sinuses appear patent. CONCLUSION:  1. No acute intracranial process by noncontrast/contrast-enhanced CT technique.   2. There is no hemodynamically significant stenosis or occlusion of the anterior or p

## 2019-08-27 NOTE — PLAN OF CARE
Patient received as a transfer from the 4th floor after an EGD to fix a GI bleed. Patient was received post procedure with BP being stable. She is still drowsy but more awake now. Responds well with family at bedside.  MD Kyara Simon notified once patient was re

## 2019-08-28 NOTE — PROGRESS NOTES
[facility]      Gastroenterology Progress Note    Christine Lao Patient Status:  Inpatient    3/31/1940 MRN [id number]   Location [facility] 2W/SW Attending Cristina Greco MD   [facility] Day # 1 PCP Gopal Gardner.  Celeste Pike MD     Sub failure with hypercapnia (HCC)     Episodic mood disorder (HCC)     Abnormal MRI     Hypotension due to hypovolemia     Multiple contusions     Recurrent falls     Acute upper GI bleed     Acute blood loss anemia      Assessment:  Acute blood loss anemia w

## 2019-08-28 NOTE — CONSULTS
[facility]    Report of Consultation    Anjana Drilling Patient Status:  Inpatient    3/31/1940 MRN [id number]   Location [facility] 2W/SW Attending Tammy Pacheco MD   Hosp Day # 0 PCP Cameron Erwin.  Jared Hansen MD     Date of Admis Procedure Laterality Date   •      • CATARACT     • CATARACT EXTRACTION Right 11/15/2017    Dr. Merline Setters.    • D & C     • LAPAROSCOPIC PERITONEAL DIALYSIS CATH INSERTION N/A 7/10/2018    Performed by Stacey Bolanos MD at [address] [place] [address] OR   • LAPAROSCOPY, mcg Oral Daily   escitalopram (LEXAPRO) tablet 5 mg 5 mg Oral Daily   Fluticasone Furoate-Vilanterol (BREO ELLIPTA) 200-25 MCG/INH inhaler 1 puff 1 puff Inhalation Daily   atorvastatin (LIPITOR) tab 10 mg 10 mg Oral Nightly   ipratropium-albuterol (DUONEB) Laboratory Data:  Lab Results   Component Value Date    WBC 7.9 08/27/2019    HGB 7.5 (L) 08/27/2019    HCT 21.1 (L) 08/27/2019    .0 (L) 08/27/2019    CREATSERUM 8.71 (H) 08/27/2019    BUN 80 (H) 08/27/2019     08/27/2019    K 3.5 08/27/2

## 2019-08-28 NOTE — PLAN OF CARE
PT [facility]ED BIPAP      [facility]  [address] [facility] 10/6 AND FIO2  35% ,SAT % AND TOLERATING GOOD

## 2019-08-28 NOTE — DIETARY NOTE
ADULT NUTRITION INITIAL ASSESSMENT    Pt is at moderate nutrition risk. Pt does not meet malnutrition criteria.       RECOMMENDATIONS TO MD:  MECCA      DIAGNOSIS/PROBLEM:  Inadequate oral intake related to decreased appetite as evidenced by po intake 10-75% oz)improvied with dialysis. 156# on 8/7 but likely true wt lower noting fluid gains/edema. BMI: Body mass index is 23.42 kg/m².   BMI CLASSIFICATION: 25-29.9 kg/m2 - overweight  IBW: 120 lbs        110 % IBW  Usual Body Wt: 140-147 lbs per wt hx.but lik NEEDS:  Calories: ~3749-9400 calories/day (~30 calories per kg Actual body wt (ABW)  Protein: 65-80 grams protein/day (1.2-1.4 grams protein per kg Ideal body wt (IBW))    MONITOR AND EVALUATE/NUTRITION GOALS:  - Food and Nutrient Intake:    Monitor: adequ

## 2019-08-28 NOTE — PROGRESS NOTES
[place] [facility]    Progress Note    Williealexsander Rendon Patient Status:  Inpatient    3/31/1940 MRN [id number]   Location [facility] 2W/SW Attending Chandler Hernandez MD   [address] Day # 1 PCP Roxanne Martin.  Jonathan Kenyon MD        Subjective:     U repair in June 2019     6– ESRD on HD  Renal following     7–full code per family wishes  I believe the patient is appropriate for set up limit and I would recommend palliative care consultation        8-DVT prophylaxis  SCDs only avoid anticoagulation wit

## 2019-08-28 NOTE — PROGRESS NOTES
[facility]OSP - [facility]    Progress Note    Maliha Worthington Patient Status:  Observation    3/31/1940 MRN [id number]   Location [facility] 4W/SW/SE Attending Deborah Sears MD   Hosp Day # 1 PCP Carlie Velez.  Vivien Larkin MD        Subjective: Eyes: Pupils are equal, round, and reactive to light. EOM are normal. Right eye exhibits no chemosis, no discharge, no exudate and no hordeolum. No foreign body present in the right eye.  Left eye exhibits no chemosis, no discharge, no exudate and no hordeo Does know I am a doctor and recognizes me in her face. However she does not remember my name. She does not remember her dog name. Knows her son who is sitting next to her his name, Gorge Crain and knows Brain. Skin: Skin is warm and dry.  No lesion and no gabriel D/W pt. Of wily Laura and Brain at bedside.        Results:     Lab Results   Component Value Date    WBC 7.0 08/28/2019    HGB 7.2 (L) 08/28/2019    HCT 23.5 (L) 08/28/2019    PLT 88.0 (L) 08/28/2019    CREATSERUM 4.88 (H) 08/28/2019    BUN 49 (H) 08/28/2019

## 2019-08-28 NOTE — PAYOR COMM NOTE
TRANSFERRED TO ICU 8/27    CONTINUED STAY REVIEW    Janet Nolasco MA [facility]  Subscriber #:  [id number]  Authorization Number: [id number]    Admit date: 8/27/19  Admit time: 1127    Admitting Physician: Debbi Brady MD  Attending Physician:  Zak Covarrubias 1304 New Bag (none) Intravenous Nancy Brown, RN      sodium chloride 0.9% IV bolus 250 mL     Date Action Dose Route User    8/27/2019 1315 New Bag 250 mL Intravenous Emilee Cartagena RN      8/27 PROCEDURE NOTE  :Pre-op Diagnosis: melena , gi blee reported abdominal pain  Otherwise difficult to obtain further review of system       Physical Exam:   Blood pressure (!) 120/95, pulse 61, temperature 96.8 °F (36 °C), temperature source Temporal, resp.  rate 15, height 5' 3\" (1.6 m), weight 133 lb 3.2 oz patient is appropriate for set up limit and I would recommend palliative care consultation        8-DVT prophylaxis  SCDs only avoid anticoagulation with GI bleed     8/27 PCP NOTE  Hosp Day # 0 PCP Eunice Palma MD      Subjective:   Subjective:  Chadwick Ramírez recurrent fall in SNF      Subconjunctival hemorrhage- as a sequela  Of hospital fall will resolve and monitor     DVt px  -in light of low platelet and GI bleed on scd boots     PLAN  EGD report reviewed  Will monitor in ICU  IV PPI BID   Monitor H/H q 12 104/86 100 % — Nasal cannula 2 L/min    08/27/19 1409 — — — 87/51Abnormal  — — — —    08/27/19 1400 — 62 16 83/44Abnormal  100 % — Nasal cannula 2 L/min    08/27/19 1358 — 69 16 87/50Abnormal  100 % — High flow nasal cannula 2 L/min    08/27/19 134 2325 — 67 — — — — — — VK   08/24/19 2200 — 63 17 125/66 100 % — — — AO   08/24/19 2130 — 63 22 135/68 100 % — — — AO   08/24/19 2054 98.3 °F (36.8 °C) 66 20 133/67 100 % 132 lb 11.5 oz Nasal cannula 2 L/min AO

## 2019-08-28 NOTE — PLAN OF CARE
1 unit PRBC given for hgb of 6.7, no transfusion reactions noted. HD given this morning. Alert and oriented to self only, does not follow commands, delayed responses, drowsy. NPO.  Plan for EGD this morning, large black tarry bowel movement before going [place]

## 2019-08-28 NOTE — PHYSICAL THERAPY NOTE
PHYSICAL THERAPY TREATMENT NOTE - INPATIENT     Room Number: 225/225-A       Presenting Problem: Unwitnessed fall at [facility]    Problem List  Principal Problem:    Recurrent falls  Active Problems:    Toxic metabolic encephalopathy    Acute upper GI bleed    Ac BALANCE                                                                                                                     Static Sitting: Good  Dynamic Sitting: Fair +           Static Standing: Fair  Dynamic Standin Pollo Lerner 2 '6-Clicks' INP assistance level: supervision   Goal #3   Current Status 20ft x 1 and 10ft x 1 with HHA mod A X 1    Goal #4 Patient will negotiate 12 stairs/one curb w/ assistive device and supervision   Goal #4   Current Status Not tested    Goal #5 Patient to DR TORIBIO PEGUERO [facility]

## 2019-08-28 NOTE — PROGRESS NOTES
[place] [facility] - [facility]    Progress Note    Eva Cardenas Patient Status:  Inpatient    3/31/1940 MRN [id number]   Location [facility] 2W/SW Attending Danette Contreras MD   Hosp Day # 0 PCP Sameer Keating.  Robert Painter MD        Subjective:   Sub admission with recurrent fall in SNF      Subconjunctival hemorrhage- as a sequela  Of hospital fall will resolve and monitor     DVt px  -in light of low platelet and GI bleed on scd boots    PLAN  EGD report reviewed  Will monitor in ICU  IV PPI BID   Mo

## 2019-08-28 NOTE — OCCUPATIONAL THERAPY NOTE
OCCUPATIONAL THERAPY EVALUATION - INPATIENT     Room Number: 225/225-A  Evaluation Date: 8/28/2019  Type of Evaluation: Initial  Presenting Problem: (recurrent falls)    Physician Order: IP Consult to Occupational Therapy  Reason for Therapy: ADL/IADL Dysf sink with CGA-min A for balance, 2 instances with leg weakness. Toilet hygiene with CGA for balance while standing. General observation: Pt with poor safety awareness, insight into deficits, requiring cues throughout session for hand placement.      The PERITONEAL DIALYSIS CATH INSERTION N/A 7/10/2018    Performed by Arianne Pisano MD at [facility] MAIN OR   • LAPAROSCOPY, SURGICAL; ABLATION, RENAL MASS LESION(S) Left     6-7 year ago had lesion removed from left kidney   • OTHER SURGICAL HISTORY  2016    Parath lower body clothing?: A Lot  -   Bathing (including washing, rinsing, drying)?: A Lot  -   Toileting, which includes using toilet, bedpan or urinal? : A Little  -   Putting on and taking off regular upper body clothing?: A Little  -   Taking care of person

## 2019-08-28 NOTE — PLAN OF CARE
Problem: Patient/Family Goals  Goal: Patient/Family Long Term Goal  Description  Patient's Long Term Goal: Return to Rehab    Interventions:  - find appropriate facility  -monitor labs  -control O2 sats  - See additional Care Plan goals for specific inte PLANNING  Goal: Discharge to home or other facility with appropriate resources  Description  INTERVENTIONS:  - Identify barriers to discharge w/pt and caregiver  - Include patient/family/discharge partner in discharge planning  - Arrange for needed dischar breathe, Incentive Spirometry  - Assess the need for suctioning and perform as needed  - Assess and instruct to report SOB or any respiratory difficulty  - Respiratory Therapy support as indicated  - Manage/alleviate anxiety  - Monitor for signs/symptoms o

## 2019-08-28 NOTE — PROGRESS NOTES
[place] [facility] - [facility]    Progress Note    Clay Wyatt Patient Status:  Inpatient    3/31/1940 MRN [id number]   Location [facility] 2W/SW Attending Clarisa Alonzo MD   [facility] Day # 1 PCP Jax Turner.  Mey Bai MD       Subjective:   Lucindal unusual rashes present, no abnormal bruising noted  Back/Spine: no abnormalities noted  Musculoskeletal: full ROM all extremities good strength  no deformities  Extremities: no edema, cyanosis  Neurological:  Grossly normal    Results:     Laboratory Data:

## 2019-08-29 NOTE — OCCUPATIONAL THERAPY NOTE
Pt not seen for OT at this time secondary to per nursing pt is receiving dialysis. Will follow up with pt next date as pt is available.

## 2019-08-29 NOTE — PROGRESS NOTES
[facility]    Progress Note    Rhesa Prudent Patient Status:  Observation    3/31/1940 MRN [id number]   Location [facility] 4W/SW/SE Attending Frank Whitney MD   Hosp Day # 2 PCP William Schmitt.  Alphonso Washington MD        Subjective: Mouth/Throat: Uvula is midline, oropharynx is clear and moist and mucous membranes are normal. Mucous membranes are not pale, not dry and not cyanotic. No oral lesions. No trismus in the jaw. No dental abscesses, uvula swelling or lacerations.  No oropharyn Right cervical: No superficial cervical, no deep cervical and no posterior cervical adenopathy present. Left cervical: No superficial cervical, no deep cervical and no posterior cervical adenopathy present.         Right: No supraclavicular adeno Normal LFTs. GI consult. Checked US. Will need MRI as outpt.         Bilateral adrenal nodules. Will do urine testing. May be needs outpatient MRI. DVT prophylaxis. Due to low platelets and recurrent falls. SCDs only. No heparin.   Social work fo

## 2019-08-29 NOTE — PAYOR COMM NOTE
PT REMAINS IN ICU AFTER ENDO.    REQUESTING RECONSIDERATION OF INPT    CONTINUED STAY REVIEW    George George [place] [facility]  Subscriber #:  [id number]  Authorization Number: [id number]    Admit date: 8/27/19  Admit time: 1127    Admitting Physician: Liliana Perez 8/29/2019 0834 Given 2000 mcg Oral Donal Barbosa RN        8/28PCP NOTE  Hosp Day # 1 PCP Eunice Marie MD      Subjective:      Unable to perform ROS: Mental status change   Constitutional: Positive for activity change.    HENT: Negative for troub Eyes: Pupils are equal, round, and reactive to light. EOM are normal. Right eye exhibits no chemosis, no discharge, no exudate and no hordeolum. No foreign body present in the right eye.  Left eye exhibits no chemosis, no discharge, no exudate and no hordeo Does know I am a doctor and recognizes me in her face. However she does not remember my name. She does not remember her dog name. Knows her son who is sitting next to her his name, Freddy Sewell and knows Brain. Skin: Skin is warm and dry.  No lesion and no gabriel D/W pt. Of Right Relevance Isabella Ly and Brain at bedside.    Results:            Lab Results   Component Value Date     WBC 7.0 08/28/2019     HGB 7.2 (L) 08/28/2019     HCT 23.5 (L) 08/28/2019     PLT 88.0 (L) 08/28/2019     CREATSERUM 4.88 (H) 08/28/2019     BUN 49 (H) 0 Neurologic grossly intact with symmetric tone and strength and reflex.   Skin without gross abnormality     Results:            Lab Results   Component Value Date     WBC 6.4 08/29/2019     HGB 6.5 08/29/2019     HCT 21.3 08/29/2019     .0 08/29/2019 - anemia - resume KARMA on HD days - dose today. Receiving one unit PRBC today      2.  Mental status change: waxing and waning   -  was seen by psychiatrist and neurologist during last admit.   - mental status was gradually improving  - Prn haldol  - serum a Acute blood loss anemia with melena s/p egd with two gastric ulcers, one of which was clean based and one had visible vessel s/p placement of hemoclip x 1 successfully.    Hgb dropped today, but no signs of melena (last BM was yesterday morning).      Plan

## 2019-08-29 NOTE — PLAN OF CARE
Double RN skin check done prior to transfer off Unit. Skin check performed by this RN and DUNIA Rascon.      Wounds are as follows: laceration above the left eye, generalized bruising, growth on right breast.    Will remain available for any further questions or

## 2019-08-29 NOTE — PLAN OF CARE
Problem: Patient/Family Goals  Goal: Patient/Family Long Term Goal  Description  Patient's Long Term Goal: Return to Rehab    Interventions:  - find appropriate facility  -monitor labs  -control O2 sats  - See additional Care Plan goals for specific inte PLANNING  Goal: Discharge to home or other facility with appropriate resources  Description  INTERVENTIONS:  - Identify barriers to discharge w/pt and caregiver  - Include patient/family/discharge partner in discharge planning  - Arrange for needed dischar for suctioning and perform as needed  - Assess and instruct to report SOB or any respiratory difficulty  - Respiratory Therapy support as indicated  - Manage/alleviate anxiety  - Monitor for signs/symptoms of CO2 retention  Outcome: Progressing     Pt raghu

## 2019-08-29 NOTE — PROGRESS NOTES
[facility]    Progress Note      Subjective:     Remain confused - hgb low again. Received one more unit PRBC today     No melena     Review of Systems:     No chest pain, sob. No motor deficit.      No abdomina pain, NVD     Objective: Daily   Pantoprazole Sodium (PROTONIX) 40 mg in Sodium Chloride 0.9 % 10 mL IV push 40 mg Intravenous Q12H   Albuterol Sulfate  (90 Base) MCG/ACT inhaler 2 puff 2 puff Inhalation Q4H PRN   calcium acetate (PHOSLO) cap 667 mg 1 capsule Oral TID CC follows:   2.0 - 3.0 All indications except for mechanical prosthetic   cardiac valves. 2.5 - 3.5 Mechanical prosthetic cardiac valves. No results for input(s): BNP in the last 168 hours.   Recent Labs   Lab 08/24/19  1013   PHOS 2.9        Recent

## 2019-08-29 NOTE — PROGRESS NOTES
[facility]      Gastroenterology Progress Note    Lindle Schilder Patient Status:  Inpatient    3/31/1940 MRN [id number]   Location [facility] 2W/SW Attending Bijal Branch MD   [facility] Day # 2 PCP Anjum Osborne.  Xenia Guzman MD     Sub encephalopathy     Anemia     Acute respiratory failure with hypercapnia (HCC)     Episodic mood disorder (HCC)     Abnormal MRI     Hypotension due to hypovolemia     Multiple contusions     Recurrent falls     Acute upper GI bleed     Acute blood loss an

## 2019-08-29 NOTE — PLAN OF CARE
Pt a/o x 1-2; on o2 2LNC; bipap at night; HD today; tolerating renal diet; hemoglobin 6.5---transfuse 1 prbc; bed alarm in place;  reorientated pt to surroundings      Problem: PAIN - ADULT  Goal: Verbalizes/displays adequate comfort level or patient's sta broncho-pulmonary hygiene including cough, deep breathe, Incentive Spirometry  - Assess the need for suctioning and perform as needed  - Assess and instruct to report SOB or any respiratory difficulty  - Respiratory Therapy support as indicated  - Manage/a

## 2019-08-29 NOTE — PROGRESS NOTES
[facility]    Progress Note      Assessment and Plan:   1. Gastrointestinal bleeding– clipping of gastric ulcers–doing well clinically although the hemoglobin had fallen from 7.2-6.5.     Recommendations: 1 more unit packed red blood cells

## 2019-08-30 NOTE — OCCUPATIONAL THERAPY NOTE
OCCUPATIONAL THERAPY TREATMENT NOTE - INPATIENT        Room Number: 568/568-A  Presenting Problem: (recurrent falls)    Problem List  Principal Problem:    Recurrent falls  Active Problems:    Toxic metabolic encephalopathy    Acute upper GI bleed    Acute up    OBJECTIVE  Precautions: Bed/chair alarm(hx of vascular repair  GI bleed  log roll/no breathe holding)    WEIGHT BEARING RESTRICTION  Weight Bearing Restriction: None    PAIN ASSESSMENT  Ratin    ACTIVITY TOLERANCE  Fair    ACTIVITIES OF DAILY YOLIE

## 2019-08-30 NOTE — CONSULTS
[facility]    Report of Consultation    Miguel Lajhonbrice Patient Status:  Inpatient    3/31/1940 MRN [id number]   Location [facility] 5SW/SE Attending Radha Greene MD   [address] Day # 3 PCP Pb Bazan.  Efren Cody MD     Date of Admi History  Past Surgical History:   Procedure Laterality Date   •      • CATARACT     • CATARACT EXTRACTION Right 11/15/2017    Dr. Franceen Severe.    • D & C     • ESOPHAGOGASTRODUODENOSCOPY (EGD) N/A 2019    Performed by Nora Adhikari DO at [address] [place] [address] ENDOSCOP 100-62.5-25 MCG/INH inhaler 1 puff 1 puff Inhalation Daily   atenolol (TENORMIN) tab 25 mg 25 mg Oral Daily   Pantoprazole Sodium (PROTONIX) 40 mg in Sodium Chloride 0.9 % 10 mL IV push 40 mg Intravenous Q12H   Albuterol Sulfate  (90 Base) MCG/ACT i SpO2: 98% 98% 100%    Weight:       Height:           General: No apparent distress, well nourished, well groomed.   Head- Normocephalic, atraumatic  Eyes- No redness or swelling  ENT- Hearing intake, smell preserved, normal glutition  Neck- No masses or 08/29/2019     08/29/2019    CO2 25.0 08/29/2019    GLU 74 08/29/2019    CA 8.5 08/29/2019    ALB 2.8 (L) 08/27/2019    ALKPHO 52 (L) 08/27/2019    TP 5.3 (L) 08/27/2019    AST 20 08/27/2019    ALT 21 08/27/2019    PTT 30.8 08/21/2019    INR 1.33 (H)

## 2019-08-30 NOTE — PROGRESS NOTES
Dr. Rogel Friendly paged x2 regarding stool and current CBC, pending call back, patient stable with family at bedside. Will continue to monitor.

## 2019-08-30 NOTE — TELEPHONE ENCOUNTER
Patient requesting referral for Neurologist with [address] [place] [address]. Daughter is also wanting to get a second opinion from Dr. Kayli Pleitez because Patient is inpatient at [facility] and is not improving. Patient is still in a confused state.      Requesting call

## 2019-08-30 NOTE — PROGRESS NOTES
[facility]      Gastroenterology Progress Note    Fara Ruff Patient Status:  Inpatient    3/31/1940 MRN [id number]   Location [facility] 2W/SW Attending Tiny Barron MD   [facility] Day # 3 PCP Justus Zavala MD     Sub hemodialysis ([facility] [address]      Assessment:  Acute blood loss anemia with melena s/p egd with two gastric ulcers, one of which was clean based and one had visible vessel s/p placement of hemoclip x 1 successfully.   hgb continues to drop and patient continues to h

## 2019-08-30 NOTE — CM/SW NOTE
Pt transferred out of PCCU. Updated clinicals sent via allscripts to the preferred new location, [facility] to continue the insurance auth request. Last PT and OT notes were from 8/28.     Liaison was reaching out to discuss the pt's impulsivity with fall

## 2019-08-30 NOTE — PROGRESS NOTES
[facility]OSP - [facility]    Progress Note    Willaim Gip Patient Status:  Observation    3/31/1940 MRN [id number]   Location [facility] 4W/SW/SE Attending Aurelia Johnson MD   Hosp Day # 3 PCP Malathi Holder.  Ade Cruz MD        Subjective: Mouth/Throat: Uvula is midline, oropharynx is clear and moist and mucous membranes are normal. Mucous membranes are not pale, not dry and not cyanotic. No oral lesions. No trismus in the jaw. No dental abscesses, uvula swelling or lacerations.  No oropharyn Right cervical: No superficial cervical, no deep cervical and no posterior cervical adenopathy present. Left cervical: No superficial cervical, no deep cervical and no posterior cervical adenopathy present.         Right: No supraclavicular adeno AAA (abdominal aortic aneurysm) without rupture (HCC)  S/P repair.   CT shows possible endoleak. IR follow up as outpt. Per IR. Abnormal bladder U/S. Will need cysto. As outpt           Biliary dilation. Normal LFTs. GI consult. Checked US.

## 2019-08-30 NOTE — PAYOR COMM NOTE
--------------    STILL IN HOUSE      CONTINUED STAY REVIEW    Calin Pitt MA O  Subscriber #:  [id number]  Authorization Number: [id number]           MEDICATIONS ADMINISTERED IN LAST 1 DAY:  albuterol sulfate (VENTOLIN) (2.5 MG/3ML) 0.083% nebulizer sol

## 2019-08-30 NOTE — TELEPHONE ENCOUNTER
LMTCB with Brain POA. LMTCB with Delmy Panchal other brother. LMTCB with daughter Radhames Teresita.

## 2019-08-30 NOTE — CM/SW NOTE
Aleja Stevens from [facility] stated they are able to accept pt, but will require pt to have 24hr caregiver/family to stay w/ pt in rehab, due to pt's impulsivity.      Felisa Powell from [facility] stated they are able to accept and do not require pt to have a 24hr caregiver/

## 2019-08-30 NOTE — PROGRESS NOTES
[place] [facility] - [facility]    Progress Note    Stephanie Calabrese Patient Status:  Inpatient    3/31/1940 MRN [id number]   Location [facility] 5SW/SE Attending Alphonso Fowler MD   [address] Day # 3 PCP Cherelle Caballero.  Anival Alan MD        Subjective: 08/30/2019    CREATSERUM 6.59 (H) 08/29/2019    BUN 73 (H) 08/29/2019     08/29/2019    K 4.2 08/29/2019     08/29/2019    CO2 25.0 08/29/2019    GLU 74 08/29/2019    CA 8.5 08/29/2019    ALB 2.8 (L) 08/27/2019    ALKPHO 52 (L) 08/27/2019    BI

## 2019-08-30 NOTE — PHYSICAL THERAPY NOTE
PHYSICAL THERAPY TREATMENT NOTE - INPATIENT     Room Number: 568/568-A       Presenting Problem: Unwitnessed fall at [facility]    Problem List  Principal Problem:    Recurrent falls  Active Problems:    Toxic metabolic encephalopathy    Acute upper GI bleed    Ac room with pt with chair alarm on . Instruction to dtr when she is leaving to notify RN staff as pt will need to return to bed due to high fall risk. Dtr verbalized understanding .       The patient's Approx Degree of Impairment: 68.66% has been calculate bedside commode, etc.): A Lot   -   Moving from lying on back to sitting on the side of the bed?: A Lot   How much help from another person does the patient currently need. ..   -   Moving to and from a bed to a chair (including a wheelchair)?: A Lot   - Status Not tested    Goal #5 Patient to demonstrate independence with home activity/exercise instructions provided to patient in preparation for discharge.    Goal #5   Current Status In progress   Goal #6     Goal #6  Current Status

## 2019-08-30 NOTE — PLAN OF CARE
Problem: Patient/Family Goals  Goal: Patient/Family Long Term Goal  Description  Patient's Long Term Goal: Return to Rehab    Interventions:  - find appropriate facility  -monitor labs  -control O2 sats  - See additional Care Plan goals for specific inte PLANNING  Goal: Discharge to home or other facility with appropriate resources  Description  INTERVENTIONS:  - Identify barriers to discharge w/pt and caregiver  - Include patient/family/discharge partner in discharge planning  - Arrange for needed dischar for suctioning and perform as needed  - Assess and instruct to report SOB or any respiratory difficulty  - Respiratory Therapy support as indicated  - Manage/alleviate anxiety  - Monitor for signs/symptoms of CO2 retention  Outcome: Progressing

## 2019-08-31 NOTE — PROCEDURES
EGD Operative Report    Meera De Los Santos Patient Status:  Inpatient    3/31/1940 MRN [id number]   Location [address] [place] [address] tolerated the procedure well with no immediate complications and was transferred to the recovery area in stable condition.   Findings:      ESOPHAGUS: Normal-appearing   STOMACH: Evidence of moderate erosive gastritis seen within the body and antrum of the

## 2019-08-31 NOTE — ANESTHESIA POSTPROCEDURE EVALUATION
Patient: Joe Hidalgo    Procedure Summary     Date:  08/31/19 Room / Location:  [address] [place] [address] ENDOSCOPY 02 / [address] [place] [address] ENDOSCOPY    Anesthesia Start:  4600 Anesthesia Stop:  7773    Procedure:  ESOPHAGOGASTRODUODENOSCOPY (EGD) (N/A ) Diagnosis:  (GASTRITIS, GASTRIC

## 2019-08-31 NOTE — PROGRESS NOTES
Pulmonary/Critical Care Follow Up Note    HPI:   Jamarcus Zuñiga is a 78year old female with Patient presents with:  Fall (musculoskeletal, neurologic)      PCP Eunice Marie MD  Admission Attending Cristhian Crandall 15 Day #4    No sob  N Vitamin B-12 (VITAMIN B12) tab 2,000 mcg, 2,000 mcg, Oral, Daily  •  escitalopram (LEXAPRO) tablet 5 mg, 5 mg, Oral, Daily  •  atorvastatin (LIPITOR) tab 10 mg, 10 mg, Oral, Nightly  •  ipratropium-albuterol (DUONEB) nebulizer solution 3 mL, 3 mL, Nebuliza

## 2019-08-31 NOTE — PROGRESS NOTES
[place] [facility]    Progress Note    Fab Snell Patient Status:  Inpatient    3/31/1940 MRN [id number]   Location [facility] 5SW/SE Attending Raul Juarez MD   [facility] Day # 4 PCP Harini Soriano MD        Subjective:more Eyes: Pupils are equal, round, and reactive to light. Conjunctivae and EOM are normal.   Neck: Normal range of motion. Neck supple. Cardiovascular: Normal rate and regular rhythm. Exam reveals no gallop and no friction rub. No murmur heard.   Pulmon shows possible endoleak.  IR follow up as outpt. Per IR.         Abnormal bladder U/S. Will need cysto. As outpt         Biliary dilation.    Normal LFTs.  GI consult. Checked US. Will need MRI as outpt.          Bilateral adrenal nodules.    Will do urin

## 2019-08-31 NOTE — PLAN OF CARE
Problem: Patient/Family Goals  Goal: Patient/Family Long Term Goal  Description  Patient's Long Term Goal: Return to Rehab    Interventions:  - find appropriate facility  -monitor labs  -control O2 sats  - See additional Care Plan goals for specific inte in bed with bed in the lowest position, call light within reach and bed alarm on. Monitoring in place.    Problem: RESPIRATORY - ADULT  Goal: Achieves optimal ventilation and oxygenation  Description  INTERVENTIONS:  - Assess for changes in respiratory stat status  Description  INTERVENTIONS  - Assess for and report changes in neurological status  - Initiate measures to prevent increased intracranial pressure  - Maintain blood pressure and fluid volume within ordered parameters to optimize cerebral perfusion

## 2019-08-31 NOTE — PROGRESS NOTES
[facility] [place] [facility]    [facility] Note    Miguel Suárez Patient Status:  Inpatient    3/31/1940 MRN [id number]   Location [facility] 5SW/SE Attending Radha Greene MD   [facility] Day # 4 PCP Pb Bazan.  Efren Cody MD       Subjective:   Lucinda noted  Musculoskeletal: full ROM all extremities good strength  no deformities  Extremities: no edema, cyanosis  Neurological:  Grossly normal    Results:     Laboratory Data:  Lab Results   Component Value Date    WBC 5.7 08/31/2019    HGB 7.1 (L) 08/31/2

## 2019-08-31 NOTE — ANESTHESIA PREPROCEDURE EVALUATION
Anesthesia PreOp Note    HPI:     Bienvenido Read is a 78year old female who presents for preoperative consultation requested by: Trent Angeles DO    Date of Surgery: 8/24/2019 - 8/31/2019    Procedure(s):  ESOPHAGOGASTRODUODENOSCOPY (EGD)  Indication: Date Noted: 11/07/2017      Hyperlipidemia         Date Noted: 11/07/2017      Tobacco use         Date Noted: 11/07/2017      Hyperglycemia         Date Noted: 11/07/2017      Calcification of aorta [facility])         Date Noted: 11/07/2017      Polycythemia 0    Fluticasone Furoate-Vilanterol 200-25 MCG/INH Inhalation Aerosol Powder, Breath Activated Inhale 1 puff into the lungs daily.  Disp: 1 each Rfl: 0    ipratropium-albuterol 0.5-2.5 (3) MG/3ML Inhalation Solution Take 3 mL by nebulization every 6 (six) h Flush 0.9 % injection 10 mL 10 mL Intravenous PRN Ric Carranza,     Fluticasone-Umeclidin-Vilant (TRELEGY ELLIPTA) 100-62.5-25 MCG/INH inhaler 1 puff 1 puff Inhalation Daily Ponce Rojas MD 1 puff at 08/29/19 0887   atenolol (TENORMIN) tab 25 mg 25 m Inability: Not on file      Transportation needs:        Medical: Not on file        Non-medical: Not on file    Tobacco Use      Smoking status: Former Smoker        Packs/day: 0.50        Years: 53.00        Pack years: 26.5        Types: Cigarettes Signs: Body mass index is 23.31 kg/m². height is 1.6 m (5' 3\") and weight is 59.7 kg (131 lb 9.6 oz). Her oral temperature is 98.5 °F (36.9 °C). Her blood pressure is 140/66 and her pulse is 61. Her respiration is 17 and oxygen saturation is 98%.     08

## 2019-08-31 NOTE — PLAN OF CARE
Spoke to Eliecer at [facility] to schedule hemodialysis for August 31, 2019. Bruce will contact nurse with time.

## 2019-08-31 NOTE — PROGRESS NOTES
[place] [facility] - [facility]    Progress Note    Angelina Law Patient Status:  Inpatient    3/31/1940 MRN [id number]   Location [facility] 5SW/SE Attending Norm Leigh MD   [facility] Day # 3 PCP Juana Baugh.  Sheila Costa MD       Subjective:   Lucinda full ROM all extremities good strength  no deformities  Extremities: no edema, cyanosis  Neurological:  Grossly normal    Results:     Laboratory Data:  Lab Results   Component Value Date    WBC 7.0 08/30/2019    HGB 7.0 (L) 08/30/2019    HCT 23.1 (L) 08/3

## 2019-09-01 NOTE — PLAN OF CARE
Patient up with one assist and walker   Dialysis today as ordered  Patient redirected multiple times during shift when attempting to get up without assistance  Bed and chair alarm active during shift      Problem: Patient/Family Goals  Goal: Patient/Family of injury  - Provide assistive devices as appropriate  - Consider OT/PT consult to assist with strengthening/mobility  - Encourage toileting schedule  Outcome: Progressing     Problem: DISCHARGE PLANNING  Goal: Discharge to home or other facility with appr based on oxygen saturation or ABGs  - Provide Smoking Cessation handout, if applicable  - Encourage broncho-pulmonary hygiene including cough, deep breathe, Incentive Spirometry  - Assess the need for suctioning and perform as needed  - Assess and instruct

## 2019-09-01 NOTE — PROGRESS NOTES
[facility]    Progress Note    Sherine Zacarias Patient Status:  Inpatient    3/31/1940 MRN [id number]   Location [facility] 5SW/SE Attending Divya Stevenson MD   [facility] Day # 5 PCP Sergei Espino.  Judith Madrigal MD        Subjective: She atraumatic. Eyes: Pupils are equal, round, and reactive to light. Conjunctivae and EOM are normal.   Neck: Normal range of motion. Neck supple. Cardiovascular: Normal rate and regular rhythm. Exam reveals no gallop and no friction rub.     No murmur he without rupture (HCC)  S/P repair.   CT shows possible endoleak.  IR follow up as outpt. Per IR.         Abnormal bladder U/S. Will need cysto. As outpt         Biliary dilation.    Normal LFTs.  GI consult. Checked US. Will need MRI as outpt.          Bi

## 2019-09-01 NOTE — PROGRESS NOTES
[facility]    Progress Note      Assessment and Plan:   1. Gastrointestinal bleeding– clipping of gastric ulcers–doing well clinically and the hemoglobin is 7.5 today. Recommendations: PPI. As per gastroenterology.     2.  Falls–likely

## 2019-09-01 NOTE — PLAN OF CARE
Family given updates about overnight and current labs  Patient attempting to get out of bed/chair without assistance. Patient educated on use of call light to prevent fall and for safety.    Encouraging PO intake       Problem: Patient/Family Goals  Goal: P reduce risk of injury  - Provide assistive devices as appropriate  - Consider OT/PT consult to assist with strengthening/mobility  - Encourage toileting schedule  Outcome: Progressing     Problem: DISCHARGE PLANNING  Goal: Discharge to home or other facili supplementation based on oxygen saturation or ABGs  - Provide Smoking Cessation handout, if applicable  - Encourage broncho-pulmonary hygiene including cough, deep breathe, Incentive Spirometry  - Assess the need for suctioning and perform as needed  - Ass

## 2019-09-01 NOTE — PLAN OF CARE
Problem: Patient/Family Goals  Goal: Patient/Family Long Term Goal  Description  Patient's Long Term Goal: Return to Rehab    Interventions:  - find appropriate facility  -monitor labs  -control O2 sats  - See additional Care Plan goals for specific inte ADULT  Goal: Achieves optimal ventilation and oxygenation  Description  INTERVENTIONS:  - Assess for changes in respiratory status  - Assess for changes in mentation and behavior  - Position to facilitate oxygenation and minimize respiratory effort  - Oxyg

## 2019-09-01 NOTE — PROGRESS NOTES
[place] [facility] - [facility]    Progress Note    [facility] Patient Status:  Inpatient    3/31/1940 MRN [id number]   Location [facility] 5SW/SE Attending Deneen Soto MD   [facility] Day # 5 PCP Sandra Lepe.  Kyara Simon MD       Subjective:   Lucinda pedal pulses normal  Skin/Hair: no unusual rashes present, no abnormal bruising noted  Back/Spine: no abnormalities noted  Musculoskeletal: full ROM all extremities good strength  no deformities  Extremities: no edema, cyanosis  Neurological:  Grossly norm

## 2019-09-02 NOTE — PROGRESS NOTES
[facility]    Progress Note    Stephanie Ruiz Patient Status:  Inpatient    3/31/1940 MRN [id number]   Location [facility] 5SW/SE Attending Emily Quintanilla MD   [facility] Day # 6 PCP Jake Mercedes. Devon Harris MD        Subjective:    [place] ALB 2.7 (L) 09/01/2019    ALKPHO 52 (L) 08/27/2019    BILT 0.4 08/27/2019    TP 5.3 (L) 08/27/2019    AST 20 08/27/2019    ALT 21 08/27/2019    PTT 30.8 08/21/2019    INR 1.33 (H) 08/21/2019    T4F 1.0 08/06/2019    TSH 4.160 (H) 08/06/2019    ESRML 6 0

## 2019-09-02 NOTE — PROGRESS NOTES
[place] [facility] - [facility]    Progress Note    Dieudonne Camp Patient Status:  Inpatient    3/31/1940 MRN [id number]   Location [facility] 5SW/SE Attending Dalila Camp MD   [facility] Day # 6 PCP Elizabeth Miles.  Tish Stallings MD        Subjective: She Normocephalic and atraumatic. Eyes: Pupils are equal, round, and reactive to light. Conjunctivae and EOM are normal.   Neck: Normal range of motion. Neck supple. Cardiovascular: Normal rate and regular rhythm.   Exam reveals no gallop and no friction ru repair.   CT shows possible endoleak.  IR follow up as outpt. Per IR.         Abnormal bladder U/S. Will need cystoscopy as outpatient         Biliary dilation.    Normal LFTs.  GI consult. Checked US. Will need MRI as outpt.          Bilateral adrenal no

## 2019-09-02 NOTE — PLAN OF CARE
Problem: Patient/Family Goals  Goal: Patient/Family Long Term Goal  Description  Patient's Long Term Goal: Return to Rehab    Interventions:  - find appropriate facility  -monitor labs  -control O2 sats  - See additional Care Plan goals for specific inte PLANNING  Goal: Discharge to home or other facility with appropriate resources  Description  INTERVENTIONS:  - Identify barriers to discharge w/pt and caregiver  - Include patient/family/discharge partner in discharge planning  - Arrange for needed dischar for suctioning and perform as needed  - Assess and instruct to report SOB or any respiratory difficulty  - Respiratory Therapy support as indicated  - Manage/alleviate anxiety  - Monitor for signs/symptoms of CO2 retention  Outcome: Progressing     Problem orders. Dr. Joshua Ann was notified regarding low H&H. No further orders obtained at present time. Vital signs and labs are monitored for within normal range.

## 2019-09-02 NOTE — PLAN OF CARE
Phone call made to Bruce at 6:35pm and spoke to [facility] to arrange for Hemodialysis treatment for tomorrow 9/3/19 at 8am per Dr. Lennox Inch orders. Hemodialysis treatment will be arranged in am on tomorrow per Dr. Bruna Sotelo orders.

## 2019-09-02 NOTE — PROGRESS NOTES
[facility]    Progress Note    Sherine Zacarias Patient Status:  Inpatient    3/31/1940 MRN [id number]   Location [facility] 5SW/SE Attending Divya Stevenson MD   [address] Day # 6 PCP Sergei Espino.  Jduith Madrigal MD       Subjective:   Lucinda noted  Back/Spine: no abnormalities noted  Musculoskeletal: full ROM all extremities good strength  no deformities  Extremities: no edema, cyanosis  Neurological:  Grossly normal    Results:     Laboratory Data:  Lab Results   Component Value Date    WBC 5

## 2019-09-02 NOTE — PLAN OF CARE
Dr. Janet Harrison was notified regarding patient low H&H this morning of 6.7/22. 1. No further orders obtained at present time.

## 2019-09-02 NOTE — PLAN OF CARE
Problem: Patient/Family Goals  Goal: Patient/Family Long Term Goal  Description  Patient's Long Term Goal: Return to Rehab    Interventions:  - find appropriate facility  -monitor labs  -control O2 sats  - See additional Care Plan goals for specific inte PLANNING  Goal: Discharge to home or other facility with appropriate resources  Description  INTERVENTIONS:  - Identify barriers to discharge w/pt and caregiver  - Include patient/family/discharge partner in discharge planning  - Arrange for needed dischar for suctioning and perform as needed  - Assess and instruct to report SOB or any respiratory difficulty  - Respiratory Therapy support as indicated  - Manage/alleviate anxiety  - Monitor for signs/symptoms of CO2 retention  Outcome: Progressing     Problem

## 2019-09-02 NOTE — PLAN OF CARE
Phone call made to answering service at 7:28am to inform the on call Dr. Franky Hernandez regarding patient critical H&Ho 6.7/22. 1. Phone call awaiting from Dr. Franky Hernandez for orders.  Obtained orders from Dr. Franky Hernandez at 7:30am to type and screen paige

## 2019-09-03 NOTE — PHYSICAL THERAPY NOTE
PHYSICAL THERAPY TREATMENT NOTE - INPATIENT     Room Number: 568/568-A       Presenting Problem: Unwitnessed fall at [facility]    Problem List  Principal Problem:    Recurrent falls  Active Problems:    Toxic metabolic encephalopathy    Acute upper GI bleed    Ac Static Sitting: Fair +  Dynamic Sitting: Fair -           Static Standing: Poor +  Dynamic Standing: Poor -    ACTIVITY TOLERANCE  Pulse: 60  Heart Rate Source: Monitor     BP: 111/58  BP Location: Right arm  BP Method: Automatic  Niya session/findings; All patient questions and concerns addressed; Alarm set; Discussed recommendations with /    CURRENT GOALS     Goals to be met by: 9/15/19  Patient Goal Patient's self-stated goal is: to go home   Goal #1 Patient is

## 2019-09-03 NOTE — CM/SW NOTE
10: 48AM  RODRIGUEZ called Makenzie Bautista at [facility] - per Makenzie Bautista, [facility] to re-eval pt today 9/3. RODRIGUEZ [facility] - not coming for re-assess, still require CG.      Per previous notes and family wishes: Makenzie Bautista w/ [facility] notified - need updated PT/OT

## 2019-09-03 NOTE — TELEPHONE ENCOUNTER
Daughter Mindy Alissa aldrich would like to speak with Dr. Vivien Larkin. Her and her siblings are concerned and wondering why patient's blood count low and needing frequent transfusions? Wondering how often she will be needing this?   Please call to speak with yani

## 2019-09-03 NOTE — PROGRESS NOTES
[place] [facility] - [facility]    Progress Note    Myla Us Patient Status:  Inpatient    3/31/1940 MRN [id number]   Location [facility] 5SW/SE Attending Leland George MD   [facility] Day # 7 PCP Bascom Litten. Jasmin Nixon MD        Subjective:    [place] Jacqueline Lucio.  Elvira Thomas MD  9/3/2019

## 2019-09-03 NOTE — OCCUPATIONAL THERAPY NOTE
OCCUPATIONAL THERAPY TREATMENT NOTE - INPATIENT        Room Number: 568/568-A           Presenting Problem: (recurrent falls)    Problem List  Principal Problem:    Recurrent falls  Active Problems:    Toxic metabolic encephalopathy    Acute upper GI bleed ACTIVITIES OF DAILY LIVING ASSESSMENT  AM-PAC ‘6-Clicks’ Inpatient Daily Activity Short Form  How much help from another person does the patient currently need…  -   Putting on and taking off regular lower body clothing?: A Lot  -   Bathing (in

## 2019-09-03 NOTE — PHYSICAL THERAPY NOTE
DUNIA Gaona approves participation, reports rehab facility needs updated note. However, pt is gone for dialysis currently. Will check back later today.

## 2019-09-03 NOTE — PROGRESS NOTES
[place] [facility] - [facility]    Progress Note    Eva Cardenas Patient Status:  Observation    3/31/1940 MRN [id number]   Location [facility] 4W/SW/SE Attending Danette Contreras MD   [facility] Day # 7 PCP Sameer Keating.  Robert Painter MD        Subjective: posterior oropharyngeal erythema or tonsillar abscesses. Eyes: Pupils are equal, round, and reactive to light. EOM are normal. Right eye exhibits no chemosis, no discharge, no exudate and no hordeolum. No foreign body present in the right eye.  Left eye e Knows I am Dr. Ade Cruz know she is at [address] the month is beginning of September. Does not know the day nor the year the year was mention is 1940. Knows her 2 sons names. Skin: Skin is warm and dry. No lesion and no rash noted.  She is not d Hemoclip was not seen. DUODENUM: Normal with no evidence of blood       Acute hypercapnic respiratory failure O2 in a.m. and BiPAP at night. On breo.        AAA (abdominal aortic aneurysm) without rupture (HCC)  S/P repair.   CT shows possible

## 2019-09-03 NOTE — PROGRESS NOTES
[place] [facility]    Progress Note      Subjective:     Mental status much improved. Getting HD today     Review of Systems:     No chest pain, sob. No motor deficit.      No abdomina pain, NVD    Improve appetite     Objective:   Temp:  [97.7 °F Pantoprazole Sodium (PROTONIX) 40 mg in Sodium Chloride 0.9 % 10 mL IV push 40 mg Intravenous Q12H   Albuterol Sulfate  (90 Base) MCG/ACT inhaler 2 puff 2 puff Inhalation Q4H PRN   calcium acetate (PHOSLO) cap 667 mg 1 capsule Oral TID CC   Vitami PD with cycler - will hold off till clinically improved   - hypoalbuminemia - poor oral intake - daily nepro   - encouraged oral intake   - anemia - on KARMA      2. Mental status change: significantly improved  -  was seen by psychiatrist and neurologist du

## 2019-09-03 NOTE — PROGRESS NOTES
[facility] - [facility]      Gastroenterology Progress Note    Meera De Los Santos Patient Status:  Inpatient    3/31/1940 MRN [id number]   Location [facility] 2W/SW Attending Juliano Gottlieb MD   [facility] Day # 7 PCP Madalyn Carcamo.  Ally Hay MD     Sub with hematuria     Acute respiratory failure with hypoxia (HCC)     Acute pulmonary edema (HCC)     Toxic metabolic encephalopathy     Anemia     Acute respiratory failure with hypercapnia (HCC)     Episodic mood disorder (HCC)     Abnormal MRI     Hypoten after dialysis     Linda Altman MD  [facility] gastroenterology

## 2019-09-04 NOTE — PROGRESS NOTES
[facility]    Progress Note    Priscella Room Patient Status:  Inpatient    3/31/1940 MRN [id number]   Location [facility] 5SW/SE Attending Zak Camejo MD   [facility] Day # 8 PCP Jayme Castleman.  Mynor Pennington MD       Subjective:   Lucinda abnormal bruising noted  Back/Spine: no abnormalities noted  Musculoskeletal: full ROM all extremities good strength  no deformities  Extremities: no edema, cyanosis  Neurological:  Grossly normal    Results:     Laboratory Data:  Lab Results   Component V

## 2019-09-04 NOTE — OCCUPATIONAL THERAPY NOTE
OCCUPATIONAL THERAPY TREATMENT NOTE - INPATIENT        Room Number: 568/568-A           Presenting Problem: (recurrent falls)    Problem List  Principal Problem:    Recurrent falls  Active Problems:    Toxic metabolic encephalopathy    Acute upper GI bleed ACTIVITY TOLERANCE                         O2 SATURATIONS                ACTIVITIES OF DAILY LIVING ASSESSMENT  AM-PAC ‘6-Clicks’ Inpatient Daily Activity Short Form  How much help from another person does the patient currently need…  -   Putting on a Comment:pt maintained static standing w/ rw and min a-CGA >10 min    Patient will complete item retrieval with SPV  Comment:CGA , with mod cues for sequencing, decreased memory, cues for safety            Goals  on: 19  Frequency: 3x week

## 2019-09-04 NOTE — PLAN OF CARE
Problem: Patient/Family Goals  Goal: Patient/Family Long Term Goal  Description  Patient's Long Term Goal: Return to Rehab    Interventions:  - find appropriate facility  -monitor labs  -control O2 sats  - See additional Care Plan goals for specific inte PLANNING  Goal: Discharge to home or other facility with appropriate resources  Description  INTERVENTIONS:  - Identify barriers to discharge w/pt and caregiver  - Include patient/family/discharge partner in discharge planning  - Arrange for needed dischar broncho-pulmonary hygiene including cough, deep breathe, Incentive Spirometry  - Assess the need for suctioning and perform as needed  - Assess and instruct to report SOB or any respiratory difficulty  - Respiratory Therapy support as indicated  - Manage/a

## 2019-09-04 NOTE — PLAN OF CARE
Problem: Patient/Family Goals  Goal: Patient/Family Long Term Goal  Description  Patient's Long Term Goal: Return to Rehab    Interventions:  - find appropriate facility  -monitor labs  -control O2 sats  - See additional Care Plan goals for specific inte pt/family on patient safety including physical limitations  - Instruct pt to call for assistance with activity based on assessment  - Modify environment to reduce risk of injury  - Provide assistive devices as appropriate  - Consider OT/PT consult to cameron perspectives and choices  Outcome: Progressing     Problem: RESPIRATORY - ADULT  Goal: Achieves optimal ventilation and oxygenation  Description  INTERVENTIONS:  - Assess for changes in respiratory status  - Assess for changes in mentation and behavior  - pressure  - Maintain blood pressure and fluid volume within ordered parameters to optimize cerebral perfusion and minimize risk of hemorrhage  - Monitor temperature, glucose, and sodium.  Initiate appropriate interventions as ordered  Outcome: Progressing

## 2019-09-04 NOTE — PHYSICAL THERAPY NOTE
Attempted to see patient but she reports she just got back into bed and is too tired for PT right now, was just up with PCT as well. Will check back at a later time.

## 2019-09-04 NOTE — PROGRESS NOTES
[facility]      Gastroenterology Progress Note    Svetlana Campos Patient Status:  Inpatient    3/31/1940 MRN [id number]   Location [facility] 2W/SW Attending Deana Bucio MD   [facility] Day # 8 PCP Cali Patrick.  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       Munir Lewis MD  [facility] gastroenterology

## 2019-09-04 NOTE — CM/SW NOTE
10: 30AM  SW contacted Georgiana Thomas w/ [employer] to inquire about ins auth status this AM after RN rounds - no update, still pending. Georgiana Thomas will call SW when achieved.     01:35PM  SW contacted Irena w/ [employer] to inquire about ins auth - per Irena: still waiting for

## 2019-09-05 NOTE — PLAN OF CARE
Report given to [facility]. at [facility]  PIV and tele removed. 13:29 pt discharged from hospital left via ambulance in stable condition.

## 2019-09-05 NOTE — TELEPHONE ENCOUNTER
Bel Hassan from [facility] calling requesting that Dr. Jorge Batista put orders in patient's chart for [address]. Per Rita Montejo RN patient has been approved for [address].      Bel Hassan can be reached at [phone] if any quest

## 2019-09-05 NOTE — PROGRESS NOTES
[facility]    Progress Note      Subjective:     Feels better. Denies any sob, dizziness . Doesn't feel foggy     Review of Systems:     No chest pain, sob. No motor deficit.      No abdomina pain, NVD    Improve appetite     Objective: 100-62.5-25 MCG/INH inhaler 1 puff 1 puff Inhalation Daily   atenolol (TENORMIN) tab 25 mg 25 mg Oral Daily   Pantoprazole Sodium (PROTONIX) 40 mg in Sodium Chloride 0.9 % 10 mL IV push 40 mg Intravenous Q12H   Albuterol Sulfate  (90 Base) MCG/ACT i 09/03/19  0613   PHOS 2.3* 3.3   ALB 2.7* 2.4*       Assessment and Plan:       1.  ESRD on HD :  - PD was switched to HD after aneurysmal repair  - HD today with 1.5 liters as tolerated   - plan was to resume PD with cycler - will hold off till clinically

## 2019-09-05 NOTE — PLAN OF CARE
Problem: Patient/Family Goals  Goal: Patient/Family Long Term Goal  Description  Patient's Long Term Goal: Return to Rehab    Interventions:  - find appropriate facility  -monitor labs  -control O2 sats  - See additional Care Plan goals for specific inte PLANNING  Goal: Discharge to home or other facility with appropriate resources  Description  INTERVENTIONS:  - Identify barriers to discharge w/pt and caregiver  - Include patient/family/discharge partner in discharge planning  - Arrange for needed dischar instruct to report SOB or any respiratory difficulty  - Respiratory Therapy support as indicated  - Manage/alleviate anxiety  - Monitor for signs/symptoms of CO2 retention  Outcome: Progressing     Problem: HEMATOLOGIC - ADULT  Goal: Maintains hematologic

## 2019-09-05 NOTE — CM/SW NOTE
RN informed SW that pt is medically stable to discharge today and will need ambulance transport (Ana Lowery).  SW spoke w/ [employer] from [facility] and arranged ambulance to [employer] at [facility] from [employer] stated they have received insurance approval and

## 2019-09-05 NOTE — DISCHARGE SUMMARY
DISCHARGE SUMMARY     Clay Wyatt Patient Status:  Inpatient    3/31/1940 MRN [id number]   Location [facility] 5SW/SE Attending Albania Trevino MD   [facility] Day # 9 PCP Eunice Bai MD     Date of Admission: 2019  Date of [address] gastric ulcers, one of which was clean based and one had visible vessel s/p placement of hemoclip x 1 successfully. Drop today. Received transfusion today also. GI following patient.   Patient had one episode of black tarry stool before transfer to the  Medication List:     Discharge Medications      START taking these medications      Instructions Prescription details   amoxicillin 500 MG Caps  Commonly known as:  AMOXIL      Take 2 capsules (1,000 mg total) by mouth daily.    Quantity:  28 capsule  Refil every 6 (six) hours as needed.    Quantity:  30 vial  Refills:  0     Pravastatin Sodium 40 MG Tabs  Commonly known as:  PRAVACHOL      TAKE 1 TABLET EVERY NIGHT   Quantity:  90 tablet  Refills:  1     Spacer/Aero Chamber Mouthpiece Misc      Use with carlos into the lungs daily. ipratropium-albuterol 0.5-2.5 (3) MG/3ML Inhalation Solution  Take 3 mL by nebulization every 6 (six) hours as needed.     Spacer/Aero Chamber Mouthpiece Does not apply Misc  Use with albuterol inhaler    calcium acetate 667 MG Oral

## 2019-09-06 NOTE — PAYOR COMM NOTE
--------------  DISCHARGE REVIEW    PayorSocorro Pallas MA [facility]  Subscriber #:  [id number]  Authorization Number: [id number]    Admit date: 8/27/19  Admit time:  1127  Discharge Date: 9/5/2019  1:30 PM     Admitting Physician: Cristal Mireles MD  Attending Sumani to rehab to improve coordination and strength.        CKD stage IV  On dialysis hopefully to transition to PD when she is capable. Renal aware of patient. PD was switched to hemodialysis after aneurysmal repair.         Toxic metabolic encephalopathy.   Elma Garcia       Biliary dilation.    Normal LFTs.  GI consult. Checked US. Will need MRI as outpt.          Bilateral adrenal nodules.    Will do urine testing.  May be needs outpatient MRI.     Discharge Physical Exam:   General appearance:  alert, appears stated (four) hours as needed for Wheezing or Shortness of Breath. Stop taking on:  9/22/2019  Quantity:  1 Inhaler  Refills:  0     calcium acetate 667 MG Caps  Commonly known as:  PHOSLO      Take 1 capsule by mouth 3 (three) times daily with meals.    Refills Inhalation Aerosol Powder, Breath Activated  Inhale 1 puff into the lungs daily. Pantoprazole Sodium (PROTONIX) 40 MG Oral Tab EC  Take 1 tablet (40 mg total) by mouth 2 (two) times daily before meals.       Home Meds - Modified    atenolol 25 MG Oral Ta 9/5/2019    Time spent:  30 to 74 minutes      Electronically signed by Tiny Barron MD on 9/5/2019  9:46 PM         REVIEWER COMMENTS

## 2019-09-11 NOTE — PROGRESS NOTES
Miguel Jose Armando  : 3/31/1940  Age 78year old  female patient is admitted to [address] [place] [address] for CAROL. Chief complaint: Initial APRN Assessment/Follow up Acute metabolic,encephalopathy Acute GI bleed and S/P Fall.     HPI  Patient is a 78- Dr. Sinan Sigala.    • D & C     • ESOPHAGOGASTRODUODENOSCOPY (EGD) N/A 8/31/2019    Performed by Kody Wise DO at [facility] ENDOSCOPY   • ESOPHAGOGASTRODUODENOSCOPY (EGD) N/A 8/27/2019    Performed by Kody Wise DO at [facility] ENDOSCOPY   • LAPAROSCOPIC PERITONEAL D distress. Calm and cooperative. Appropriate. HEENT: NCAT, neck supple, no carotid bruit. No JVD. CV: RRR and no murmur. Pulm: Effort and breath sounds normal. CTAB/L. On O2 per NC. No distress, wheezes, or crackles.   Abd: Soft, NTND, BS normal, no mas s/p repair     Other  VIt B-12 [address] [place] [address] daily. Ysabel Steinberg, BLAKE  9/11/2019  10:46 AM    >35 total minutes spent with patient >50% of visit was spent in counseling and coordination of care.

## 2019-09-21 NOTE — ED INITIAL ASSESSMENT (HPI)
Pt sent for low Hgb 5.8. Dialysis pt, last dialysis was today. C/o headaches, denies any increased weakness.

## 2019-09-21 NOTE — TELEPHONE ENCOUNTER
I was paged by Jacky Tom from [place] [address] extended care regarding critical hemoglobin of 5.8. She has no prior values. Patient returned from dialysis. She denies any symptoms. Given the very low hemoglobin recommend evaluation in the emergency room.   Staff

## 2019-09-21 NOTE — PROGRESS NOTES
Christine Shilo  : 3/31/1940  Age 78year old  female patient is admitted to [address] [place] [address] for CAROL. Chief complaint: Follow up Acute metabolic,encephalopathy Acute GI bleed and S/P Fall.  C/O Nausea and Headache    HPI  Patient is a 78- communited with nurse to check BP with HA episodes. BP this morning 139/84 and P-74. Past Medical History:   Diagnosis Date   • Aortic aneurysm ([address]     Check q6months. 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  Sees Dr. Abdoul Angulo (meli date: 6/10/2019        Years since quittin.2      Smokeless tobacco: Never Used    Alcohol use:  Yes      Alcohol/week: 0.0 standard drinks      Comment: rarely    Drug use: No      ALLERGIES:    Ace Inhibitors          OTHER (SEE COMMENTS)    CODE STAT hospitalization  - serum ammonia wnl  - MRI and neuro input noted - toxic metabolic encephalopathy      Recurrent falls   - +ve orthostatic - repeat negative whiles in-patient  - TSH and B12 wnl   - CT Head/Brain negative for acute intracranial finding  -

## 2019-09-21 NOTE — PROGRESS NOTES
Bryan Fernandez  : 3/31/1940  Age 78year old  female patient is admitted to [address] [place] [address] for CAROL. Chief complaint: Follow up Acute metabolic,encephalopathy Acute GI bleed and S/P Fall.  Follow up Nausea and Headache and c/o hematemesi dizziness during PT today, BP checked 99/61, patient was asked to lay in bed with BLE elevated and rechecked post 30 minutes, BP was 125/66 and patient stable. Last 48hr BP range 99//84 and currently on Atenolol 25mg Daily.  If HA and dizziness with c OTHER SURGICAL HISTORY  2016    Parathyroid sx.     • OTHER SURGICAL HISTORY  07/10/2018    pd cath     • TONSILLECTOMY       Family History   Problem Relation Age of Onset   • Heart Attack Father 54        CAd S/P MI.    • Heart Attack Mother 43        Rhe Peritoneal Catheter. Skin: Normal color, warm, and moist. No rashes, erythema, or diaphoresis. Neuro: AOx1-2, no focal deficit, and didn't observe gait. Uses wheel chair. Psych: Flat affect.  Behavior and judgment normal.       DIAGNOSTICS/LABS:Reviewed

## 2019-09-22 NOTE — TELEPHONE ENCOUNTER
Patient seen in the ER. Patient's hemoglobin was better. Patient did not have any symptoms. CBC showed a hemoglobin of 7.6  Hemoglobin on September 5 was 8.4, on September 3 was 8.2  She had no history of bleeding.   Patient was discharged back to [facility]

## 2019-09-22 NOTE — ED PROVIDER NOTES
Patient Seen in: [facility] AND [facility] Emergency Department    History   Patient presents with:  Abnormal Result (metabolic, cardiac)      HPI    Patient presents to the ED after a hemoglobin check at dialysis today showed a hemoglobin of 5.8.   Patient jordan Onset   • Heart Attack Father 54        CAd S/P MI.    • Heart Attack Mother 43        Rheumatic fever. • Heart Disorder Brother 37        Herat aneuyrism ruptured   • Heart Surgery Brother    • Heart Disorder Brother 54        CAD S/P CABG.     • Diabet dry.   Psychiatric: She has a normal mood and affect. Her behavior is normal.   Nursing note and vitals reviewed.       ED Course        Labs Reviewed   CBC W/ DIFFERENTIAL - Abnormal; Notable for the following components:       Result Value    RBC 2.33 (*) blood counts after dialysis today. However, laboratory testing in the ED shows the patient's hemoglobin at her baseline. No history of bleeding, patient otherwise asymptomatic, no concern at this point for significant change in hemoglobin levels.   Discus

## 2019-09-23 NOTE — TELEPHONE ENCOUNTER
Dr Xenia Guzman please review pended order if agree. Patient was seen in office by Dr Arden Rascon today see TE 09/23.

## 2019-09-23 NOTE — TELEPHONE ENCOUNTER
Cortez Jennings remaggy a referral for office consult visit today, 9/23 to discuss removal for PD Cath for Pt and states can be faxed to [phone].

## 2019-09-25 NOTE — TELEPHONE ENCOUNTER
Patient is getting release from [facility] on Friday . They need a referral for oxygen.  Dx [phone] [id number].[phone]    Needs to be fax : [phone] Atte: Stephen Walton

## 2019-09-26 PROBLEM — D64.9 ANEMIA, UNSPECIFIED TYPE: Status: ACTIVE | Noted: 2019-01-01

## 2019-09-26 PROBLEM — K92.2 UPPER GI BLEED: Status: ACTIVE | Noted: 2019-01-01

## 2019-09-26 NOTE — TELEPHONE ENCOUNTER
Moe Galdamez called stating Dr. Liliana Joseph is seeing patient in [facility].    Per Moe Galdamez, patient is getting discharged tomorrow and needs order for oxygen tank and concentrator along with last progress note completed in the last 30 days faxed to [phone]

## 2019-09-26 NOTE — TELEPHONE ENCOUNTER
LAINA/ Zabrina Kelly is calling stating that they did not receive insurance information on face sheet. Please sent insurance information for orders for oxygen.      Fax : [phone]

## 2019-09-26 NOTE — TELEPHONE ENCOUNTER
Dialysis unit called with Hgb 5.9 - hgb was 6.7 g/dl on 9/23 .     Called [facility] and spoke with Nurse to transfer patient to [facility] ER     Will try to get PD catheter removed while she is in hospital

## 2019-09-27 PROBLEM — N18.4 CKD (CHRONIC KIDNEY DISEASE) STAGE 4, GFR 15-29 ML/MIN (HCC): Status: ACTIVE | Noted: 2017-04-18

## 2019-09-27 NOTE — H&P
[facility]    History and Physical    St[facility]y Shelter Patient Status:  Inpatient    3/31/1940 MRN [id number]   Location [facility] 3W/SW Attending Altagracia Nick MD   Hosp Day # 1 PCP Sandra Lepe.  Kyara Simon MD     Date:  2019 • Heart Disorder Brother 37        Herat aneuyrism ruptured   • Heart Surgery Brother    • Heart Disorder Brother 54        CAD S/P CABG.     • Diabetes Brother    • Diabetes Maternal Grandmother    • Diabetes Maternal Aunt      Social History:  Social Hi 2,000 mcg by mouth daily. amoxicillin 500 MG Oral Cap Take 2 capsules (1,000 mg total) by mouth daily. clarithromycin 250 MG Oral Tab Take 1 tablet (250 mg total) by mouth every 12 (twelve) hours.    Fluticasone Furoate-Vilanterol 200-25 MCG/INH Inhalat 16, height 4' 9.01\" (1.448 m), weight 128 lb (58.1 kg), SpO2 97 %, not currently breastfeeding. Nursing note and vitals reviewed. Constitutional: She is oriented to person, place, and time and thin. Vital signs are normal. She appears well-nourished. Head (right side): No submental, no submandibular, no preauricular, no posterior auricular and no occipital adenopathy present.         Head (left side): No submental, no submandibular, no preauricular, no posterior auricular and no occipital adenopathy p ranging less than 10 mm in size. 8-27-19: s/p egd with two gastric ulcers, one of which was clean based and one had visible vessel s/p placement of hemoclip x 1 successfully. Decline. H/O GI bleed. PPI, serial H and H. GI following pt. EGD today.        U

## 2019-09-27 NOTE — PROGRESS NOTES
Vitamin B-12 ER TBCR 2,000 mcg is Non-Formulary Medication &  Auto-Substituted to Cyanocobalamin 2000mg IR tablet  Per P&T PROTOCOL

## 2019-09-27 NOTE — CM/SW NOTE
Case Management/ Progression of Care    Readmission from   :8/24/2019 - 9/5/2019 (12 days)  Mel Gan. CM/SW consulted for discharge planning.    Patient was admitted  Thru the ED from  [facility]

## 2019-09-27 NOTE — ANESTHESIA PREPROCEDURE EVALUATION
Anesthesia PreOp Note    HPI:     Eva Cardenas is a 78year old female who presents for preoperative consultation requested by: Margo Bonilla MD    Date of Surgery: 9/26/2019 - 9/27/2019    Procedure(s):  ESOPHAGOGASTRODUODENOSCOPY (EGD)  Indicati 11/10/2017      Stress incontinence in female         Date Noted: 11/07/2017      Essential hypertension         Date Noted: 11/07/2017      Hyperlipidemia         Date Noted: 11/07/2017      Tobacco use         Date Noted: 11/07/2017      Hyperglycemia pd cath     • TONSILLECTOMY           Medications Prior to Admission:  Loperamide HCl 2 MG Oral Cap Take 2 mg by mouth 4 (four) times daily as needed for Diarrhea.  Disp:  Rfl:  Past Week at Unknown time   Acidophilus/Pectin Oral Cap Take 1 capsule by mouth Unknown at Unknown time   clarithromycin 250 MG Oral Tab Take 1 tablet (250 mg total) by mouth every 12 (twelve) hours.  Disp: 28 tablet Rfl: 0 Unknown at Unknown time   Fluticasone Furoate-Vilanterol 200-25 MCG/INH Inhalation Aerosol Powder, Breath Activat ipratropium-albuterol (DUONEB) nebulizer solution 3 mL 3 mL Nebulization BID Qasim Liriano MD 3 mL at 09/27/19 0800   atorvastatin (LIPITOR) tab 10 mg 10 mg Oral Nightly Mirna Diaz MD      No current [facility]-ordered outpatient medications on file. file        Attends meetings of clubs or organizations: Not on file        Relationship status: Not on file      Intimate partner violence:        Fear of current or ex partner: Not on file        Emotionally abused: Not on file        Physically abused: N have informed Stephanie Laredo and/or legal guardian or family member of the nature of the anesthetic plan, benefits, risks including possible dental damage if relevant, major complications, and any alternative forms of anesthetic management.    All of the

## 2019-09-27 NOTE — CONSULTS
[facility] HOSP - [facility]    Report of Consultation    Stephanie Ruiz Patient Status:  Inpatient    3/31/1940 MRN [id number]   Location [facility] 3W/SW Attending Meliton Traylor MD   Hosp Day # 1 PCP Jake Mercedes.  Devon Harris MD     Date of Admissio Surgical History  Past Surgical History:   Procedure Laterality Date   •      • CATARACT     • CATARACT EXTRACTION Right 11/15/2017    Dr. Omar Benavidez.    • D & C     • ESOPHAGOGASTRODUODENOSCOPY (EGD) N/A 2019    Performed by Nano Bermeo DO at [facility] ipratropium-albuterol (DUONEB) nebulizer solution 3 mL 3 mL Nebulization Q6H PRN   ipratropium-albuterol (DUONEB) nebulizer solution 3 mL 3 mL Nebulization BID   atorvastatin (LIPITOR) tab 10 mg 10 mg Oral Nightly   Pantoprazole Sodium (PROTONIX) 40 mg i albuterol inhaler       Allergies    Ace Inhibitors          OTHER (SEE COMMENTS)    Review of Systems:   GENERAL HEALTH: feels well otherwise, denies fever or weight loss  SKIN: denies any unusual skin lesions or rashes  EYES: no visual complaints or defi <0.045 07/15/2019    CK 67 08/24/2019    B12 >2,000 (H) 08/17/2019         Imaging:  No results found. Impression:   Intermittent melena with drop in hemoglobin to 4.7  Documented antral ulcer with visible vessel 1 month ago. Recommendations:   We

## 2019-09-27 NOTE — ED NOTES
pts son notes that pt was here recently for the same issue and bloody stools. Pt notes that she has had mild bloody stools over the past few days. Pt states that she has had formed stools with traces of dark blood, denies diarrhea. Denies blood thinners.

## 2019-09-27 NOTE — CONSULTS
[facility]    Report of Consultation    Date of Admission:  9/26/2019  Date of Consult:  9/27/2019   Reason for Consultation:     ESRD on HD     History of Present Illness:     Mariajose Haque is a 68 yr old female with pmh of ESRD on  MD DEEDEE at [address] [place] [address] MAIN OR   • LAPAROSCOPY, SURGICAL; ABLATION, RENAL MASS LESION(S) Left     6-7 year ago had lesion removed from left kidney   • OTHER SURGICAL HISTORY  2016    Parathyroid sx.     • OTHER SURGICAL HISTORY  07/10/2018    pd cath     • TONSILLECT solution 3 mL 3 mL Nebulization BID   atorvastatin (LIPITOR) tab 10 mg 10 mg Oral Nightly       Allergies    Ace Inhibitors          OTHER (SEE COMMENTS)    Review of Systems:   Constitutional: fatigue and weakness  Eyes: negative for irritation, redness a Data:  Recent Labs   Lab 09/23/19  1143 09/26/19  1931 09/27/19  0521 09/27/19  1157   RBC 2.04* 1.45* 2.24*  --    HGB 6.7* 4.7* 7.3* 7.0*   HCT 21.7* 15.4* 22.8* 21.8*   .4* 106.2* 101.8*  --    NEPRELIM 3.60 3.53 3.00  --    WBC 5.4 5.3 5.3  --

## 2019-09-27 NOTE — PAYOR COMM NOTE
--------------  ADMISSION REVIEW     Yohan Rodriguez MA [facility]  Subscriber #:  [id number]  Authorization Number: [id number]    Admit date: 9/26/19  Admit time: 2207       Admitting Physician: Jennie Schofield MD  Attending Physician:  Jennie Schofield MD  Primary C Performed by Salvatore Coronado MD at [facility] MAIN OR   • LAPAROSCOPY, SURGICAL; ABLATION, RENAL MASS LESION(S) Left     6-7 year ago had lesion removed from left kidney   • OTHER SURGICAL HISTORY  2016    Parathyroid sx.     • OTHER SURGICAL HISTORY  07/10/2018 8/26/2019          ICD-10-CM Noted POA    * (Principal) Anemia, unspecified type D64.9 9/26/2019     Anemia D64.9 Unknown Unknown    Upper GI bleed K92.2 9/26/2019            NURSING:  Pt notes that she has had mild bloody stools over the past few days.   P HGB 7.3 (L) 09/27/2019     HCT 22.8 (L) 09/27/2019     PLT 75.0 (L) 09/27/2019     Impression:   Intermittent melena with drop in hemoglobin to 4.7  Documented antral ulcer with visible vessel 1 month ago.  Pink Shaper  Recommendations:   We will proceed with upper 11*   ALT  --  7*   BILT  --  0.2   TP  --  4.2*        Impression:       1.  ESRD on HD TTS:  - HD tomm with 1 liter UF as tolerated   - start on daily nepro  - check serum phos - on phoslo TID with meals  - plan to remove PD catheter - Dr. Juwan Williamson notified

## 2019-09-27 NOTE — ED PROVIDER NOTES
Patient Seen in: [facility] AND [facility] Emergency Department      History   Patient presents with:  Abnormal Result (metabolic, cardiac)    Stated Complaint:     HPI    68-year-old female with past medical history significant for aortic aneurysm, CAD, end-sta PERITONEAL DIALYSIS CATH INSERTION N/A 7/10/2018    Performed by Gregorio Guy MD at [address] [place] [address] MAIN OR   • LAPAROSCOPY, SURGICAL; ABLATION, RENAL MASS LESION(S) Left     6-7 year ago had lesion removed from left kidney   • OTHER SURGICAL HISTORY  2016    Parath No sig cervical LAD   Neurological: Awake, alert. Normal reflexes. No cranial nerve deficit. Skin: Skin is warm and dry. No rash noted. No erythema.    Psychiatric:    ED Course     Labs Reviewed   BASIC METABOLIC PANEL (8) - Abnormal; Notable for the fo Patient's hemoglobin was 4.7 in the emergency department which is lower than noted at dialysis. Patient has consented to blood transfusion. Discussed case with Dr. Lisset Mailk who agrees with admission.   Discussed with Dr. Guicho Gale from gastroenterology who a

## 2019-09-27 NOTE — PLAN OF CARE
Problem: Patient Centered Care  Goal: Patient preferences are identified and integrated in the patient's plan of care  Description  Interventions:  - What would you like us to know as we care for you?  I was just here for this same thing, then went to jules heart rate control medications as ordered  - Initiate emergency measures for life threatening arrhythmias  - Monitor electrolytes and administer replacement therapy as ordered  Outcome: Progressing     Problem: RESPIRATORY - ADULT  Goal: Achieves optimal v function  Description  INTERVENTIONS:  - Assess bowel function  - Maintain adequate hydration with IV or PO as ordered and tolerated  - Evaluate effectiveness of GI medications  - Encourage mobilization and activity  - Obtain nutritional consult as needed

## 2019-09-27 NOTE — TELEPHONE ENCOUNTER
Dr. Cali Rm    Patient daughter , Paxtno called, patient was admitted to [facility] via ER after dialysis yesterday   Is receiving blood transfusions and has an EGD this afternoon with Dr. Inez Hall ( GI )  Daughter wanted you to be aware of the situation

## 2019-09-27 NOTE — TELEPHONE ENCOUNTER
Addressed at [facility]. Forms signed at [facility].   Patient d/c held - she was transferred to [facility] for acute anemia

## 2019-09-27 NOTE — ED INITIAL ASSESSMENT (HPI)
Care assumed from EMS. Pt from [facility] for rehab. Per EMS, pt was at dialysis today and had bloodwork performed which resulted with a hbg of 5.9. Pt denies weakness, dizziness/lightheadedness, headache, visual changes.

## 2019-09-27 NOTE — PLAN OF CARE
Problem: Patient Centered Care  Goal: Patient preferences are identified and integrated in the patient's plan of care  Description  Interventions:  - What would you like us to know as we care for you?  I was just here for this same thing, then went to jules heart rate control medications as ordered  - Initiate emergency measures for life threatening arrhythmias  - Monitor electrolytes and administer replacement therapy as ordered  Outcome: Progressing     Problem: RESPIRATORY - ADULT  Goal: Achieves optimal v

## 2019-09-28 NOTE — PROGRESS NOTES
[place] [facility]    Progress Note    Maliha Worthington Patient Status:  Inpatient    3/31/1940 MRN [id number]   Location [facility] 3W/SW Attending Deborah Sears MD   [facility] Day # 2 PCP Carlie Velez.  Vivien Larkin MD        Subjective:     C need PD cath. Removed. General Sx. Consulted. Renal following pt.         HLD. On statin.         AAA (abdominal aortic aneurysm) without rupture (HCC)  S/P repair.   CT shows possible endoleak.  IR follow up as outpt. Per IR.          Abnormal bladder U/

## 2019-09-28 NOTE — PROGRESS NOTES
[facility]  Nephrology Daily Progress Note    Berta Perez  [id number]  59 year old      HPI:   Berta Perez is a 78year old female. Feels OK except just tired. No obvious melena.        ROS:     Constitutional:  Negative for dec appropriate for age    Labs:  Lab Results   Component Value Date    WBC 5.8 09/28/2019    HGB 7.0 09/28/2019    HCT 22.2 09/28/2019    PLT 97.0 09/28/2019    CREATSERUM 5.53 09/28/2019    BUN 34 09/28/2019     09/28/2019    K 4.6 09/28/2019     Flush 0.9 % injection 10 mL, 10 mL, Intravenous, PRN  •  Acidophilus/Pectin (PROBIOTIC) CAPS 1 capsule, 1 capsule, Oral, Daily  •  atenolol (TENORMIN) tab 25 mg, 25 mg, Oral, Daily  •  calcium acetate (PHOSLO) cap 667 mg, 1 capsule, Oral, TID CC  •  Vitami bleed     Acute blood loss anemia     ESRD on hemodialysis (HCC)     Anemia, unspecified type     Upper GI bleed      Stable from renal stand point. Scheduled for routine HD today. Hb stable 7.0. Iron def. Start Venofer and Epogen.   If stable PD cathet

## 2019-09-28 NOTE — ANESTHESIA POSTPROCEDURE EVALUATION
Patient: Sherine Zacarias    Procedure Summary     Date:  09/27/19 Room / Location:  [address] [place] [address] ENDOSCOPY 01 / [address] [place] [address] ENDOSCOPY    Anesthesia Start:  1901 Anesthesia Stop:  1925    Procedure:  ESOPHAGOGASTRODUODENOSCOPY (EGD) (N/A ) Diagnosis:       Melena      (ga

## 2019-09-28 NOTE — PLAN OF CARE
Problem: Patient Centered Care  Goal: Patient preferences are identified and integrated in the patient's plan of care  Description  Interventions:  - What would you like us to know as we care for you?  I was just here for this same thing, then went to jules heart rate control medications as ordered  - Initiate emergency measures for life threatening arrhythmias  - Monitor electrolytes and administer replacement therapy as ordered  Outcome: Progressing     Problem: RESPIRATORY - ADULT  Goal: Achieves optimal v Assess bowel function  - Maintain adequate hydration with IV or PO as ordered and tolerated  - Evaluate effectiveness of GI medications  - Encourage mobilization and activity  - Obtain nutritional consult as needed  - Establish a toileting routine/schedule

## 2019-09-28 NOTE — CONSULTS
[place] [facility]    Report of Consultation    [facility] Patient Status:  Inpatient    3/31/1940 MRN [id number]   Location [facility] 3W/SW Attending Deneen Soto MD   [facility] Day # 2 PCP Sandra Lepe.  Kyara Simon MD     Date of Adm ENDOSCOPY   • ESOPHAGOGASTRODUODENOSCOPY (EGD) N/A 8/27/2019    Performed by Pradeep Valdez DO at [id number] CATH INSERTION N/A 7/10/2018    Performed by Avril Overton MD at [address] [facility] MAIN OR   • LAPAROSCOPY, SURGICAL; ABL (TENORMIN) tab 25 mg, 25 mg, Oral, Daily  •  calcium acetate (PHOSLO) cap 667 mg, 1 capsule, Oral, TID CC  •  Vitamin B-12 (VITAMIN B12) tab 2,000 mcg, 2,000 mcg, Oral, Daily  •  escitalopram (LEXAPRO) tablet 5 mg, 5 mg, Oral, Daily  •  Fluticasone Furoate K 4.6 09/28/2019     09/28/2019    CO2 29.0 09/28/2019    GLU 64 (L) 09/28/2019    CA 7.5 (L) 09/28/2019    ALB 2.2 (L) 09/28/2019    ALKPHO 37 (L) 09/27/2019    BILT 0.2 09/27/2019    TP 4.2 (L) 09/27/2019    AST 11 (L) 09/27/2019    ALT 7 (L) 09/27 Bladder non-tender and non-distended. Urine clear yellow.

## 2019-09-28 NOTE — PHYSICAL THERAPY NOTE
Attempted eval, Pt receiving dialysis at bedside, to be completed shortly, will re-attempt tomorrow, RN notified.

## 2019-09-28 NOTE — OPERATIVE REPORT
ESOPHAGOGASTRODUODENOSCOPY REPORT    Patient Name:  Basil Quiros Record #: [id number]  YOB: 1940  Date of Procedure: 9/27/2019    Referring physician: Justus Mckeon. Sherine Zavala MD    Surgeon:  Laura Levin.  Darylene Ear, MD    Pre-op diagnosis

## 2019-09-28 NOTE — PROGRESS NOTES
[facility]  Gastroenterology Progress Note    Meera Filiberto Patient Status:  Inpatient    3/31/1940 MRN [id number]   Location [facility] 3W/SW Attending Juliano Gottlieb MD   [facility] Day # 2 PCP Madalyn Carcamo.  Ally Hay MD     Subjective:  Mag Toxic metabolic encephalopathy     Anemia     Acute respiratory failure with hypercapnia (HCC)     Episodic mood disorder (HCC)     Abnormal MRI     Hypotension due to hypovolemia     Multiple contusions     Recurrent falls     Acute upper GI bleed     Acu

## 2019-09-29 NOTE — OCCUPATIONAL THERAPY NOTE
OCCUPATIONAL THERAPY EVALUATION - INPATIENT     Room Number: 344/344-A  Evaluation Date: 9/29/2019  Type of Evaluation: Initial  Presenting Problem: anemia    Physician Order: IP Consult to Occupational Therapy  Reason for Therapy: ADL/IADL Dysfunction and Anemia, unspecified type  Active Problems:    CKD (chronic kidney disease) stage 4, GFR 15-29 ml/min ([facility])    Hyperkalemia    Essential hypertension    Hyperglycemia    Age-related osteoporosis without current pathological fracture    ESRD (end stage renal Standard height toilet  Shower/Tub and Equipment: Tub-shower combo(son reports he has grabs bars and a shower chair to install)  Other Equipment: Reacher    Occupation/Status: retired  Hand Dominance: Right  Drives: Yes  Patient Regularly Uses: Glasses Putting on and taking off regular upper body clothing?: A Little  -   Taking care of personal grooming such as brushing teeth?: A Little  -   Eating meals?: A Little    AM-PAC Score:  Score: 18  Approx Degree of Impairment: 46.65%  Standardized Score (AM-P

## 2019-09-29 NOTE — PHYSICAL THERAPY NOTE
PHYSICAL THERAPY EVALUATION - INPATIENT     Room Number: 344/344-A  Evaluation Date: 9/29/2019  Type of Evaluation: Initial   Physician Order: PT Eval and Treat    Presenting Problem: admitted from [facility] due to upper GI bleed, anemia  Reason for Therapy:  Flakita Lundberg Established Goals: (10-9-19)    PHYSICAL THERAPY MEDICAL/SOCIAL HISTORY     History related to current admission:  ESRD on HD  Problem List  Principal Problem:    Anemia, unspecified type  Active Problems:    CKD (chronic kidney disease) stage 4, GFR 15-29 cath     • TONSILLECTOMY         HOME SITUATION  Type of Home: House   Home Layout: Two level  Stairs to Enter : 4  Railing: Yes  Stairs to Bedroom: 7  Railing: Yes    Lives With: Alone(family lives in the area)  Drives: Yes  Patient Owned Equipment: [employer] primary hyperparathyroidism     Calculus of kidney     Visual impairment     Nodule of upper lobe of right lung     AAA (abdominal aortic aneurysm) without rupture (HCC)     Hypercalcemia     Sepsis due to undetermined organism ([facility] [address]     Acute cystitis wit a chair (including a wheelchair)?: A Little   -   Need to walk in hospital room?: A Little   -   Climbing 3-5 steps with a railing?: A Lot     AM-PAC Score:  Raw Score: 17   Approx Degree of Impairment: 50.57%   Standardized Score (AM-PAC Scale): 42.13   C

## 2019-09-29 NOTE — PROGRESS NOTES
[facility]  Nephrology Daily Progress Note    Myla Us  [id number]  60 year old      HPI:   Myla Us is a 78year old female. Feeling well. Just weak and wants to go home. No melena.        ROS:     Constitutional:  Negat appropriate for age reflexes and motor skills appropriate for age    Labs:  Lab Results   Component Value Date    WBC 4.8 09/29/2019    HGB 7.2 09/29/2019    HCT 23.0 09/29/2019    PLT 52.0 09/29/2019    CREATSERUM 3.52 09/29/2019    BUN 14 09/29/2019    N Q12H  •  Normal Saline Flush 0.9 % injection 10 mL, 10 mL, Intravenous, PRN  •  Acidophilus/Pectin (PROBIOTIC) CAPS 1 capsule, 1 capsule, Oral, Daily  •  atenolol (TENORMIN) tab 25 mg, 25 mg, Oral, Daily  •  calcium acetate (PHOSLO) cap 667 mg, 1 capsule, falls     Acute upper GI bleed     Acute blood loss anemia     ESRD on hemodialysis (HCC)     Anemia, unspecified type     Upper GI bleed      Stable from renal stand point. Hb stable. PD catheter to be removed in am.  Next HD on Tues.  Will probably need t

## 2019-09-29 NOTE — PROGRESS NOTES
[facility]  Gastroenterology Progress Note    Stephanie Ruiz Patient Status:  Inpatient    3/31/1940 MRN [id number]   Location [facility] 3W/SW Attending Emily Quintanilla MD   [facility] Day # 3 PCP Jake Mercedes.  Devon Harris MD     Subjective:  Mag ([facility] [address]     Toxic metabolic encephalopathy     Anemia     Acute respiratory failure with hypercapnia (HCC)     Episodic mood disorder (HCC)     Abnormal MRI     Hypotension due to hypovolemia     Multiple contusions     Recurrent falls     Acute upper GI ble

## 2019-09-29 NOTE — PROGRESS NOTES
[facility] Note: Route Optimization for Pantoprazole (PROTONIX)    Patient is currently on Pantoprazole (PROTONIX) 40 mg IV every 12 hours.    The patient meets the criteria to convert to the oral equivalent as established by the IV to Oral conversion pro

## 2019-09-29 NOTE — PROGRESS NOTES
[place] [facility]    Progress Note    [facility] Patient Status:  Inpatient    3/31/1940 MRN [id number]   Location [facility] 3W/SW Attending Deneen Soto MD   [facility] Day # 3 PCP Sandra Lepe.  Kyara Simon MD        Subjective:     C be needs outpatient MRI     Due to bleeding, SCD's.      Possible dc to rehab tomorrow           Results:     Lab Results   Component Value Date    WBC 4.8 09/29/2019    HGB 7.2 (L) 09/29/2019    HCT 23.0 (L) 09/29/2019    PLT 52.0 (L) 09/29/2019    JOSE JUAN

## 2019-09-30 NOTE — BRIEF OP NOTE
Pre-Operative Diagnosis: Peritoneal dialysis catheter dysfunction ([address] [T85.611A]     Post-Operative Diagnosis: Peritoneal dialysis catheter dysfunction ([address] [Q28.580B]      Procedure Performed:   Procedure(s):  REMOVAL OF PERITONEAL DIALYSIS CATHETER

## 2019-09-30 NOTE — PROGRESS NOTES
[facility]    Progress Note      Subjective:     Feels better - no sob, cp.      Appetite and energy level better       Review of Systems:     Constitutional: fatigue and weakness  Eyes: negative for irritation, redness and visual disturba [facility] injection 4 mg 4 mg Intravenous Q6H PRN   [MAR Hold] dextrose 50 % injection 50 mL 50 mL Intravenous PRN   [MAR Hold] Glucose-Vitamin C (DEX-4) chewable tab 4 tablet 4 tablet Oral Q15 Min PRN   [MAR Hold] glucose (DEX4) oral liquid 15 g 15 g Oral Value Ref Range Status   08/21/2019 1.33 (H) 0.90 - 1.20 Final     Comment:       Only the INR (not the Protime value) should be utilized for   the monitoring of oral anticoagulant therapy.      Recommended therapeutic ranges for anticoagulant therapy are

## 2019-09-30 NOTE — CM/SW NOTE
9/30: SW received call from [facility] with [place] [address]. [place] is stating that pt needs to have peer to peer complete before authorization can be given for pt to be accepted at SNF.      MD to call for Peer to Peer by Tuesday, October 1, 2019 at:

## 2019-09-30 NOTE — ANESTHESIA PREPROCEDURE EVALUATION
Anesthesia PreOp Note    HPI:     Sukumar Chapman is a 78year old female who presents for preoperative consultation requested by: Corinne Shire, MD    Date of Surgery: 9/26/2019 - 9/27/2019    Procedure(s):  ESOPHAGOGASTRODUODENOSCOPY (EGD)  Indicati 11/10/2017      Stress incontinence in female         Date Noted: 11/07/2017      Essential hypertension         Date Noted: 11/07/2017      Hyperlipidemia         Date Noted: 11/07/2017      Tobacco use         Date Noted: 11/07/2017      Hyperglycemia LAPAROSCOPY, SURGICAL; ABLATION, RENAL MASS LESION(S) Left     6-7 year ago had lesion removed from left kidney   • OTHER SURGICAL HISTORY  2016    Parathyroid sx.     • OTHER SURGICAL HISTORY  07/10/2018    pd cath     • TONSILLECTOMY           (Not in a h Oral Daily Marley Carmona MD 5 mg at 09/30/19 1014    [MAR Hold] Fluticasone Furoate-Vilanterol (BREO ELLIPTA) 200-25 MCG/INH inhaler 1 puff 1 puff Inhalation Daily Roxanne YOU MD 1 puff at 09/29/19 0930    [MAR Hold] ipratropium-albuterol (Bety Morelle) per session: Not on file      Stress: Not on file    Relationships      Social connections:        Talks on phone: Not on file        Gets together: Not on file        Attends Buddhist service: Not on file        Active member of club or organization: Not disease dialysis,     Endo/Other      Comments: Primary hyperpyrathyroidism  Abdominal  - normal exam     Other findings: AAO x 3            Anesthesia Plan:   ASA:  4  Plan:   MAC  Informed Consent Plan and Risks Discussed With:  Patient  Discussed plan w

## 2019-09-30 NOTE — PROGRESS NOTES
[place] [facility]    Progress Note    Lindle Schilder Patient Status:  Inpatient    3/31/1940 MRN [id number]   Location [facility] 3W/SW Attending Bijal Branch MD   [facility] Day # 4 PCP Anjum Osborne.  Xenia Guzman MD        Subjective:     C Edema not present. Pulmonary/Chest: Effort normal. No accessory muscle usage or stridor. No apnea, no tachypnea and no bradypnea. She is not intubated. No respiratory distress. She has decreased breath sounds. She has no wheezes. She has no rhonchi.  She 11 (L) 09/27/2019    ALT 7 (L) 09/27/2019    PTT 30.8 08/21/2019    INR 1.33 (H) 08/21/2019    T4F 1.0 08/06/2019    TSH 4.160 (H) 08/06/2019    ESRML 6 06/22/2018    MG 2.0 09/30/2019    PHOS 2.6 09/30/2019    TROP <0.045 07/15/2019    CK 67 08/24/2019

## 2019-10-01 NOTE — OPERATIVE REPORT
[facility]    PATIENT'S NAME: Kathleen Candelario   ATTENDING PHYSICIAN: [facility] Kayli Pleitez MD   OPERATING PHYSICIAN: Mich Ervin MD   PATIENT ACCOUNT#:   [id number]    LOCATION:  [address] [place] [address] [address] RECORD #:   [id number]       DATE OF BIRTH: 1 Vicryl. Hemostasis was assured and 3-0 plain was used for subcutaneous tissue, skin closed with 4-0 Vicryl. Steri-Strips applied. Sterile dressing applied. Patient went to Recovery in good condition. Dictated By Bassam López MD  d: 09/30/2019 17

## 2019-10-01 NOTE — PROGRESS NOTES
[place] [facility]    Progress Note    Rhesa Prudent Patient Status:  Inpatient    3/31/1940 MRN [id number]   Location [facility] 3W/SW Attending Frank Whitney MD   [facility] Day # 5 PCP William Schmitt.  Alphonso Washington MD        Subjective:     C Cardiovascular: Normal rate and regular rhythm. Edema not present. Pulmonary/Chest: Effort normal. No accessory muscle usage or stridor. No apnea, no tachypnea and no bradypnea. She is not intubated. No respiratory distress.  She has decreased breath kept Stating per Medicare guidelines she can get into a long-term facility with occasional PT/OT or she can go home with home health. She continued to deny subacute rehab. Explained about patient's situation she kept saying \"per Medicare guidelines. \" one had notified me.        Anju Nichole MD  10/1/2019

## 2019-10-01 NOTE — CM/SW NOTE
Case Management/ Progression of Care    CM informed patient and Son Isabella Walter , regarding denial for SNF stat at [facility].  Family and Patient informed they have a right to appeal decision  And were provided with a phone number to call the insurance company for appe

## 2019-10-01 NOTE — CM/SW NOTE
SW received call from MD Phoebe Costa - peer to peer completed and pt's insurance denied pt SNF stay. Per insurance physician - rec that pt go to LTC or home w/ HHC. SW consulted w/  [address].  Updated MD Phoebe Costa - options for pt at this time would be [facility] as

## 2019-10-01 NOTE — PROGRESS NOTES
[place] [facility]    Progress Note      Subjective:     Seeing during HD     State sob in am with deep breathing - now feeling better.      S/p PD catheter removal yesterday    Review of Systems:     Constitutional: fatigue and weakness  Eyes: ne mL 50 mL Intravenous PRN   Glucose-Vitamin C (DEX-4) chewable tab 4 tablet 4 tablet Oral Q15 Min PRN   glucose (DEX4) oral liquid 15 g 15 g Oral Q15 Min PRN   acetaminophen (TYLENOL) tab 650 mg 650 mg Oral Q6H PRN   ipratropium-albuterol (DUONEB) nebulizer indications except for mechanical prosthetic   cardiac valves. 2.5 - 3.5 Mechanical prosthetic cardiac valves. No results for input(s): BNP in the last 168 hours.   Recent Labs   Lab 09/28/19  0545 09/29/19  0536 09/30/19  0541   MG 2.1 1.9 2.0

## 2019-10-01 NOTE — PROGRESS NOTES
[place] [facility]    Progress Note    Rishabh Painting Patient Status:  Inpatient    3/31/1940 MRN [id number]   Location [facility] 3W/SW Attending Parth [place] MD   [facility] Day # 5 PCP Jevon Shepard.  Carlie Neville MD            Subjective: 4.1 4.2    106 103 104   CO2 32.0 29.0 33.0* 32.0   ALKPHO 37*  --   --   --    AST 11*  --   --   --    ALT 7*  --   --   --    BILT 0.2  --   --   --    TP 4.2*  --   --   --                          Assessment and Plan:         ESRD (end stage meli

## 2019-10-01 NOTE — PROGRESS NOTES
Ambulatory O2 assessment completed:      On room air at rest: 93% O2 sat  On 1L nasal cannula at rest: 96% O2 sat    On room air ambulatin% O2 sat  On 1L nasal cannula ambulatin% O2 sat

## 2019-10-01 NOTE — PROGRESS NOTES
[facility]    Progress Note    Clay Wyatt Patient Status:  Inpatient    3/31/1940 MRN [id number]   Location [facility] 3W/SW Attending Clarisa Alonzo MD   [facility] Day # 5 PCP Jax Turner.  Mey Bai MD     Subjective:     PO #1 Fluticasone Furoate-Vilanterol  1 puff Inhalation Daily   • ipratropium-albuterol  3 mL Nebulization BID   • atorvastatin  10 mg Oral Nightly       Results:       Recent Labs   Lab 09/29/19  0536 09/30/19  0541 10/01/19  0551   RBC 2.20* 2.19* 2.18*   HGB

## 2019-10-02 NOTE — HOME CARE LIAISON
Received referral from [address] [place] [address]. Met with patient and son Brain at the bedside. They are agreeable to [facility]. Residential brochure provided with contact information. All questions addressed and answered.

## 2019-10-02 NOTE — OCCUPATIONAL THERAPY NOTE
OCCUPATIONAL THERAPY TREATMENT NOTE - INPATIENT        Room Number: 344/344-A           Presenting Problem: anemia    Problem List  Principal Problem:    Anemia, unspecified type  Active Problems:    CKD (chronic kidney disease) stage 4, GFR 15-29 ml/min ( PAIN ASSESSMENT  Ratin           ACTIVITY TOLERANCE  Performs all requested activity witout c/o or SOB  O2 SATURATIONS  SPO2 on Room Air at Rest: (98)  SPO2 Ambulation on Room Air: 95(95)     Ambulation oxygen flow (liters per minute): 2    ACTIVIT functional mobility task 1x.       Goals  on: 10/6/2019  Frequency: 3-5x/week

## 2019-10-02 NOTE — PROGRESS NOTES
[place] [facility]    Progress Note      Subjective:     Seeing during HD     State sob in am with deep breathing - now feeling better.      S/p PD catheter removal yesterday    Review of Systems:     Constitutional: fatigue and weakness  Eyes: ne injection 50 mL 50 mL Intravenous PRN   Glucose-Vitamin C (DEX-4) chewable tab 4 tablet 4 tablet Oral Q15 Min PRN   glucose (DEX4) oral liquid 15 g 15 g Oral Q15 Min PRN   acetaminophen (TYLENOL) tab 650 mg 650 mg Oral Q6H PRN   ipratropium-albuterol (DUON All indications except for mechanical prosthetic   cardiac valves. 2.5 - 3.5 Mechanical prosthetic cardiac valves. No results for input(s): BNP in the last 168 hours. Recent Labs   Lab 09/28/19  0545 09/29/19  0536 09/30/19  0541   MG 2.1 1.9 2.

## 2019-10-02 NOTE — CM/SW NOTE
Case Management/ Progression of Care    Peer to Peer denied CAR[facility], Patient and family were offered the option of Out of pocket pay for SNF or Home with Home Health. Patient plans on hiring care givers at home and will take patient home with Home Health.

## 2019-10-02 NOTE — CM/SW NOTE
10/02/19 1500   Discharge disposition   Expected discharge disposition Home-Health   Name of Facillity/Home Care/Hospice Residential   Discharge transportation   (Family )   MDO received for discharge, patient discharging with Residential HHC, Oxygen, f

## 2019-10-02 NOTE — PHYSICAL THERAPY NOTE
PHYSICAL THERAPY TREATMENT NOTE - INPATIENT     Room Number: 951/980-H       Presenting Problem: admitted from [facility] due to upper GI bleed, anemia    Problem List  Principal Problem:    Anemia, unspecified type  Active Problems:    CKD (chronic kidney diseas Treatment Plan: Bed mobility; Body mechanics; Endurance; Energy conservation;Patient education;Gait training;Strengthening;Stoop training;Stair training;Transfer training;Balance training    SUBJECTIVE  Pt was agreeable to therapy session.      OBJECTIVE  Prec sets  LAQ     Position Sitting       Patient End of Session: Up in chair;Needs met;Call light within reach;RN aware of session/findings; All patient questions and concerns addressed    CURRENT GOALS   Goals to be met by:  10-9-19  Patient Goal Patient's randy

## 2019-10-02 NOTE — DISCHARGE SUMMARY
DISCHARGE SUMMARY     Myla Us Patient Status:  Inpatient    3/31/1940 MRN [id number]   Location [facility] 3W/SW Attending Leland Holloway., MD   [facility] Day # 6 PCP Bascom Litten.  Jasmin Nixon MD     Date of Admission: 2019  Date of Discharge reactive to light. EOM are normal.   Neck: Normal range of motion. Neck supple. Cardiovascular: Normal rate and regular rhythm. Exam reveals no gallop and no friction rub. No murmur heard.   Pulmonary/Chest: Effort normal and breath sounds normal. No clarithromycin 250 MG Tabs  Commonly known as:  BIAXIN      Take 1 tablet (250 mg total) by mouth every 12 (twelve) hours.    Quantity:  28 tablet  Refills:  0     escitalopram 5 MG Tabs  Commonly known as:  LEXAPRO      Take 1 tablet (5 mg total) by mout mg total) by mouth daily. Fluticasone-Umeclidin-Vilant 100-62.5-25 MCG/INH Inhalation Aerosol Powder, Breath Activated  Inhale 1 puff into the lungs daily.     Pantoprazole Sodium (PROTONIX) 40 MG Oral Tab EC  Take 1 tablet (40 mg total) by mouth 2 (two) INSTRUCTIONS: See electronic chart    Liset Mckay MD 10/2/2019    Time spent:  30 to 74 minutes

## 2019-10-02 NOTE — PROGRESS NOTES
[facility]    Progress Note    Jenny Elam Patient Status:  Inpatient    3/31/1940 MRN [id number]   Location [facility] 3W/SW Attending Roxane Ramirez MD   [facility] Day # 6 PCP Savanna Chacon.  Valdemar Red MD        Subjective:she is She exhibits no distension and no mass. There is no hepatosplenomegaly. There is no tenderness. Musculoskeletal: Normal range of motion. Neurological: She is alert and oriented to person, place, and time. Skin: Skin is warm and dry. Psychiatric:  Sh 09/30/2019    CO2 32.0 09/30/2019    GLU 75 09/30/2019    CA 7.7 (L) 09/30/2019    ALB 2.2 (L) 09/30/2019    ALKPHO 37 (L) 09/27/2019    BILT 0.2 09/27/2019    TP 4.2 (L) 09/27/2019    AST 11 (L) 09/27/2019    ALT 7 (L) 09/27/2019    PTT 30.8 08/21/2019

## 2019-10-03 NOTE — TELEPHONE ENCOUNTER
Chiquita from [facility] R[facility] called reg order for home care services.  Pt was to start today but pt has dialysis and will be seen tomorrow at 9

## 2019-10-03 NOTE — TELEPHONE ENCOUNTER
Patient unable to come to [place] office she does not drive requesting to be seen in [facility]

## 2019-10-03 NOTE — TELEPHONE ENCOUNTER
Patient is unable to come on Oct 15 at 3:30PM ([facility]). Can we use your [id number] slot for Oct 17 at 1:30PM?    Please confirm. Thank you.

## 2019-10-03 NOTE — TELEPHONE ENCOUNTER
S/w patient for TCM. SHe was discharged 10/2, GI Bleed. [facility] unable to find any openings for TCM HFU with PCP. Please advise and assist patient in scheduling TCM HFU. Last date for TCM is 10/16/2019. Thank you!

## 2019-10-03 NOTE — TELEPHONE ENCOUNTER
Spoke to patient's daughter (caller) and relayed Dr. Jairo Callahan advice.  Phone# for [facility] provided ([phone])

## 2019-10-03 NOTE — TELEPHONE ENCOUNTER
She should be taking atenolol 25 a day. Protonix 40 twice a day. Lexapro 5 a day. Pravachol  40 at night. B12 2000 a day. She should have those prescriptions from previous admission. Had an off and that is why they did not send it through?

## 2019-10-03 NOTE — PROGRESS NOTES
Initial Post Discharge Follow Up   Discharge Date: 10/2/19  Contact Date: 10/3/2019    Consent Verification:  Assessment Completed With: Patient  HIPAA Verified?   Yes    Discharge Dx:   gi bleed    General:   • How have you been since your discharge from Sodium (PROTONIX) 40 MG Oral Tab EC Take 1 tablet (40 mg total) by mouth 2 (two) times daily before meals. Disp: 30 tablet Rfl: 1   escitalopram 5 MG Oral Tab Take 1 tablet (5 mg total) by mouth daily.  Disp: 30 tablet Rfl: 0   Fluticasone Furoate-Vilantero BONE DENSITOMETRY with [address] ([address])    It is recommended that you wear a 2 piece outfit that does not contain any metal such as snaps and zippers, etc. Attention women: Tiara she does not believe she needs the oxygen. NCM instructed patient to continue oxygen as ordered and PCP can address concern at [facility] appt. She verbalized understanding. She states that she has some issues with constipation.  She can not remember when the last

## 2019-10-03 NOTE — TELEPHONE ENCOUNTER
Patient's daughter calling stating patient was discharged form hospital yesterday and was given a list of discharge medications. Per daughter, patient is only taking atenolol and pravastatin regularly.    Per daughter, patient did not receive any of the o

## 2019-10-03 NOTE — TELEPHONE ENCOUNTER
Yes. There are facilities close by.      Connect with  at the dialysis unit and they will initial the paper work as well

## 2019-10-03 NOTE — TELEPHONE ENCOUNTER
[address] states that pt goes to Dialysis in [facility] on [address], but Angel Salazar wants to know if there is another location, which will be closer to the pt home in [place]?

## 2019-10-04 NOTE — TELEPHONE ENCOUNTER
Patient's son returning call. Advised of Dr. Magdi Lopez message below. Asking if medication list could be sent via [address] Advised already done. All questions answered.

## 2019-10-04 NOTE — TELEPHONE ENCOUNTER
Daughter is requesting a portable oxygen machine to be able to use when dining out. Please advise on DME order.     Please route to clinical staff

## 2019-10-04 NOTE — PAYOR COMM NOTE
--------------  DISCHARGE REVIEW    Manuel Mullins MA [facility]  Subscriber #:  [id number]  Authorization Number: [id number]    Admit date: 9/26/19  Admit time:  2207  Discharge Date: 10/2/2019  4:33 PM     Admitting Physician: Clarisa Alonzo MD  Attending Phys yesterday , had hd yesterday         HLD. On statin.         AAA (abdominal aortic aneurysm) without rupture (HCC)  S/P repair.   CT shows possible endoleak.  IR follow up as outpt. per IR.       Abnormal bladder U/S. Will need cysto.  As outpt        Tabs  Commonly known as:  TYLENOL      Take 650 mg by mouth every 8 (eight) hours as needed for Pain (Headache). Refills:  0     Acidophilus/Pectin Caps  Commonly known as:  PROBIOTIC      Take 1 capsule by mouth daily.    Refills:  0     amoxicillin 500 inhaler   Quantity:  1 each  Refills:  0     Vitamin B-12 ER 2000 MCG Tbcr      Take 2,000 mcg by mouth daily.    Refills:  0            Discharge Plan:  Discharge Condition: Stable    Current Discharge Medication List    Home Meds - Unchanged    Loperamide [address]  [phone]    In 1 week      Thiago Leonard MD  [address]  [address]  [phone]    Schedule an appointment as soon as possible for a visit  For follow up in 1-2 weeks, call to schedule apt    Danette Contreras MD

## 2019-10-04 NOTE — TELEPHONE ENCOUNTER
Chiquita from [facility] call anssatish stated they started home are service and will providing with nurse, Physical therapy and occupational therapy is it ok to send order please call her back       Please advise

## 2019-10-07 NOTE — TELEPHONE ENCOUNTER
Thank you. As you mention, we can always give a referral for [facility]. If she prefers to talk to a physician, she can make an appointment to see Dr. Cris Palma sometime next week.   If she prefer to see someone quickly, I will be happy to

## 2019-10-07 NOTE — TELEPHONE ENCOUNTER
David Nix for [facility] asking for verbal order to draw patients blood tomorrow morning and will drop off at lab after.        Please advise

## 2019-10-07 NOTE — TELEPHONE ENCOUNTER
Daughter called to follow-up on [facility] order for portable oxygen tank. Informed daughter that order was faxed to Roly Gonzalez today. Daughter given contact number for [facility] to find out how long delivery will take.

## 2019-10-07 NOTE — TELEPHONE ENCOUNTER
Kyra/daughter [phone] is calling for status of the order she requested. Daughter would like to confirm that it was sent out and also to see when then can expect the portable oxygen machine.

## 2019-10-07 NOTE — TELEPHONE ENCOUNTER
No answered, left message saying that we already fax the prescription for the oxygen machine to [address] [place] [address] will give her a call when they are going to send the machine. To please call us if she has any question.

## 2019-10-07 NOTE — TELEPHONE ENCOUNTER
Daughter inquiring if pt can received dialysis treatment at the dialysis center located at [facility]. Please call [phone]

## 2019-10-07 NOTE — TELEPHONE ENCOUNTER
Action Requested: Summary for Provider     []  Critical Lab, Recommendations Needed  [x] Need Additional Advice  []   FYI    []   Need Orders  [] Need Medications Sent to Pharmacy  []  Other     SUMMARY: Fer Roper for .     I received a call from

## 2019-10-07 NOTE — TELEPHONE ENCOUNTER
[facility]--See Dr. Everardo Jurado's advice below. I spoke to the patient's daughter about Dr. Everardo Jurado's advice.  [facility] located on the 4th floor of the [facility] but is not part of [facility] AND [facility].

## 2019-10-08 NOTE — TELEPHONE ENCOUNTER
Attempted to contact patient. A female picked up the phone and stated patient is busy right now. Requested that RN call back patient later.

## 2019-10-08 NOTE — TELEPHONE ENCOUNTER
Dr Peña Yang, [address]. Ej Raphael, nurse [facility], stated that labs (CBC, CMP) drawn at dialysis, due to patient's vein access. Results will be faxed to Dr ePña Yang, on behalf of Dr Celeste Pike.

## 2019-10-08 NOTE — TELEPHONE ENCOUNTER
Hi [employer]   Please authorize DME for home oxygen for   Referral [id number] which has Liter flow for  [facility]   Other referrals/order do not have liter flow  And Home medical will not accept them  Thank you.    Enma Pimentel RN

## 2019-10-08 NOTE — TELEPHONE ENCOUNTER
Called Daughter - ok to transfer patient to [facility] as its close to their home.      Will discuss with FA from [facility] and Cindy Siegel

## 2019-10-08 NOTE — TELEPHONE ENCOUNTER
Daughter (not SHAHBAZ) calling and states that Hadley Salinas RN should be visiting the patient today around 0800 am-10 am but did not show up, states that they do not have any information regarding the [place] office number, requesting for the number, given [phone] [facility]

## 2019-10-08 NOTE — TELEPHONE ENCOUNTER
Spoke to daughter. She said  at [address] [facility] told her to call this office. Daughter wants to know if patient changes her dialysis site to [facility], will she still be able to see Dr. Elías Welch there.  She also wants to know if they will have a chair

## 2019-10-09 NOTE — TELEPHONE ENCOUNTER
Relayed Dr message to Pt's son, stated Pt can't come today d/t nurse visit, dialysis tomorrow, will call back for Friday appt with  Dr Mehreen Garcia

## 2019-10-10 NOTE — TELEPHONE ENCOUNTER
AS Vidya Reese , Occupational Therapist from [facility] called. Patient  has been evaluated, received one OT  Session  and ADl's are good. She will not have any further OT sessions.

## 2019-10-14 NOTE — TELEPHONE ENCOUNTER
Patient daughter called stating patient's dialysis got moved so they will be running 10-15 minutes late. Please advise. She is aware this appointment is at [facility].

## 2019-10-15 NOTE — TELEPHONE ENCOUNTER
Order was faxed today to [facility] at 10:10am. Confirmation was received. Spk to [place] who confirmed fax was received. [facility] will contact daughter to schedule a .

## 2019-10-16 NOTE — TELEPHONE ENCOUNTER
[address] from [facility] Is discharging patient from skilled nursing and needs a verbal order.

## 2019-10-16 NOTE — TELEPHONE ENCOUNTER
Dr Jason Martinez, please advise. Cinda Reese, [facility], called to ask for order to continue home health PT for patient, 2 times a week for 2 more weeks.

## 2019-10-16 NOTE — TELEPHONE ENCOUNTER
Detailed message informing of Dr. Alaina Perez approval was left for [facility].

## 2019-10-17 PROBLEM — J44.9 COPD (CHRONIC OBSTRUCTIVE PULMONARY DISEASE) (HCC): Status: ACTIVE | Noted: 2019-01-01

## 2019-10-17 NOTE — PATIENT INSTRUCTIONS
ASSESSMENT/PLAN:   Ckd (chronic kidney disease) stage 4, gfr 15-29 ml/min (hcc)  (primary encounter diagnosis) Follow up with renal.     Anemia, unspecified type Check blood. Acute respiratory failure with hypoxia (hcc) Needs oxygen concentrator.  Danny Jackson

## 2019-10-17 NOTE — PROGRESS NOTES
HPI:    Patient ID: Sukumar Chapman is a 78year old female.     Recived flu shot while in hospital.    Headache    Pertinent negatives include no abdominal pain, coughing, dizziness, ear pain, eye pain, eye redness, fever, nausea, neck pain, numbness, ph Component Value Date/Time    GLU 75 09/30/2019 05:41 AM     09/30/2019 05:41 AM    K 4.2 09/30/2019 05:41 AM     09/30/2019 05:41 AM    CO2 32.0 09/30/2019 05:41 AM    CREATSERUM 5.38 (H) 09/30/2019 05:41 AM    CA 7.7 (L) 09/30/2019 05:41 AM Genitourinary: Negative for decreased urine volume, difficulty urinating, dysuria, flank pain, frequency, hematuria, urgency, vaginal bleeding, vaginal discharge and vaginal pain. Musculoskeletal: Negative for neck pain. Skin: Negative for rash.    Bernard Santillan clarithromycin 250 MG Oral Tab, Take 1 tablet (250 mg total) by mouth every 12 (twelve) hours. , Disp: 28 tablet, Rfl: 0  Fluticasone-Umeclidin-Vilant 100-62.5-25 MCG/INH Inhalation Aerosol Powder, Breath Activated, Inhale 1 puff into the lungs daily. , Disp Performed by Lizzie Cole MD at [facility] MAIN OR   • ESOPHAGOGASTRODUODENOSCOPY (EGD) N/A 9/27/2019    Performed by Paulo Zacarias MD at [facility] ENDOSCOPY   • ESOPHAGOGASTRODUODENOSCOPY (EGD) N/A 8/31/2019    Performed by Sky Doherty DO at [facility] ENDOSCOPY   • Right Ear: Tympanic membrane, external ear and ear canal normal. No lacerations. No drainage, swelling or tenderness. No foreign bodies. No mastoid tenderness.  Tympanic membrane is not injected, not scarred, not perforated, not erythematous, not retracted Eyes: Pupils are equal, round, and reactive to light. Conjunctivae and EOM are normal. Right eye exhibits no chemosis, no discharge, no exudate and no hordeolum. No foreign body present in the right eye.  Left eye exhibits no chemosis, no discharge, no exud Head (left side): No submental, no submandibular, no preauricular, no posterior auricular and no occipital adenopathy present. She has no cervical adenopathy.         Right cervical: No superficial cervical, no deep cervical and no posterior cervic Fasciculations: absent  Tongue deviation: none    Gait, Coordination, and Reflexes     Reflexes   Right biceps: 2+  Left biceps: 2+  Right patellar: 2+  Left patellar: 2+           ASSESSMENT/PLAN:   Ckd (chronic kidney disease) stage 4, gfr 15-29 ml/min (

## 2019-10-18 NOTE — TELEPHONE ENCOUNTER
She has an order in there to get an oxygen concentrator DME order. Which was authorized. Not sure was going on with that. In the meantime she was still supposed to follow-up with pulmonary.

## 2019-10-18 NOTE — TELEPHONE ENCOUNTER
Followed up with patient, not home, per daughter Mychal Keating) pt was seen today in [address], is currently using oxygen, is not sure of any ongoing issues with her machine.  Please clarify if patient is to follow up with Dr Neha Lockett regarding obtaining an oxygen concentra

## 2019-10-18 NOTE — TELEPHONE ENCOUNTER
Patient's son Max asking for Dr. Phoebe Costa to call him. He wouldn't state exact reason why.  He was frustrated because they went to [facility] today to have labs drawn and were turned away because patient did not fast.

## 2019-10-18 NOTE — TELEPHONE ENCOUNTER
Dr. Helen Flower spoke to Simran at [facility] supplies. Simran states their office has LM several messages for daughter and patient. Supplies are ready for patient. Spk to patient, HME information provided.  Patient will contact supplies store to set up a

## 2019-10-18 NOTE — TELEPHONE ENCOUNTER
Informed patient's son his mother's labs were to be to fast prior due to the Lipid Panel on order. Son was unaware at the time.   States his mother was poorly treated by  at [facility] [employer] [place].  Informed son to make a complaint against that worker from [address] [place] [address].

## 2019-10-20 NOTE — ED INITIAL ASSESSMENT (HPI)
Pt presents to ED with son who states pt was altered upon him arriving at the house today. Pt lives alone, uses oxygen, is on dialysis and has hx transfusions.  Pt is alert and verbal, states she normally wears oxygen but drove herself to [employer] for ciga

## 2019-10-20 NOTE — ED PROVIDER NOTES
Patient Seen in: [facility] AND [facility] Emergency Department      History   Patient presents with:  Altered Mental Status (neurologic)    Stated Complaint:     HPI    Patient is a 14-year-old female with a history of COPD chronic kidney disease she is on dial ESOPHAGOGASTRODUODENOSCOPY (EGD) N/A 9/27/2019    Performed by Akshat Cook MD at [address] [place] [address] ENDOSCOPY   • ESOPHAGOGASTRODUODENOSCOPY (EGD) N/A 8/31/2019    Performed by Deangelo Cervantes DO at [address] [place] [address] ENDOSCOPY   • ESOPHAGOGASTRODUODENOSCOPY (EGD) N/A 8/27/2019 normal. Pupils are equal, round, and reactive to light. Neck: Neck supple. Cardiovascular: Normal rate, regular rhythm, normal heart sounds and intact distal pulses. Pulmonary/Chest: Effort normal and breath sounds normal. No respiratory distress. orders. RAINBOW DRAW BLUE   RAINBOW DRAW LAVENDER   RAINBOW DRAW LIGHT GREEN   RAINBOW DRAW GOLD          Ct Brain Or Head ([id number])    Result Date: 10/20/2019  CONCLUSION:  1. Mild cerebral cortical atrophy.   Moderate chronic white matter microvascular is health screening including reassessment of your blood pressure.     Medications Prescribed:  Current Discharge Medication List

## 2019-10-21 NOTE — TELEPHONE ENCOUNTER
Per Olamideilee Holstein, son of patient, patient needs to come and see Dr Mey Bai for ER f/u on Friday for medication also due to possible confusion. But no available appointment except \"res 24\" or a TCM appointment. Pls adviseNannette you.     Please reply to pool: JOHN PAUL ANGELO

## 2019-10-21 NOTE — TELEPHONE ENCOUNTER
Spoke to son Shaka Burks, he confirmed the appointment. Patient was taken to [facility] ER yesterday for confusion. Patient got in her car and went to  a pack of cigarettes, son was concerned and took her to the ER.   There was a bruise on her back and an xray w

## 2019-10-21 NOTE — TELEPHONE ENCOUNTER
OK. Forms filled. She will eihter need to bring to [facility] after she fills her rinfo. Or we can mail after she brings her info. Needs to be scanned into our records.

## 2019-10-23 NOTE — TELEPHONE ENCOUNTER
Daughter in law called for sooner appt. I placed patient at 10:30am on Friday at [facility] office. Carlito Everett will call back to confirm with her  the time. IF ok, CSS, cancel 4:30pm slot on Friday 10/25/19.

## 2019-10-23 NOTE — TELEPHONE ENCOUNTER
Action Requested: Summary for Provider     []  Critical Lab, Recommendations Needed  [] Need Additional Advice  []   FYI    []   Need Orders  [] Need Medications Sent to Pharmacy  []  Other     SUMMARY: Nelly Novak pt daughter not on Jearldine Merry stated that she co

## 2019-10-23 NOTE — PROGRESS NOTES
HPI:    Patient ID: Claude Marquez is a 78year old female.     HPI   Patient comes in today with daughter with complaint of ongoing symptoms of recurrent altered mental status is been going on for few months now since patient admitted to the hospital no 0  Fluticasone-Umeclidin-Vilant 100-62.5-25 MCG/INH Inhalation Aerosol Powder, Breath Activated, Inhale 1 puff into the lungs daily. , Disp: 1 each, Rfl: 0  Pantoprazole Sodium (PROTONIX) 40 MG Oral Tab EC, Take 1 tablet (40 mg total) by mouth 2 (two) times 11/15/2017    Dr. Yariel Miller.    • D & C     • DIALYSIS CATHETER REMOVAL N/A 9/30/2019    Performed by Ritta Dakins, MD at [address] [facility]   • ESOPHAGOGASTRODUODENOSCOPY (EGD) N/A 9/27/2019    Performed by Avila Pruett MD at [facility] motion. Neck supple. Cardiovascular: Normal rate and regular rhythm. Pulmonary/Chest: Effort normal and breath sounds normal.   Abdominal: Soft. Bowel sounds are normal. Musculoskeletal: Normal range of motion.      Neurological: She is alert and orient

## 2019-10-25 NOTE — TELEPHONE ENCOUNTER
Patient's daughter calling with complaint of patient having confusion even after starting Ciprofloxacin for a UTI. Per daughter patient is not doing good. Daughter states she had noticed increased memory loss in patient and confusion.      Daughter also

## 2019-10-28 NOTE — TELEPHONE ENCOUNTER
Pt daughter calling back and states Dr Javan Cardenas does not need to call her since she has discussed with pt other daughter and they are looking into assisted living for pt.     Pt daughter also asking if CT of brain is necessary since pt headaches have diminish

## 2019-10-28 NOTE — TELEPHONE ENCOUNTER
Spoke with daughter. Seemed to have another set back. Confusion last week. Feels like needs to go to urinate. Makes sure oxygen at night. But takes oxygen off at night. Confusion better in past couple days. Feels down and depressed.  Looking at assisted sue

## 2019-10-29 PROBLEM — J44.1 COPD EXACERBATION (HCC): Status: ACTIVE | Noted: 2019-01-01

## 2019-10-29 PROBLEM — R09.02 HYPOXIA: Status: ACTIVE | Noted: 2019-01-01

## 2019-10-29 NOTE — TELEPHONE ENCOUNTER
Dr. Evy Gonzalez was paged from the ER and discussed with ER doctor. She is going to go see patient soon.

## 2019-10-29 NOTE — CONSULTS
[facility] - [facility]    Report of Consultation    Jennifer Metcalf Patient Status:  Emergency    3/31/1940 MRN [id number]   Location [address] Attending David Thomas MD   Hosp Day # 0 PCP Michael Brock.  Phoebe Costa MD vomiting)    • Renal disorder    • Visual impairment        Past Surgical History  Past Surgical History:   Procedure Laterality Date   •      • CATARACT     • CATARACT EXTRACTION Right 11/15/2017    Dr. Yariel Miller.    • D & C     • DIALYSIS CATHETER REM MBP/ADD-vantage, 1 g, Intravenous, Once      (Not in a hospital admission)      Allergies    Ace Inhibitors          OTHER (SEE COMMENTS)    Review of Systems:    Pertinent items are noted in HPI.     Physical Exam:   Blood pressure 160/66, pulse 52, temper hypercapnia   O2 therapy   CXR with pulmonary edema   HD   Check echo   Pro bnp and PCT     2- pulmonary edema   HD     3- ESRD on maintenance HD   Per renal     4- severe COPD with lifelong h/o heavy smoking with ongoing smoking   Smoking cessation   No c

## 2019-10-29 NOTE — PROGRESS NOTES
Pt arrived from ED at 1700 on bipap 10/5 at 50% , with family at bedside. Pt alert, and oriented, able to move all ext but weakly. Pt lives alone but family takes turns caring for her.  Pt placed in gown and on monitor, Pt SB in 50-60/s blood pressure was e

## 2019-10-29 NOTE — TELEPHONE ENCOUNTER
Rip from [facility] calling regarding patient seen today by Janis Harrington. Nohemi Paulson was informed to advise  if any changes on the patient. Patient only had 25 minutess left for treatment and was taken to ER for difficulty breathing.

## 2019-10-29 NOTE — RESPIRATORY THERAPY NOTE
RESPIRATORY THERAPY PATIENT TRANSPORT NOTE    Transported from: ER 40  Transported to:     Patient requiring bi-level support?:yes  Bi-level support used during transport:yes  Oxygen utilized during transport:yes   Bi-level support returned post-tra

## 2019-10-29 NOTE — CONSULTS
[facility]    Report of Consultation    Date of Admission:  10/29/2019  Date of Consult:  10/29/2019   Reason for Consultation:     ESRD on HD .  Pulmonary edema     History of Present Illness:     Mariluz Moya is a 68 yr old female (postoperative nausea and vomiting)    • Renal disorder    • Visual impairment        Past Surgical History  Past Surgical History:   Procedure Laterality Date   •      • CATARACT     • CATARACT EXTRACTION Right 11/15/2017    Dr. Teto Estrada.    • D & C redness and visual disturbance  Ears, nose, mouth, throat, and face: negative for hearing loss and sore throat  Respiratory: negative for cough, +ve exertional dyspnea   Gastrointestinal: negative for abdominal pain, diarrhea and nausea  Genitourinary: Juan Ferreira    K 3.2*   CL 99   CO2 35.0*   ALKPHO 80   AST 22   ALT 19   BILT 0.6   TP 6.4     PTT   Date Value Ref Range Status   08/21/2019 30.8 23.2 - 35.3 seconds Final     INR   Date Value Ref Range Status   08/21/2019 1.33 (H) 0.90 - 1.20 Final     Comm

## 2019-10-29 NOTE — PLAN OF CARE
Problem: Patient Centered Care  Goal: Patient preferences are identified and integrated in the patient's plan of care  Description  Interventions:  - What would you like us to know as we care for you?  She is Voodoo  - Provide timely, complete, and accu

## 2019-10-29 NOTE — H&P
[facility] - [facility]    History and [address] Patient Status:  Emergency    3/31/1940 MRN [id number]   Location [address] Attending Divina Roldan MD   Hosp Day # 0 PCP Desmond Camacho.  Adithya Siddiqi, MD • CATARACT EXTRACTION Right 11/15/2017    Dr. Maren Hernandez.    • D & C     • DIALYSIS CATHETER REMOVAL N/A 9/30/2019    Performed by Cyndi Savage MD at [address] [place] [address] MAIN OR   • ESOPHAGOGASTRODUODENOSCOPY (EGD) N/A 9/27/2019    Performed by Akshat Cook MD at [address] [place] [address] HENT: Negative. Eyes: Negative. Respiratory: Positive for shortness of breath. Negative for apnea, cough, choking, chest tightness, wheezing and stridor. Cardiovascular: Negative for chest pain, palpitations and leg swelling.    Gastrointestinal: Cervical Papanicolaou contraindicated    Results:     Lab Results   Component Value Date    WBC 12.4 (H) 10/29/2019    HGB 10.1 (L) 10/29/2019    HCT 34.0 (L) 10/29/2019    .0 (L) 10/29/2019    CREATSERUM 3.61 (H) 10/29/2019    BUN 11 10/29/2019

## 2019-10-29 NOTE — ED PROVIDER NOTES
Patient Seen in: [facility] AND [facility] Emergency Department    History   Patient presents with:  Dyspnea MARLEY SOB (respiratory)    Stated Complaint:     HPI    Patient presents for difficulty breathing.   She has chronic COPD is on oxygen 24 hours a day 2 L co ago had lesion removed from left kidney   • OTHER SURGICAL HISTORY  2016    Parathyroid sx.     • OTHER SURGICAL HISTORY  07/10/2018    pd cath     • OTHER SURGICAL HISTORY  09/30/2019    removal of PD cath   • TONSILLECTOMY         Social History    Tobacc 40 MG Oral Tab,  TAKE 1 TABLET EVERY NIGHT   Cyanocobalamin (VITAMIN B-12 ER) 2000 MCG Oral Tab CR,  Take 2,000 mcg by mouth daily.          Review of Systems  Constitutional: no fever  Cardiovascular: no chest pain  Gastrointestinal: no abdominal pain, no Dr. Aimee De Jesus to discuss further. We did discuss this patient. She will be coming to the ER to evaluate the patient the patient's daughter was here as well I did update her. I did talk also with primary care physician Dr. Kayli Pleitez.   We discussed this patie the following components:    WBC 12.4 (*)     RBC 3.10 (*)     HGB 10.1 (*)     HCT 34.0 (*)     .7 (*)     MCHC 29.7 (*)     RDW-SD 65.2 (*)     RDW 16.5 (*)     .0 (*)     Neutrophil Absolute Prelim 9.70 (*)     Neutrophil Absolute 9.70 (*)

## 2019-10-30 PROCEDURE — 99221 1ST HOSP IP/OBS SF/LOW 40: CPT | Performed by: INTERNAL MEDICINE

## 2019-10-30 NOTE — PAYOR COMM NOTE
--------------  ADMISSION REVIEW     Andressa Davidson MA O  Subscriber #:  [id number]  Authorization Number: [id number]    Admit date: 10/29/19  Admit time: 36       Admitting Physician: Tasneem Hartman MD  Attending Physician:  Caprice Fernandez MD  [facility] ABLATION, RENAL MASS LESION(S) Left     6-7 year ago had lesion removed from left kidney   • OTHER SURGICAL HISTORY  2016    Parathyroid sx.     • OTHER SURGICAL HISTORY  07/10/2018    pd cath     • OTHER SURGICAL HISTORY  09/30/2019    removal of PD cath °F (37.1 °C)   Resp (!) 27   SpO2 97%     Physical Exam  Constitutional:  Alert, well nourished adult lying in bed in moderate distress.     Respiratory:    Patient with diminished breath sounds with accessory muscle use initial pulse ox in triage was 68% s CO2 35.0 (*)     Creatinine 3.61 (*)     BUN/CREA Ratio 3.0 (*)     Calcium, Total 8.0 (*)     GFR, Non- 11 (*)     GFR, -American 13 (*)     All other components within normal limits   CBC W/ DIFFERENTIAL - Abnormal; Notable for increased her hydralazine to three times a day    Physical Exam:   Vital Signs:  Blood pressure 155/72, pulse 56, temperature 98.8 °F (37.1 °C), resp.  rate 24, SpO2 94 %    Pulmonary/Chest: Decreased at the base       Lab Results   Component Value Date by the family ongoing smoking addiction  Frequent falls she had a fall last week with large bruises on her back  Now in ER on 3 L nasal cannula and O2 sat 97% at rest and claimed improvement  Denied significant cough  Denied fever or chills  Denied chest p hospitalization and has poor functional status since June. Was dishcarged on October 2nd after acute GIB and blood loss anemia requiring PRBC transfusions. state sob started today but was having exertional dyspnea.  Has a sitter at home and PT visits home p Blood pressure (!) 167/77, pulse 59, temperature 97.6 °F (36.4 °C), temperature source Temporal, resp.  rate 18, weight 132 lb 1.6 oz (59.9 kg), SpO2 95 %     Pulmonary/Chest:   Diminished bilaterally   Diminished more in the left base      Assessment and Date Action Dose Route User    10/29/2019 1937 Given 2.5 mg Nebulization Verito Adan, [place]      albuterol sulfate (VENTOLIN) (2.5 MG/3ML) 0.083% nebulizer solution 2.5 mg     Date Action Dose Route User    10/30/2019 0825 Given 2.5 mg Nebulization Her

## 2019-10-30 NOTE — CM/SW NOTE
Received call from [facility]/VALERIANO ([id number]) who states patient will need Trilogy/AVAPS. [facility] will be ordering Trilogy through [address] [place] [address]. Received MDO for discharge planning. PT/OT evaluations pending.  Met with patient's daughter Tamara Menendez & son Sandrine Knight

## 2019-10-30 NOTE — PROGRESS NOTES
[facility]    Progress Note      Subjective:     State doesn't remember she was short of breath yesterday or was in ED.      Feels good today - had good BF    Daughter at bedside     Review of Systems:     Constitutional: negative for fati PRN  calcium acetate (PHOSLO) cap 667 mg, 1 capsule, Oral, TID CC  hydrALAzine HCl (APRESOLINE) tab 50 mg, 50 mg, Oral, TID  albuterol sulfate (VENTOLIN) (2.5 MG/3ML) 0.083% nebulizer solution 2.5 mg, 2.5 mg, Nebulization, QID  [START ON 10/31/2019] Pantop Portable  (cpt=71045)    Result Date: 10/29/2019  CONCLUSION: There are findings suggesting increased fluid volume status of the patient with pulmonary vascular congestion and bilateral pleural effusions.   These findings have progressed since previous exam

## 2019-10-30 NOTE — RESPIRATORY THERAPY NOTE
Process started for home NIV/AVAPs. Will switch patient to AVAPs tonight and check ABG in Am. Marcia Ashley from [address] [place] [address] aware. If patient is going to SNF, will need to make sure a SNF that is able to take AVAPs is available.

## 2019-10-30 NOTE — PLAN OF CARE
Problem: Patient Centered Care  Goal: Patient preferences are identified and integrated in the patient's plan of care  Description  Interventions:  - What would you like us to know as we care for you?   - Provide timely, complete, and accurate informatio urine output, blood pressure (other measures as available)  - Encourage oral intake as appropriate  - Instruct patient on fluid and nutrition restrictions as appropriate  Outcome: Progressing  Note:   Pt had her HD treatment yesterday and had 2 L removed.

## 2019-10-30 NOTE — PROGRESS NOTES
[facility]    Progress Note    Angelina Law Patient Status:  Inpatient    3/31/1940 MRN [id number]   Location [facility] 2W/ Attending Radha Cobb MD   Hosp Day # 1 PCP Juana Baugh.  Sheila Costa MD        Subjective:     Cons wasting and fatigue  With recurrent falls with recent fall with large bruises and ecchymosis in her back  Supportive care      7- h/o CAD / CHF   Check echo      8- h/o PUD and h/o GIB few months ago   PPI      9- h/o AAA / endovascular repair in 6/2019 When compared with ECG of 08/24/2019 20:57:50 No significant changes have occurred Electronically signed on 10/29/2019 at 17:41 by PREETI Hurtado.  Karla Clayton MD  10/30/2019

## 2019-10-30 NOTE — CONSULTS
[facility] Consult Note    [facility] Patient Status:  Inpatient    3/31/1940 MRN [id number]   Location [facility] 2W/SW Attending Sonia Potter MD   Hosp Day # 1 PCP Sandra Lepe.  Kyara Simon MD       HPI:  66-year-old female with past after dialysis to ensure that she is as euvolemic as possible    ESRD:  - Renal following on HD    COPD:  - On bronchodilators  - Pulm following      Yashira CLARK/ALESSIA Cardiology    ------------------------------------------------------------ pd cath     • OTHER SURGICAL HISTORY  09/30/2019    removal of PD cath   • TONSILLECTOMY       Family History   Problem Relation Age of Onset   • Heart Attack Father 54        CAd S/P MI.    • Heart Attack Mother 43        Rheumatic fever.     • Heart Disor affect, cranial nerves intact  Skin: Warm and dry. Edison Jung MD  10/30/2019  3:33 PM

## 2019-10-30 NOTE — PROGRESS NOTES
[facility]    Progress Note    Rishabh Painting Patient Status:  Inpatient    3/31/1940 MRN [id number]   Location [facility] 2W/SW Attending Nick Paniagua MD   Hosp Day # 1 PCP Jevon Shepard.  Carlie Neville MD        Subjective:     Cons Musculoskeletal: Normal range of motion. Neurological: She is alert.    Oriented x2           Assessment and Plan:      Acute Hypoxic respiratory failure secondary to pulmonary edema and effusion- s/p UF yesterday  , probnp  Very high , 2 d echo done pe ventricular hypertrophy consider old anterior infarct.  -Nonspecific ST depression -Seen with left ventricular hypertrophy (strain) or digitalis effect.  ABNORMAL When compared with ECG of 08/24/2019 20:57:50 No significant changes have occurred Electronica

## 2019-10-30 NOTE — PLAN OF CARE
Plan for Left and Right heart cardiac catheterization tomorrow 10/31 After morning dialysis - spoke with dialysis RN re: scheduling and updated renal md will arrange time with cath lab for post dialysis

## 2019-10-31 PROCEDURE — 93460 R&L HRT ART/VENTRICLE ANGIO: CPT | Performed by: INTERNAL MEDICINE

## 2019-10-31 NOTE — PROGRESS NOTES
Pulmonary/Critical Care Follow Up Note    HPI:   Rina Crane is a 78year old female with Patient presents with:  Dyspnea MARLEY SOB (respiratory)      PCP Eunice Oropeza MD  Admission Attending Nikki Cabrera.  Cristhian West 15 Day #2    No sob  Low discharge      Allergies: Ace Inhibitors          OTHER (SEE COMMENTS)        PHYSICAL EXAM:   Blood pressure 118/61, pulse 59, temperature 97.6 °F (36.4 °C), temperature source Oral, resp.  rate 11, height 4' 9\" (1.448 m), weight 134 lb 8 oz (61 kg), S

## 2019-10-31 NOTE — PLAN OF CARE
Problem: Patient Centered Care  Goal: Patient preferences are identified and integrated in the patient's plan of care  Description  Interventions:  - What would you like us to know as we care for you?  I live alone but could use more help  - Provide timel function maintained  Description  INTERVENTIONS:  - Monitor labs and assess for signs and symptoms of volume excess or deficit  - Monitor intake, output and patient weight  - Monitor urine specific gravity, serum osmolarity and serum sodium as indicated or

## 2019-10-31 NOTE — PROGRESS NOTES
[facility] AND [facility]  Progress Note    Jennifer Metcalf Patient Status:  Inpatient    3/31/1940 MRN [id number]   Location [facility] 3W/SW Attending Miriam Correa MD   Hosp Day # 2 PCP Michael Brock. Phoebe Costa MD     Assessment:    1.   Flash pulmonary e  10/31/2019    K 4.3 10/31/2019    CL 99 10/31/2019    CO2 33.0 10/31/2019    BUN 34 10/31/2019    CREATSERUM 7.26 10/31/2019    GLU 79 10/31/2019    CA 8.3 10/31/2019        Lab Results   Component Value Date    TROP <0.045 10/29/2019    TROP <0.045

## 2019-10-31 NOTE — PROGRESS NOTES
Inpatient Throughput Communication:    Called inpatient RN Khloe Rolon, and notified of scheduled procedure Left/Right heart cath. Verified that appropriate consent is signed: Yes  Appropriate Consent Signed: Yes  Access Site Hair Clipped and skin prepped:  Reid Steward

## 2019-10-31 NOTE — IVS NOTE
Patient transported to room 336 via cart at [phone] with RN and transported present. Patient's vitals charted prior to transport at 1730. Patient's right groin soft and non tender, no hematoma noted.  Family also present during transport

## 2019-10-31 NOTE — PROCEDURES
Preop: Flash pulmonary edema, LV dysfunction  Postop: Same  Procedure: Right and left heart cath, coronary angiogram.  Angio-Seal to RFA. Findings: RA 22, PA 63/34, wedge 40, PA sat 67%. LVEDP 24. No LV angiogram.  Coronaries: 70% distal left main.   Vero Mondragon

## 2019-10-31 NOTE — PROCEDURES
[facility]    PATIENT'S NAME: Beatriz Wilks   ATTENDING PHYSICIAN: Roxanne Muhammad MD   OPERATING PHYSICIAN: Parvin Quinn.  Angel Burger MD   PATIENT ACCOUNT#:   [id number]    LOCATION:  [facility] 6 [facility] 10  MEDICAL RECORD #:   [id number]       DATE circumflex. The circumflex artery is nondominant. It gives off a small first OM which has mild plaquing. Just after the takeoff of the first OM, the circumflex has a 40% stenosis.   It then gives rise to a large posterolateral branch which is essentia

## 2019-10-31 NOTE — PROGRESS NOTES
[facility]    Progress Note      Subjective:     S/p HD today - c/o headaches     Review of Systems:     Constitutional: negative for fatigue, fevers and weight loss  Eyes: negative for irritation, redness and visual disturbance  Ears, no mg, Oral, TID  albuterol sulfate (VENTOLIN) (2.5 MG/3ML) 0.083% nebulizer solution 2.5 mg, 2.5 mg, Nebulization, QID  Pantoprazole Sodium (PROTONIX) EC tab 40 mg, 40 mg, Oral, QAM AC  0.9% NaCl infusion, , Intravenous, On Call  CefTRIAXone Sodium (ROCEPHIN COLORUR Yellow 10/31/2019    CLARITY Hazy 10/31/2019    SPECGRAVITY 1.014 10/31/2019    GLUUR Negative 10/31/2019    BILUR Negative 10/31/2019    KETUR Negative 10/31/2019    BLOODURINE Negative 10/31/2019    PHURINE 6.0 10/31/2019    PROUR 100  10/31/2019 hypoxia and pulmonary edema s/p UF on 10/30 and HD today with 2.5 liters UF   - repeat CXR showed improve edema   - HD site looks ok  - proBNP quite elevated - will consider UF again tomm   - serum albumin wnl. Serum phos wnl  - urine CS pending      2.  Hy

## 2019-10-31 NOTE — RESPIRATORY THERAPY NOTE
ABG results on nasal cannula 3 lpm: Ph 7.31,P CO2 59 mmhg,P O2 72 mmhg,Bicarb 26.7,BE +2.4,patient off BIPAP at 0715.

## 2019-10-31 NOTE — PROGRESS NOTES
Double RN skin check done prior to transfer off Unit. Skin check performed by this RN and Shadi Moran RN. Wounds are as follows: Generalized bruising; predominately to mid back. All skin intact.     Will remain available for any further questions or concerns

## 2019-10-31 NOTE — PROGRESS NOTES
[facility]    Progress Note    Miguel Suárez Patient Status:  Inpatient    3/31/1940 MRN [id number]   Location [facility] 2W/SW Attending Anson Sanchez MD   Hosp Day # 2 PCP Pb Bazan.  Efren Cody MD        Subjective:     Cons Normocephalic and atraumatic. Mouth/Throat: Oropharynx is clear and moist. No oropharyngeal exudate or posterior oropharyngeal erythema. Eyes: Pupils are equal, round, and reactive to light.  Conjunctivae and EOM are normal. Right eye exhibits no discha ICS/LABA/LAMA    Leukocytosis - pending ucx , on emp rocephin    htn - continue with current medication, monitor bp     H/o PUD and gi bleed - slightly drop on hb ut now stable ,moniotr, check FOBT. On PPI.      AAA /endovascular repair     Pt/ot, social wo -------------------------- Sinus Rhythm -Left atrial enlargement. Voltage criteria for LVH met.   -Decreasing R-wave progression -may be secondary to left ventricular hypertrophy consider old anterior infarct.  -Nonspecific ST depression -Seen with left ve

## 2019-11-01 PROCEDURE — 99231 SBSQ HOSP IP/OBS SF/LOW 25: CPT | Performed by: INTERNAL MEDICINE

## 2019-11-01 NOTE — CONSULTS
[facility] HOSP - [facility]    Report of Consultation    Tyrese Carranza Patient Status:  Inpatient    3/31/1940 MRN [id number]   Location [facility] 3W/SW Attending Romana Moellers, MD   Hosp Day # 3 PCP Desmond Camacho.  Adithya Siddiqi MD     Date of Admissio Essential hypertension    • History of blood transfusion    • History of primary hyperparathyroidism 02/23/2016    S/P removal L superior and inferior parathyroids.     • Hyperlipidemia     Pt. taking 40 mg Pravastatin; states not been told she has high cho 1963        Quit date: 8/3/2019        Years since quittin.2      Smokeless tobacco: Never Used    Alcohol use:  Yes      Alcohol/week: 0.0 standard drinks      Comment: rarely    Drug use: No          Current Medications:  atorvastatin (LIPITOR) ta (two) times daily before meals. escitalopram 5 MG Oral Tab, Take 1 tablet (5 mg total) by mouth daily. Fluticasone Furoate-Vilanterol 200-25 MCG/INH Inhalation Aerosol Powder, Breath Activated, Inhale 1 puff into the lungs daily.   ipratropium-albuterol 0 cefTRIAXone  1 g Intravenous Once   • Fluticasone-Umeclidin-Vilant  1 puff Inhalation Daily   • cefTRIAXone  1 g Intravenous Q24H       Continuous Infusions:     Patient is awake alert and in no distress. The head is normocephalic.   Pupils are equally rou Date: 10/31/2019  CONCLUSION:  1. CHF/fluid overload with slight interval improvement. 2. Left pleural effusion with left lower lobe atelectasis. A coexistent left lower lobe pneumonia should be excluded clinically. 3. Atherosclerosis aorta.  4. Right-side unintended errors.

## 2019-11-01 NOTE — PROGRESS NOTES
[facility]    Progress Note    Bienvenido Read Patient Status:  Inpatient    3/31/1940 MRN [id number]   Location [facility] 3W/SW Attending Kj Gutierrez MD   Hosp Day # 3 PCP Shahana Velasco.  Kayli Pleitez MD        Subjective:     Cons AVAPS to improve symptoms and improve quality of life and reduce hospital re-admission rate   Will arrange for NIV/AVAP at home      Smoking cessation   ICS/LABA/LAMA inhaler   Neb   O2   NIV during sleep and prn     Very high risk from pulmonary point of Approved by (CST): Lasha Silva MD on 10/31/2019 at 7:57                       Simmialexsander Double.  Odessa Jackson MD  11/1/2019

## 2019-11-01 NOTE — PROGRESS NOTES
[facility]  Progress Note    Eva Cardenas Patient Status:  Inpatient    3/31/1940 MRN [id number]   Location [facility] 3W/SW Attending Randall Bowman MD   Hosp Day # 3 PCP Sameer Keating. Robert Painter MD     Assessment:    1.   Presentation with Regular rate and rhythm, S1, S2 normal, 1/6 ABEBE  Lungs: Very mild basilar crackles. Normal excursions and effort. Abdomen: Soft, non-tender. Extremities: Without clubbing, cyanosis or edema. Peripheral pulses are 2+.   Right groin site soft, nontender

## 2019-11-01 NOTE — PROGRESS NOTES
[facility]    Progress Note      Subjective:     receiving UF today with 2.5 liters - tolerating well. State breathing is ok.  No chest pain     discussed with family     Review of Systems:     Constitutional: negative for fatigue, fevers Oral, Q8H PRN  calcium acetate (PHOSLO) cap 667 mg, 1 capsule, Oral, TID CC  hydrALAzine HCl (APRESOLINE) tab 50 mg, 50 mg, Oral, TID  albuterol sulfate (VENTOLIN) (2.5 MG/3ML) 0.083% nebulizer solution 2.5 mg, 2.5 mg, Nebulization, QID  Pantoprazole Sodiu Chest Ap Portable  (cpt=71045)    Result Date: 10/31/2019  CONCLUSION:  1. CHF/fluid overload with slight interval improvement. 2. Left pleural effusion with left lower lobe atelectasis.   A coexistent left lower lobe pneumonia should be excluded clinically

## 2019-11-01 NOTE — PROGRESS NOTES
Misc. Note    CV Surgery consulted for CABG. Seen pt. Initially seen with Dr. Linh Espinal this morning. Pt's son, Corinna Locke, at bedside. STS risk score provided to pt. And her son, Jesus, who is now at bedside. Explained results. Risk of Mortality=28.6 %.  Risk of Mo

## 2019-11-01 NOTE — CM/SW NOTE
Case Management /Progression of [employer] Authorization is pending, updated PT notes sent via ISR to [facility] [phone], they are reviewing clinicals and meeting with family for Financial Long term care placement.        SW/CM to remain avail

## 2019-11-01 NOTE — PLAN OF CARE
Problem: Patient Centered Care  Goal: Patient preferences are identified and integrated in the patient's plan of care  Description  Interventions:  - What would you like us to know as we care for you?  Has 2 sons  - Provide timely, complete, and accurate maintained  Description  INTERVENTIONS:  - Monitor labs and assess for signs and symptoms of volume excess or deficit  - Monitor intake, output and patient weight  - Monitor urine specific gravity, serum osmolarity and serum sodium as indicated or ordered

## 2019-11-01 NOTE — PROGRESS NOTES
[facility] - [facility]    Progress Note    [facility] Patient Status:  Inpatient    3/31/1940 MRN [id number]   Location [facility] 2W/SW Attending Mikey Pedersen MD   Hosp Day # 3 PCP Savanna Chacon.  Valdemar Red MD        Subjective:     Cons Oropharynx is clear and moist. No oropharyngeal exudate or posterior oropharyngeal erythema. Eyes: Pupils are equal, round, and reactive to light. Conjunctivae and EOM are normal. Right eye exhibits no discharge. Left eye exhibits no discharge.  No sclera diffuse diffuse is a dominant RCA. Cards. and pulmonary critical care following patient. Chest x-ray shows improvement in pulmonary edema. Renal following patient. Seen by cardiovascular surgery.     Pulmonary edema with EF 45% with severe multivessel C 3.4 10/29/2019    ALKPHO 80 10/29/2019    BILT 0.6 10/29/2019    TP 6.4 10/29/2019    AST 22 10/29/2019    ALT 19 10/29/2019    PTT 30.8 08/21/2019    INR 1.17 10/31/2019    T4F 1.0 08/06/2019    TSH 4.160 (H) 08/06/2019    ESRML 6 06/22/2018    MG 2.3 10/

## 2019-11-01 NOTE — PHYSICAL THERAPY NOTE
PHYSICAL THERAPY EVALUATION - INPATIENT     Room Number: 336/336-A  Evaluation Date: 11/1/2019  Type of Evaluation: Initial   Physician Order: PT Eval and Treat    Presenting Problem: acute pulmonary edema  Reason for Therapy: Mobility Dysfunction and Dis mechanics; Endurance; Energy conservation;Patient education; Family education;Gait training;Range of motion;Strengthening;Transfer training;Stair training;Balance training  Rehab Potential : Good  Frequency (Obs): 3-5x/week       PHYSICAL THERAPY MEDICAL/SOCI OTHER SURGICAL HISTORY  2016    Parathyroid sx.     • OTHER SURGICAL HISTORY  07/10/2018    pd cath     • OTHER SURGICAL HISTORY  09/30/2019    removal of PD cath   • TONSILLECTOMY         HOME SITUATION  Type of Home: House   Home Layout: Two level  Stairs Automatic  Patient Position: Sitting    O2 WALK        SPO2 Ambulation on Oxygen: 93  Ambulation oxygen flow (liters per minute): 3      AM-PAC '6-Clicks' INPATIENT SHORT FORM - BASIC MOBILITY  How much difficulty does the patient currently have. ..  -   Tu to ambulate 25 feet with assist device: walker - rolling at assistance level: CG   Goal #3   Current Status    Goal #4 Patient will negotiate 3 stairs/one curb w/ assistive device and Min A x 1   Goal #4   Current Status    Goal #5 Patient to demonstrate i

## 2019-11-02 PROCEDURE — 99232 SBSQ HOSP IP/OBS MODERATE 35: CPT | Performed by: INTERNAL MEDICINE

## 2019-11-02 NOTE — PROGRESS NOTES
[facility]  Progress Note    Priscella Room Patient Status:  Inpatient    3/31/1940 MRN [id number]   Location [facility] 3W/SW Attending Feliciano Bedolla MD   Hosp Day # 4 PCP Jayme Castleman. Mynor Pennington MD     Assessment:    1.   Presentation with Wt Readings from Last 2 Encounters:  11/02/19 : 121 lb 6.4 oz (55.1 kg)  10/23/19 : 133 lb (60.3 kg)      Physical Exam:    General: Alert and oriented x 3,  No apparent distress. HEENT: No focal deficits. Neck: No JVD, carotids 2+ no bruits.   Card

## 2019-11-02 NOTE — PROGRESS NOTES
[facility]    Progress Note      Subjective:     More confused today - daughter at bedside    No fever, wbc wnl    No sedative meds     Review of Systems:     Constitutional: negative for fatigue, fevers and weight loss  Eyes: negative fo Q8H PRN  calcium acetate (PHOSLO) cap 667 mg, 1 capsule, Oral, TID CC  hydrALAzine HCl (APRESOLINE) tab 50 mg, 50 mg, Oral, TID  albuterol sulfate (VENTOLIN) (2.5 MG/3ML) 0.083% nebulizer solution 2.5 mg, 2.5 mg, Nebulization, QID  Pantoprazole Sodium (PRO 10/29/19  1340 11/02/19  0535   PHOS 2.9 5.8*   ALB 3.4 3.1*                 Assessment and Plan:       1. ESRD on HD TTS:  - received HD on 10/30 - was terminated 40 mins early   - given hypoxia and pulmonary edema s/p UF on 10/30   - UF yesterday with 2.

## 2019-11-02 NOTE — PROGRESS NOTES
[facility]    Progress Note    Stephanie Calabrese Patient Status:  Inpatient    3/31/1940 MRN [id number]   Location [facility] 3W/SW Attending Mariana Quinn MD   Hosp Day # 4 PCP Cherelle Caballero.  Anival Alan MD        Subjective:     Cons moderate to severe elevation of left ventricular filling pressures. Severe CAD 70% of the left main moderate diffuse diffuse is a dominant RCA. Cards. and pulmonary critical care following patient. Chest x-ray shows improvement in pulmonary edema.   Laura Vila 11/02/2019    CA 8.6 11/02/2019    ALB 3.1 (L) 11/02/2019    ALKPHO 80 10/29/2019    BILT 0.6 10/29/2019    TP 6.4 10/29/2019    AST 22 10/29/2019    ALT 19 10/29/2019    PTT 30.8 08/21/2019    INR 1.17 10/31/2019    T4F 1.3 11/01/2019    TSH 1.860 11/01/2

## 2019-11-02 NOTE — PLAN OF CARE
Vitals stable on 2L O2. Pt A&Ox1. HD scheduled for tomorrow (11/3). Denies pain.     Problem: Patient Centered Care  Goal: Patient preferences are identified and integrated in the patient's plan of care  Description  Interventions:  - What would you like us ADULT  Goal: Hemodynamic stability and optimal renal function maintained  Description  INTERVENTIONS:  - Monitor labs and assess for signs and symptoms of volume excess or deficit  - Monitor intake, output and patient weight  - Monitor urine specific Bel Bodo

## 2019-11-02 NOTE — PROGRESS NOTES
Pulmonary/Critical Care Follow Up Note    HPI:   Joe Hidalgo is a 78year old female with Patient presents with:  Dyspnea MARLEY SOB (respiratory)      PCP Eunice Martinez MD  Admission Attending Aj Kwon.  Cristhian Dunn 15 Day #4    Feeling ok °F (36.7 °C), temperature source Oral, resp. rate 18, height 4' 9\" (1.448 m), weight 121 lb 6.4 oz (55.1 kg), SpO2 95 %, not currently breastfeeding.     Intake/Output Summary (Last 24 hours) at 11/2/2019 1248  Last data filed at 11/2/2019 1054  Gross per

## 2019-11-03 PROCEDURE — 99232 SBSQ HOSP IP/OBS MODERATE 35: CPT | Performed by: INTERNAL MEDICINE

## 2019-11-03 NOTE — PROGRESS NOTES
[facility]    Progress Note      Subjective:     Remain confused - seeing during HD - tolerating well     Review of Systems:     Constitutional: negative for fatigue, fevers and weight loss  Eyes: negative for irritation, redness and visu Q6H PRN  acetaminophen (TYLENOL) tab 650 mg, 650 mg, Oral, Q8H PRN  calcium acetate (PHOSLO) cap 667 mg, 1 capsule, Oral, TID CC  hydrALAzine HCl (APRESOLINE) tab 50 mg, 50 mg, Oral, TID  albuterol sulfate (VENTOLIN) (2.5 MG/3ML) 0.083% nebulizer solution MG 2.3  --    PHOS 2.9 5.8*        Recent Labs   Lab 10/29/19  1340 11/02/19  0535   PHOS 2.9 5.8*   ALB 3.4 3.1*       Ct Brain Or Head (55574)    Result Date: 11/2/2019  CONCLUSION:  1.  There is contrast in the cerebrovascular system from recent cardia

## 2019-11-03 NOTE — CM/SW NOTE
SW received MDO for medications; entresto. Per previous SW/CM note, plan for rehab at [facility].      [facility] authorization still pending for rehab**    [address], [address]

## 2019-11-03 NOTE — PROGRESS NOTES
[facility]    Progress Note    Angelina Law Patient Status:  Inpatient    3/31/1940 MRN [id number]   Location [facility] 3W/SW Attending Radha Cobb MD   Hosp Day # 5 PCP Juana Baugh.  Sheila Costa MD        Subjective:     Cons inferior wall, moderate TR.  Severe pulmonary hypertension.  Worsening from prior.   S/P right and left heart cath.  showing moderate to severe pulmonary hypertension moderate to severe elevation of left ventricular filling pressures.  Severe CAD 70% of the (L) 11/02/2019    CREATSERUM 6.33 (H) 11/02/2019    BUN 26 (H) 11/02/2019     11/02/2019    K 4.1 11/02/2019     11/02/2019    CO2 26.0 11/02/2019    GLU 72 11/02/2019    CA 8.6 11/02/2019    ALB 3.1 (L) 11/02/2019    ALKPHO 80 10/29/2019    BI

## 2019-11-03 NOTE — PLAN OF CARE
Problem: RESPIRATORY - ADULT  Goal: Achieves optimal ventilation and oxygenation  Description  INTERVENTIONS:  - Assess for changes in respiratory status  - Assess for changes in mentation and behavior  - Position to facilitate oxygenation and minimize r risk of falls.   - Newberg fall precautions as indicated by assessment.  - Educate pt/family on patient safety including physical limitations  - Instruct pt to call for assistance with activity based on assessment  - Modify environment to reduce risk of i

## 2019-11-03 NOTE — PROGRESS NOTES
[facility] AND [facility]  Progress Note    Clay Wyatt Patient Status:  Inpatient    3/31/1940 MRN [id number]   Location [facility] 3W/SW Attending Sonal Joya MD   Hosp Day # 5 PCP Jax Turner. Mey Bai MD     Assessment:    1.   Presentation with oz  10/23/19 : 133 lb      Physical Exam:    General: Alert and oriented x 3,  No apparent distress. HEENT: No focal deficits. Neck: No JVD, carotids 2+ no bruits.   Cardiac: Regular rate and rhythm, S1, S2 normal, no murmur  Lungs: Clear without wheezes,

## 2019-11-03 NOTE — PROGRESS NOTES
[facility]    Progress Note    Fab Snell Patient Status:  Inpatient    3/31/1940 MRN [id number]   Location [facility] 3W/SW Attending Lobo Hernandez MD   Hosp Day # 5 PCP Harini Soriano MD        Subjective:     Cons 6.1 11/02/2019    HGB 9.4 (L) 11/02/2019    HCT 32.3 (L) 11/02/2019    PLT 36.0 (L) 11/02/2019    CREATSERUM 6.33 (H) 11/02/2019    BUN 26 (H) 11/02/2019     11/02/2019    K 4.1 11/02/2019     11/02/2019    CO2 26.0 11/02/2019    GLU 72 11/02/2

## 2019-11-04 PROCEDURE — 99232 SBSQ HOSP IP/OBS MODERATE 35: CPT | Performed by: INTERNAL MEDICINE

## 2019-11-04 NOTE — PHYSICAL THERAPY NOTE
Attempted to see pt this PM, per nursing pt BP is low and recommend hold PT treatment today. Will follow up tomorrow.

## 2019-11-04 NOTE — PROGRESS NOTES
[facility]    Progress Note    Cayden Harley Patient Status:  Inpatient    3/31/1940 MRN [id number]   Location [facility] 3W/SW Attending August Castillo MD   Hosp Day # 6 PCP Eunice Cardenas MD        Subjective:     Cons 11/02/2019     11/02/2019    K 4.1 11/02/2019     11/02/2019    CO2 26.0 11/02/2019    GLU 72 11/02/2019    CA 8.6 11/02/2019    ALB 3.1 (L) 11/02/2019    ALKPHO 80 10/29/2019    BILT 0.6 10/29/2019    TP 6.4 10/29/2019    AST 22 10/29/2019

## 2019-11-04 NOTE — CM/SW NOTE
Case Management/ Progression of Care  MANUEL contacted [id number] [address]  [phone]  Authorization for rehabilitation is pending Insurance         SW/CM to remain available for support and/or discharge planning.          Jessie Zhong RN C

## 2019-11-04 NOTE — PROGRESS NOTES
[facility] AND [facility]  Progress Note    Jamarcus Zuñiga Patient Status:  Inpatient    3/31/1940 MRN [id number]   Location [facility] 3W/SW Attending Deysi Bean MD   Hosp Day # 6 PCP Eunice Marie MD     Assessment:    1.   Presentation with °C)      Intake/Output:    Intake/Output Summary (Last 24 hours) at 11/4/2019 1018  Last data filed at 11/3/2019 1819  Gross per 24 hour   Intake 290 ml   Output 2500 ml   Net -2210 ml       Wt Readings from Last 2 Encounters:  11/04/19 : 115 lb 9.6 oz  10

## 2019-11-04 NOTE — PROGRESS NOTES
[facility] - [facility]  Nephrology Daily Progress Note    Rishabh Painting  [id number]  89 year old      HPI:   Rishabh Painting is a 78year old female. Awake and seems alert and oriented. Mild VANEGAS but better.   No CP.       ROS:     Constitutional appropriate for age reflexes and motor skills appropriate for age    Labs:     Recent Labs   Lab 10/31/19  0526 10/31/19  1828 11/01/19  0537 11/02/19  0535   WBC 6.7  --  6.5 6.1   HGB 8.9* 10.3* 9.5* 9.4*   HCT 30.4* 34.2* 33.0* 32.3*   PLT 74.0*  --  39 atelectasis 4. Double-lumen central venous catheter with the tip in the atrial caval junction.   No pneumothorax    Dictated by (CST): Fran Mendosa MD on 11/04/2019 at 7:26     Approved by (CST): Fran Mendosa MD on 11/04/2019 at 7:36 disease) on dialysis [facility])     Cataract     History of primary hyperparathyroidism     Calculus of kidney     Visual impairment     Nodule of upper lobe of right lung     AAA (abdominal aortic aneurysm) without rupture (HCC)     Hypercalcemia     Sepsis du

## 2019-11-04 NOTE — PROGRESS NOTES
Contacted Dr. Jennifer Wagner Cardiology for low bp -- pt not symptomatic. Medications readjusted; continuing to monitor bp closely.

## 2019-11-04 NOTE — PROGRESS NOTES
[facility]    Progress Note    Sherine Zacarias Patient Status:  Inpatient    3/31/1940 MRN [id number]   Location [facility] 2W/SW Attending Saul Whitley MD   Hosp Day # 6 PCP Eunice Madrigal MD        Subjective:     Cons Oropharynx is clear and moist. No oropharyngeal exudate or posterior oropharyngeal erythema. Eyes: Pupils are equal, round, and reactive to light. Conjunctivae and EOM are normal. Right eye exhibits no discharge. Left eye exhibits no discharge.  No sclera Severe CAD 70% of the left main moderate diffuse diffuse is a dominant RCA. Cards. and pulmonary critical care following patient. Chest x-ray shows improvement in pulmonary edema. Renal following patient. Seen by cardiovascular surgery.     Pulmonary ed PCI.    Acute toxic metabolic encephalopathy possibly due to hypercarbia. No narcotics. Neurochecks. Had full work-up prior to admissions. Baseline.      profound debility and muscle wasting and fatigue  With recurrent falls with recent fall with large moderately advanced chronic appearing deep white matter ischemic change in the periventricular white matter bilaterally.     Dictated by (CST): Ismael Garcia MD on 11/02/2019 at 11:36     Approved by (CST): Ismael Garcia MD on 11/02/2019 at 11:43

## 2019-11-04 NOTE — PLAN OF CARE
Patient noted to be hypotensive - SBP 79.  Patient is asymptomatic. Patient received coreg 6.25 mg and diovan 320 mg this AM.  Hydralazine was held. Will d/c hydral and diovan and reduce coreg dose to 3.25 mg BID with holding parameters.   Will

## 2019-11-05 PROCEDURE — 99232 SBSQ HOSP IP/OBS MODERATE 35: CPT | Performed by: INTERNAL MEDICINE

## 2019-11-05 NOTE — CONSULTS
[facility]    PATIENT'S NAME: Roz Posada   ATTENDING PHYSICIAN: Mendel Jacobus, MD   CONSULTING PHYSICIAN: Praveen Robert MD   PATIENT ACCOUNT#:   [id number]    LOCATION:  [address] [place] [address] [address] #:   [id number]       DATE OF BIRTH:  [birth year] and have remained around that range intermittently. When she was admitted into the hospital, her platelets were at 770,336, and they have persistently declined to a value of 39,000, and currently, they are at 36,000.   HIT has been ordered and reviewed, an decrease in both lower lobes. HEART:  Distant heart sounds present. ABDOMEN:  Nontender, slightly obese, but no hepatosplenomegaly palpated. EXTREMITIES:  There is pitting edema in both extremities without asymmetry.    NEUROLOGIC:  She is moaning, but m perhaps repeating labs in a week or so and re-assess the stability of her count. If platelets remain above 50,000, can then consider anti-platelets with PCI. Either options would be appropriate as long as platelets remain greater than 50,000.   One additi

## 2019-11-05 NOTE — PROGRESS NOTES
[facility]    Progress Note    Lindle Schilder Patient Status:  Inpatient    3/31/1940 MRN [id number]   Location [facility] 3W/SW Attending Bijal Branch MD   [facility] Day # 7 PCP Anjum Osborne.  Xenia Guzman MD        Subjective:     C severe pulmonary hypertension moderate to severe elevation of left ventricular filling pressures.  Severe CAD 70% of the left main moderate diffuse diffuse is a dominant RCA.  Cards.  and pulmonary critical care following patient.  Chest x-ray shows improve 29.0 11/05/2019    GLU 86 11/05/2019    CA 9.2 11/05/2019    ALB 3.3 (L) 11/05/2019    ALKPHO 80 10/29/2019    BILT 0.6 10/29/2019    TP 6.4 10/29/2019    AST 22 10/29/2019    ALT 19 10/29/2019    PTT 30.8 08/21/2019    INR 1.17 10/31/2019    T4F 1.3 11/01

## 2019-11-05 NOTE — TELEPHONE ENCOUNTER
Jake Whelan is requesting clarification on the following medication :      hydrALAzine HCl 50 MG Oral Tab 90 tablet 1 10/23/2019    Sig:   Take 1 tablet (50 mg total) by mouth 3 (three) times daily.      Route: Freddy Justino     Order #:   [id number]       Per Jake Whelan th

## 2019-11-05 NOTE — CARDIAC REHAB
Cardiac Rehab Phase I    Activity:    Education:  Handouts provided and reviewed: [facility] Booklet. Diet: Healthy Cardiac diet reviewed. Disease Process: Disease process reviewed.     Reviewed the following: SITE CARE: Reviewed      RI

## 2019-11-05 NOTE — DIETARY NOTE
ADULT NUTRITION INITIAL ASSESSMENT    Pt is at moderate nutrition risk. Pt does not meet malnutrition criteria.       RECOMMENDATIONS TO MD:  See Nutrition Intervention     NUTRITION DIAGNOSIS/PROBLEM:  Inadequate oral intake related to decreased ability t (118 lb 3.2 oz)   11/04/19 0511 52.4 kg (115 lb 9.6 oz)   11/03/19 0404 55.2 kg (121 lb 9.6 oz)   11/02/19 0510 55.1 kg (121 lb 6.4 oz)   11/01/19 0408 57.8 kg (127 lb 6.4 oz)   10/31/19 0510 61 kg (134 lb 8 oz)   10/29/19 2116 59.9 kg (132 lb 1.6 oz) Current wt)  Fluid needs: per renal tolerance    MONITOR AND EVALUATE/NUTRITION GOALS:  - Food and Nutrient Intake:      Monitor: adequacy of PO intake, tolerance of PO intake and adequacy of supplement intake.   - Anthropometric Measurement:      Monitor:

## 2019-11-05 NOTE — PROGRESS NOTES
[facility] AND [facility]  Progress Note    Myla Us Patient Status:  Inpatient    3/31/1940 MRN [id number]   Location [facility] 3W/SW Attending Isauro Alfred MD   Hosp Day # 7 PCP Eunice Nixon MD     Assessment:    1.   Presentation with BMI 25.58 kg/m²     Temp (24hrs), Av.2 °F (36.8 °C), Min:98 °F (36.7 °C), Max:98.5 °F (36.9 °C)      Intake/Output:    Intake/Output Summary (Last 24 hours) at 2019 0944  Last data filed at 2019 0612  Gross per 24 hour   Intake 660 ml   Outpu

## 2019-11-05 NOTE — CM/SW NOTE
11: 25AM  Per RN rounds - pt may be ready for d/c today. SW contacted Luc Banegas w/ [employer] - pending ins auth. Received call from Vanessa arora/ [facility] - provided pt update. Provided Vanessa arora/ contact info and phone # for follow up about pt's home [employer] after [employer].

## 2019-11-05 NOTE — TELEPHONE ENCOUNTER
[facility], spoke with Sussy tomas ; informed that patient is in  the hospital at the present time and to hold off on the refill for the present time.

## 2019-11-05 NOTE — PROGRESS NOTES
[facility] - [facility]  Nephrology Daily Progress Note    Sherine Zacarias  [id number]  44 year old      HPI:   Sherine Zacarias is a 78year old female. Weak but no CP or SOB. MS much better.        ROS:     Constitutional:  Negative for decreased reflexes and motor skills appropriate for age    Labs:  Lab Results   Component Value Date    WBC 8.0 11/05/2019    HGB 11.1 11/05/2019    HCT 37.6 11/05/2019    PLT 93.0 11/05/2019    CREATSERUM 7.08 11/05/2019    BUN 26 11/05/2019     11/05/2019 advanced chronic appearing deep white matter ischemic change in the periventricular white matter bilaterally.     Dictated by (CST): Catherine Naqvi MD on 11/02/2019 at 11:36     Approved by (CST): Catherine Naqvi MD on 11/02/2019 at 11:43          Xr Chest A 11/5/2019 1034  Last data filed at 11/5/2019 1015  Gross per 24 hour   Intake 860 ml   Output 0 ml   Net 860 ml          ASSESSMENT/PLAN:   Assessment   Patient Active Problem List:     Aortic aneurysm (HCC)     CKD (chronic kidney disease) stage 4, GFR 15

## 2019-11-05 NOTE — PROGRESS NOTES
[facility]    Hematology/Oncology   Progress Note        Chief complaint  TCP    Subjective:  Vitals reviewed, low systolics, getting dialysis with albumin support. NO fevers.   More awake today, reports feeling ok but being worried about vasculature. 2. Left basilar atelectasis and/or lung consolidation with left-sided effusion. 3. Improving right basilar atelectasis 4. Double-lumen central venous catheter with the tip in the atrial caval junction.   No pneumothorax    Dictated by (CST): M

## 2019-11-05 NOTE — TELEPHONE ENCOUNTER
[facility], spoke with Deo Pandey.  Tech to confirm TID dose received on 10/23/2019      Easton Bergman filled one on 10/17/2019 for BID dose  from Dr. Olga Barger    Please clarify dosing and thank you

## 2019-11-05 NOTE — PROGRESS NOTES
[facility]    Progress Note    Stephanie Calabrese Patient Status:  Inpatient    3/31/1940 MRN [id number]   Location [facility] 3W/SW Attending Alphonso Fowler MD   [facility] Day # 7 PCP Cherelle Caballero.  Anival Alan MD        Subjective:     C WBC 8.0 11/05/2019    HGB 11.1 (L) 11/05/2019    HCT 37.6 11/05/2019    PLT 93.0 (L) 11/05/2019    CREATSERUM 7.08 (H) 11/05/2019    BUN 26 (H) 11/05/2019     (L) 11/05/2019    K 4.5 11/05/2019    CL 99 11/05/2019    CO2 29.0 11/05/2019    GLU 86 11/

## 2019-11-05 NOTE — TELEPHONE ENCOUNTER
She is in the hospital and she was a little hypotensive last night so the dose was decreased and held. We do not know where she will be going home on her what dose.   When she is discharged we will have the discharge instructions on what dose she will be o

## 2019-11-06 PROBLEM — J81.0 ACUTE PULMONARY EDEMA (HCC): Status: RESOLVED | Noted: 2019-01-01 | Resolved: 2019-01-01

## 2019-11-06 PROBLEM — J44.1 COPD EXACERBATION (HCC): Status: RESOLVED | Noted: 2019-01-01 | Resolved: 2019-01-01

## 2019-11-06 PROBLEM — R09.02 HYPOXIA: Status: RESOLVED | Noted: 2019-01-01 | Resolved: 2019-01-01

## 2019-11-06 LAB
ALBUMIN SERPL-MCNC: 3.6 G/DL
ALBUMIN/GLOB SERPL: NORMAL {RATIO}
ALP SERPL-CCNC: 59 U/L
ALT SERPL-CCNC: 17 U/L
ANION GAP SERPL CALC-SCNC: 3 MMOL/L
AST SERPL-CCNC: 22 U/L
BILIRUB SERPL-MCNC: 0.3 MG/DL
BUN SERPL-MCNC: 10 MG/DL
BUN/CREAT SERPL: NORMAL
CALCIUM SERPL-MCNC: 8.3 MG/DL
CHLORIDE SERPL-SCNC: 105 MMOL/L
CO2 SERPL-SCNC: 29 MMOL/L
CREAT SERPL-MCNC: 3.39 MG/DL
GLOBULIN SER-MCNC: 2.6 G/DL
GLUCOSE SERPL-MCNC: 76 MG/DL
LENGTH OF FAST TIME PATIENT: NORMAL H
POTASSIUM SERPL-SCNC: 4.4 MMOL/L
PROT SERPL-MCNC: 6.2 G/DL
SODIUM SERPL-SCNC: 137 MMOL/L

## 2019-11-06 PROCEDURE — 99232 SBSQ HOSP IP/OBS MODERATE 35: CPT | Performed by: NURSE PRACTITIONER

## 2019-11-06 NOTE — PROGRESS NOTES
[facility]    Progress Note    Kourtney López Patient Status:  Inpatient    3/31/1940 MRN [id number]   Location [facility] 3W/SW Attending Lele Severino MD   [address] Day # 8 PCP Axel Lebron.  Jorge Batista MD        Subjective:     R thoracentesis if fluid statis not improved with HD   -pulm following  -wean o2 as able    ESRD  -fluid removal per HD  -renal following             Results:     Lab Results   Component Value Date    WBC 6.5 11/06/2019    HGB 10.1 (L) 11/06/2019    HCT 33. 8

## 2019-11-06 NOTE — PROGRESS NOTES
[facility]    Progress Note      Assessment and Plan:   1.   Acute on chronic respiratory failure–patient had pulmonary edema superimposed on this modest left pleural effusion with elevated left hemidiaphragm and basilar left lung atelectasi abnormality     Results:     Lab Results   Component Value Date    WBC 6.5 11/06/2019    HGB 10.1 11/06/2019    HCT 33.8 11/06/2019    PLT 61.0 11/06/2019    CREATSERUM 3.39 11/06/2019    BUN 10 11/06/2019     11/06/2019    K 4.4 11/06/2019

## 2019-11-06 NOTE — PLAN OF CARE
No resp distress noted. Continued on 1.5L O2 per nc throughout the shift and saturation is good upon checking. Lungs with slight crackles to L base. Appetite poor. Up to chair with the walker and 1 assist. Flu vaccine given.  Sons Max and Saba notified of d Spirometry  - Assess the need for suctioning and perform as needed  - Assess and instruct to report SOB or any respiratory difficulty  - Respiratory Therapy support as indicated  - Manage/alleviate anxiety  - Monitor for signs/symptoms of CO2 retention  Jean Carlos Elena Discharge

## 2019-11-06 NOTE — DISCHARGE SUMMARY
DISCHARGE SUMMARY     Rina Crane Patient Status:  Inpatient    3/31/1940 MRN [id number]   Location [facility] 3W/SW Attending Ainsley Parker MD   [facility] Day # 8 PCP Nikki Cabrera.  Juan Oropeza MD     Date of Admission: 10/29/2019  Date of [address] [facility] heart cath.  showing moderate to severe pulmonary hypertension moderate to severe elevation of left ventricular filling pressures.  Severe CAD 70% of the left main moderate diffuse diffuse is a dominant RCA.  Cards.  and pulmonary critical care following pa clear to auscultation bilaterally  Cardiovascular: S1, S2 normal, no murmur, click, rub or gallop, regular rate and rhythm  Extremities: extremities normal, atraumatic, no cyanosis or edema  Neurologic: Grossly normal    Procedures during hospitalization: Inhale 1 puff into the lungs daily. Quantity:  1 each  Refills:  0     hydrALAzine HCl 50 MG Tabs  Commonly known as:  APRESOLINE      Take 1 tablet (50 mg total) by mouth 3 (three) times daily.    Quantity:  90 tablet  Refills:  1     ipratropium-albu Oral Tab  Take 1 tablet (50 mg total) by mouth 3 (three) times daily. Loperamide HCl 2 MG Oral Cap  Take 2 mg by mouth 4 (four) times daily as needed for Diarrhea. Acidophilus/Pectin Oral Cap  Take 1 capsule by mouth daily.     acetaminophen 325 MG Or

## 2019-11-06 NOTE — PROGRESS NOTES
[facility]  Nephrology Daily Progress Note    Jenny Elam  [id number]  81 year old      HPI:   Jenny Elam is a 78year old female. Overall continues to improve. MS better and more conversant. No CP or SOB.        ROS:     Con exam appropriate for age reflexes and motor skills appropriate for age    Labs:  Lab Results   Component Value Date    WBC 6.5 11/06/2019    HGB 10.1 11/06/2019    HCT 33.8 11/06/2019    PLT 61.0 11/06/2019    CREATSERUM 3.39 11/06/2019    BUN 10 11/06/201 11/04/2019 at 7:26     Approved by (CST): Dipika Yi MD on 11/04/2019 at 7:36              Medications:    Current Facility-Administered Medications:   •  albuterol sulfate (VENTOLIN) (2.5 MG/3ML) 0.083% nebulizer solution 2.5 mg, 2.5 mg, Nebuliza hematuria     Acute respiratory failure with hypoxia (HCC)     Acute pulmonary edema (HCC)     Acute encephalopathy     Anemia     Acute respiratory failure with hypercapnia (HCC)     Episodic mood disorder (HCC)     Abnormal MRI     Hypotension due to hyp

## 2019-11-07 NOTE — PAYOR COMM NOTE
--------------  DISCHARGE REVIEW    [facility]  Subscriber #:  [id number]  Authorization Number: [id number]    Admit date: 10/29/19  Admit time:  6783  Discharge Date: 11/6/2019  2:04 PM     Admitting Physician: Dara Castro., MD  Attending Farihay severe distal left main disease, symptoms controlled. After discussion with Dr. Peter Solares platelet goal should be above 50k prior to initiating plavix. Will plan on discharge, outpatient CBC 1 week to assess platelet trends, consideration of steroids.   If john following patient.     AAA Alva Fisherman repair      Recurrent falls.  With large bruises on her back.  PT OT.  Social work. Elder Zendejas looking into rehab.     Tobacco use. Was smoking even recently.  Patient aware.     Thrombocytopenia.  Gutierres from prior.    Instructions Prescription details   acetaminophen 325 MG Tabs  Commonly known as:  TYLENOL      Take 650 mg by mouth every 8 (eight) hours as needed for Pain (Headache).    Refills:  0     Acidophilus/Pectin Caps  Commonly known as:  PROBIOTIC      Take 1 c Tabs  Commonly known as:  TENORMIN        Pravastatin Sodium 40 MG Tabs  Commonly known as:  PRAVACHOL              Where to Get Your Medications      These medications were sent to [address] [place] [address], [address] [place] [address] [id number] inhaler    calcium acetate 667 MG Oral Cap  Take 1 capsule by mouth 3 (three) times daily with meals. Cyanocobalamin (VITAMIN B-12 ER) 2000 MCG Oral Tab CR  Take 2,000 mcg by mouth daily.               Discharge Diet: Cardiac diet 2 gr na    Discharge Ac

## 2019-11-08 NOTE — TELEPHONE ENCOUNTER
Please review; protocol failed. You sent a month supply with one refill. Pharmacy asking if qty 270 can be sent for 90 day supply.      Requested Prescriptions     Pending Prescriptions Disp Refills   • HYDRALAZINE HCL 25 MG Oral Tab [Pharmacy Med Name: H

## 2019-11-13 NOTE — PROGRESS NOTES
Bryan Fernandez  : 3/31/1940  Age 78year old  female patient is admitted to [address] [place] [address] for CAROL.     Chief complaint: Initial APRN Assessment/Follow up Acute hypoxic respiratory failure    HPI  Patient is a 77-year-old female with medic 9/27/2019    Performed by Angeline Alexis MD at [address] [place] [address] ENDOSCOPY   • ESOPHAGOGASTRODUODENOSCOPY (EGD) N/A 8/31/2019    Performed by Smith Enriquez DO at [address] [place] [address] ENDOSCOPY   • ESOPHAGOGASTRODUODENOSCOPY (EGD) N/A 8/27/2019    Performed by Smith Enriquez DO at [address] [place] [address] frequency/hematuria/dysuria. Skin: Denies rash or itching. Neuro: Denies weakness, syncope or headache. Hematologic: Denies excessive bruising,hemoptysis, or any bleeding. PHYSICAL EXAM:  Gen: A+Ox3. No distress. Calm and cooperative.   Mariaelena catheterization.   -Severe pulmonary hypertension   - Severe CAD 70% of the left main moderate diffuse   -Very high risk for surgery   - CPM Atorvastatin    Thrombocytopenia  -Goal >50K prior to starting plavix  -Currently not on ASA or plavix  -Discharged

## 2019-11-17 NOTE — PROGRESS NOTES
Lala Walden  : 3/31/1940  Age 78year old  female patient is admitted to [address] [place] [address] for CAROL.     Chief complaint: Follow up Acute hypoxic respiratory failure    HPI  Patient is a 77-year-olf female with medical history significant f nausea and vomiting)    • Renal disorder    • Visual impairment      Past Surgical History:   Procedure Laterality Date   •      • CATARACT     • CATARACT EXTRACTION Right 11/15/2017    Dr. Jillian Isaac.    • D & C     • DIALYSIS CATHETER REMOVAL N/A / major abnormality. REVIEW OF SYSTEMS:+ HA during HD yesterday    Constitutional: Denies fever, or chills. CV: Denies SOB, dizziness,palpitations or CP. Respiratory: Denies SOB, cough or wheezing   GI: Denies black or bloody stools.  Denies Abd ten starting plavix  -Currently not on ASA or plavix  -Discharged with Platelet of 64O 00/1      Acute HFpEF  -EF 45-50%  -CPM Coreg BID  -Severe pulmonary hypertension  -on HD        Hypertension  -low normal- improved off hydral          ESRD  HD-TTHS    Hx

## 2019-11-22 NOTE — PROGRESS NOTES
Tania Dubon  : 3/31/1940  Age 78year old  female patient is admitted to [address] [place] [address] for CAROL.     Chief complaint: Follow up Anemia and HA    HPI  Patient is a 77-year-olf female with medical history significant for ESRD on HD, COPD high cholesterol   • PONV (postoperative nausea and vomiting)    • Renal disorder    • Visual impairment      Past Surgical History:   Procedure Laterality Date   •      • CATARACT     • CATARACT EXTRACTION Right 11/15/2017    Dr. Mihaela Deluca.    • D & C Reviewed in [facility] EMR. VITALS:Reveals no major abnormality. REVIEW OF SYSTEMS:   Constitutional: Denies fever, or chills. CV: Denies SOB, dizziness,palpitations or CP.    Respiratory: Denies SOB, cough or wheezing   GI: Denies black or bloody stool Atorvastatin    Thrombocytopenia  -Goal >50K prior to starting plavix  -Currently not on ASA or plavix  -Discharged with Platelet of 61V 56/6      Acute HFpEF  -EF 45-50%  -CPM Coreg BID  -Severe pulmonary hypertension  -on HD        Hypertension  -low nor

## 2019-11-27 NOTE — PROGRESS NOTES
Lindle Schilder  : 3/31/1940  Age 78year old  female patient is admitted to [address] [place] [address] for CAROL.     Chief complaint: Discharge Planning and laboratory encounter review    HPI  Patient is a 77-year-olf female with medical history signif Hyperlipidemia     Pt. taking 40 mg Pravastatin; states not been told she has high cholesterol   • PONV (postoperative nausea and vomiting)    • Renal disorder    • Visual impairment      Past Surgical History:   Procedure Laterality Date   •  OTHER (SEE COMMENTS)    CODE STATUS: FULL CODE      CURRENT MEDICATIONS: Reviewed in SNF EMR. VITALS:Reveals no major abnormality. REVIEW OF SYSTEMS:   Constitutional: Denies fever, or chills. CV: Denies SOB, dizziness,palpitations or CP.    Re hemidiaphragm and basilar left lung atelectasis.     -Pulmo consulted in Rehab.   -Use Incentive spirometry 10x/1hr.    -Smoking cessation.   -Per pulmo documentation, recs  CXR in 3 to 4 weeks to reassess pleural effusion.     CAD  -S/P right and left hear

## 2020-01-01 ENCOUNTER — HOSPITAL ENCOUNTER (EMERGENCY)
Facility: HOSPITAL | Age: 80
Discharge: HOME | End: 2020-01-01
Payer: MEDICARE

## 2020-01-01 ENCOUNTER — HOSPITAL ENCOUNTER (OUTPATIENT)
Facility: HOSPITAL | Age: 80
Setting detail: OBSERVATION
End: 2020-01-01
Attending: EMERGENCY MEDICINE | Admitting: INTERNAL MEDICINE
Payer: MEDICARE

## 2020-01-01 ENCOUNTER — PATIENT MESSAGE (OUTPATIENT)
Dept: INTERNAL MEDICINE CLINIC | Facility: CLINIC | Age: 80
End: 2020-01-01

## 2020-01-01 ENCOUNTER — APPOINTMENT (OUTPATIENT)
Dept: GENERAL RADIOLOGY | Facility: HOSPITAL | Age: 80
DRG: 864 | End: 2020-01-01
Attending: EMERGENCY MEDICINE
Payer: MEDICARE

## 2020-01-01 ENCOUNTER — APPOINTMENT (OUTPATIENT)
Dept: MRI IMAGING | Facility: HOSPITAL | Age: 80
End: 2020-01-01
Attending: Other
Payer: MEDICARE

## 2020-01-01 ENCOUNTER — HOSPITAL ENCOUNTER (OUTPATIENT)
Dept: CV DIAGNOSTICS | Age: 80
Discharge: HOME | End: 2020-01-01
Attending: INTERNAL MEDICINE
Payer: MEDICARE

## 2020-01-01 ENCOUNTER — SNF VISIT (OUTPATIENT)
Dept: INTERNAL MEDICINE CLINIC | Facility: SKILLED NURSING FACILITY | Age: 80
End: 2020-01-01

## 2020-01-01 ENCOUNTER — APPOINTMENT (OUTPATIENT)
Dept: NUCLEAR MEDICINE | Facility: HOSPITAL | Age: 80
DRG: 864 | End: 2020-01-01
Attending: INTERNAL MEDICINE
Payer: MEDICARE

## 2020-01-01 ENCOUNTER — TELEPHONE (OUTPATIENT)
Dept: INTERNAL MEDICINE CLINIC | Facility: CLINIC | Age: 80
End: 2020-01-01

## 2020-01-01 ENCOUNTER — LAB ENCOUNTER (OUTPATIENT)
Dept: LAB | Facility: HOSPITAL | Age: 80
End: 2020-01-01
Attending: INTERNAL MEDICINE
Payer: MEDICARE

## 2020-01-01 ENCOUNTER — TELEPHONE (OUTPATIENT)
Dept: NEPHROLOGY | Facility: CLINIC | Age: 80
End: 2020-01-01

## 2020-01-01 ENCOUNTER — SNF ADMIT/H&P (OUTPATIENT)
Dept: INTERNAL MEDICINE CLINIC | Facility: SKILLED NURSING FACILITY | Age: 80
End: 2020-01-01

## 2020-01-01 ENCOUNTER — OFFICE VISIT (OUTPATIENT)
Dept: INTERNAL MEDICINE CLINIC | Facility: CLINIC | Age: 80
End: 2020-01-01
Payer: MEDICARE

## 2020-01-01 ENCOUNTER — APPOINTMENT (OUTPATIENT)
Dept: GENERAL RADIOLOGY | Facility: HOSPITAL | Age: 80
End: 2020-01-01
Attending: EMERGENCY MEDICINE
Payer: MEDICARE

## 2020-01-01 ENCOUNTER — APPOINTMENT (OUTPATIENT)
Dept: MRI IMAGING | Facility: HOSPITAL | Age: 80
End: 2020-01-01
Attending: INTERNAL MEDICINE
Payer: MEDICARE

## 2020-01-01 ENCOUNTER — APPOINTMENT (OUTPATIENT)
Dept: GENERAL RADIOLOGY | Facility: HOSPITAL | Age: 80
DRG: 291 | End: 2020-01-01
Attending: INTERNAL MEDICINE
Payer: MEDICARE

## 2020-01-01 ENCOUNTER — PATIENT MESSAGE (OUTPATIENT)
Dept: NEPHROLOGY | Facility: CLINIC | Age: 80
End: 2020-01-01

## 2020-01-01 ENCOUNTER — EXTERNAL FACILITY (OUTPATIENT)
Dept: FAMILY MEDICINE CLINIC | Facility: CLINIC | Age: 80
End: 2020-01-01

## 2020-01-01 ENCOUNTER — APPOINTMENT (OUTPATIENT)
Dept: CT IMAGING | Facility: HOSPITAL | Age: 80
End: 2020-01-01
Payer: MEDICARE

## 2020-01-01 ENCOUNTER — HOSPITAL ENCOUNTER (OUTPATIENT)
Dept: INTERVENTIONAL RADIOLOGY/VASCULAR | Facility: HOSPITAL | Age: 80
Discharge: NURSING, REHAB OR LONG-TERM CARE | End: 2020-01-01
Attending: INTERNAL MEDICINE | Admitting: INTERNAL MEDICINE
Payer: MEDICARE

## 2020-01-01 ENCOUNTER — APPOINTMENT (OUTPATIENT)
Dept: GENERAL RADIOLOGY | Facility: HOSPITAL | Age: 80
DRG: 291 | End: 2020-01-01
Payer: MEDICARE

## 2020-01-01 ENCOUNTER — HOME HEALTH CHARGES (OUTPATIENT)
Dept: INTERNAL MEDICINE CLINIC | Facility: CLINIC | Age: 80
End: 2020-01-01

## 2020-01-01 ENCOUNTER — MA CHART PREP (OUTPATIENT)
Dept: FAMILY MEDICINE CLINIC | Facility: CLINIC | Age: 80
End: 2020-01-01

## 2020-01-01 ENCOUNTER — HOSPITAL ENCOUNTER (INPATIENT)
Facility: HOSPITAL | Age: 80
LOS: 4 days | Discharge: NURSING, REHAB OR LONG-TERM CARE | DRG: 291 | End: 2020-01-01
Attending: EMERGENCY MEDICINE | Admitting: INTERNAL MEDICINE
Payer: MEDICARE

## 2020-01-01 ENCOUNTER — HOSPITAL ENCOUNTER (INPATIENT)
Facility: HOSPITAL | Age: 80
LOS: 6 days | Discharge: HOME HEALTH | DRG: 864 | End: 2020-01-01
Attending: EMERGENCY MEDICINE | Admitting: INTERNAL MEDICINE
Payer: MEDICARE

## 2020-01-01 VITALS
DIASTOLIC BLOOD PRESSURE: 78 MMHG | WEIGHT: 119.31 LBS | SYSTOLIC BLOOD PRESSURE: 97 MMHG | TEMPERATURE: 98 F | OXYGEN SATURATION: 95 % | HEIGHT: 59 IN | HEART RATE: 80 BPM | RESPIRATION RATE: 18 BRPM | BODY MASS INDEX: 24.05 KG/M2

## 2020-01-01 VITALS
OXYGEN SATURATION: 97 % | BODY MASS INDEX: 26.81 KG/M2 | HEART RATE: 78 BPM | SYSTOLIC BLOOD PRESSURE: 133 MMHG | HEIGHT: 59 IN | WEIGHT: 133 LBS | DIASTOLIC BLOOD PRESSURE: 68 MMHG | TEMPERATURE: 98 F

## 2020-01-01 VITALS
BODY MASS INDEX: 25.2 KG/M2 | RESPIRATION RATE: 18 BRPM | SYSTOLIC BLOOD PRESSURE: 155 MMHG | HEART RATE: 74 BPM | WEIGHT: 125 LBS | DIASTOLIC BLOOD PRESSURE: 80 MMHG | TEMPERATURE: 98 F | HEIGHT: 59 IN | OXYGEN SATURATION: 96 %

## 2020-01-01 VITALS
OXYGEN SATURATION: 88 % | HEIGHT: 59 IN | WEIGHT: 128 LBS | HEART RATE: 78 BPM | DIASTOLIC BLOOD PRESSURE: 70 MMHG | BODY MASS INDEX: 25.8 KG/M2 | SYSTOLIC BLOOD PRESSURE: 117 MMHG

## 2020-01-01 VITALS
RESPIRATION RATE: 18 BRPM | SYSTOLIC BLOOD PRESSURE: 130 MMHG | BODY MASS INDEX: 25.46 KG/M2 | HEART RATE: 69 BPM | TEMPERATURE: 98 F | OXYGEN SATURATION: 98 % | WEIGHT: 126.31 LBS | HEIGHT: 59 IN | DIASTOLIC BLOOD PRESSURE: 73 MMHG

## 2020-01-01 VITALS
TEMPERATURE: 98 F | OXYGEN SATURATION: 97 % | RESPIRATION RATE: 19 BRPM | BODY MASS INDEX: 25 KG/M2 | SYSTOLIC BLOOD PRESSURE: 130 MMHG | HEART RATE: 85 BPM | DIASTOLIC BLOOD PRESSURE: 66 MMHG | WEIGHT: 123.13 LBS

## 2020-01-01 VITALS
HEIGHT: 59 IN | RESPIRATION RATE: 15 BRPM | WEIGHT: 142.19 LBS | TEMPERATURE: 99 F | HEART RATE: 65 BPM | DIASTOLIC BLOOD PRESSURE: 88 MMHG | SYSTOLIC BLOOD PRESSURE: 163 MMHG | OXYGEN SATURATION: 96 % | BODY MASS INDEX: 28.67 KG/M2

## 2020-01-01 DIAGNOSIS — I10 ESSENTIAL HYPERTENSION: ICD-10-CM

## 2020-01-01 DIAGNOSIS — J96.01 ACUTE RESPIRATORY FAILURE WITH HYPOXIA (HCC): ICD-10-CM

## 2020-01-01 DIAGNOSIS — D63.1 ANEMIA DUE TO CHRONIC KIDNEY DISEASE, ON CHRONIC DIALYSIS (HCC): ICD-10-CM

## 2020-01-01 DIAGNOSIS — D64.9 ANEMIA, UNSPECIFIED TYPE: ICD-10-CM

## 2020-01-01 DIAGNOSIS — I50.9 ACUTE ON CHRONIC CONGESTIVE HEART FAILURE, UNSPECIFIED HEART FAILURE TYPE (HCC): ICD-10-CM

## 2020-01-01 DIAGNOSIS — N18.6 ANEMIA DUE TO CHRONIC KIDNEY DISEASE, ON CHRONIC DIALYSIS (HCC): ICD-10-CM

## 2020-01-01 DIAGNOSIS — E78.2 MIXED HYPERLIPIDEMIA: ICD-10-CM

## 2020-01-01 DIAGNOSIS — I50.9 ACUTE ON CHRONIC CONGESTIVE HEART FAILURE, UNSPECIFIED HEART FAILURE TYPE (HCC): Primary | ICD-10-CM

## 2020-01-01 DIAGNOSIS — Z99.2 ESRD (END STAGE RENAL DISEASE) ON DIALYSIS (HCC): ICD-10-CM

## 2020-01-01 DIAGNOSIS — J44.9 CHRONIC OBSTRUCTIVE PULMONARY DISEASE, UNSPECIFIED COPD TYPE (HCC): ICD-10-CM

## 2020-01-01 DIAGNOSIS — N30.01 ACUTE CYSTITIS WITH HEMATURIA: ICD-10-CM

## 2020-01-01 DIAGNOSIS — Z02.9 DISCHARGE PLANNING ISSUES: ICD-10-CM

## 2020-01-01 DIAGNOSIS — J43.9 PULMONARY EMPHYSEMA, UNSPECIFIED EMPHYSEMA TYPE (HCC): ICD-10-CM

## 2020-01-01 DIAGNOSIS — Z01.89 ENCOUNTER FOR LABORATORY TEST: ICD-10-CM

## 2020-01-01 DIAGNOSIS — R50.9 FEVER, UNSPECIFIED FEVER CAUSE: Primary | ICD-10-CM
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
## 2020-01-01 DIAGNOSIS — N30.00 ACUTE CYSTITIS WITHOUT HEMATURIA: ICD-10-CM

## 2020-01-01 DIAGNOSIS — Z99.2 ANEMIA DUE TO CHRONIC KIDNEY DISEASE, ON CHRONIC DIALYSIS (HCC): ICD-10-CM

## 2020-01-01 DIAGNOSIS — D69.6 THROMBOCYTOPENIA (HCC): ICD-10-CM

## 2020-01-01 DIAGNOSIS — N18.6 ESRD (END STAGE RENAL DISEASE) ON DIALYSIS (HCC): ICD-10-CM

## 2020-01-01 DIAGNOSIS — Z71.89 ENCOUNTER FOR MEDICATION REVIEW AND COUNSELING: ICD-10-CM

## 2020-01-01 DIAGNOSIS — I25.10 CAD (CORONARY ARTERY DISEASE): ICD-10-CM

## 2020-01-01 DIAGNOSIS — N18.4 CKD (CHRONIC KIDNEY DISEASE) STAGE 4, GFR 15-29 ML/MIN (HCC): ICD-10-CM

## 2020-01-01 DIAGNOSIS — I25.10 CORONARY ARTERY DISEASE INVOLVING NATIVE CORONARY ARTERY OF NATIVE HEART, ANGINA PRESENCE UNSPECIFIED: ICD-10-CM

## 2020-01-01 DIAGNOSIS — Z99.2 ESRD (END STAGE RENAL DISEASE) ON DIALYSIS (HCC): Primary | ICD-10-CM

## 2020-01-01 DIAGNOSIS — I25.10 CORONARY ARTERY DISEASE INVOLVING NATIVE HEART WITHOUT ANGINA PECTORIS, UNSPECIFIED VESSEL OR LESION TYPE: ICD-10-CM

## 2020-01-01 DIAGNOSIS — N18.9 CHRONIC RENAL FAILURE, UNSPECIFIED CKD STAGE: ICD-10-CM

## 2020-01-01 DIAGNOSIS — I25.10 CORONARY ATHEROSCLEROSIS OF NATIVE CORONARY ARTERY: Primary | ICD-10-CM

## 2020-01-01 DIAGNOSIS — I27.20 PULMONARY HTN (HCC): ICD-10-CM

## 2020-01-01 DIAGNOSIS — R09.02 HYPOXIA: ICD-10-CM

## 2020-01-01 DIAGNOSIS — I25.10 CORONARY ARTERY DISEASE INVOLVING NATIVE HEART WITHOUT ANGINA PECTORIS, UNSPECIFIED VESSEL OR LESION TYPE: Primary | ICD-10-CM

## 2020-01-01 DIAGNOSIS — I71.4 ABDOMINAL AORTIC ANEURYSM (AAA) WITHOUT RUPTURE (HCC): Primary | ICD-10-CM

## 2020-01-01 DIAGNOSIS — Z86.39 HISTORY OF PRIMARY HYPERPARATHYROIDISM: ICD-10-CM

## 2020-01-01 DIAGNOSIS — Z09 FOLLOW UP: ICD-10-CM

## 2020-01-01 DIAGNOSIS — R91.1 NODULE OF UPPER LOBE OF RIGHT LUNG: ICD-10-CM

## 2020-01-01 DIAGNOSIS — J96.02 ACUTE RESPIRATORY FAILURE WITH HYPERCAPNIA (HCC): ICD-10-CM

## 2020-01-01 DIAGNOSIS — R58 ECCHYMOSIS: ICD-10-CM

## 2020-01-01 DIAGNOSIS — Z72.0 TOBACCO ABUSE: ICD-10-CM

## 2020-01-01 DIAGNOSIS — N18.6 ESRD (END STAGE RENAL DISEASE) ON DIALYSIS (HCC): Primary | ICD-10-CM

## 2020-01-01 LAB
ANION GAP SERPL CALC-SCNC: 9 MMOL/L (ref 0–18)
BASOPHILS # BLD AUTO: 0.11 X10(3) UL (ref 0–0.2)
BASOPHILS NFR BLD AUTO: 1.6 %
BUN BLD-MCNC: 42 MG/DL (ref 7–18)
BUN/CREAT SERPL: 6.6 (ref 10–20)
CALCIUM BLD-MCNC: 7.8 MG/DL (ref 8.5–10.1)
CHLORIDE SERPL-SCNC: 102 MMOL/L (ref 98–112)
CO2 SERPL-SCNC: 27 MMOL/L (ref 21–32)
CREAT BLD-MCNC: 6.38 MG/DL (ref 0.55–1.02)
DEPRECATED RDW RBC AUTO: 52.2 FL (ref 35.1–46.3)
DEPRECATED RDW RBC AUTO: 68.9 FL (ref 35.1–46.3)
EOSINOPHIL # BLD AUTO: 0.31 X10(3) UL (ref 0–0.7)
EOSINOPHIL NFR BLD AUTO: 4.6 %
ERYTHROCYTE [DISTWIDTH] IN BLOOD BY AUTOMATED COUNT: 14 % (ref 11–15)
ERYTHROCYTE [DISTWIDTH] IN BLOOD BY AUTOMATED COUNT: 18.2 % (ref 11–15)
GLUCOSE BLD-MCNC: 64 MG/DL (ref 70–99)
GLUCOSE BLDC GLUCOMTR-MCNC: 83 MG/DL (ref 70–99)
HCT VFR BLD AUTO: 30.7 % (ref 35–48)
HCT VFR BLD AUTO: 37.6 % (ref 35–48)
HEMATOCRIT: 34.5 % (ref 35–45)
HEMOGLOBIN: 11.5 G/DL (ref 11.7–15.5)
HGB BLD-MCNC: 11.4 G/DL (ref 12–16)
HGB BLD-MCNC: 9.9 G/DL (ref 12–16)
IMM GRANULOCYTES # BLD AUTO: 0.06 X10(3) UL (ref 0–1)
IMM GRANULOCYTES NFR BLD: 0.9 %
ISTAT ACTIVATED CLOTTING TIME: 208 SECONDS (ref 125–137)
ISTAT ACTIVATED CLOTTING TIME: 235 SECONDS (ref 125–137)
LYMPHOCYTES # BLD AUTO: 1.43 X10(3) UL (ref 1–4)
LYMPHOCYTES NFR BLD AUTO: 21.1 %
MCH RBC QN AUTO: 32.2 PG (ref 26–34)
MCH RBC QN AUTO: 33.2 PG (ref 26–34)
MCH: 30.7 PG (ref 27–33)
MCHC RBC AUTO-ENTMCNC: 30.3 G/DL (ref 31–37)
MCHC RBC AUTO-ENTMCNC: 32.2 G/DL (ref 31–37)
MCHC: 33.3 G/DL (ref 32–36)
MCV RBC AUTO: 103 FL (ref 80–100)
MCV RBC AUTO: 106.2 FL (ref 80–100)
MCV: 92 FL (ref 80–100)
MONOCYTES # BLD AUTO: 0.63 X10(3) UL (ref 0.1–1)
MONOCYTES NFR BLD AUTO: 9.3 %
MPV: 10.2 FL (ref 7.5–12.5)
NEUTROPHILS # BLD AUTO: 4.25 X10 (3) UL (ref 1.5–7.7)
NEUTROPHILS # BLD AUTO: 4.25 X10(3) UL (ref 1.5–7.7)
NEUTROPHILS NFR BLD AUTO: 62.5 %
OSMOLALITY SERPL CALC.SUM OF ELEC: 295 MOSM/KG (ref 275–295)
PLATELET # BLD AUTO: 130 10(3)UL (ref 150–450)
PLATELET # BLD AUTO: 86 10(3)UL (ref 150–450)
PLATELET COUNT: 124 THOUSAND/UL (ref 140–400)
PLATELET MORPHOLOGY: NORMAL
POTASSIUM SERPL-SCNC: 4.7 MMOL/L (ref 3.5–5.1)
RBC # BLD AUTO: 2.98 X10(6)UL (ref 3.8–5.3)
RBC # BLD AUTO: 3.54 X10(6)UL (ref 3.8–5.3)
RDW: 14.3 % (ref 11–15)
RED BLOOD CELL COUNT: 3.75 MILLION/UL (ref 3.8–5.1)
SODIUM SERPL-SCNC: 138 MMOL/L (ref 136–145)
WBC # BLD AUTO: 6.3 X10(3) UL (ref 4–11)
WBC # BLD AUTO: 6.8 X10(3) UL (ref 4–11)
WHITE BLOOD CELL COUNT: 7.2 THOUSAND/UL (ref 3.8–10.8)

## 2020-01-01 PROCEDURE — 99220 INITIAL OBSERVATION CARE,LEVL III: CPT | Performed by: HOSPITALIST

## 2020-01-01 PROCEDURE — B2111ZZ FLUOROSCOPY OF MULTIPLE CORONARY ARTERIES USING LOW OSMOLAR CONTRAST: ICD-10-PCS | Performed by: INTERNAL MEDICINE

## 2020-01-01 PROCEDURE — 99233 SBSQ HOSP IP/OBS HIGH 50: CPT | Performed by: INTERNAL MEDICINE

## 2020-01-01 PROCEDURE — 92979 ENDOLUMINL IVUS OCT C EA: CPT

## 2020-01-01 PROCEDURE — 93458 L HRT ARTERY/VENTRICLE ANGIO: CPT

## 2020-01-01 PROCEDURE — 72125 CT NECK SPINE W/O DYE: CPT

## 2020-01-01 PROCEDURE — 4A023N7 MEASUREMENT OF CARDIAC SAMPLING AND PRESSURE, LEFT HEART, PERCUTANEOUS APPROACH: ICD-10-PCS | Performed by: INTERNAL MEDICINE

## 2020-01-01 PROCEDURE — 71045 X-RAY EXAM CHEST 1 VIEW: CPT | Performed by: EMERGENCY MEDICINE

## 2020-01-01 PROCEDURE — 85025 COMPLETE CBC W/AUTO DIFF WBC: CPT

## 2020-01-01 PROCEDURE — 99223 1ST HOSP IP/OBS HIGH 75: CPT | Performed by: INTERNAL MEDICINE

## 2020-01-01 PROCEDURE — 36415 COLL VENOUS BLD VENIPUNCTURE: CPT

## 2020-01-01 PROCEDURE — 99310 SBSQ NF CARE HIGH MDM 45: CPT | Performed by: NURSE PRACTITIONER

## 2020-01-01 PROCEDURE — 99226 SUBSEQUENT OBSERVATION CARE: CPT | Performed by: INTERNAL MEDICINE

## 2020-01-01 PROCEDURE — 93452 LEFT HRT CATH W/VENTRCLGRPHY: CPT

## 2020-01-01 PROCEDURE — 99232 SBSQ HOSP IP/OBS MODERATE 35: CPT | Performed by: INTERNAL MEDICINE

## 2020-01-01 PROCEDURE — 99309 SBSQ NF CARE MODERATE MDM 30: CPT | Performed by: FAMILY MEDICINE

## 2020-01-01 PROCEDURE — 85347 COAGULATION TIME ACTIVATED: CPT

## 2020-01-01 PROCEDURE — 93306 TTE W/DOPPLER COMPLETE: CPT | Performed by: INTERNAL MEDICINE

## 2020-01-01 PROCEDURE — 92920 PRQ TRLUML C ANGIOP 1ART&/BR: CPT

## 2020-01-01 PROCEDURE — 71045 X-RAY EXAM CHEST 1 VIEW: CPT | Performed by: INTERNAL MEDICINE

## 2020-01-01 PROCEDURE — 78582 LUNG VENTILAT&PERFUS IMAGING: CPT | Performed by: INTERNAL MEDICINE

## 2020-01-01 PROCEDURE — 70450 CT HEAD/BRAIN W/O DYE: CPT

## 2020-01-01 PROCEDURE — 99214 OFFICE O/P EST MOD 30 MIN: CPT | Performed by: INTERNAL MEDICINE

## 2020-01-01 PROCEDURE — 02713Z6 DILATION OF CORONARY ARTERY, TWO ARTERIES, BIFURCATION, PERCUTANEOUS APPROACH: ICD-10-PCS | Performed by: INTERNAL MEDICINE

## 2020-01-01 PROCEDURE — 99308 SBSQ NF CARE LOW MDM 20: CPT | Performed by: NURSE PRACTITIONER

## 2020-01-01 PROCEDURE — 99239 HOSP IP/OBS DSCHRG MGMT >30: CPT | Performed by: INTERNAL MEDICINE

## 2020-01-01 PROCEDURE — 99153 MOD SED SAME PHYS/QHP EA: CPT

## 2020-01-01 PROCEDURE — 1111F DSCHRG MED/CURRENT MED MERGE: CPT | Performed by: NURSE PRACTITIONER

## 2020-01-01 PROCEDURE — 99307 SBSQ NF CARE SF MDM 10: CPT | Performed by: FAMILY MEDICINE

## 2020-01-01 PROCEDURE — 80048 BASIC METABOLIC PNL TOTAL CA: CPT | Performed by: INTERNAL MEDICINE

## 2020-01-01 PROCEDURE — B2151ZZ FLUOROSCOPY OF LEFT HEART USING LOW OSMOLAR CONTRAST: ICD-10-PCS | Performed by: INTERNAL MEDICINE

## 2020-01-01 PROCEDURE — 99233 SBSQ HOSP IP/OBS HIGH 50: CPT | Performed by: OTHER

## 2020-01-01 PROCEDURE — 92924 PRQ TRLUML C ATHRC 1 ART&/BR: CPT

## 2020-01-01 PROCEDURE — 82962 GLUCOSE BLOOD TEST: CPT

## 2020-01-01 PROCEDURE — 5A1D70Z PERFORMANCE OF URINARY FILTRATION, INTERMITTENT, LESS THAN 6 HOURS PER DAY: ICD-10-PCS | Performed by: INTERNAL MEDICINE

## 2020-01-01 PROCEDURE — 92978 ENDOLUMINL IVUS OCT C 1ST: CPT

## 2020-01-01 PROCEDURE — 99214 OFFICE O/P EST MOD 30 MIN: CPT | Performed by: NURSE PRACTITIONER

## 2020-01-01 PROCEDURE — G0180 MD CERTIFICATION HHA PATIENT: HCPCS | Performed by: INTERNAL MEDICINE

## 2020-01-01 PROCEDURE — 85027 COMPLETE CBC AUTOMATED: CPT | Performed by: INTERNAL MEDICINE

## 2020-01-01 PROCEDURE — 90935 HEMODIALYSIS ONE EVALUATION: CPT | Performed by: INTERNAL MEDICINE

## 2020-01-01 PROCEDURE — B241ZZ3 ULTRASONOGRAPHY OF MULTIPLE CORONARY ARTERIES, INTRAVASCULAR: ICD-10-PCS | Performed by: INTERNAL MEDICINE

## 2020-01-01 PROCEDURE — 99152 MOD SED SAME PHYS/QHP 5/>YRS: CPT

## 2020-01-01 PROCEDURE — 99284 EMERGENCY DEPT VISIT MOD MDM: CPT

## 2020-01-01 RX ORDER — CHOLECALCIFEROL (VITAMIN D3) 125 MCG
2000 CAPSULE ORAL DAILY
Status: DISCONTINUED | OUTPATIENT
Start: 2020-01-01 | End: 2020-01-01

## 2020-01-01 RX ORDER — HYDRALAZINE HYDROCHLORIDE 25 MG/1
25 TABLET, FILM COATED ORAL EVERY 8 HOURS PRN
Status: DISCONTINUED | OUTPATIENT
Start: 2020-01-01 | End: 2020-01-01

## 2020-01-01 RX ORDER — ONDANSETRON 4 MG/1
4 TABLET, ORALLY DISINTEGRATING ORAL ONCE
Status: COMPLETED | OUTPATIENT
Start: 2020-01-01 | End: 2020-01-01

## 2020-01-01 RX ORDER — LOPERAMIDE HYDROCHLORIDE 2 MG/1
2 CAPSULE ORAL EVERY 6 HOURS PRN
Status: DISCONTINUED | OUTPATIENT
Start: 2020-01-01 | End: 2020-01-01

## 2020-01-01 RX ORDER — MORPHINE SULFATE 2 MG/ML
2 INJECTION, SOLUTION INTRAMUSCULAR; INTRAVENOUS EVERY 2 HOUR PRN
Status: DISCONTINUED | OUTPATIENT
Start: 2020-01-01 | End: 2020-01-01

## 2020-01-01 RX ORDER — PANTOPRAZOLE SODIUM 40 MG/1
40 TABLET, DELAYED RELEASE ORAL
Status: DISCONTINUED | OUTPATIENT
Start: 2020-01-01 | End: 2020-01-01

## 2020-01-01 RX ORDER — ESCITALOPRAM OXALATE 5 MG/1
5 TABLET ORAL DAILY
Status: DISCONTINUED | OUTPATIENT
Start: 2020-01-01 | End: 2020-01-01

## 2020-01-01 RX ORDER — ALBUTEROL SULFATE 90 UG/1
1 AEROSOL, METERED RESPIRATORY (INHALATION) 4 TIMES DAILY
Status: DISCONTINUED | OUTPATIENT
Start: 2020-01-01 | End: 2020-01-01

## 2020-01-01 RX ORDER — ONDANSETRON 2 MG/ML
4 INJECTION INTRAMUSCULAR; INTRAVENOUS EVERY 6 HOURS PRN
Status: DISCONTINUED | OUTPATIENT
Start: 2020-01-01 | End: 2020-01-01

## 2020-01-01 RX ORDER — AMLODIPINE BESYLATE 10 MG/1
10 TABLET ORAL DAILY
Status: DISCONTINUED | OUTPATIENT
Start: 2020-01-01 | End: 2020-01-01

## 2020-01-01 RX ORDER — SEVELAMER CARBONATE 800 MG/1
800 TABLET, FILM COATED ORAL
Status: DISCONTINUED | OUTPATIENT
Start: 2020-01-01 | End: 2020-01-01

## 2020-01-01 RX ORDER — METOCLOPRAMIDE HYDROCHLORIDE 5 MG/ML
5 INJECTION INTRAMUSCULAR; INTRAVENOUS EVERY 8 HOURS PRN
Status: DISCONTINUED | OUTPATIENT
Start: 2020-01-01 | End: 2020-01-01

## 2020-01-01 RX ORDER — FOLIC ACID 1 MG/1
1 TABLET ORAL DAILY
Status: DISCONTINUED | OUTPATIENT
Start: 2020-01-01 | End: 2020-01-01

## 2020-01-01 RX ORDER — GARLIC EXTRACT 500 MG
1 CAPSULE ORAL DAILY
Status: ON HOLD | COMMUNITY
End: 2020-01-01

## 2020-01-01 RX ORDER — MORPHINE SULFATE 4 MG/ML
4 INJECTION, SOLUTION INTRAMUSCULAR; INTRAVENOUS EVERY 2 HOUR PRN
Status: DISCONTINUED | OUTPATIENT
Start: 2020-01-01 | End: 2020-01-01

## 2020-01-01 RX ORDER — AMPICILLIN 500 MG/1
500 CAPSULE ORAL 2 TIMES DAILY
Status: DISCONTINUED | OUTPATIENT
Start: 2020-01-01 | End: 2020-01-01

## 2020-01-01 RX ORDER — AMLODIPINE BESYLATE 5 MG/1
10 TABLET ORAL DAILY
COMMUNITY
Start: 2020-01-01

## 2020-01-01 RX ORDER — CLOPIDOGREL BISULFATE 75 MG/1
TABLET ORAL
Status: COMPLETED
Start: 2020-01-01 | End: 2020-01-01

## 2020-01-01 RX ORDER — VERAPAMIL HYDROCHLORIDE 2.5 MG/ML
INJECTION, SOLUTION INTRAVENOUS
Status: COMPLETED
Start: 2020-01-01 | End: 2020-01-01

## 2020-01-01 RX ORDER — ACETAMINOPHEN 325 MG/1
650 TABLET ORAL EVERY 8 HOURS PRN
COMMUNITY

## 2020-01-01 RX ORDER — MIDAZOLAM HYDROCHLORIDE 1 MG/ML
INJECTION INTRAMUSCULAR; INTRAVENOUS
Status: DISCONTINUED
Start: 2020-01-01 | End: 2020-01-01 | Stop reason: WASHOUT

## 2020-01-01 RX ORDER — NITROGLYCERIN 20 MG/100ML
INJECTION INTRAVENOUS
Status: COMPLETED
Start: 2020-01-01 | End: 2020-01-01

## 2020-01-01 RX ORDER — IPRATROPIUM BROMIDE AND ALBUTEROL SULFATE 2.5; .5 MG/3ML; MG/3ML
3 SOLUTION RESPIRATORY (INHALATION) EVERY 6 HOURS PRN
Status: DISCONTINUED | OUTPATIENT
Start: 2020-01-01 | End: 2020-01-01

## 2020-01-01 RX ORDER — NITROGLYCERIN 0.4 MG/1
TABLET SUBLINGUAL
Status: COMPLETED
Start: 2020-01-01 | End: 2020-01-01

## 2020-01-01 RX ORDER — HYDRALAZINE HYDROCHLORIDE 25 MG/1
25 TABLET, FILM COATED ORAL EVERY 8 HOURS SCHEDULED
Status: DISCONTINUED | OUTPATIENT
Start: 2020-01-01 | End: 2020-01-01

## 2020-01-01 RX ORDER — SODIUM CHLORIDE 9 MG/ML
INJECTION, SOLUTION INTRAVENOUS
Status: ACTIVE | OUTPATIENT
Start: 2020-01-01 | End: 2020-01-01

## 2020-01-01 RX ORDER — ACETAMINOPHEN 325 MG/1
650 TABLET ORAL EVERY 8 HOURS PRN
Status: DISCONTINUED | OUTPATIENT
Start: 2020-01-01 | End: 2020-01-01

## 2020-01-01 RX ORDER — SODIUM CHLORIDE 0.9 % (FLUSH) 0.9 %
3 SYRINGE (ML) INJECTION AS NEEDED
Status: DISCONTINUED | OUTPATIENT
Start: 2020-01-01 | End: 2020-01-01

## 2020-01-01 RX ORDER — ALBUMIN (HUMAN) 12.5 G/50ML
100 SOLUTION INTRAVENOUS AS NEEDED
Status: DISCONTINUED | OUTPATIENT
Start: 2020-01-01 | End: 2020-01-01

## 2020-01-01 RX ORDER — SODIUM CHLORIDE 9 MG/ML
INJECTION, SOLUTION INTRAVENOUS CONTINUOUS
Status: ACTIVE | OUTPATIENT
Start: 2020-01-01 | End: 2020-01-01

## 2020-01-01 RX ORDER — MIDAZOLAM HYDROCHLORIDE 1 MG/ML
INJECTION INTRAMUSCULAR; INTRAVENOUS
Status: COMPLETED
Start: 2020-01-01 | End: 2020-01-01

## 2020-01-01 RX ORDER — ASPIRIN 81 MG/1
81 TABLET ORAL DAILY
Status: DISCONTINUED | OUTPATIENT
Start: 2020-01-01 | End: 2020-01-01

## 2020-01-01 RX ORDER — CARVEDILOL 12.5 MG/1
12.5 TABLET ORAL 2 TIMES DAILY WITH MEALS
COMMUNITY

## 2020-01-01 RX ORDER — SEVELAMER HYDROCHLORIDE 800 MG/1
800 TABLET, FILM COATED ORAL
Qty: 270 TABLET | Refills: 3 | Status: SHIPPED | OUTPATIENT
Start: 2020-01-01

## 2020-01-01 RX ORDER — CLOPIDOGREL BISULFATE 75 MG/1
75 TABLET ORAL DAILY
Status: DISCONTINUED | OUTPATIENT
Start: 2020-01-01 | End: 2020-01-01

## 2020-01-01 RX ORDER — HEPARIN SODIUM 5000 [USP'U]/ML
5000 INJECTION, SOLUTION INTRAVENOUS; SUBCUTANEOUS EVERY 8 HOURS SCHEDULED
Status: DISCONTINUED | OUTPATIENT
Start: 2020-01-01 | End: 2020-01-01

## 2020-01-01 RX ORDER — SEVELAMER HYDROCHLORIDE 800 MG/1
800 TABLET, FILM COATED ORAL
Qty: 270 TABLET | Refills: 3 | Status: SHIPPED | OUTPATIENT
Start: 2020-01-01 | End: 2020-01-01

## 2020-01-01 RX ORDER — LIDOCAINE HYDROCHLORIDE 20 MG/ML
INJECTION, SOLUTION EPIDURAL; INFILTRATION; INTRACAUDAL; PERINEURAL
Status: COMPLETED
Start: 2020-01-01 | End: 2020-01-01

## 2020-01-01 RX ORDER — HEPARIN SODIUM 1000 [USP'U]/ML
1.5 INJECTION, SOLUTION INTRAVENOUS; SUBCUTANEOUS ONCE
Status: COMPLETED | OUTPATIENT
Start: 2020-01-01 | End: 2020-01-01

## 2020-01-01 RX ORDER — CARVEDILOL 6.25 MG/1
1 TABLET ORAL 2 TIMES DAILY
COMMUNITY
Start: 2019-01-01 | End: 2020-01-01 | Stop reason: DRUGHIGH

## 2020-01-01 RX ORDER — MAGNESIUM HYDROXIDE/ALUMINUM HYDROXICE/SIMETHICONE 120; 1200; 1200 MG/30ML; MG/30ML; MG/30ML
30 SUSPENSION ORAL 4 TIMES DAILY PRN
Status: DISCONTINUED | OUTPATIENT
Start: 2020-01-01 | End: 2020-01-01

## 2020-01-01 RX ORDER — CLOPIDOGREL BISULFATE 75 MG/1
75 TABLET ORAL DAILY
Qty: 30 TABLET | Refills: 2 | Status: SHIPPED | OUTPATIENT
Start: 2020-01-01

## 2020-01-01 RX ORDER — HEPARIN SODIUM 1000 [USP'U]/ML
INJECTION, SOLUTION INTRAVENOUS; SUBCUTANEOUS
Status: COMPLETED
Start: 2020-01-01 | End: 2020-01-01

## 2020-01-01 RX ORDER — MORPHINE SULFATE 2 MG/ML
1 INJECTION, SOLUTION INTRAMUSCULAR; INTRAVENOUS EVERY 2 HOUR PRN
Status: DISCONTINUED | OUTPATIENT
Start: 2020-01-01 | End: 2020-01-01

## 2020-01-01 RX ORDER — TRAMADOL HYDROCHLORIDE 50 MG/1
50 TABLET ORAL EVERY 12 HOURS PRN
Status: DISCONTINUED | OUTPATIENT
Start: 2020-01-01 | End: 2020-01-01

## 2020-01-01 RX ORDER — ATORVASTATIN CALCIUM 20 MG/1
20 TABLET, FILM COATED ORAL NIGHTLY
Qty: 90 TABLET | Refills: 1 | Status: SHIPPED | OUTPATIENT
Start: 2020-01-01

## 2020-01-01 RX ORDER — ASPIRIN 81 MG/1
TABLET, CHEWABLE ORAL
Status: COMPLETED
Start: 2020-01-01 | End: 2020-01-01

## 2020-01-01 RX ORDER — HEPARIN SODIUM 1000 [USP'U]/ML
1.5 INJECTION, SOLUTION INTRAVENOUS; SUBCUTANEOUS ONCE
Status: DISCONTINUED | OUTPATIENT
Start: 2020-01-01 | End: 2020-01-01

## 2020-01-01 RX ORDER — SUMATRIPTAN 50 MG/1
50 TABLET, FILM COATED ORAL EVERY 2 HOUR PRN
Status: DISCONTINUED | OUTPATIENT
Start: 2020-01-01 | End: 2020-01-01

## 2020-01-01 RX ORDER — ATORVASTATIN CALCIUM 20 MG/1
20 TABLET, FILM COATED ORAL NIGHTLY
Status: DISCONTINUED | OUTPATIENT
Start: 2020-01-01 | End: 2020-01-01

## 2020-01-01 RX ORDER — CARVEDILOL 12.5 MG/1
12.5 TABLET ORAL 2 TIMES DAILY WITH MEALS
Status: DISCONTINUED | OUTPATIENT
Start: 2020-01-01 | End: 2020-01-01

## 2020-01-01 RX ORDER — SUMATRIPTAN 50 MG/1
50 TABLET, FILM COATED ORAL AS NEEDED
Status: DISCONTINUED | OUTPATIENT
Start: 2020-01-01 | End: 2020-01-01

## 2020-01-01 RX ORDER — ESCITALOPRAM OXALATE 10 MG/1
5 TABLET ORAL DAILY
Status: DISCONTINUED | OUTPATIENT
Start: 2020-01-01 | End: 2020-01-01

## 2020-01-01 RX ORDER — ACETAMINOPHEN 325 MG/1
650 TABLET ORAL EVERY 6 HOURS PRN
Status: DISCONTINUED | OUTPATIENT
Start: 2020-01-01 | End: 2020-01-01

## 2020-01-01 RX ORDER — POLYETHYLENE GLYCOL 3350 17 G/17G
17 POWDER, FOR SOLUTION ORAL DAILY PRN
Status: DISCONTINUED | OUTPATIENT
Start: 2020-01-01 | End: 2020-01-01

## 2020-01-01 RX ORDER — ASPIRIN 81 MG/1
81 TABLET ORAL DAILY
Qty: 30 TABLET | Refills: 11 | Status: ON HOLD | COMMUNITY
Start: 2020-01-01 | End: 2020-01-01

## 2020-01-01 RX ORDER — SUMATRIPTAN 50 MG/1
1 TABLET, FILM COATED ORAL EVERY 2 HOUR PRN
COMMUNITY
Start: 2019-01-01

## 2020-01-01 RX ORDER — CLARITHROMYCIN 250 MG/1
250 TABLET, FILM COATED ORAL 2 TIMES DAILY
Status: ON HOLD | COMMUNITY
End: 2020-01-01

## 2020-01-01 RX ORDER — ZOLPIDEM TARTRATE 5 MG/1
5 TABLET ORAL NIGHTLY PRN
Status: DISCONTINUED | OUTPATIENT
Start: 2020-01-01 | End: 2020-01-01

## 2020-01-01 RX ORDER — GARLIC EXTRACT 500 MG
1 CAPSULE ORAL DAILY
Status: DISCONTINUED | OUTPATIENT
Start: 2020-01-01 | End: 2020-01-01

## 2020-01-01 RX ORDER — ACETAMINOPHEN 500 MG
1000 TABLET ORAL ONCE
Status: COMPLETED | OUTPATIENT
Start: 2020-01-01 | End: 2020-01-01

## 2020-01-01 RX ORDER — DEXTROSE MONOHYDRATE 25 G/50ML
50 INJECTION, SOLUTION INTRAVENOUS
Status: DISCONTINUED | OUTPATIENT
Start: 2020-01-01 | End: 2020-01-01

## 2020-01-01 RX ORDER — METOCLOPRAMIDE HYDROCHLORIDE 5 MG/ML
10 INJECTION INTRAMUSCULAR; INTRAVENOUS EVERY 8 HOURS PRN
Status: DISCONTINUED | OUTPATIENT
Start: 2020-01-01 | End: 2020-01-01

## 2020-01-01 RX ORDER — POTASSIUM CHLORIDE 20 MEQ/1
20 TABLET, EXTENDED RELEASE ORAL ONCE
Status: DISCONTINUED | OUTPATIENT
Start: 2020-01-01 | End: 2020-01-01

## 2020-01-01 RX ORDER — BISACODYL 10 MG
10 SUPPOSITORY, RECTAL RECTAL
Status: DISCONTINUED | OUTPATIENT
Start: 2020-01-01 | End: 2020-01-01

## 2020-01-01 RX ORDER — LIDOCAINE AND PRILOCAINE 25; 25 MG/G; MG/G
CREAM TOPICAL AS NEEDED
Status: DISCONTINUED | OUTPATIENT
Start: 2020-01-01 | End: 2020-01-01

## 2020-01-01 RX ORDER — HYDROCODONE BITARTRATE AND ACETAMINOPHEN 5; 325 MG/1; MG/1
1 TABLET ORAL EVERY 6 HOURS PRN
Status: DISCONTINUED | OUTPATIENT
Start: 2020-01-01 | End: 2020-01-01

## 2020-01-01 RX ORDER — HEPARIN SODIUM 5000 [USP'U]/ML
5000 INJECTION, SOLUTION INTRAVENOUS; SUBCUTANEOUS EVERY 12 HOURS SCHEDULED
Status: DISCONTINUED | OUTPATIENT
Start: 2020-01-01 | End: 2020-01-01

## 2020-01-01 RX ORDER — VANCOMYCIN HYDROCHLORIDE 125 MG/1
125 CAPSULE ORAL DAILY
Status: DISCONTINUED | OUTPATIENT
Start: 2020-01-01 | End: 2020-01-01

## 2020-01-01 RX ADMIN — SODIUM CHLORIDE: 9 INJECTION, SOLUTION INTRAVENOUS at 13:00:00

## 2020-01-01 RX ADMIN — FOLIC ACID 1 MG: 1 TABLET ORAL at 08:51:00

## 2020-01-01 RX ADMIN — AMLODIPINE BESYLATE 10 MG: 10 TABLET ORAL at 08:51:00

## 2020-01-01 RX ADMIN — HYDRALAZINE HYDROCHLORIDE 25 MG: 25 TABLET, FILM COATED ORAL at 20:29:00

## 2020-01-01 RX ADMIN — HYDRALAZINE HYDROCHLORIDE 25 MG: 25 TABLET, FILM COATED ORAL at 04:34:00

## 2020-01-01 RX ADMIN — ATORVASTATIN CALCIUM 20 MG: 20 TABLET, FILM COATED ORAL at 20:29:00

## 2020-01-01 RX ADMIN — CLOPIDOGREL BISULFATE 75 MG: 75 TABLET ORAL at 08:51:00

## 2020-01-01 RX ADMIN — CHOLECALCIFEROL (VITAMIN D3) 2000 MCG: 125 MCG CAPSULE ORAL at 08:50:00

## 2020-01-01 RX ADMIN — PANTOPRAZOLE SODIUM 40 MG: 40 TABLET, DELAYED RELEASE ORAL at 18:27:00

## 2020-01-01 RX ADMIN — CARVEDILOL 12.5 MG: 12.5 TABLET ORAL at 08:51:00

## 2020-01-01 RX ADMIN — CARVEDILOL 12.5 MG: 12.5 TABLET ORAL at 18:27:00

## 2020-01-01 RX ADMIN — GARLIC EXTRACT 1 CAPSULE: 500 MG CAPSULE ORAL at 08:50:00

## 2020-01-01 RX ADMIN — HYDRALAZINE HYDROCHLORIDE 25 MG: 25 TABLET, FILM COATED ORAL at 15:52:00

## 2020-01-01 RX ADMIN — PANTOPRAZOLE SODIUM 40 MG: 40 TABLET, DELAYED RELEASE ORAL at 04:34:00

## 2020-01-01 RX ADMIN — ASPIRIN 81 MG: 81 TABLET ORAL at 08:50:00

## 2020-01-01 RX ADMIN — ESCITALOPRAM OXALATE 5 MG: 5 TABLET ORAL at 08:50:00

## 2020-01-03 NOTE — ED PROVIDER NOTES
Patient Seen in: [facility] AND [facility] Emergency Department      History   Patient presents with:  Fall    Stated Complaint: fall on sunday, hit head    HPI    Patient is a 70-year-old female who presents with fall that occurred 5 days ago.   Patient states t PERITONEAL DIALYSIS CATH INSERTION N/A 7/10/2018    Performed by Avril Overton MD at [facility] MAIN OR   • LAPAROSCOPY, SURGICAL; ABLATION, RENAL MASS LESION(S) Left     6-7 year ago had lesion removed from left kidney   • OTHER SURGICAL HISTORY  2016    Parath Course   Labs Reviewed - No data to display         MDM     Ct Brain Or Head (25544)    Result Date: 1/3/2020  CONCLUSION:  1. No acute intracranial process. 2. Stable mild generalized atrophy and moderate chronic microangiopathic ischemic changes.     Dict

## 2020-01-03 NOTE — ED INITIAL ASSESSMENT (HPI)
C/o falling 5 days ago, noticed bruising to posterior head and neck this morning , patient on dialysis, states she received a blood thinner there

## 2020-01-08 NOTE — TELEPHONE ENCOUNTER
SE , pros and cons of HD should be discussed by pts nephrologist.  I dont think she needs to be seen to discuss about HD with me   If they have any other concerns, I will be happy to see her and discuss further    [facility]

## 2020-01-08 NOTE — TELEPHONE ENCOUNTER
Daughter Kathryn Thomas (verbal permission from pt because SHAHBAZ not on file) requesting appt with Dr. Juan Oropeza to discuss side effects of dialysis. Pt currently residing at rehab center where she is transported for dialysis 3 x per week.  Daughter states Dr. Juan Oropeza

## 2020-01-08 NOTE — TELEPHONE ENCOUNTER
Per daughter pt was scheduling for a check up.  Not for side effect of the HD, no further questions or concerns

## 2020-01-08 NOTE — TELEPHONE ENCOUNTER
Call Details: Patient's daughter, Nicholette Comas, states patient has been experiencing migraines and nausea. Daughter is declining soonest available appointment due to scheduling issues and wants patient to see. Dr. Jared Hansen only.     Call transferred to RN Triage

## 2020-01-10 PROBLEM — D69.6 THROMBOCYTOPENIA (HCC): Status: ACTIVE | Noted: 2020-01-01

## 2020-01-10 NOTE — PROGRESS NOTES
Sherine Zacarias is a 78year old female. Patient presents with:  Hypertension: check-up  Headache: c/o headache and nausea with dialysis    HPI:   66-year-old female with a past medical history of surgical coronary disease, COPD on BiPAP, AAA status post escitalopram 5 MG Oral Tab Take 1 tablet (5 mg total) by mouth daily. 30 tablet 0   • ipratropium-albuterol 0.5-2.5 (3) MG/3ML Inhalation Solution Take 3 mL by nebulization every 6 (six) hours as needed.  30 vial 0   • Spacer/Aero Chamber Mouthpiece Does no LESION(S) Left     6-7 year ago had lesion removed from left kidney   • OTHER SURGICAL HISTORY  2016    Parathyroid sx.     • OTHER SURGICAL HISTORY  07/10/2018    pd cath     • OTHER SURGICAL HISTORY  09/30/2019    removal of PD cath   • TONSILLECTOMY (!) 88%   BMI 25.85 kg/m²      Physical Exam    Constitutional: She is oriented to person, place, and time. She appears well-nourished. HENT:   Head: Normocephalic. Eyes: Conjunctivae are normal.   Neck: Neck supple.    Cardiovascular: Normal rate, regu hemodialysis    6 AAA status post repair    7 history of fall        Plan:   Cardiology consultation  Hematology consultation  Resume Plavix if platelets are above 11,439  Continue the hemodialysis  To see Dr. Kayli Pleitez in 6 weeks    I have spent 50 minutes

## 2020-01-10 NOTE — PATIENT INSTRUCTIONS
ASSESSMENT AND PLAN:   1. Coronary artery disease involving native heart without angina pectoris, unspecified vessel or lesion type   -I have reviewed the angiogram report. She has 70% stenosis of the left main. Patient plan to get stents placed.   I have

## 2020-01-13 LAB
ABSOLUTE IMMATURE GRANULOCYTES (OFFPRE24): NORMAL
BASO+EOS+MONOS # BLD: NORMAL 10*3/UL
BASO+EOS+MONOS NFR BLD: NORMAL %
BASOPHILS # BLD: NORMAL 10*3/UL
BASOPHILS NFR BLD: NORMAL %
DIFFERENTIAL METHOD BLD: NORMAL
EOSINOPHIL # BLD: NORMAL 10*3/UL
EOSINOPHIL NFR BLD: NORMAL %
ERYTHROCYTE [DISTWIDTH] IN BLOOD: NORMAL %
HCT VFR BLD CALC: 34.5 %
HGB BLD-MCNC: 11.5 G/DL
IMMATURE GRANULOCYTES (OFFPRE25): NORMAL
LYMPHOCYTES # BLD: NORMAL 10*3/UL
LYMPHOCYTES NFR BLD: NORMAL %
MCH RBC QN AUTO: 30.7 PG
MCHC RBC AUTO-ENTMCNC: 33.3 G/DL
MCV RBC AUTO: 92 FL
MONOCYTES # BLD: NORMAL 10*3/UL
MONOCYTES NFR BLD: NORMAL %
MPV (OFFPRE2): NORMAL
NEUTROPHILS # BLD: NORMAL 10*3/UL
NEUTROPHILS NFR BLD: NORMAL %
NRBC BLD MANUAL-RTO: NORMAL %
PLAT MORPH BLD: NORMAL
PLATELET # BLD: 124 10*3/UL
RBC # BLD: 3.75 10*6/UL
RBC MORPH BLD: NORMAL
WBC # BLD: 7.2 10*3/UL
WBC MORPH BLD: NORMAL

## 2020-02-17 ENCOUNTER — OFFICE VISIT (OUTPATIENT)
Dept: CARDIOLOGY | Age: 80
End: 2020-02-17

## 2020-02-17 VITALS
WEIGHT: 132 LBS | BODY MASS INDEX: 26.61 KG/M2 | SYSTOLIC BLOOD PRESSURE: 110 MMHG | DIASTOLIC BLOOD PRESSURE: 62 MMHG | HEIGHT: 59 IN | HEART RATE: 80 BPM

## 2020-02-17 DIAGNOSIS — I71.40 ABDOMINAL AORTIC ANEURYSM (AAA) WITHOUT RUPTURE (CMD): Primary | ICD-10-CM

## 2020-02-17 DIAGNOSIS — I25.10 CORONARY ARTERY DISEASE INVOLVING NATIVE CORONARY ARTERY OF NATIVE HEART, ANGINA PRESENCE UNSPECIFIED: ICD-10-CM

## 2020-02-17 DIAGNOSIS — Z72.0 TOBACCO ABUSE: ICD-10-CM

## 2020-02-17 DIAGNOSIS — I10 ESSENTIAL HYPERTENSION: ICD-10-CM

## 2020-02-17 DIAGNOSIS — E78.00 PURE HYPERCHOLESTEROLEMIA: ICD-10-CM

## 2020-02-17 LAB
ABSOLUTE IMMATURE GRANULOCYTES (OFFPRE24): NORMAL
ABSOLUTE NRBC (AUTO): NORMAL
BASO+EOS+MONOS # BLD: NORMAL 10*3/UL
BASO+EOS+MONOS NFR BLD: NORMAL %
BASOPHILS # BLD: NORMAL 10*3/UL
BASOPHILS NFR BLD: NORMAL %
DIFFERENTIAL METHOD BLD: NORMAL
EOSINOPHIL # BLD: NORMAL 10*3/UL
EOSINOPHIL NFR BLD: NORMAL %
ERYTHROCYTE [DISTWIDTH] IN BLOOD BY AUTOMATED COUNT: 68.9 %
ERYTHROCYTE [DISTWIDTH] IN BLOOD: NORMAL %
HCT CALC (HGB X3) (OFFPRE23): NORMAL
HCT VFR BLD CALC: 37.6 %
HGB BLD-MCNC: 11.4 G/DL
IMMATURE GRANULOCYTES (OFFPRE25): NORMAL
LYMPHOCYTES # BLD: NORMAL 10*3/UL
LYMPHOCYTES NFR BLD: NORMAL %
MCH RBC QN AUTO: 32.2 PG
MCHC RBC AUTO-ENTMCNC: 30.3 G/DL
MCV RBC AUTO: 106.2 FL
MONOCYTES # BLD: NORMAL 10*3/UL
MONOCYTES NFR BLD: NORMAL %
MPV (OFFPRE2): NORMAL
NEUTROPHILS # BLD: NORMAL 10*3/UL
NEUTROPHILS NFR BLD: NORMAL %
NRBC BLD MANUAL-RTO: NORMAL %
PLAT MORPH BLD: NORMAL
PLATELET # BLD: 130 10*3/UL
RBC # BLD: 3.54 10*6/UL
RBC MORPH BLD: NORMAL
WBC # BLD: 6.8 10*3/UL
WBC MORPH BLD: NORMAL

## 2020-02-17 PROCEDURE — 3074F SYST BP LT 130 MM HG: CPT | Performed by: INTERNAL MEDICINE

## 2020-02-17 PROCEDURE — 3078F DIAST BP <80 MM HG: CPT | Performed by: INTERNAL MEDICINE

## 2020-02-17 PROCEDURE — 99214 OFFICE O/P EST MOD 30 MIN: CPT | Performed by: INTERNAL MEDICINE

## 2020-02-17 RX ORDER — HYDRALAZINE HYDROCHLORIDE 25 MG/1
25 TABLET, FILM COATED ORAL 3 TIMES DAILY
COMMUNITY

## 2020-02-17 RX ORDER — LOPERAMIDE HYDROCHLORIDE 2 MG/1
2 CAPSULE ORAL PRN
COMMUNITY

## 2020-02-17 RX ORDER — ESCITALOPRAM OXALATE 5 MG/1
5 TABLET ORAL DAILY
COMMUNITY

## 2020-02-17 RX ORDER — IPRATROPIUM BROMIDE AND ALBUTEROL SULFATE 2.5; .5 MG/3ML; MG/3ML
3 SOLUTION RESPIRATORY (INHALATION) EVERY 6 HOURS PRN
COMMUNITY

## 2020-02-17 RX ORDER — LANOLIN ALCOHOL/MO/W.PET/CERES
1000 CREAM (GRAM) TOPICAL DAILY
COMMUNITY

## 2020-02-17 RX ORDER — CLOPIDOGREL BISULFATE 75 MG/1
75 TABLET ORAL DAILY
COMMUNITY

## 2020-02-17 RX ORDER — FOLIC ACID 1 MG/1
1 TABLET ORAL DAILY
COMMUNITY

## 2020-02-17 RX ORDER — L. ACIDOPHILUS/PECTIN, CITRUS 25MM-100MG
1 TABLET ORAL
COMMUNITY

## 2020-02-17 RX ORDER — SUMATRIPTAN 50 MG/1
50 TABLET, FILM COATED ORAL
COMMUNITY

## 2020-02-17 RX ORDER — ATORVASTATIN CALCIUM 20 MG/1
20 TABLET, FILM COATED ORAL DAILY
COMMUNITY

## 2020-02-17 RX ORDER — CARVEDILOL 12.5 MG/1
12.5 TABLET ORAL 2 TIMES DAILY WITH MEALS
COMMUNITY

## 2020-02-17 RX ORDER — PANTOPRAZOLE SODIUM 40 MG/1
40 TABLET, DELAYED RELEASE ORAL 2 TIMES DAILY
COMMUNITY

## 2020-02-17 SDOH — HEALTH STABILITY: MENTAL HEALTH: HOW OFTEN DO YOU HAVE A DRINK CONTAINING ALCOHOL?: NEVER

## 2020-02-17 ASSESSMENT — PATIENT HEALTH QUESTIONNAIRE - PHQ9
1. LITTLE INTEREST OR PLEASURE IN DOING THINGS: NOT AT ALL
SUM OF ALL RESPONSES TO PHQ9 QUESTIONS 1 AND 2: 0
SUM OF ALL RESPONSES TO PHQ9 QUESTIONS 1 AND 2: 0
2. FEELING DOWN, DEPRESSED OR HOPELESS: NOT AT ALL

## 2020-02-18 ENCOUNTER — CLINICAL ABSTRACT (OUTPATIENT)
Dept: CARDIOLOGY | Age: 80
End: 2020-02-18

## 2020-02-21 ENCOUNTER — TELEPHONE (OUTPATIENT)
Dept: CARDIOLOGY | Age: 80
End: 2020-02-21

## 2020-02-24 NOTE — TELEPHONE ENCOUNTER
Sandra Smith has questions for the nurse about changing the port in chest area to the shoulder area?

## 2020-02-24 NOTE — TELEPHONE ENCOUNTER
Returned call to daughter and advised her to contact dialysis center for questions and issues on pt's port, daughter verbalized understanding.

## 2020-02-26 PROBLEM — I27.20 PULMONARY HTN (HCC): Status: ACTIVE | Noted: 2020-01-01

## 2020-02-26 PROBLEM — D69.3 CHRONIC ITP (IDIOPATHIC THROMBOCYTOPENIA) (HCC): Status: ACTIVE | Noted: 2020-01-01

## 2020-03-04 DIAGNOSIS — I25.10 CORONARY ARTERY DISEASE INVOLVING NATIVE CORONARY ARTERY OF NATIVE HEART, ANGINA PRESENCE UNSPECIFIED: ICD-10-CM

## 2020-03-06 ENCOUNTER — TELEPHONE (OUTPATIENT)
Dept: CARDIOLOGY | Age: 80
End: 2020-03-06

## 2020-03-09 ENCOUNTER — TELEPHONE (OUTPATIENT)
Dept: CARDIOLOGY | Age: 80
End: 2020-03-09

## 2020-03-16 ENCOUNTER — OFFICE VISIT (OUTPATIENT)
Dept: CARDIOLOGY | Age: 80
End: 2020-03-16

## 2020-03-16 VITALS
BODY MASS INDEX: 26.61 KG/M2 | WEIGHT: 132 LBS | SYSTOLIC BLOOD PRESSURE: 130 MMHG | HEIGHT: 59 IN | DIASTOLIC BLOOD PRESSURE: 64 MMHG | TEMPERATURE: 97.9 F | HEART RATE: 64 BPM

## 2020-03-16 DIAGNOSIS — I71.40 ABDOMINAL AORTIC ANEURYSM (AAA) WITHOUT RUPTURE (CMD): Primary | ICD-10-CM

## 2020-03-16 DIAGNOSIS — Z72.0 TOBACCO ABUSE: ICD-10-CM

## 2020-03-16 DIAGNOSIS — E78.00 PURE HYPERCHOLESTEROLEMIA: ICD-10-CM

## 2020-03-16 DIAGNOSIS — I25.118 CORONARY ARTERY DISEASE OF NATIVE ARTERY OF NATIVE HEART WITH STABLE ANGINA PECTORIS (CMD): ICD-10-CM

## 2020-03-16 DIAGNOSIS — I10 ESSENTIAL HYPERTENSION: ICD-10-CM

## 2020-03-16 PROCEDURE — 99214 OFFICE O/P EST MOD 30 MIN: CPT | Performed by: INTERNAL MEDICINE

## 2020-03-16 PROCEDURE — 3075F SYST BP GE 130 - 139MM HG: CPT | Performed by: INTERNAL MEDICINE

## 2020-03-16 PROCEDURE — 3078F DIAST BP <80 MM HG: CPT | Performed by: INTERNAL MEDICINE

## 2020-03-16 RX ORDER — ACETAMINOPHEN 325 MG/1
650 TABLET ORAL EVERY 6 HOURS PRN
COMMUNITY

## 2020-03-16 ASSESSMENT — PATIENT HEALTH QUESTIONNAIRE - PHQ9
SUM OF ALL RESPONSES TO PHQ9 QUESTIONS 1 AND 2: 1
1. LITTLE INTEREST OR PLEASURE IN DOING THINGS: SEVERAL DAYS
2. FEELING DOWN, DEPRESSED OR HOPELESS: NOT AT ALL
SUM OF ALL RESPONSES TO PHQ9 QUESTIONS 1 AND 2: 1

## 2020-03-17 ENCOUNTER — TELEPHONE (OUTPATIENT)
Dept: CARDIOLOGY | Age: 80
End: 2020-03-17

## 2020-03-17 NOTE — TELEPHONE ENCOUNTER
Patients son/Irwin calling to speak with Dr.Sher Pinky Ramos regarding changes due to heart condition, surgery being postponed. Please call at:[phone],thanks.

## 2020-03-18 ENCOUNTER — TELEPHONE (OUTPATIENT)
Dept: CARDIOLOGY | Age: 80
End: 2020-03-18

## 2020-03-18 DIAGNOSIS — I25.118 CORONARY ARTERY DISEASE OF NATIVE ARTERY OF NATIVE HEART WITH STABLE ANGINA PECTORIS (CMD): Primary | ICD-10-CM

## 2020-03-18 DIAGNOSIS — I10 ESSENTIAL HYPERTENSION: ICD-10-CM

## 2020-03-18 DIAGNOSIS — E78.00 PURE HYPERCHOLESTEROLEMIA: ICD-10-CM

## 2020-03-18 DIAGNOSIS — Z72.0 TOBACCO ABUSE: ICD-10-CM

## 2020-03-18 DIAGNOSIS — Z01.810 PRE-OPERATIVE CARDIOVASCULAR EXAMINATION: ICD-10-CM

## 2020-03-18 NOTE — TELEPHONE ENCOUNTER
Contacted pt's son, Rola Solorzano. He states there was miscommunication between him and his siblings. He thought Dr. Cherie Sarmiento was the surgeon doing her fistula placement but learned she was not the surgeon. Pt saw cardiologist on Monday and was told she needs to have a stent placed before having any procedures so fistula placement is postponed for now.

## 2020-03-20 ENCOUNTER — TELEPHONE (OUTPATIENT)
Dept: CARDIOLOGY | Age: 80
End: 2020-03-20

## 2020-03-20 RX ORDER — CLOPIDOGREL 300 MG/1
300 TABLET, FILM COATED ORAL ONCE
Qty: 1 TABLET | Refills: 0 | Status: SHIPPED | OUTPATIENT
Start: 2020-03-20 | End: 2020-03-20

## 2020-03-25 PROBLEM — I25.10 CAD (CORONARY ARTERY DISEASE): Status: ACTIVE | Noted: 2020-01-01

## 2020-03-25 PROCEDURE — 93458 L HRT ARTERY/VENTRICLE ANGIO: CPT | Performed by: INTERNAL MEDICINE

## 2020-03-25 PROCEDURE — 92933 PRQ TRLML C ATHRC ST ANGIOP1: CPT | Performed by: INTERNAL MEDICINE

## 2020-03-25 PROCEDURE — 92978 ENDOLUMINL IVUS OCT C 1ST: CPT | Performed by: INTERNAL MEDICINE

## 2020-03-25 PROCEDURE — 92920 PRQ TRLUML C ANGIOP 1ART&/BR: CPT | Performed by: INTERNAL MEDICINE

## 2020-03-25 PROCEDURE — 99152 MOD SED SAME PHYS/QHP 5/>YRS: CPT | Performed by: INTERNAL MEDICINE

## 2020-03-25 PROCEDURE — 92928 PRQ TCAT PLMT NTRAC ST 1 LES: CPT | Performed by: INTERNAL MEDICINE

## 2020-03-25 NOTE — INTERVAL H&P NOTE
Pre-op Diagnosis: * No pre-op diagnosis entered *    DNR reversed for purposes of procedure. Discussed with patient and son who agree to proceed FULL CODE until after fully recovered from procedure.     The above referenced H&P was reviewed by [facility] AT [facility],

## 2020-03-25 NOTE — PROCEDURES
[facility]    [facility] Cardiac Cath Procedure Note  [facility] Patient Status:  Outpatient in a Bed    3/31/1940 MRN [id number]   Location [facility] Attending Tamie Hdez,  [facility] Day # 0 PCP [facility] This was uneventful and achieved an excellent result by JOHANA and angiography. Circumflex intervention  Lesion Characteristics- severely torturous, moderately calcified. Type non-C lesion.   Pre-intervention stenosis 95% prox, 70% ostium, Post interventi extends into the left main about 8-10 mm, jailing the Cx. Cx was re-wired through stent struts with zion MORLEY. Kissing balloon inflation was then performed with a 3 x 12 mm balloon in the circumflex and 3 x 12 mm balloon in the LAD.   Each balloon was in

## 2020-03-25 NOTE — DIETARY NOTE
NUTRITION EDUCATION NOTE    Received consult for nutrition education per cardiac rehab order set. Appropriate education and handout(s) provided. See education section of Epic for specifics.     Gina Pike RD,LDN  [id number]

## 2020-03-25 NOTE — PLAN OF CARE
Patient received post cath. Aox3, breathing comfortably on 2 L NC. Right radial site with TR band in place. Site is clean, dry, intact. Mild bruising noted. VSS. No complaints of pain, numbness, or tingling.  Bed locked lowest position, calling appropriatel ordered  - Instruct patient on fluid and nutrition restrictions as appropriate  Outcome: Progressing  Goal: Hemodynamic stability and optimal renal function maintained  Description  INTERVENTIONS:  - Monitor labs and assess for signs and symptoms of volume

## 2020-03-26 LAB
ABSOLUTE IMMATURE GRANULOCYTES (OFFPRE24): NORMAL
ANION GAP SERPL CALC-SCNC: 9 MMOL/L
BASO+EOS+MONOS # BLD: NORMAL 10*3/UL
BASO+EOS+MONOS NFR BLD: NORMAL %
BASOPHILS # BLD: NORMAL 10*3/UL
BASOPHILS NFR BLD: NORMAL %
BUN SERPL-MCNC: 42 MG/DL
BUN/CREAT SERPL: 6.6
CALCIUM SERPL-MCNC: 7.8 MG/DL
CHLORIDE SERPL-SCNC: 102 MMOL/L
CO2 SERPL-SCNC: 27 MMOL/L
CREAT SERPL-MCNC: 6.38 MG/DL
DIFFERENTIAL METHOD BLD: NORMAL
EOSINOPHIL # BLD: NORMAL 10*3/UL
EOSINOPHIL NFR BLD: NORMAL %
ERYTHROCYTE [DISTWIDTH] IN BLOOD: NORMAL %
GLUCOSE SERPL-MCNC: 64 MG/DL
HCT VFR BLD CALC: 30.7 %
HGB BLD-MCNC: 9.9 G/DL
IMMATURE GRANULOCYTES (OFFPRE25): NORMAL
LENGTH OF FAST TIME PATIENT: NORMAL H
LYMPHOCYTES # BLD: NORMAL 10*3/UL
LYMPHOCYTES NFR BLD: NORMAL %
MCH RBC QN AUTO: NORMAL PG
MCHC RBC AUTO-ENTMCNC: NORMAL G/DL
MCV RBC AUTO: NORMAL FL
MONOCYTES # BLD: NORMAL 10*3/UL
MONOCYTES NFR BLD: NORMAL %
MPV (OFFPRE2): NORMAL
NEUTROPHILS # BLD: NORMAL 10*3/UL
NEUTROPHILS NFR BLD: NORMAL %
NRBC BLD MANUAL-RTO: NORMAL %
PLAT MORPH BLD: NORMAL
PLATELET # BLD: 86 10*3/UL
POTASSIUM SERPL-SCNC: 4.7 MMOL/L
RBC # BLD: 2.98 10*6/UL
RBC MORPH BLD: NORMAL
SODIUM SERPL-SCNC: 138 MMOL/L
WBC # BLD: 6.3 10*3/UL
WBC MORPH BLD: NORMAL

## 2020-03-26 PROCEDURE — 99217 OBSERVATION CARE DISCHARGE: CPT | Performed by: INTERNAL MEDICINE

## 2020-03-26 RX ORDER — ASPIRIN 81 MG/1
81 TABLET ORAL DAILY
COMMUNITY
Start: 2020-03-26

## 2020-03-26 NOTE — PROGRESS NOTES
[facility] - [facility]    Progress Note    Forney Tere Patient Status:  Outpatient in a Bed    3/31/1940 MRN [id number]   Location [facility] 3W/SW Attending Bal Black MD   Hosp Day # 0 PCP Desmond Camacho.  Adithya Siddiqi MD       Patient seen and kg)  01/03/20 : 125 lb (56.7 kg)  11/06/19 : 123 lb (55.8 kg)    Tele: SR    Exam   Gen: No acute distress, alert and oriented x3,   Neck:supple,no JVD  Pulm: Lungs clear, normal respiratory effort  CV: Heart with regular rate and rhythm, nl S1,S2 ,no murm

## 2020-03-26 NOTE — CM/SW NOTE
Received notice from pt's RN that pt is ready for d/c and is from [facility] beds at the [facility] and is scheduled for dialysis at [facility] at 10AM today. RODRIGUEZ contacted [facility] ([phone]) w/ [facility] and confirmed pt resides w/ [facility].  Eufemia also confirmed pt receives HD Tue/Thurs/Sat

## 2020-03-26 NOTE — CARDIAC REHAB
Cardiac Rehab Phase I    Activity:  Distance Per self  Assistance needed No  Patient tolerated activity Well per patient. Education:  Handouts provided and reviewed: [address] Diet: Healthy Cardiac diet reviewed.     Disease Pr

## 2020-04-02 ENCOUNTER — TELEPHONE (OUTPATIENT)
Dept: CARDIOLOGY | Age: 80
End: 2020-04-02

## 2020-04-03 ENCOUNTER — APPOINTMENT (OUTPATIENT)
Dept: CARDIOLOGY | Age: 80
End: 2020-04-03

## 2020-04-15 PROBLEM — N18.6 ANEMIA DUE TO CHRONIC KIDNEY DISEASE, ON CHRONIC DIALYSIS (HCC): Status: ACTIVE | Noted: 2020-01-01

## 2020-04-15 PROBLEM — Z99.2 ANEMIA DUE TO CHRONIC KIDNEY DISEASE, ON CHRONIC DIALYSIS (HCC): Status: ACTIVE | Noted: 2020-01-01

## 2020-04-15 PROBLEM — D63.1 ANEMIA DUE TO CHRONIC KIDNEY DISEASE, ON CHRONIC DIALYSIS (HCC): Status: ACTIVE | Noted: 2020-01-01

## 2020-05-12 PROBLEM — D62 ACUTE BLOOD LOSS ANEMIA: Status: RESOLVED | Noted: 2019-01-01 | Resolved: 2020-01-01

## 2020-05-12 NOTE — PROGRESS NOTES
HPI:    Patient ID: Lala Walden is a [de-identified]year old female. HPI  Pt seen and evaluated at [address] rehab facility. No new problems noted. She is a HD patient. Otherwise in good spirits and no complaints.      Review of Systems nebulization every 6 (six) hours as needed. 30 vial 0   • Spacer/Aero Chamber Mouthpiece Does not apply Misc Use with albuterol inhaler 1 each 0   • Cyanocobalamin (VITAMIN B-12 ER) 2000 MCG Oral Tab CR Take 2,000 mcg by mouth daily. Allergies:   Ace

## 2020-05-14 ENCOUNTER — APPOINTMENT (OUTPATIENT)
Dept: CARDIOLOGY | Age: 80
End: 2020-05-14

## 2020-05-14 PROBLEM — I50.9 CONGESTIVE HEART FAILURE OF UNKNOWN ETIOLOGY (HCC): Status: ACTIVE | Noted: 2020-01-01

## 2020-05-14 PROBLEM — N30.00 ACUTE CYSTITIS WITHOUT HEMATURIA: Status: ACTIVE | Noted: 2020-01-01

## 2020-05-14 PROBLEM — I50.9 ACUTE ON CHRONIC CONGESTIVE HEART FAILURE, UNSPECIFIED HEART FAILURE TYPE (HCC): Status: ACTIVE | Noted: 2020-01-01

## 2020-05-14 PROBLEM — R09.02 HYPOXIA: Status: ACTIVE | Noted: 2020-01-01

## 2020-05-14 PROBLEM — N39.0 UTI (URINARY TRACT INFECTION): Status: ACTIVE | Noted: 2020-01-01

## 2020-05-14 LAB — NT-PROBNP SERPL-MCNC: NORMAL PG/ML

## 2020-05-14 NOTE — ED INITIAL ASSESSMENT (HPI)
Pt presents by EMS from [facility] for c/o fevers, chills since yesterday. Denies CORNEL ROACH.      Dialysis pt Tue/Thus/Sat

## 2020-05-14 NOTE — H&P
[facility]    PATIENT'S NAME: Orlin Kandialexsander PHYSICIAN: Liz Aguayo MD   PATIENT ACCOUNT#:   [id number]    LOCATION:  [address] [place] [address] RECORD #:   [id number]       YOB: 1940  ADMISSION DATE:       05/14/20 procedure. MEDICATIONS:  Please see medication reconciliation list.     ALLERGIES:  No known drug allergies. She had side effects to ACE inhibitors. FAMILY HISTORY:  Mother had rheumatic fever. Father had coronary artery disease.      SOCIAL HISTOR Dialysis per Nephrology. Start her on IV Rocephin. Monitor her hemodynamic status closely. Monitor her respiratory status. DVT prophylaxis. Follow up on urine culture. Monitor her Accu-Cheks and obtain hemoglobin A1c.   Further recommendations to foll

## 2020-05-14 NOTE — CONSULTS
[facility]    Report of Consultation    Date of Admission:  5/14/2020  Date of Consult:  5/14/2020   Reason for Consultation:     ESRD on HD     History of Present Illness:     Ruddy Lundberg is a 68 yr old female with pmh of ESRD on  Ric Carranza DO at [facility] ENDOSCOPY   • ESOPHAGOGASTRODUODENOSCOPY (EGD) N/A 8/27/2019    Performed by Kody Wise DO at [address] CATH INSERTION N/A 7/10/2018    Performed by Salvatore Coronado MD at [facility] MAIN OR   • [address] chewable tab 8 tablet, 8 tablet, Oral, Q15 Min PRN  Insulin Aspart Pen (NOVOLOG) 100 UNIT/ML flexpen 1-5 Units, 1-5 Units, Subcutaneous, TID CC  [START ON 5/15/2020] Acidophilus/Pectin (PROBIOTIC) CAPS 1 capsule, 1 capsule, Oral, Daily  [START ON 5/15/2020 kg) (05/14 0941)  BSA (Calculated - sq m): 1.57 sq meters (05/14 0941)  Pulse: 69 (05/14 1218)  BP: 113/68 (05/14 1218)  Temp: 100.2 °F (37.9 °C) (05/14 0941)  Do Not Use - Resp Rate: --  SpO2: 95 % (05/14 1218)  Temp:  [100.2 °F (37.9 °C)] 100.2 °F (37.9 SPECGRAVITY 1.020 05/14/2020    GLUUR Negative 05/14/2020    BILUR Negative 05/14/2020    KETUR Negative 05/14/2020    BLOODURINE Moderate 05/14/2020    PHURINE 6.0 05/14/2020    PROUR 100  05/14/2020    UROBILINOGEN <2.0 05/14/2020    NITRITE Negative

## 2020-05-14 NOTE — ED PROVIDER NOTES
Patient Seen in: [facility] AND [facility] Emergency Department      History   Patient presents with:  Fever    Stated Complaint: fever/chills    HPI    55-year-old female with past medical history significant for AAA, COPD, CAD, depression, end-stage renal dise ESOPHAGOGASTRODUODENOSCOPY (EGD) N/A 8/31/2019    Performed by Dolores Mendoza DO at [address] [facility] ENDOSCOPY   • ESOPHAGOGASTRODUODENOSCOPY (EGD) N/A 8/27/2019    Performed by Dolores Mendoza DO at [address] [facility] CATH INSERTION N/A 7/ pulses  Pulmonary/Chest: CTA b/l with no rales, wheezes. No chest wall tenderness. Right chest wall with dialysis catheter without significant surrounding erythema or tenderness. Abdominal: Nontender. Nondistended. Soft.  Bowel sounds are normal.   Back Abnormality         Status                     ---------                               -----------         ------                     CBC W/ DIFFERENTIAL[[id number]]          Abnormal            Final result                 Please vi chronic congestive heart failure, unspecified heart failure type ([address]  (primary encounter diagnosis)  Hypoxia  Acute cystitis without hematuria    Disposition:  Admit  5/14/2020 11:21 am    Follow-up:  No follow-up provider specified.   We recommend that y

## 2020-05-14 NOTE — TELEPHONE ENCOUNTER
[facility] dialysis  clinic calling to inform Dr. Mary Chavez pt was sent to [address] [place] [address] ER due to fever of 100.3 and chills. Buddy Harkins states pt will most likely be admitted and will have dialysis at [address] [place] [address].

## 2020-05-15 LAB — TSH SERPL-ACNC: 1.74 M[IU]/L

## 2020-05-15 NOTE — PROGRESS NOTES
[facility]    Progress Note      Subjective:     Confused. Asking whose house she is in    [place] I sound like one of her doctor    Breathing improved.  No chest pain     Review of Systems:     Constitutional: negative for fatigue, fevers a HCl (ZOFRAN) injection 4 mg, 4 mg, Intravenous, Q6H PRN  Metoclopramide HCl (REGLAN) injection 5 mg, 5 mg, Intravenous, Q8H PRN  CefTRIAXone Sodium (ROCEPHIN) 1 g in sodium chloride 0.9% 100 mL MBP/ADD-vantage, 1 g, Intravenous, Q24H  glucose (DEX4) oral l 08/21/2019 30.8 23.2 - 35.3 seconds Final     INR   Date Value Ref Range Status   10/31/2019 1.17 0.90 - 1.20 Final     Comment:     Only the INR (not the Protime value) should be utilized for   the monitoring of oral anticoagulant therapy.      Recommend

## 2020-05-15 NOTE — TELEPHONE ENCOUNTER
Pt son calling and states he was just on the phone with Dr Judith Madrigal. Per son he is concerned that there \"was a breakout of Covid at the [facility]\"  And he is asking if Dr Judith Madrigal recommends taking pt to a different dialysis center.     Please

## 2020-05-15 NOTE — PLAN OF CARE
Patient is a/ox2-3, forgetful. 2L O2 nasal cannula. Covid PCR pending; contact/drop prec in place. Accucheck ACHS. IV antibiotics. Dialysis T-R-Sa. Need to collect OB stool. Continent. Up with standby assist. Bed alarm on, call light within reach.  Will con signs of decreased coronary artery perfusion - ex.  Angina  - Evaluate fluid balance, assess for edema, trend weights  Outcome: Progressing  Goal: Absence of cardiac arrhythmias or at baseline  Description  INTERVENTIONS:  - Continuous cardiac monitoring, m

## 2020-05-15 NOTE — PROGRESS NOTES
[facility]    Progress Note    Bon Lockhart Patient Status:  Inpatient    3/31/1940 MRN [id number]   Location [facility] 3W/SW Attending Fortino Ram MD   Hosp Day # 1 PCP Mable Gibson.  Cris Palma MD        Subjective:     Con She is active. Non-toxic appearance. She does not have a sickly appearance. She does not appear ill. No distress. She is not intubated. Nasal cannula in place. HENT:   Head: Normocephalic and atraumatic.    Eyes: Pupils are equal, round, and reactive to cervical adenopathy present. Left cervical: No superficial cervical, no deep cervical and no posterior cervical adenopathy present. Right: No supraclavicular adenopathy present. Left: No supraclavicular adenopathy present.    Neurologica 05/15/2020    HGB 9.3 (L) 05/15/2020    HCT 29.5 (L) 05/15/2020    .0 (L) 05/15/2020    CREATSERUM 3.35 (H) 05/15/2020    BUN 15 05/15/2020     05/15/2020    K 4.1 05/15/2020     05/15/2020    CO2 27.0 05/15/2020    GLU 84 05/15/2020

## 2020-05-15 NOTE — CM/SW NOTE
Received MDO for d/c planning. SW contacted pt's son/Brain GONZALEZ via phone. Pt is currently on Contact/Droplet isolation for r/out COVID 23.     Pt's son verified pt's address and confirmed that pt lives alone. Pt lives in a home w/ 3 levels.  Pt does not

## 2020-05-15 NOTE — PLAN OF CARE
Problem: Patient/Family Goals  Goal: Patient/Family Long Term Goal  Description  Patient's Long Term Goal: to go home    Interventions:  - discharge planning  - provide education on new medications  - advance activity as tolerated  - See additional Care including rhythm and repeat lab results as appropriate  - Fluid restriction as ordered  - Instruct patient on fluid and nutrition restrictions as appropriate  Outcome: Progressing       Dialysis started late last night, 2.5L removed per report.  IV Rocephin

## 2020-05-16 NOTE — PROGRESS NOTES
[facility]    Progress Note    Eva Cardenas Patient Status:  Inpatient    3/31/1940 MRN [id number]   Location [facility] 3W/SW Attending Danette Contreras MD   [address] Day # 2 PCP Sameer Keating.  Robert Painter MD        Subjective:     C normal and breath sounds normal. No accessory muscle usage or stridor. No apnea, no tachypnea and no bradypnea. She is not intubated. No respiratory distress. She has no decreased breath sounds. She has no wheezes. She has no rhonchi. She has no rales.  She Component Value Date    WBC 5.7 05/16/2020    HGB 8.6 (L) 05/16/2020    HCT 27.7 (L) 05/16/2020    .0 (L) 05/16/2020    CREATSERUM 3.35 (H) 05/15/2020    BUN 15 05/15/2020     05/15/2020    K 4.1 05/15/2020     05/15/2020    CO2 27.0 0

## 2020-05-16 NOTE — PLAN OF CARE
Problem: Patient Centered Care  Goal: Patient preferences are identified and integrated in the patient's plan of care  Description  Interventions:  - What would you like us to know as we care for you?  From from home  - Provide timely, complete, and accur indicated  - Evaluate effectiveness of antiarrhythmic and heart rate control medications as ordered  - Initiate emergency measures for life threatening arrhythmias  - Monitor electrolytes and administer replacement therapy as ordered  Outcome: Progressing

## 2020-05-16 NOTE — PROGRESS NOTES
[facility]  Nephrology Daily Progress Note    Cayden Harley  [id number]  [de-identified]year old      HPI:   Cayden Harley is a [de-identified]year old female. Overall doing well. Awake and alert. No SOB or cough. Eating well.        ROS:     Denny reflexes and motor skills appropriate for age    Labs:  Lab Results   Component Value Date    WBC 5.7 05/16/2020    HGB 8.6 05/16/2020    HCT 27.7 05/16/2020    .0 05/16/2020     Recent Labs   Lab 05/14/20  0947 05/15/20  0509 05/16/20  0449   WBC 9 PRN  •  Metoclopramide HCl (REGLAN) injection 5 mg, 5 mg, Intravenous, Q8H PRN  •  CefTRIAXone Sodium (ROCEPHIN) 1 g in sodium chloride 0.9% 100 mL MBP/ADD-vantage, 1 g, Intravenous, Q24H  •  glucose (DEX4) oral liquid 15 g, 15 g, Oral, Q15 Min PRN **OR** hypertension     Hyperlipidemia     Tobacco use     Hyperglycemia     Calcification of aorta (HCC)     Polycythemia     Age-related osteoporosis without current pathological fracture     ESRD (end stage renal disease) on dialysis [facility])     Cataract     His

## 2020-05-16 NOTE — PHYSICAL THERAPY NOTE
PHYSICAL THERAPY EVALUATION - INPATIENT     Room Number: 310/310-A  Evaluation Date: 5/16/2020  Type of Evaluation: Initial   Physician Order: PT Eval and Treat    Presenting Problem: febrile, UTI, CHF  Reason for Therapy: Mobility Dysfunction and Dischar and is without assist for extended periods of time. Patient will benefit from continued IP PT services to address these deficits in preparation for discharge.     DISCHARGE RECOMMENDATIONS  PT Discharge Recommendations: 24 hour care/supervision;Sub-acut • CATARACT     • CATARACT EXTRACTION Right 11/15/2017    Dr. Loy Perez.    • D & C     • DIALYSIS CATHETER REMOVAL N/A 9/30/2019    Performed by Krishna Avery MD at [facility] OR   • ESOPHAGOGASTRODUODENOSCOPY (EGD) N/A 9/27/2019    Performed by Jackeline Amaro -    ACTIVITY TOLERANCE  Denies dizziness, lightheadedness and SOB during activity today. AM-PAC '6-Clicks' INPATIENT SHORT FORM - BASIC MOBILITY  How much difficulty does the patient currently have. ..  -   Turning over in bed (including adjusting be Patient to demonstrate independence with home activity/exercise instructions provided to patient in preparation for discharge.    Goal #5   Current Status    Goal #6    Goal #6  Current Status

## 2020-05-16 NOTE — PLAN OF CARE
Problem: CARDIOVASCULAR - ADULT  Goal: Maintains optimal cardiac output and hemodynamic stability  Description  INTERVENTIONS:  - Monitor vital signs, rhythm, and trends  - Monitor for bleeding, hypotension and signs of decreased cardiac output  - Evalua on self management of diabetes  Outcome: Progressing     Blood glucose kept trending downwards last night, no official hypoglycemic events but close. Lots of snacks given as patient refused to eat a sandwich.  Still confused tonight/this morning as was yest

## 2020-05-17 LAB
ALBUMIN SERPL-MCNC: 2.8 G/DL
ALBUMIN/GLOB SERPL: 0.8 {RATIO}
ALP SERPL-CCNC: 73 U/L
ALT SERPL-CCNC: 15 U/L
ANION GAP SERPL CALC-SCNC: 0 MMOL/L
AST SERPL-CCNC: 13 U/L
BILIRUB SERPL-MCNC: 0.4 MG/DL
BUN SERPL-MCNC: 13 MG/DL
BUN/CREAT SERPL: 3.9
CALCIUM SERPL-MCNC: 8.4 MG/DL
CHLORIDE SERPL-SCNC: 100 MMOL/L
CO2 SERPL-SCNC: 37 MMOL/L
CREAT SERPL-MCNC: 3.31 MG/DL
GLOBULIN SER-MCNC: 3.5 G/DL
GLUCOSE SERPL-MCNC: 80 MG/DL
POTASSIUM SERPL-SCNC: 4.6 MMOL/L
PROT SERPL-MCNC: 6.3 G/DL
SODIUM SERPL-SCNC: 137 MMOL/L

## 2020-05-17 NOTE — PROGRESS NOTES
[facility]  Nephrology Daily Progress Note    Cayden Harley  [id number]  [de-identified]year old      HPI:   Cayden Harley is a [de-identified]year old female. Feels OK. No CP or SOB.         ROS:     Constitutional:  Negative for decreased activity, feve for age    Labs:  Lab Results   Component Value Date    WBC 4.7 05/17/2020    HGB 9.2 05/17/2020    HCT 29.7 05/17/2020    .0 05/17/2020    CREATSERUM 3.31 05/17/2020    BUN 13 05/17/2020     05/17/2020    K 4.6 05/17/2020     05/17/2020 mg, Oral, Q6H PRN  •  ondansetron HCl (ZOFRAN) injection 4 mg, 4 mg, Intravenous, Q6H PRN  •  Metoclopramide HCl (REGLAN) injection 5 mg, 5 mg, Intravenous, Q8H PRN  •  CefTRIAXone Sodium (ROCEPHIN) 1 g in sodium chloride 0.9% 100 mL MBP/ADD-vantage, 1 g, 15-29 ml/min (HCC)     Hyperkalemia     Stress incontinence in female     Essential hypertension     Hyperlipidemia     Tobacco use     Hyperglycemia     Calcification of aorta (HCC)     Polycythemia     Age-related osteoporosis without current pathologica

## 2020-05-17 NOTE — PROGRESS NOTES
[facility]    Progress Note    [facility] Patient Status:  Inpatient    3/31/1940 MRN [id number]   Location [facility] 3W/SW Attending Deneen Soto MD   [facility] Day # 3 PCP Sandra Lepe.  Kyara Simon MD        Subjective:     C and breath sounds normal. No accessory muscle usage or stridor. No apnea, no tachypnea and no bradypnea. She is not intubated. No respiratory distress. She has no decreased breath sounds. She has no wheezes. She has no rhonchi. She has no rales.  She exhibi Results:     Lab Results   Component Value Date    WBC 4.7 05/17/2020    HGB 9.2 (L) 05/17/2020    HCT 29.7 (L) 05/17/2020    .0 05/17/2020    CREATSERUM 3.31 (H) 05/17/2020    BUN 13 05/17/2020     05/17/2020    K 4.6 05/17/2020     0

## 2020-05-17 NOTE — PLAN OF CARE
Pt weaned to 1L per NC this shift, with O2 sats in 90s. Room air ambulation trial completed. Pt ambulated for 3-4 min on room air, O2 sats dropped to 83%, pt experienced only mild SOB. O2 sats improved to 97% after <1min of rest on 1L NC.  Plan for discharg and temperature  - Assess for signs of decreased coronary artery perfusion - ex.  Angina  - Evaluate fluid balance, assess for edema, trend weights  Outcome: Progressing  Goal: Absence of cardiac arrhythmias or at baseline  Description  INTERVENTIONS:  - Co

## 2020-05-17 NOTE — OCCUPATIONAL THERAPY NOTE
OCCUPATIONAL THERAPY EVALUATION - INPATIENT     Room Number: 310/310-A  Evaluation Date: 5/17/2020  Type of Evaluation: Initial  Presenting Problem: (Fever and Chills)    Physician Order: IP Consult to Occupational Therapy  Reason for Therapy: ADL/IADL Dys [facility])  Active Problems:    CKD (chronic kidney disease) stage 4, GFR 15-29 ml/min ([facility])    Essential hypertension    ESRD (end stage renal disease) on dialysis [facility])    AAA (abdominal aortic aneurysm) without rupture ([facility])    Coronary artery disease    Chr SURGICAL; ABLATION, RENAL MASS LESION(S) Left     6-7 year ago had lesion removed from left kidney   • OTHER SURGICAL HISTORY  2016    Parathyroid sx.     • OTHER SURGICAL HISTORY  07/10/2018    pd cath     • OTHER SURGICAL HISTORY  09/30/2019    removal of personal grooming such as brushing teeth?: A Little  -   Eating meals?: A Little    AM-PAC Score:  Score: 16  Approx Degree of Impairment: 53.32%  Standardized Score (AM-PAC Scale): 35.96  CMS Modifier (G-Code): CK    FUNCTIONAL TRANSFER ASSESSMENT  Supine

## 2020-05-18 LAB
HCT VFR BLD CALC: 29.9 %
HGB BLD-MCNC: 9.4 G/DL
MAGNESIUM SERPL-MCNC: 2.2 MG/DL
PLATELET # BLD: 174 10*3/UL
RBC # BLD: 2.91 10*6/UL
WBC # BLD: 6 10*3/UL

## 2020-05-18 NOTE — PROGRESS NOTES
[facility]    Progress Note    Subjective:     State was confused overnight and mean to the Nurse . Currently feels comfortable now. No cp or sob or fever.      Oriented to place and person     Review of Systems:     Constitutional: negati Subcutaneous, 2 times per day  acetaminophen (TYLENOL) tab 650 mg, 650 mg, Oral, Q6H PRN  ondansetron HCl (ZOFRAN) injection 4 mg, 4 mg, Intravenous, Q6H PRN  Metoclopramide HCl (REGLAN) injection 5 mg, 5 mg, Intravenous, Q8H PRN  glucose (DEX4) oral liqui 8.4* 8.0*   ALB 3.1*  --  2.8* 2.8*   * 139 137 137   K 4.4 4.1 4.6 4.5   CL 99 105 100 101   CO2 26.0 27.0 37.0* 32.0   ALKPHO  --   --  73  --    AST  --   --  13*  --    ALT  --   --  15  --    BILT  --   --  0.4  --    TP  --   --  6.3*  --

## 2020-05-18 NOTE — PLAN OF CARE
Alert and oriented. Possible DC tomorrow. Pt still 2L O2. Call light in reach. Bed alarm on. Will continue to monitor.    Problem: Patient Centered Care  Goal: Patient preferences are identified and integrated in the patient's plan of care  Description  Int baseline  Description  INTERVENTIONS:  - Continuous cardiac monitoring, monitor vital signs, obtain 12 lead EKG if indicated  - Evaluate effectiveness of antiarrhythmic and heart rate control medications as ordered  - Initiate emergency measures for life t

## 2020-05-18 NOTE — PHYSICAL THERAPY NOTE
PHYSICAL THERAPY TREATMENT NOTE - INPATIENT     Room Number: 284/525-Q       Presenting Problem: febrile, UTI, CHF    Problem List  Principal Problem:    Acute on chronic congestive heart failure, unspecified heart failure type ([facility]  Active Problems:    C AM-PAC '6-Clicks' INPATIENT SHORT FORM - BASIC MOBILITY  How much difficulty does the patient currently have. ..  -   Turning over in bed (including adjusting bedclothes, sheets and blankets)?: None   -   Sitting down on and standing up from a bubba activity/exercise instructions provided to patient in preparation for discharge.    Goal #5   Current Status In progress   Goal #6    Goal #6  Current Status

## 2020-05-18 NOTE — DISCHARGE SUMMARY
DISCHARGE SUMMARY     [facility] Patient Status:  Inpatient    3/31/1940 MRN [id number]   Location [facility] 3W/SW Attending Roxane Martino., MD   [address] Day # 4 PCP Savanna Chacon.  Valdemar Red MD     Date of Admission: 2020  Date of Discharge taking care of patient. Spoke with pt. And son Brain. ADDENDUM: ambulatory pulse ox. 83%. Resolved after sitting to 97% < few seconds. Will need home oxygen.      Discharge Physical Exam:   General appearance:  alert, appears stated age and cooperati LEXAPRO      Take 1 tablet (5 mg total) by mouth daily. Quantity:  30 tablet  Refills:  0     Fluticasone-Umeclidin-Vilant 100-62.5-25 MCG/INH Aepb  Commonly known as:  TRELEGY ELLIPTA      Inhale 1 puff into the lungs daily.    Quantity:  1 each  Refills Tab  Take 1 tablet (1 mg total) by mouth daily. Loperamide HCl 2 MG Oral Cap  Take 2 mg by mouth every 6 (six) hours as needed for Diarrhea. Acidophilus/Pectin Oral Cap  Take 1 capsule by mouth daily.     acetaminophen 325 MG Oral Tab  Take 650 mg b

## 2020-05-18 NOTE — PLAN OF CARE
I called and gave report to nurse Orlando Bass at [place] [id number] rehab facility.  Patient's physical and history were relayed to nursing staff and included past medical history, admitting diagnosis and subsequent treatment for UTI (completed IV abx), and rhythm, and trends  - Monitor for bleeding, hypotension and signs of decreased cardiac output  - Evaluate effectiveness of vasoactive medications to optimize hemodynamic stability  - Monitor arterial and/or venous puncture sites for bleeding and/or hematom

## 2020-05-18 NOTE — CM/SW NOTE
09: 47AM  Per RN rounds - pt may be medically cleared for d/c today. RN stated that MDs were inquiring if pt will be able to receive HD tomorrow upon return to SNF.     RODRIGUEZ contacted Silvestre Snider ([phone]) w/ [facility] and confirmed that pt will be able to go to her s

## 2020-05-19 NOTE — PAYOR COMM NOTE
--------------  ADMISSION REVIEW     PayorLisa Youssef MA [facility]  Subscriber #:  [id number]  Authorization Number: [id number]    Admit date: 5/14/20  Admit time: 1219       Admitting Physician:   Attending Physician:  No att. providers found  Primary Care Physician • Hyperlipidemia     Pt. taking 40 mg Pravastatin; states not been told she has high cholesterol   • PONV (postoperative nausea and vomiting)    • Pulmonary emphysema (HCC)    • Renal disorder    • Visual impairment               Past Surgical History: Oral   SpO2 (!) 88 %   O2 Device None (Room air)       Current:/64   Pulse 72   Temp 100.2 °F (37.9 °C) (Oral)   Resp 22   Ht 149.9 cm (4' 11\")   Wt 61.7 kg   SpO2 93%   BMI 27.47 kg/m²          Physical Exam    Physical Exam   Constitutional: AAOx3 components within normal limits   CBC W/ DIFFERENTIAL - Abnormal; Notable for the following components:    RBC 2.78 (*)     HGB 9.1 (*)     HCT 28.3 (*)     .8 (*)     RDW-SD 48.7 (*)     .0 (*)     Neutrophil Absolute Prelim 8.17 (*)     Hong taking history from patient examining patient, medical decision-making, reviewing lab work and radiology studies, explaining results to patient and family, discussing with consultants/admitting physician, and documenting in patient's chart.   Admission disp history of end-stage renal disease, on hemodialysis, supposed to get her dialysis today, instead came into the emergency department for evaluation of fever on and off for the last 2 to 3 days.   CBC showed white blood cell count of 9.6, hemoglobin 9.1, MCV and off fever for the last 2 to 3 days associated with body aches and fatigue. Denied any back pain, dysuria, or urinary frequency. No abdominal pain. No chest pain. No sick contacts. Patient had orthopnea, progressive, since last night.   She is due f ADMINISTERED IN LAST 1 DAY:  Acidophilus/Pectin (PROBIOTIC) CAPS 1 capsule     Date Action Dose Route User    Discharged on 5/18/2020 5/18/2020 1055 Given 1 capsule Oral Rafael Carbajal RN      Albuterol Sulfate  (90 Base) MCG/ACT inhaler 1 puff respiratory failure, CAD s/p PCI of LAD, COPD who presented from NH for fever and sob     C/o cough and sob. +ve fever. Denies any dysuria or urinary frequency. Last HD on Tuesday      In ED patient pulse ox was 88% on RA. Rapid covid negative.  proBNP 16,0 phone #: [phone]     PLAN: [facility] to [facility], Ambulance set for 3PM, PCS completed     SW/CM to remain available for support and/or discharge planning.     --------------  DISCHARGE REVIEW    Karlos Khan MA HMO  Subscriber #:  [id number]  [facility] HD. Renal following pt. hemodialysis was done yesterday. Chest x-ray showed mild interstitial edema with proBNP quite elevated. Wean oxygen just qhs. Checked ambulatory pulse ox.        Hypoxia/ H/O smoking. On oxygen. Improved. Will wean as outpt. NEEDED   Quantity:  270 tablet  Refills:  1        CONTINUE taking these medications      Instructions Prescription details   acetaminophen 325 MG Tabs  Commonly known as:  TYLENOL      Take 650 mg by mouth every 8 (eight) hours as needed for Pain (Headach Refills:  0     Vitamin B-12 ER 2000 MCG Tbcr      Take 2,000 mcg by mouth daily.    Refills:  0            Discharge Plan:  Discharge Condition: Stable    Current Discharge Medication List    Home Meds - Unchanged    carvedilol 12.5 MG Oral Tab  Take 12.5 weeks        Vital signs:  Temp:  [98 °F (36.7 °C)-98.7 °F (37.1 °C)] 98 °F (36.7 °C)  Pulse:  [69-71] 69  Resp:  [18-19] 18  BP: (124132)/(66-55) 130/73    -----------------------------------------------------------------------------------------------  P

## 2020-05-20 NOTE — PROGRESS NOTES
Rishabh Garima  : 3/31/1940  Age [de-identified]year old  female patient is admitted to [address] [place] [address] for CAROL. Chief complaint:Initial NP Assessment/Follow up Fever and Hypoxia.     HPI  Patient is a 66-year-old female with medical history signifi ESOPHAGOGASTRODUODENOSCOPY (EGD) N/A 8/31/2019    Performed by Sky Doherty DO at [facility] ENDOSCOPY   • ESOPHAGOGASTRODUODENOSCOPY (EGD) N/A 8/27/2019    Performed by Sky Doherty DO at [address] [place] [address] CATH INSERTION N/A 7/ Denies excessive bruising,hemoptysis, or any bleeding. PHYSICAL EXAM:  Gen: A+Ox3. No distress. Calm and cooperative. Appropriate. HEENT: NCAT, neck supple, no carotid bruit. No JVD. CV: RRR and no murmur.   Pulm: Effort and breath sounds norm BiPAP   -CPM Duonebs Q6 PRN  -CPM Trelegy Ellipta Daily  -Still smokes, educated on smoke cessation and benefits  -Pulmonology consult     HTN   CPM Coreg BID  CPM  Hydralazine Q8 PRN     Depression   -CPM Escitalopram     Diarrhea  -On Loperamide 2mg Q6 P

## 2020-05-28 NOTE — PROGRESS NOTES
Mumtazaguilar Alis  : 3/31/1940  Age [de-identified]year old  female patient is admitted to [address] [place] [address] for CAROL. Chief complaint:Follow up Fever and Hypoxia.     HPI  Patient is a 77-year-olf female with medical history significant for ESRD on HD, C ESOPHAGOGASTRODUODENOSCOPY (EGD) N/A 8/31/2019    Performed by Rohit Villanueva DO at [facility] ENDOSCOPY   • ESOPHAGOGASTRODUODENOSCOPY (EGD) N/A 8/27/2019    Performed by Rohit Villanueva DO at [address] [facility] CATH INSERTION N/A 7/ Denies excessive bruising,hemoptysis, or any bleeding. PHYSICAL EXAM:  Gen: A+Ox3. No distress. Calm and cooperative. Appropriate. HEENT: NCAT, neck supple, no carotid bruit. No JVD. CV: RRR and no murmur.   Pulm: Effort and breath sounds norm seen Dr. Joyce Alfred post 2 months of PUD tx, will inquire from patient  -CPM Folic Acid      COPD   -CPM BiPAP   -CPM Duonebs Q6 PRN  -CPM Trelegy Ellipta Daily  -Still smokes, educated on smoke cessation and benefits  -Pulmonology consult     HTN   CPM Coreg B

## 2020-05-31 NOTE — PROGRESS NOTES
[facility]  : 3/31/1940  Age [de-identified]year old  female patient is admitted to [address] [place] [address] for CAROL. Chief complaint: Discharge Planning.     HPI  Patient is a 70-year-old female with medical history significant for ESRD on HD, COPD and ESOPHAGOGASTRODUODENOSCOPY (EGD) N/A 9/27/2019    Performed by Agustin Harris MD at [address] [place] [address] ENDOSCOPY   • ESOPHAGOGASTRODUODENOSCOPY (EGD) N/A 8/31/2019    Performed by Rodrigo Neves DO at [address] [place] [address] ENDOSCOPY   • ESOPHAGOGASTRODUODENOSCOPY (EGD) N/A 8/27/2019 frequency/hematuria/dysuria. Skin: Denies rash or itching. Neuro: Denies weakness, syncope or headache. Hematologic: Denies excessive bruising,hemoptysis, or any bleeding. PHYSICAL EXAM:  Gen: A+Ox3. No distress. Calm and cooperative.   Mariaelena hypertension   - Severe CAD 70% of the left main moderate diffuse   - CPM Atorvastatin and Plavix    Thrombocytopenia-improved. -Will monitor.     Acute HFpEF  -EF 45-50%  -CPM Coreg BID    Hx Severe pulmonary hypertension     Hypertension  -CPM Hydralazin

## 2020-06-02 NOTE — TELEPHONE ENCOUNTER
Daughter Mindy Maxwell stated patient was discharged from SNF yesterday 6/1/20 and she wants to update patient's medication list and make Dr Vivien Larkin aware of changes.  Advised daughter to send [employer] message of this and upload the list of medications from the S

## 2020-06-03 NOTE — TELEPHONE ENCOUNTER
Spoke to pt daughter Krista Aldana, she is schedule for follow up re. HTN with me on 6/8 & physical w/ Dr. Mey Bai 8/13. Advised to contact Renal MD regarding Sevelamer. Is it ok to give pt D-Mannose 1300 mg to prevent UTIs?

## 2020-06-03 NOTE — TELEPHONE ENCOUNTER
Refill of Sevelamer HCL goes thru renal MD. They monitor that.    KEEP 8-13-20 alicia't which is for a physical but needs alicia't sooner for just check on HTN etc.

## 2020-06-03 NOTE — TELEPHONE ENCOUNTER
Not sure of the effectiveness of it. It works for certain types of bacteria there is a prevention. She had that type of bacteria but she is also had all UTIs with other types of bacteria. Also be careful with the d-mannose with the sugars.   It will rais

## 2020-06-03 NOTE — TELEPHONE ENCOUNTER
From: [facility] Room  To: Helga Guevara MD  Sent: 6/3/2020 9:12 AM CDT  Subject: Prescription Question    Hi Dr Patrick Webb,    My mom was discharged from the nursing home on Monday and they were using their own pharmacy for refills.  My mom was taking S

## 2020-06-03 NOTE — TELEPHONE ENCOUNTER
From: [facility]  To: Sandra Lepe. Kyara Simon MD  Sent: 2020 3:57 PM CDT  Subject: Prescription Question    Hi Dr. Kyara Simon,    I'm writing for my mom, Mariya Tellez (: 3/31/1940).  She came home yesterday from a nursing home after being there sin

## 2020-06-03 NOTE — TELEPHONE ENCOUNTER
Dr. Shellie Bergeron patient is out of medication.  Will you approve refill in Dr. Phillip Rodgersasant absence (daughter sent a My Chart message)

## 2020-06-07 PROBLEM — K92.2 ACUTE UPPER GI BLEED: Status: RESOLVED | Noted: 2019-01-01 | Resolved: 2020-01-01

## 2020-06-07 PROBLEM — N30.00 ACUTE CYSTITIS WITHOUT HEMATURIA: Status: RESOLVED | Noted: 2020-01-01 | Resolved: 2020-01-01

## 2020-06-07 PROBLEM — N39.0 UTI (URINARY TRACT INFECTION): Status: RESOLVED | Noted: 2020-01-01 | Resolved: 2020-01-01

## 2020-06-07 PROBLEM — A41.9 SEPSIS DUE TO UNDETERMINED ORGANISM (HCC): Status: RESOLVED | Noted: 2019-01-01 | Resolved: 2020-01-01

## 2020-06-08 NOTE — PROGRESS NOTES
HPI:    Patient ID: Stephanie Ruiz is a [de-identified]year old female. Presents to the clinic to review medications and follow up on Hypertension. Hypertension  Patient is here for follow up of hypertension. Accompanied by patient daughter.  BP at home: 170/1 (60.3 kg)  05/17/20 : 126 lb 5.2 oz (57.3 kg)  03/26/20 : 142 lb 3.2 oz (64.5 kg)    BP Readings from Last 3 Encounters:  06/08/20 : 133/68  05/18/20 : 130/73  03/26/20 : (!) 163/88    Labs:   Lab Results   Component Value Date/Time    GLU 84 05/18/2020 04 syncope, facial asymmetry, speech difficulty, weakness, light-headedness, numbness and headaches. Hematological: Negative for adenopathy.    Psychiatric/Behavioral: Negative for agitation, behavioral problems, confusion, decreased concentration, dysphoric BLOOD PRESSURE IS GREATER THAN 150).  ) 270 tablet 1   • Spacer/Aero Chamber Mouthpiece Does not apply Misc Use with albuterol inhaler 1 each 0     Allergies:   Ace Inhibitors          OTHER (SEE COMMENTS)    HISTORY:  Past Medical History:   Diagnosis Date 09/30/2019    removal of PD cath   • TONSILLECTOMY        Family History   Problem Relation Age of Onset   • Heart Attack Father 54        CAd S/P MI.    • Heart Attack Mother 43        Rheumatic fever.     • Heart Disorder Brother 37        Herat aneuyrism No drainage, swelling or tenderness. Tympanic membrane is not scarred, not perforated and not erythematous. No middle ear effusion. No decreased hearing is noted. Nose: Nose normal. No rhinorrhea or sinus tenderness.  Right sinus exhibits no maxillary si is alert and oriented to person, place, and time. She has normal reflexes. Coordination normal.   Skin: Skin is warm and dry. No rash noted. She is not diaphoretic. No erythema. No pallor. Psychiatric: She has a normal mood and affect.  Her speech is norm

## 2020-06-08 NOTE — PATIENT INSTRUCTIONS
ASSESSMENT/PLAN:   Abdominal aortic aneurysm (aaa) without rupture (hcc)  (primary encounter diagnosis) status post repair    Essential hypertension not stable  Continue amlodipine daily AM  Carvedilol Twice a day  Check BP in evening 2 weeks and call

## 2020-06-09 NOTE — TELEPHONE ENCOUNTER
Just a reminder, please bring Blood pressure machine, BP readings for the next week twice a day, and medications to the office visit on Monday.

## 2020-06-09 NOTE — TELEPHONE ENCOUNTER
----- Message from [facility] sent at 6/9/2020  1:11 PM CDT -----  Regarding: Other  Contact: [phone]  Hi,    This morning my mom's BP was 184/109. We are concerned her BP has been consistently high.      Thank you,  Simone Townsend  [phone]

## 2020-06-10 ENCOUNTER — CLINICAL ABSTRACT (OUTPATIENT)
Dept: CARDIOLOGY | Age: 80
End: 2020-06-10

## 2020-06-10 NOTE — TELEPHONE ENCOUNTER
Paging    Message # [id number] 2020 06:56p [BONITAR]  To:  From: MARIE Chand MD:  Phone#:  ----------------------------------------------------------------------  [facility] [address] [facility] [birth year] RE /101 VERY [place]CERN  [phone]  Pag

## 2020-06-10 NOTE — TELEPHONE ENCOUNTER
Spoke to patient daughter Salima Justin, she advised she has not been giving her mother Amlodipine because the order states only to give if BP under 100. She gave pt hydralazine 25mg at 6:15pm. BP lower 173/83.  Reviewed instructions given during 6/8/2020 offi

## 2020-06-10 NOTE — TELEPHONE ENCOUNTER
Spoke to patient Daughter Abril Points, Pt took Amlodipine 10mg at 9am BP was 174/82 at 9:30am. Took Hydralazine at 10am. Advised daughter to recheck pt BP at noon. Agreed no further questions at this time.

## 2020-06-11 PROBLEM — N18.9 CHRONIC RENAL FAILURE, UNSPECIFIED CKD STAGE: Status: ACTIVE | Noted: 2020-01-01

## 2020-06-11 PROBLEM — R50.9 FEVER, UNSPECIFIED FEVER CAUSE: Status: ACTIVE | Noted: 2020-01-01

## 2020-06-11 PROBLEM — R50.9 FEVER OF UNDETERMINED ORIGIN: Status: ACTIVE | Noted: 2020-01-01

## 2020-06-11 NOTE — ED PROVIDER NOTES
Patient Seen in: [facility] AND [facility] Emergency Department    History   Patient presents with:  Fever    Stated Complaint:     HPI    Patient is here with complaint of fever at home.   The son says that she just looked tired this morning and also felt she se ESOPHAGOGASTRODUODENOSCOPY (EGD) N/A 8/27/2019    Performed by Bryon Hull DO at [address] CATH INSERTION N/A 7/10/2018    Performed by Cher Rowe MD at [facility] OR   • LAPAROSCOPY, SURGICAL; ABLATION, RENAL M Activated,  Inhale 1 puff into the lungs daily. Pantoprazole Sodium (PROTONIX) 40 MG Oral Tab EC,  Take 1 tablet (40 mg total) by mouth 2 (two) times daily before meals. escitalopram 5 MG Oral Tab,  Take 1 tablet (5 mg total) by mouth daily.    ipratrop as well as chest x-ray. Her last urine infection on May 14 urine culture grew out multiple species probable contamination.   Her nephrologist is Dr. Aimee De Jesus    Patient work-up showed negative COVID test.  Lactic acid was normal white count was normal.  Ch RAPID SARS-COV-2 BY PCR - Normal   CBC WITH DIFFERENTIAL WITH PLATELET    Narrative: The following orders were created for panel order CBC WITH DIFFERENTIAL WITH PLATELET.   Procedure                               Abnormality         Status

## 2020-06-11 NOTE — ED NOTES
Per son, pt here for fever up to 102, confusion. States these are sx of past uti's, when asked if pt makes urine, they state no. Pt has no complaints. Completed dialysis today. No recent cough/known covid exposures.

## 2020-06-11 NOTE — TELEPHONE ENCOUNTER
Spoke to Lala pt daughter, pt BP was 132/81 at noon 6/10/2020 and 146/85 at 5pm 6/10/2020. Advised to check BP when she returns from dialysis and give amlodipine around 2pm. Check BP at dinner if over >150 give hydralazine.  Pt daughter agreed no furth

## 2020-06-12 PROCEDURE — 99221 1ST HOSP IP/OBS SF/LOW 40: CPT | Performed by: INTERNAL MEDICINE

## 2020-06-12 NOTE — CONSULTS
Ramon 23 Patient Status:  Inpatient    3/31/1940 MRN [id number]   Location [facility]5W Attending Lee De Anda MD   Hosp Day # 1 PCP Martín Maxwell.  Olga Barger MD       Reason for Consu • D & C     • DIALYSIS CATHETER REMOVAL N/A 9/30/2019    Performed by Mey Aguilar MD at [address] [place] [address] MAIN OR   • ESOPHAGOGASTRODUODENOSCOPY (EGD) N/A 9/27/2019    Performed by Aris Sheridan MD at [address] [place] [address] ENDOSCOPY   • ESOPHAGOGASTRODUODENOSCOPY (EGD) N/A 8/31/ (PLAVIX) tab 75 mg, 75 mg, Oral, Daily  •  escitalopram (LEXAPRO) tablet 5 mg, 5 mg, Oral, Daily  •  Fluticasone-Umeclidin-Vilant (TRELEGY ELLIPTA) 100-62.5-25 MCG/INH inhaler 1 puff, 1 puff, Inhalation, Daily  •  folic acid (FOLVITE) tab 1 mg, 1 mg, Oral, consulted. # Acute fever with weakness, RO bacteremia, CXR with L lobar opacity, RO COVID-19 pneumonia   -FU blood cx  # COPD, on home O2  # Previous AAA repair  # ESRD on HD via R HD permacath    PLAN:  -  Continue on ceftriaxone.   -  Await COVID-19 PC

## 2020-06-12 NOTE — ED NOTES
Orders for admission, patient is aware of plan and ready to go upstairs. Any questions, please call ED RN Niya at extension [phone]. Patient is ambulatory, stand by assistance.      Admitting provider - Dr. Jayleen Garcia

## 2020-06-12 NOTE — H&P
[facility] HOSP - [facility]    History and Physical    Maliha Elin Patient Status:  Inpatient    3/31/1940 MRN [id number]   Location [facility]5W Attending Deborah Sears MD   Hosp Day # 1 PCP Carlie Velez.  Vivien Larkin MD     Date:  2020  D EXTRACTION Right 11/15/2017    Dr. Tawanda Morse.    • D & C     • DIALYSIS CATHETER REMOVAL N/A 9/30/2019    Performed by Arianne Pisano MD at [address] [place] [address] MAIN OR   • ESOPHAGOGASTRODUODENOSCOPY (EGD) N/A 9/27/2019    Performed by Shireen Banegas MD at [address] [place] [address] ENDOSCOPY   • E 12.5 MG Oral Tab, Take 12.5 mg by mouth 2 (two) times daily with meals. SUMAtriptan Succinate 50 MG Oral Tab, Take 1 tablet by mouth every 2 (two) hours as needed for Migraine. amLODIPine Besylate 5 MG Oral Tab, Take 10 mg by mouth daily.     Clopidog Cardiovascular: Negative for chest pain, palpitations and leg swelling. Gastrointestinal: Negative for heartburn, nausea, vomiting, abdominal pain, diarrhea, constipation, blood in stool, abdominal distention, anal bleeding and rectal pain.    Endocrine 06/22/2018    CRP 13.10 (H) 06/12/2020    MG 1.9 06/12/2020    PHOS 3.8 05/18/2020    TROP <0.045 10/29/2019    CK 67 08/24/2019    B12 >2,000 (H) 05/15/2020     Xr Chest Ap Portable  (cpt=71045)    Result Date: 6/11/2020  CONCLUSION:  1.  Left basilar opac

## 2020-06-12 NOTE — PAYOR COMM NOTE
--------------  CONTINUED STAY REVIEW    Karissa Hull MA O  Subscriber #:  [id number]  Authorization Number: [id number]    Admit date: 6/11/20  Admit time: 2226    Admitting Physician: Lynne Rico MD  Attending Physician:  Diana Gatica MD  [facility] 6/12/2020 1414 Given 5000 Units Subcutaneous (Left Upper Abdomen) Tristan Carias RN    6/12/2020 0535 Given 5000 Units Subcutaneous (Left Lower Abdomen) Fahad Alvarado [facility]    6/12/2020 0010 Given 5000 Units Subcutaneous (Right Lower Abdomen) Irina Joyce • Coronary atherosclerosis     •      • Dialysis patient [facility])     • Essential hypertension     • High blood pressure     • High cholesterol     • History of blood transfusion 08/2019     [facility]   • History of primary hyperparathyroidism 02/23/2016     S/P re • Diabetes Maternal Aunt           Social History  Patient Albin Husbands, willa (Son)     Other Topics            Concern    None on file     Social History Narrative    None on file              Current Medications:  CefTRIAXone Sodium (ROCEPHIN) 1 Physical Exam:   Height: --  Weight: 132 lb 4.4 oz (60 kg) (06/11 1745)  BSA (Calculated - sq m): --  Pulse: 84 (06/12 1220)  BP: 103/58 (06/12 1248)  Temp: 98.8 °F (37.1 °C) (06/12 1220)  Do Not Use - Resp Rate: --  SpO2: 94 % (06/12 1220)  Temp:  [98.3 ° 2.0 - 3.0 All indications except for mechanical prosthetic   cardiac valves.      2.5 - 3.5 Mechanical prosthetic cardiac valves.         No results for input(s): BNP in the last 168 hours.         Lab Results   Component Value Date     COLORUR Yellow 06/11 ED to Hosp-Admission (Current) on 6/11/2020          Detailed Report      Chart Review: Note Routing History     No routing history on file.      PLEASE FAX DAYS CERTIFIED AND NEXT REVIEW DATE

## 2020-06-12 NOTE — CONSULTS
[facility] Cardiology Consult Note    Priscella Room Patient Status:  Inpatient    3/31/1940 MRN [id number]   Location [facility]5W Attending Zak Camejo MD   Hosp Day # 1 PCP Jayme Castleman.  Mynor Pennington MD     [de-identified]year old female , consulted for man Aortic aneurysm (HCC)     Check q6months. 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 Sees Dr. Felipa Hdez (renal MD).     • Cataract    • COPD (chronic obstructive pulmonary disease) (HCC)    • Coronary atherosclerosis    • Depression    • Lexis Disorder Brother 54        CAD S/P CABG. • Diabetes Brother    • Diabetes Maternal Grandmother    • Diabetes Maternal Aunt       reports that she quit smoking about 10 months ago. Her smoking use included cigarettes.  She started smoking about 57 years a

## 2020-06-12 NOTE — TELEPHONE ENCOUNTER
From: Fab Snell  To: Isabelle Thomas MD  Sent: 6/12/2020 9:58 AM CDT  Subject: Other    Hi Dr Sakina Alegria,    My mom is back at the hospital for another UTI. She also had a fever and is weak. She just had one a few weeks ago.  I read UTIs are common with D

## 2020-06-12 NOTE — PAYOR COMM NOTE
--------------  ADMISSION REVIEW     Kip Machado MA [facility]  Subscriber #:  [id number]  Authorization Number: [id number]    Admit date: 6/11/20  Admit time: 2226       Admitting Physician: Steven Tyler MD  Attending Physician:  Steven Tyler MD  Primary C been told she has high cholesterol   • PONV (postoperative nausea and vomiting)    • Pulmonary emphysema (HCC)    • Renal disorder    • Visual impairment        Past Surgical History:   Procedure Laterality Date   •      • CATARACT     • CATARACT TIMES DAILY AS NEEDED  Patient taking differently: Take 25 mg by mouth every 8 (eight) hours as needed (IF BLOOD PRESSURE IS GREATER THAN 150). atorvastatin 20 MG Oral Tab,  Take 1 tablet (20 mg total) by mouth nightly.    folic acid 1 MG Oral Tab,  [facility] No masses, no hepato-splenomegaly. Musculoskeletal:  Good muscle tone. Skin:  Warm, dry, well perfused. Good skin turgor. No rashes seen. Neurology:  Moving all extremities equally with good coordination. No cranial nerve asymmetry noted.   Psychiat American 11 (*)     GFR, -American 13 (*)     All other components within normal limits   CBC W/ DIFFERENTIAL - Abnormal; Notable for the following components:    RBC 2.84 (*)     HGB 9.3 (*)     HCT 28.8 (*)     .4 (*)     RDW-SD 50.4 (*) Oral Jake De Oliveira, RN      atorvastatin (LIPITOR) tab 20 mg     Date Action Dose Route User    6/12/2020 0009 Given 20 mg Oral Jake De Oliveira RN      CefTRIAXone Sodium (ROCEPHIN) 1 g in sodium chloride 0.9% 100 mL MBP/ADD-vantage     Date Action Do

## 2020-06-12 NOTE — ED NOTES
ERMD asked for urine, pt states she does not make urine. Straight cathed per ERMD request, small amount of urine (3-5ml approx) obtained with sterile technique.   Tolerated well

## 2020-06-12 NOTE — CM/SW NOTE
SW self-referred to patient chart for discharge planning needs. Per nursing rounds, pt is from home alone; however, noted recent stay at [facility]. To preserve PPE, SW placed call to pt's room to discuss DC Planning.  Pt reports she was at [facility] and rec

## 2020-06-12 NOTE — CONSULTS
[facility]    Report of Consultation    Date of Admission:  6/11/2020  Date of Consult:  6/12/2020   Reason for Consultation:     ESRD on HD     History of Present Illness:     Caity Robles is a 68 yr old female with pmh of ESRD on  Performed by Gricel Leon MD at [facility] MAIN OR   • ESOPHAGOGASTRODUODENOSCOPY (EGD) N/A 9/27/2019    Performed by Indigo He MD at [facility] ENDOSCOPY   • ESOPHAGOGASTRODUODENOSCOPY (EGD) N/A 8/31/2019    Performed by Victor Manuel Hua DO at [facility] ENDOSCOPY   • tab 25 mg, 25 mg, Oral, Q8H PRN  sevelamer (RENVELA) tab 800 mg, 800 mg, Oral, TID CC  Pantoprazole Sodium (PROTONIX) EC tab 40 mg, 40 mg, Oral, BID AC  SUMAtriptan Succinate (IMITREX) tab 50 mg, 50 mg, Oral, Q2H PRN  Heparin Sodium (Porcine) 5000 UNIT/ML atraumatic  Eyes: conjunctivae/corneas clear  Neck:  no JVD  Pulmonary:  clear to auscultation bilaterally  Cardiovascular: S1, S2 normal  Abdominal: soft, non-tender; non distended  Extremities: no edema  Skin: No rashes or lesions  Neurologic: Grossly no ceftriaxone  - rapid covid and PCR negative   - CS pending from catheter   - ID input noted    Discussed with Nursing     Thank you for allowing me to participate in the care of your patient.     Darline Junior MD  6/12/2020

## 2020-06-12 NOTE — TELEPHONE ENCOUNTER
Her urine CS are still pending.  She has dialysis cathter which can be the source of infection as well     Her last urine Culture wasn't suggestive of infection from May

## 2020-06-13 PROCEDURE — 99232 SBSQ HOSP IP/OBS MODERATE 35: CPT | Performed by: INTERNAL MEDICINE

## 2020-06-13 NOTE — PLAN OF CARE
Telephone call received from pt's daughter Emilie Crump for update. Updated her on pt status. She reported pt is scheduled to have permacath replaced on Tuesday 6/15 due to age of current permacath (1 year) and risk of infection.  I stated I would let the nephrolo

## 2020-06-13 NOTE — PROGRESS NOTES
[place] [facility] - [facility]    Progress Note    Stephanie Calabrese Patient Status:  Inpatient    3/31/1940 MRN [id number]   Location [facility] 5SW/SE Attending Alphonso Fowler MD   [facility] Day # 2 PCP Cherelle Caballero.  Anival Alan MD       Subjective:   Lucinda bruising noted  Back/Spine: no abnormalities noted  Musculoskeletal: full ROM all extremities good strength  no deformities  Extremities: no edema, cyanosis  Neurological:  Grossly normal    Results:     Laboratory Data:  Lab Results   Component Value Date

## 2020-06-13 NOTE — PLAN OF CARE
Telephone call received from pt georgie Grove Roles for update on patient. Provided update on pt status and poc for the day. He verbalized understanding.

## 2020-06-13 NOTE — PLAN OF CARE
Problem: Patient/Family Goals  Goal: Patient/Family Long Term Goal  Description  Patient's Long Term Goal: Be able to go back home    Interventions:  - Monitor vital signs  - Monitor labs  - Administer medications per order  - Dialysis per order  - Azalea Monroy opioid side effects  - Notify MD/LIP if interventions unsuccessful or patient reports new pain  - Anticipate increased pain with activity and pre-medicate as appropriate  Outcome: Progressing     Problem: SAFETY ADULT - FALL  Goal: Free from fall injury  D Administer electrolyte replacement as ordered  - Monitor response to electrolyte replacements, including rhythm and repeat lab results as appropriate  - Fluid restriction as ordered  - Instruct patient on fluid and nutrition restrictions as appropriate  Ou

## 2020-06-13 NOTE — PROGRESS NOTES
[facility] ID PROGRESS NOTE    Fab Snell Patient Status:  Inpatient    3/31/1940 MRN [id number]   Location [facility] 5SW/SE Attending Raul Juarez MD   [facility] Day # 2 PCP Harini Soriano MD     Subjective:   In an effort to preserve PPE a ([facility] [place] [address]     Coronary artery disease     Thrombocytopenia (HCC)     Pulmonary HTN (HCC)     Chronic ITP (idiopathic thrombocytopenia) (HCC)     CAD (coronary artery disease)     Anemia due to chronic kidney disease, on chronic dialysis ([facility] [place] [address]     Acute on

## 2020-06-13 NOTE — PLAN OF CARE
Alert and oriented. VSS. Bed locked and alarmed. Non skid socks on. Call light in reach. Low appetite.    Dialysis scheduled for tomorrow am.   Covid PCR sent     Problem: Patient/Family Goals  Goal: Patient/Family Long Term Goal  Description  Patient's Consider cultural and social influences on pain and pain management  - Manage/alleviate anxiety  - Utilize distraction and/or relaxation techniques  - Monitor for opioid side effects  - Notify MD/LIP if interventions unsuccessful or patient reports new lorna Electrolytes maintained within normal limits  Description  INTERVENTIONS:  - Monitor labs and rhythm and assess patient for signs and symptoms of electrolyte imbalances  - Administer electrolyte replacement as ordered  - Monitor response to electrolyte rep

## 2020-06-13 NOTE — PROGRESS NOTES
[facility]    Cardiology Progress Note    Bryanbhargav Fernandez Patient Status:  Inpatient    3/31/1940 MRN [id number]   Location [facility] 5SW/SE Attending Lee De Anda MD   [facility] Day # 2 PCP Martín Maxwell.  Olga Barger MD     [de-identified] year ol Net 100 ml       Scheduled Meds:   • vancomycin HCl  125 mg Oral Daily   • cefTRIAXone  1 g Intravenous Q24H   • amLODIPine Besylate  10 mg Oral Daily   • atorvastatin  20 mg Oral Nightly   • carvedilol  12.5 mg Oral BID with meals   • Clopidogrel Bisulf

## 2020-06-13 NOTE — PROGRESS NOTES
[place] [facility] - [facility]    Progress Note    Sukumar Chapman Patient Status:  Inpatient    3/31/1940 MRN [id number]   Location [facility] 5SW/SE Attending Trell Scanlon MD   [address] Day # 2 PCP Patricia Childers.  Gardenia Jeter MD        Subjective: 06/12/2020    HGB 9.3 (L) 06/12/2020    HCT 29.3 (L) 06/12/2020    .0 (L) 06/12/2020    CREATSERUM 4.82 (H) 06/12/2020    BUN 30 (H) 06/12/2020     (L) 06/12/2020    K 4.0 06/12/2020    CL 97 (L) 06/12/2020    CO2 32.0 06/12/2020    GLU 88 06/

## 2020-06-14 NOTE — PROGRESS NOTES
[place] [facility] - [facility]    Progress Note    Fara Ruff Patient Status:  Inpatient    3/31/1940 MRN [id number]   Location [facility] 5SW/SE Attending Tiny Barron MD   [address] Day # 3 PCP Justus Zavala MD       Subjective:   Lucinda bruising noted  Back/Spine: no abnormalities noted  Musculoskeletal: full ROM all extremities good strength  no deformities  Extremities: no edema, cyanosis  Neurological:  Grossly normal    Results:     Laboratory Data:  Lab Results   Component Value Date

## 2020-06-14 NOTE — PLAN OF CARE
No c/o of pain during the shift. Pt. Oxygen does drop into the high 80's when oxygen is off, mid 90's with oxygen on. Encourage pt. To deep breath and notify staff when pt. Does feel short of breath.   Dialysis is scheduled in the AM.  Pt. Is aware and ha

## 2020-06-14 NOTE — PLAN OF CARE
Problem: Patient/Family Goals  Goal: Patient/Family Long Term Goal  Description  Patient's Long Term Goal: Be able to go back home    Interventions:  - Monitor vital signs  - Monitor labs  - Administer medications per order  - Dialysis per order  - Mumtaznis Headings opioid side effects  - Notify MD/LIP if interventions unsuccessful or patient reports new pain  - Anticipate increased pain with activity and pre-medicate as appropriate  Outcome: Progressing     Problem: SAFETY ADULT - FALL  Goal: Free from fall injury  D Administer electrolyte replacement as ordered  - Monitor response to electrolyte replacements, including rhythm and repeat lab results as appropriate  - Fluid restriction as ordered  - Instruct patient on fluid and nutrition restrictions as appropriate  Ou

## 2020-06-14 NOTE — PROGRESS NOTES
[facility]    Progress Note    Berta Perez Patient Status:  Inpatient    3/31/1940 MRN [id number]   Location [facility] 5SW/SE Attending Zackary Carrel., MD   [facility] Day # 3 PCP Keegan Mendiola MD        Subjective: undetermined origin- 99 the highest now   AbNL urine. cx + , will continue with IV Ab, ID on board   bcx so far negative         CKD stage. On HD Tues. , Thurs. , & Sat. Renal following pt.  Had hd today      Anemia- hg dropped to 7.8 , will retest, will a

## 2020-06-15 ENCOUNTER — APPOINTMENT (OUTPATIENT)
Dept: CARDIOLOGY | Age: 80
End: 2020-06-15

## 2020-06-15 NOTE — PROGRESS NOTES
[place] [facility] - [facility]    Progress Note    Stephanie Calabrese Patient Status:  Inpatient    3/31/1940 MRN [id number]   Location [facility] 5SW/SE Attending Alphonso Fowler MD   [address] Day # 4 PCP Cherelle Caballero.  Anival Alan MD        Subjective: tenderness. Abdominal: Soft. Bowel sounds are normal. She exhibits no distension. Neurological: She is alert. Skin: Skin is warm. No lesion and no rash noted. No erythema. No pallor.            Assessment and Plan:    Fever of undetermined origin  AbN Archie Sherman MD on 6/15/2020 at 7:26 AM     Finalized by (CST): Dong Dailey MD on 6/15/2020 at 7:27 AM          Ekg 12-lead    Result Date: 6/15/2020  ECG Report  Interpretation  -------------------------- Sinus Rhythm -Left axis -anterior fascicular block.   Vo

## 2020-06-15 NOTE — PLAN OF CARE
Vitals are stable. Did c/o of pain x1 and medication was given. Pt. Is resting now. Will notify MD if needed.    Problem: PAIN - ADULT  Goal: Verbalizes/displays adequate comfort level or patient's stated pain goal  Description  INTERVENTIONS:  - Ingrid Hammans

## 2020-06-15 NOTE — PROGRESS NOTES
[facility]    Progress Note      Subjective:     Early am events noted with chest pain and sob. Per patient doesn't recall having such pain. Currently asymptomatic.  Received ASA, NTG    Elevated D-dimer and scheduled for VQ scan     Revie 125 mg, Oral, Daily  amLODIPine Besylate (NORVASC) tab 10 mg, 10 mg, Oral, Daily  atorvastatin (LIPITOR) tab 20 mg, 20 mg, Oral, Nightly  carvedilol (COREG) tab 12.5 mg, 12.5 mg, Oral, BID with meals  Clopidogrel Bisulfate (PLAVIX) tab 75 mg, 75 mg, Oral, hours. Recent Labs   Lab 06/12/20  0054 06/14/20 0646   MG 1.9  --    PHOS  --  4.5        Recent Labs   Lab 06/14/20 0646   PHOS 4.5   ALB 2.6*       Xr Chest Ap Portable  (cpt=71045)    Result Date: 6/15/2020  CONCLUSION:  1.   Small-moderate size left

## 2020-06-15 NOTE — PLAN OF CARE
Pt's daughter called and asked for update. Provided her info on pt's current status, VS and CXR results from this morning. Pt has been resting comfortably and up to chair. She thanked me for my time.

## 2020-06-15 NOTE — PAYOR COMM NOTE
ASKING FOR RECONSIDERATION OF INPT  PT REMAINS IN HOUSE    CONTINUED STAY REVIEW    PayorSclarissa Khan MA O  Subscriber #:  [id number]  Authorization Number: [id number]    Admit date: 6/11/20  Admit time: 2226    Admitting Physician: Omkar He MD  Attendi Action Dose Route User    6/15/2020 0619 Given 5000 Units Subcutaneous (Left Lower Abdomen) Macey Balloon, RN    6/14/2020 2059 Given 5000 Units Subcutaneous (Left Lower Abdomen) Macey Balloon, RN      nitroGLYCERIN (NITROSTAT) 0.4 MG SL tab pending  - received rocephin in ED - on ceftriaxone  - rapid covid and PCR negative   - CS pending from catheter   - ID input noted     06/13/20 1900 98.8 °F (37.1 °C) 73 16 114/57 91 % — Nasal cannula 2 L/min AF   06/13/20 1429 99.7 °F (37.6 °C) 80 15 98/ up.     · SVT - tele reviewed probably atrial tach, can switch to metoprolol if BP is an issue and these episodes recur, otherwise no med changes for now.     · CAD - no ischemic symptoms, no changes to meds. Continue DAPT.      · HTN - ok to hold anti HTN diaphoresis, fatigue, fever and unexpected weight change. HENT: Negative for trouble swallowing. Respiratory: Negative for apnea, cough, choking, chest tightness, shortness of breath, wheezing and stridor.     Cardiovascular: Negative for chest pain, p    Constitutional: Positive for fatigue. Negative for activity change, appetite change, chills, diaphoresis, fever and unexpected weight change. HENT: Negative for trouble swallowing.     Respiratory: Negative for apnea, cough, choking, chest tightness, also send occult blood test         HTN. Better control. CPM.            COPD. Does not seem exac. Stable on oxygen. MDI PRN. CPM.         CAD. Pt. without Sx.          Cardiac arrithymia. Cards. Consulted.  Lytes and ECG checked.         Results:

## 2020-06-15 NOTE — PLAN OF CARE
RRT    *See RRT Documentation Record*    Reason the RRT was called: Chest sharpness  Assessment of patient leading up to RRT: At 0630 pt.  Call out with complaints of chest sharpness starting in her back going through to her chest.  Denies any pressure and

## 2020-06-15 NOTE — SIGNIFICANT EVENT
Rapid    Called for chest tightness radiating to the back. Notes SOB has been there during stay no sig change. No abd pain. No fever. No other complaints. Rates it as 4/10 tightness.     /74 (BP Location: Left arm)   Pulse 81   Temp 98 °F (36.7 °

## 2020-06-15 NOTE — PROGRESS NOTES
[facility] ID PROGRESS NOTE    Sukumar Chapman Patient Status:  Inpatient    3/31/1940 MRN [id number]   Location [facility] 5SW/SE Attending Trell Scanlon MD   [facility] Day # 4 PCP Patricia Childers. Gardenia Jeter MD     Subjective:   In an effort to preserve PPE a contusions     Recurrent falls     ESRD on hemodialysis (HCC)     Anemia, unspecified type     Upper GI bleed     COPD (chronic obstructive pulmonary disease) (HCC)     Coronary artery disease     Thrombocytopenia (HCC)     Pulmonary HTN (HCC)     Chronic

## 2020-06-16 NOTE — PROGRESS NOTES
[place] [facility]    Progress Note    Lamond Graver Patient Status:  Inpatient    3/31/1940 MRN [id number]   Location [facility] 5SW/SE Attending Dara Tay MD   [facility] Day # 5 PCP Romelia Mckeon.  Helen Flower MD        Subjective: Pulmonary/Chest: Effort normal and breath sounds normal. No stridor. No respiratory distress. She has no wheezes. She has no rales. She exhibits no tenderness. Abdominal: Soft. Bowel sounds are normal. She exhibits no distension.    Neurological: She BUN 27 (H) 06/15/2020     (L) 06/15/2020    K 4.2 06/15/2020    CL 98 06/15/2020    CO2 30.0 06/15/2020     (H) 06/15/2020    CA 9.0 06/15/2020    ALB 2.6 (L) 06/14/2020    ALKPHO 73 05/17/2020    BILT 0.4 05/17/2020    TP 6.3 (L) 05/17/2020

## 2020-06-16 NOTE — PLAN OF CARE
Focus: chest pain  Data:this RN was checking pt's vital signs when she c/o pressure on mid chest area at 0204, pain level at 5 out of 10. Pt denies sob. Pt using o2 2l nc. Pt denies pain when pressure applied to chest. Skin warm to touch.  Pt stated,\" I fe

## 2020-06-16 NOTE — PLAN OF CARE
Focus: chest pain  Data: at 0618, summoned by pct to pt's room, pt c/o mid sternal pain,\"pt described pain as \"like a hammer pounding on my chest.\" pain level 8 out of 10. Pt is not in resp distress,, denies sob. Action:  Vital signs taken.  House MD (

## 2020-06-17 NOTE — PROGRESS NOTES
[facility]  Nephrology Daily Progress Note    Myla Us  [id number]  [de-identified]year old      HPI:   Myla Us is a [de-identified]year old female. No further CP. Not eating or sleeping very well and feels she would do better at home.        R appropriate for age reflexes and motor skills appropriate for age    Labs:     Recent Labs   Lab 06/14/20  0646 06/14/20  1413 06/15/20  0623   WBC 8.0 7.1 7.9   HGB 7.8* 7.8* 7.9*   HCT 24.7* 24.8* 25.6*   .0 157.0 171.0     Recent Labs   Lab 06/12 meals  •  Clopidogrel Bisulfate (PLAVIX) tab 75 mg, 75 mg, Oral, Daily  •  escitalopram (LEXAPRO) tablet 5 mg, 5 mg, Oral, Daily  •  Fluticasone-Umeclidin-Vilant (TRELEGY ELLIPTA) 100-62.5-25 MCG/INH inhaler 1 puff, 1 puff, Inhalation, Daily  •  folic acid Upper GI bleed     COPD (chronic obstructive pulmonary disease) (HCC)     Coronary artery disease     Thrombocytopenia (HCC)     Pulmonary HTN (HCC)     Chronic ITP (idiopathic thrombocytopenia) (HCC)     CAD (coronary artery disease)     Anemia due to chr

## 2020-06-17 NOTE — TELEPHONE ENCOUNTER
Routed to Dr Sheila Costa for advise, thanks.     Future Appointments   Date Time Provider Analilia Rangel   6/22/2020  9:30 AM Demi Garcia MD [facility]   8/13/2020  3:00 PM Norm Leigh MD [id number] [id number]

## 2020-06-17 NOTE — TELEPHONE ENCOUNTER
From: Meera De Los Santos  To: Madalyn Carcamo. Ally Hay MD  Sent: 6/17/2020 10:23 AM CDT  Subject: Other    My mom is still in the hospital and my mom mentioned Dr Ally Hay saw her yesterday.  She also mentioned they were waiting on results of another test. Do you kno

## 2020-06-17 NOTE — DIETARY NOTE
ADULT NUTRITION INITIAL ASSESSMENT    RECOMMENDATIONS TO MD:  CPM     Pt is at moderate nutrition risk. Pt does not meet malnutrition criteria.         NUTRITION DIAGNOSIS/PROBLEM:  Inadequate oral intake related to increased protein needs with ESRD, on HD Reinforced increased protein intake due to dialysis, especially taking Nepro before Dialysis if unable to eat meals.   - Feeding assistance: meal set up  - Coordination of nutrition care: collaboration with other providers    ANTHROPOMETRICS:  HT:    4'11 adequacy of PO intake, tolerance of PO intake and adequacy of supplement intake.   - Anthropometric Measurement:   Monitor: wt and wt change  - Nutrition Goals: allow wt loss due to fluid losses, PO and supplement greater than 75% of needs, labs WNL and lisa

## 2020-06-17 NOTE — DISCHARGE SUMMARY
DISCHARGE SUMMARY     [facility] Patient Status:  Inpatient    3/31/1940 MRN [id number]   Location [facility] 5SW/SE Attending Dalila Camp MD   [facility] Day # 6 PCP Elizabeth Stallings MD     Date of Admission: 2020  Date of [address] Elevated d-dimer. V/Q negative. Cards. aware. Lidocaine seems to help the pain. Troponins negative again. Most likely this chest pain is musculoskeletal.        Elevated d-dimer. V/Q negative.        Cardiac arrithymia. Cards. consulted.  Kalyn and escitalopram 5 MG Tabs  Commonly known as:  LEXAPRO      Take 1 tablet (5 mg total) by mouth daily.    Quantity:  30 tablet  Refills:  0     Fluticasone-Umeclidin-Vilant 100-62.5-25 MCG/INH Aepb  Commonly known as:  TRELEGY ELLIPTA      Inhale 1 puff into 50K    HYDRALAZINE HCL 25 MG Oral Tab  TAKE ONE TABLET BY MOUTH THREE TIMES DAILY AS NEEDED    atorvastatin 20 MG Oral Tab  Take 1 tablet (20 mg total) by mouth nightly. folic acid 1 MG Oral Tab  Take 1 tablet (1 mg total) by mouth daily.     Fluticasone

## 2020-06-17 NOTE — PHYSICAL THERAPY NOTE
PHYSICAL THERAPY EVALUATION - INPATIENT     Room Number: 544/544-A  Evaluation Date: 6/17/2020  Type of Evaluation: Initial   Physician Order: PT Eval and Treat    Presenting Problem: fever d/t unknown cause  Reason for Therapy: Mobility Dysfunction and D in preparation for discharge. DISCHARGE RECOMMENDATIONS  PT Discharge Recommendations: 24 hour care/supervision;Home with home health PT/OT    PLAN  PT Treatment Plan: Bed mobility; Endurance; Energy conservation;Patient education;Gait training;Transfer t Performed by Naz Deluna MD at [facility] ENDOSCOPY   • ESOPHAGOGASTRODUODENOSCOPY (EGD) N/A 8/31/2019    Performed by Jean Singh DO at [facility] ENDOSCOPY   • ESOPHAGOGASTRODUODENOSCOPY (EGD) N/A 8/27/2019    Performed by Jean Singh DO at [facility] seated:  SPO2 99% on 2L O2 NC  HR 89 bpm  BP /94 mmHg  *no dizziness, SOB, mild fatigue and weakness, improving with seated rest    AM-PAC '6-Clicks' INPATIENT SHORT FORM - BASIC MOBILITY  How much difficulty does the patient currently have. ..  - assistive device     Goal #2  Current Status    Goal #3 Patient is able to ambulate 150 feet with assist device: least restrictive assistive device at assistance level: modified independent   Goal #3   Current Status    Goal #4 Patient will negotiate 8 sta

## 2020-06-17 NOTE — PLAN OF CARE
Problem: Patient/Family Goals  Goal: Patient/Family Long Term Goal  Description  Patient's Long Term Goal: Be able to go back home    Interventions:  - Monitor vital signs  - Monitor labs  - Administer medications per order  - Dialysis per order  - Kathleen Au relaxation techniques  - Monitor for opioid side effects  - Notify MD/LIP if interventions unsuccessful or patient reports new pain  - Anticipate increased pain with activity and pre-medicate as appropriate  Outcome: Adequate for Discharge     Problem: SAF and assess patient for signs and symptoms of electrolyte imbalances  - Administer electrolyte replacement as ordered  - Monitor response to electrolyte replacements, including rhythm and repeat lab results as appropriate  - Fluid restriction as ordered  -

## 2020-06-17 NOTE — OCCUPATIONAL THERAPY NOTE
OCCUPATIONAL THERAPY EVALUATION - INPATIENT     Room Number: 544/544-A  Evaluation Date: 6/17/2020  Type of Evaluation: Initial  Presenting Problem: fever    Physician Order: IP Consult to Occupational Therapy  Reason for Therapy: ADL/IADL Dysfunction and hour assist. If family is not able to stay with patient, patient would benefit from subacute rehab stay as patient is unable to return to safely live by herself.      DISCHARGE RECOMMENDATIONS  OT Discharge Recommendations: Home with home health PT/OT;24 ho Surgical History  Past Surgical History:   Procedure Laterality Date   •      • CATARACT     • CATARACT EXTRACTION Right 11/15/2017    Dr. Teto Estrada.    • D & C     • DIALYSIS CATHETER REMOVAL N/A 2019    Performed by Mary Jane Elkins MD at [address] [place] [address] awareness    VISION  Current Vision: no visual deficits    PERCEPTION  Overall Perception Status:   WFL - within functional limits    SENSATION  Light touch:  intact    Communication: able to communicate wants and needs    Behavioral/Emotional/Social: calm stated goal is: \"to get out of here\"     Patient will complete functional transfer with MOD I  Comment:     Patient will complete toileting with MOD I  Comment:     Patient will tolerate standing for 5 minutes in prep for adls with MOD I   Comment:    Pa

## 2020-06-17 NOTE — PLAN OF CARE
Problem: Patient/Family Goals  Goal: Patient/Family Long Term Goal  Description  Patient's Long Term Goal: Be able to go back home    Interventions:  - Monitor vital signs  - Monitor labs  - Administer medications per order  - Dialysis per order  - Dennis Addison opioid side effects  - Notify MD/LIP if interventions unsuccessful or patient reports new pain  - Anticipate increased pain with activity and pre-medicate as appropriate  Outcome: Progressing     Problem: SAFETY ADULT - FALL  Goal: Free from fall injury  D Administer electrolyte replacement as ordered  - Monitor response to electrolyte replacements, including rhythm and repeat lab results as appropriate  - Fluid restriction as ordered  - Instruct patient on fluid and nutrition restrictions as appropriate  Ou

## 2020-06-18 PROBLEM — N39.0 UTI (URINARY TRACT INFECTION): Status: ACTIVE | Noted: 2020-01-01

## 2020-06-18 PROBLEM — N30.00 ACUTE CYSTITIS WITHOUT HEMATURIA: Status: ACTIVE | Noted: 2020-01-01

## 2020-06-18 NOTE — PAYOR COMM NOTE
--------------  DISCHARGE REVIEW    Sachi Pettit MA [facility]  Subscriber #:  [id number]  Authorization Number: [id number]    Admit date: 6/11/20  Admit time:  2226  Discharge Date: 6/17/2020  5:28 PM     Admitting Physician: Priya Hart MD  Attending Physici pt. Covid negative X 2. Stopped amp. ? Colonization. Monitor without Abx.   and remained afeb. Has permacath right side which does not show any evidence of infection.   Per renal we will do cultures from permacath site as an outpatient after off antibiotics TIMES DAILY AS NEEDED   Quantity:  270 tablet  Refills:  1        CONTINUE taking these medications      Instructions Prescription details   acetaminophen 325 MG Tabs  Commonly known as:  TYLENOL      Take 650 mg by mouth every 8 (eight) hours as needed fo Stable    Current Discharge Medication List    Home Meds - Unchanged    acetaminophen 325 MG Oral Tab  Take 650 mg by mouth every 8 (eight) hours as needed for Pain.     Sevelamer HCl 800 MG Oral Tab  Take 1 tablet (800 mg total) by mouth 3 (three) times da electronic chart    Eunice Barger MD 6/17/2020    Time spent:  30 to 74 minutes      Electronically signed by Lee De Anda MD on 6/17/2020  9:34 PM         REVIEWER COMMENTS

## 2020-06-18 NOTE — TELEPHONE ENCOUNTER
Ervin Cooks calling to inform Dr Bj Pichardo that patient is heading [facility] ER due to shortness of breath and feeling of weakness. For additional questions please call. Thank you.

## 2020-06-18 NOTE — TELEPHONE ENCOUNTER
From: Sherine Zacarias  To: Sergei Espino.  Judith Madrigal MD  Sent: 6/18/2020 2:28 PM CDT  Subject: Other    My mom is back in ER because her white blood cells are elevated and left lung is abnormal.

## 2020-06-18 NOTE — PLAN OF CARE
Patient admitted to floor, resting comfortably in bed, bed is low and locked in place, call light is within reach. Bed alarm on. Patient denies any pain or SOB at this moment.    Problem: Patient/Family Goals  Goal: Patient/Family Long Term Goal  Descriptio vital signs, obtain 12 lead EKG if indicated  - Evaluate effectiveness of antiarrhythmic and heart rate control medications as ordered  - Initiate emergency measures for life threatening arrhythmias  - Monitor electrolytes and administer replacement therap

## 2020-06-18 NOTE — ED NOTES
Patient resting on cart comfortably. Family at bedside. Call light within reach. Will continue to monitor.

## 2020-06-18 NOTE — TELEPHONE ENCOUNTER
From: Miguel Suárez  To: Pb Bazan. Efren Cody MD  Sent: 6/18/2020 7:52 AM CDT  Subject: Other    My mom came home from hospital yesterday and they want her to take Lipitor at night.  Also, she's had really bad diarrhea since she came home and we are not griffith

## 2020-06-18 NOTE — ED PROVIDER NOTES
Patient Seen in: [facility] Emergency Department      History   Patient presents with:  Dyspnea MARLEY SOB  Weakness    Stated Complaint: Weak Legs & Exertional SOB    HPI    69-year-old female with past medical history significant for end-stage franny EXTRACTION Right 11/15/2017    Dr. Teto Estrada.    • D & C     • DIALYSIS CATHETER REMOVAL N/A 9/30/2019    Performed by Mary Jane Elkins MD at [facility] MAIN OR   • ESOPHAGOGASTRODUODENOSCOPY (EGD) N/A 9/27/2019    Performed by Uri Ramírez MD at [facility] ENDOSCOPY   • E Ears: TM's clear b/l  Nose: Nose normal.   Mouth/Throat: MMM, post OP clear with no exudates  Eyes: Conjunctivae and EOM are normal. PERRLA  Neck: Normal range of motion. Neck supple. No tracheal deviation present.    CV: s1s2+, RRR, no m/r/g, normal dist orders were created for panel order CBC WITH DIFFERENTIAL WITH PLATELET.   Procedure                               Abnormality         Status                     ---------                               -----------         ------                     CBC W/ D lab work and radiology studies, explaining results to patient and family, discussing with consultants/admitting physician, and documenting in patient's chart.   Admission disposition: 6/18/2020 12:19 PM                   Disposition and Plan     Clinical Im

## 2020-06-18 NOTE — TELEPHONE ENCOUNTER
----- Message from [facility] sent at 6/18/2020  7:52 AM CDT -----  Regarding: Other  Contact: [phone]  My mom came home from hospital yesterday and they want her to take Lipitor at night.  Also, she's had really bad diarrhea since she came home

## 2020-06-18 NOTE — PROGRESS NOTES
[facility]acy Note:  Renal Dose Adjustment for Metoclopramide (REGLAN)    Miguel Suárez has been prescribed Metoclopramide (REGLAN) 10 mg every 8 hours as needed for N/V.     Estimated Creatinine Clearance: 6.2 mL/min (A) (based on SCr of 6.76 mg/dL (H))

## 2020-06-18 NOTE — CONSULTS
[facility] [address] Patient Status:  Emergency    3/31/1940 MRN [id number]   Location [address] Attending Baron Ricketts MD   Hosp Day # 0 PCP Sergei Espino.  Judith Madrigal MD emphysema ([facility] [address]    • Renal disorder    • Visual impairment      Past Surgical History:   Procedure Laterality Date   •      • CATARACT     • CATARACT EXTRACTION Right 11/15/2017    Dr. Mihaela Deluca.    • D & C     • DIALYSIS CATHETER REMOVAL N/A 2019 blurred vision, double vision or yellow sclerae. Ears, Nose, Throat:  No hearing loss, sneezing, congestion, runny nose or sore throat. SKIN:  No rash or itching.   CARDIOVASCULAR:  No chest pain, chest pressure or chest discomfort  RESPIRATORY:  No shortn with Enterococcus, received three days of ceftriaxone and two days of ampicillin which was stopped. Blood cx NGTD. Now presents to [facility] ED on 6/18, the day after discharge with increased weakness.  Per patient and family, she was unable to get down the stair

## 2020-06-19 NOTE — PROGRESS NOTES
[facility]    Lindle Schilder Patient Status:  Observation    3/31/1940 MRN [id number]   Location [facility] 3W/SW Attending Bijal Branch MD   Hosp Day # 0 PCP Anjum Osborne.  Xenia Guzman MD     Subjective: respiratory failure with hypercapnia (HCC)     Episodic mood disorder (HCC)     Hypotension due to hypovolemia     Multiple contusions     Recurrent falls     ESRD on hemodialysis (HCC)     Anemia, unspecified type     Upper GI bleed     COPD (chronic obst Previous AAA repair  # ESRD on HD via R HD permacath     PLAN:  -  Monitor off abx for now. -  FU cx results. Will get MRI lumbar spine given numbness BLE  -  Follow fever curve, wbc. Repeat CBC/diff tomorrow AM.  -  Reviewed labs, micro, imaging reports.

## 2020-06-19 NOTE — PLAN OF CARE
Patient is Aox 3 resting in bed, bed is low and locked in place, call light is within reach. Patient has lower body weakness/numbness. Patient taken down for stat MRI. Neurology consulted for numbness in lower extremities.  Pain management for lower extremi output  - Evaluate effectiveness of vasoactive medications to optimize hemodynamic stability  - Monitor arterial and/or venous puncture sites for bleeding and/or hematoma  - Assess quality of pulses, skin color and temperature  - Assess for signs of decrea

## 2020-06-19 NOTE — OCCUPATIONAL THERAPY NOTE
OT orders received and chart reviewed. Per RN, pt currently c/o significant leg pain and recommending therapy defer treatment at this time.  Will follow up and initiate treatment when appropriate

## 2020-06-19 NOTE — PROGRESS NOTES
[facility]    Progress Note    Sherine Zacarias Patient Status:  Observation    3/31/1940 MRN [id number]   Location [facility] 3W/SW Attending Divya Stevenson MD   Hosp Day # 0 PCP Sergei Espino.  Judith Madrigal MD       Subjective:   d rashes present, no abnormal bruising noted  Back/Spine: no abnormalities noted  Musculoskeletal: full ROM all extremities good strength  no deformities  Extremities: no edema, cyanosis  Neurological: Only 2/5 strength in the lower extremities    Results: Yoana Bryan MD

## 2020-06-19 NOTE — PROGRESS NOTES
Doctors and consults paged after patient expiration. [facility] notified, not a canidate. Per family they do not want autopsy. Family is at bedside. Spiritual care met with family. Family updated.

## 2020-06-19 NOTE — PLAN OF CARE
Alert. See significant event note. Pt received dialysis. 1.5L removed. Tylenol given for pain-little to no relief. Norco added. Pt finally able to sleep. Bed alarm in place. Call light in reach. Will continue to monitor.        Problem: Patient/Family Goals weights  6/19/2020 0423 by Jaswinder Kinney  Outcome: Progressing  Goal: Absence of cardiac arrhythmias or at baseline  Description  INTERVENTIONS:  - Continuous cardiac monitoring, monitor vital signs, obtain 12 lead EKG if indicated  - Evaluate effectivene

## 2020-06-19 NOTE — CONSULTS
[facility]    PATIENT'S NAME: Amy Sandra   ATTENDING PHYSICIAN: [address]  Jonathan Kenyon MD   CONSULTING PHYSICIAN: Katherin Kat MD   PATIENT ACCOUNT#:   [id number]    LOCATION:  [address] RECORD #:   [id number]       DATE OF BIR proximally and distally in the upper extremities is normal.  She has trace movement, left leg. Not able to lift the left leg up off the bed. No movement in the right leg.   Deep tendon reflexes are equivocal to trace in the upper extremities, absent in th

## 2020-06-19 NOTE — PHYSICAL THERAPY NOTE
Chart reviewed, attempted to see pt for PT eval. Pt c/o severe leg pain, RN requesting to f/u at a later time. Will f/u later today as appropriate, schedule permitting.      Update 3 PM: Pt off the floor at lumbar MRI, not appropriate for therapy today per

## 2020-06-19 NOTE — CM/SW NOTE
CM Discharge Planning   Met with daughter at the bedside for discharge planning. Patient  Down for MRI, Overhead page for RRT and Code Blue. Informed by RN patient has passed.  called to the bedside.    Provided support and listening for Da

## 2020-06-19 NOTE — SIGNIFICANT EVENT
Alert and oriented. Pt attempted to get out of bed. Bed alarm went off. Pt on isolation--PCT applied gloves and turned around to see pt slowly sliding to floor. Pt stated that she wanted to see if she was able to walk. Vitals stable. No injuries.  Bed ala

## 2020-06-19 NOTE — PLAN OF CARE
Patient alert and oriented, forgetful at times, resting comfortably in bed, bed is low and locked in place, call light is within reach. Bed alarm on. Patient fell last night, spoke with son and updated regarding POC.  Reports flank pain, medicated with norc baseline  Description  INTERVENTIONS:  - Continuous cardiac monitoring, monitor vital signs, obtain 12 lead EKG if indicated  - Evaluate effectiveness of antiarrhythmic and heart rate control medications as ordered  - Initiate emergency measures for life t

## 2020-06-19 NOTE — CONSULTS
[facility]    Report of Consultation    Berta Perez Patient Status:  Observation    3/31/1940 MRN [id number]   Location [facility] 3W/SW Attending Zackary Carrel., MD   Hosp Day # 0 PCP Keegan Mendiola MD     Date of Adm [facility] MAIN OR   • ESOPHAGOGASTRODUODENOSCOPY (EGD) N/A 9/27/2019    Performed by Corinne Shire, MD at [facility] ENDOSCOPY   • ESOPHAGOGASTRODUODENOSCOPY (EGD) N/A 8/31/2019    Performed by Yumiko Vazquez DO at [facility] ENDOSCOPY   • ESOPHAGOGASTRODUODENOSCOPY (EGD) N Not Currently        Alcohol/week: 0.0 standard drinks        Comment: rarely      Drug use: No      Sexual activity: Not on file    Lifestyle      Physical activity:        Days per week: Not on file        Minutes per session: Not on file      Stress: No PRN  Metoclopramide HCl (REGLAN) injection 5 mg, 5 mg, Intravenous, Q8H PRN  heparin sodium 1000 UNIT/ML injection 1,500 Units, 1.5 mL, Intravenous, Once  sodium chloride 0.9% IV bolus 100 mL, 100 mL, Intravenous, Once PRN  Albumin Human (ALBUMINAR) 25 % s negative for fatigue, fevers and weight loss  Eyes: negative for irritation, redness and visual disturbance  Ears, nose, mouth, throat, and face: negative for hearing loss and sore throat  Respiratory: negative for cough, hemoptysis and wheezing  Cardiovas 06/18/2020    CREATSERUM 6.76 (H) 06/18/2020    BUN 53 (H) 06/18/2020     06/18/2020    K 4.6 06/18/2020     06/18/2020    CO2 25.0 06/18/2020     (H) 06/18/2020    CA 9.2 06/18/2020    ALB 2.6 (L) 06/14/2020    ALKPHO 73 05/17/2020    B ([facility] [address]     Hypotension due to hypovolemia     Multiple contusions     Recurrent falls     ESRD on hemodialysis (HCC)     Anemia, unspecified type     Upper GI bleed     COPD (chronic obstructive pulmonary disease) (HCC)     Coronary artery disease     Throm

## 2020-06-19 NOTE — PROGRESS NOTES
Called MRI x4 times, voicemail left for STAT MRI of spine to be completed. After multiple calls placed to MRI, my  was notified and the CRN was called. Alisia Concepcion MRI then called back, was told transport will come get her, pt was never taken.  Called again,

## 2020-06-19 NOTE — PROGRESS NOTES
MRI put in STAT at 1014, called MRI was told 2 patients in front of her. Not performed, tried calling department x4 times, voicemail left there were no answers. The TL and CRN Alia, notified and went to department knocked on door & spoke with MRI.  Then rece

## 2020-06-19 NOTE — PROGRESS NOTES
Responded to Code Blue and facilitated communication between medical team and family.  Vane Ramos will work with family to provide support and complete expiration paperwork.       06/19/20 9144   Clinical Encounter Type   Visited With Patient   Crisis Vi

## 2020-06-19 NOTE — H&P
[facility]    History and [address] Patient Status:  Observation    3/31/1940 MRN [id number]   Location [facility] 3W/SW Attending Nolan Polk MD   Hosp Day # 0 PCP Michael Brock.  Phoebe Costa MD     Date:   Performed by Carina Corrigan MD at [facility] ENDOSCOPY   • ESOPHAGOGASTRODUODENOSCOPY (EGD) N/A 8/31/2019    Performed by Zachariah Brown DO at [facility] ENDOSCOPY   • ESOPHAGOGASTRODUODENOSCOPY (EGD) N/A 8/27/2019    Performed by Zachariah Brown DO at [facility] ENDOSCOPY   • Take 1 tablet (75 mg total) by mouth daily.  Start taking only if platelets are above 82Z (Patient taking differently: Take 75 mg by mouth daily.  )  HYDRALAZINE HCL 25 MG Oral Tab, TAKE ONE TABLET BY MOUTH THREE TIMES DAILY AS NEEDED (Patient taking differ stool, abdominal distention, anal bleeding and rectal pain. Endocrine: Negative for cold intolerance, heat intolerance, polydipsia, polyphagia and polyuria.    Genitourinary: Negative for bladder incontinence, dysuria, urgency, frequency, hematuria, flank present. No tracheal deviation present. No thyroid mass and no thyromegaly present. Cardiovascular: Normal rate and regular rhythm. Edema not present. Pulmonary/Chest: Effort normal and breath sounds normal. No accessory muscle usage or stridor.  No 06/19/2020     06/19/2020    K 3.3 (L) 06/19/2020    CL 97 (L) 06/19/2020    CO2 34.0 (H) 06/19/2020     (H) 06/19/2020    CA 8.3 (L) 06/19/2020    ALB 2.9 (L) 06/19/2020    ALKPHO 90 06/19/2020    BILT 0.7 06/19/2020    TP 6.7 06/19/2020    A Renal following patient. COPD (chronic obstructive pulmonary disease) (HCC)  Stable. Nebs as needed. Congestive heart failure of unknown etiology ([facility] [address]  For hemodialysis. Renal following patient.       Acute cystitis without hematuria  Does not

## 2020-06-19 NOTE — TELEPHONE ENCOUNTER
Spoke with son Gerard Montoya and sister Jenny Jeffery. Not eat much. Diarrhea and not stop last night. Weakness. This AM. Could not get to HD. No strength.

## 2020-06-19 NOTE — PROGRESS NOTES
This nurse went to MRI  department around 3:10 PM to administer PRN medication for discomfort. The patent was unresponsive upon assessment with no palpable pulse. She also appeared to be agonally breathing.  Patient is DNR/DNI which was confirmed with nina

## 2020-06-19 NOTE — PROGRESS NOTES
[facility] Note:  Renal Dose Adjustment for Tramadol Aldo Nugent    Maliha Worthington has been prescribed Tramadol (ULTRAM) 50 mg orally every 8 hours as needed for pain. Estimated Creatinine Clearance: 13.3 mL/min (A) (based on SCr of 2.88 mg/dL (H)).

## 2020-06-20 NOTE — DISCHARGE SUMMARY
DISCHARGE SUMMARY     Dieudonne Camp Patient Status:  Observation    3/31/1940 MRN [id number]   [facility] 3W/SW Attending No att. providers found   [facility] Day # 0 PCP Eunice Stallings MD     Date of Admission: 2020  Date of Disch bilateral lower extremities. Neuro consulted stat. MRI ordered. PT/OT. Blood culture pending. Urine culture done. Following patient. Covid negative.        White blood cell count. Blood and urine cultures done. ID consulted.        Anemia.     May b known as:  LEXAPRO      Take 1 tablet (5 mg total) by mouth daily. Quantity:  30 tablet  Refills:  0     Fluticasone-Umeclidin-Vilant 100-62.5-25 MCG/INH Aepb  Commonly known as:  TRELEGY ELLIPTA      Inhale 1 puff into the lungs daily.    Quantity:  1 ea meals., Normal, Disp-270 tablet, R-3Prescription sent to [facility] in error. Patient is completely out of medication and requesting local refill. carvedilol 12.5 MG Oral Tab  Take 12.5 mg by mouth 2 (two) times daily with meals.   , Historical As tolerated    Follow-up appointment:   No follow-up provider specified.     Vital signs:  Temp:  [97.9 °F (36.6 °C)] 97.9 °F (36.6 °C)  Pulse:  [0-82] 80  Resp:  [18] 18  BP: ()/(66-78) 97/78    ------------------------------------------------------

## 2020-09-23 NOTE — PROGRESS NOTES
Spoke to Dr. Adithya Siddiqi at bedside now, keep patient PCU to monitor for another day. Acute rehab

## 2020-11-24 NOTE — CONSULTS
[facility] Hospitalist Discharge Summary       ADMIT DATE: 11/19/2020   DISCHARGE DATE: 11/24/2020     PCP: Pcp Not In System   DATE PCP NOTIFIED:  N/A  METHOD OF NOTIFICATION: N/A    CONSULTING PHYSICIAN(s):   Dr. Baljeet Field (Gastroenterology)   Dr. Maryam Marquez (Hematology and Oncology)   Dr. 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 Upon arrival, the patient was afebrile with HR 71, RR 18, SpO2 99, and /80. Labs were significant for hemoglobin 10.6, T. Bili 1.7, , , and . The patient was admitted for further workup and management with Oncology following.     As noted, the patient was recently diagnosed with neuroendocrine carcinoma involving the liver with possible metastases to the lungs. She underwent a RIJ chest port placement on 11/20/2020 in order to initiate chemotherapy. Chemotherapy with Folfirinox was initiated per Oncology. She remained stable after this. Prior to discharge, the patient was given 1 dose of Neupogen, and is to received Neulasta as an outpatient. Oncology cleared the patient for discharge, and she is to continue chemotherapy in the outpatient setting.      The patient was noted to have worsening transaminitis and hyperbilirubinemia secondary to her carcinoma. GI was following throughout her  [facility] [facility]    Report of Consultation    Date of Admission:  6/19/2019  Date of Consult:  6/20/2019   Reason for Consultation:     ESRD on RRT     History of Present Illness:     Wayne Robbins is a 68 yr old female with pmh of left ki Heart Disorder Brother 37        Herat aneuyrism ruptured   • Heart Surgery Brother    • Heart Disorder Brother 54        CAD S/P CABG.     • Diabetes Brother    • Diabetes Maternal Grandmother    • Diabetes Maternal Aunt        Social History  Patient Guar admission. An US of the abdomen was done, which redemonstrated the large right lobe hepatic mass. The patient’s LFTs were monitored closely throughout her admission. She did not require any intervention or a paracentesis.    All of the patient's other chronic issues remained stable and no additional acute interventions or new medications needed to be initiated during this admission. Currently, the patient remains stable and has no further acute complaints or issues at this time. She has now been deemed stable for discharge home by all services and was instructed to follow up with her PCP and specialists documented below. The patient has confirmed her understanding of the hospital course and discharge instructions and is agreeable to the discharge plan.     CONDITION ON DISCHARGE: Stable  CODE STATUS:   Code Status: Full Resuscitation     DISCHARGE INSTRUCTIONS  DISCHARGE TO: Home  DIET: Regular  ACTIVITY: As tolerated  HOME HEALTH CARE RN/PT/OT/MSW/Wound/Ostomy Agency: None  HOME OXYGEN: None    FOLLOW-UP APPOINTMENTS:   Oncology in 3 days  Infusion Center [facility] tomorrow.     DISCHARGE MEDICATION LIST:     Summary of your Discharge Medications      Take these Medications      Details   enoxaparin 80 MG/0.8ML injectable solution  Commonly known as: LOVENOX   Inject 0.8 mLs into the skin every 12 hours for 10 days.     HYDROcodone-acetaminophen 5-325 MG per tablet  Commonly known as: NORCO   Take 1 tablet by mouth every 4 hours as needed for Pain.     loperamide 2 MG capsule  Commonly known as: IMODIUM   Take 4 mg by mouth at first loose stool, then 2 mg every 2 hours until diarrhea-free for 12 hours.     ondansetron 8 MG tablet  Commonly known as: ZOFRAN   Take 1 tablet by mouth 2 times daily. Take for 2 days post chemotherapy.  Comment: Cycle length = 14 d  May dispense maximum allowable quantity per insurance     prochlorperazine 10 MG tablet  Commonly known as: COMPAZINE   Take 1 tablet by mouth every 6  hours as needed for Nausea or Vomiting.  Comment: Day Supply = 30     Slow Iron 160 (50 Fe) MG extended-release tablet   Generic drug: ferrous sulfate dried  Take 1 tablet by mouth daily.             PHYSICAL EXAMINATION:   Physical Exam  Constitutional:       General: She is not in acute distress.     Appearance: She is not diaphoretic.   HENT:      Head: Normocephalic and atraumatic.      Mouth/Throat:      Mouth: Mucous membranes are moist.      Pharynx: Oropharynx is clear.   Eyes:      Extraocular Movements: Extraocular movements intact.      Conjunctiva/sclera: Conjunctivae normal.   Cardiovascular:      Rate and Rhythm: Normal rate and regular rhythm.      Heart sounds: No murmur.   Pulmonary:      Effort: Pulmonary effort is normal.      Breath sounds: Normal breath sounds. No wheezing.   Abdominal:      General: There is distension.      Palpations: Abdomen is soft.      Tenderness: There is no abdominal tenderness.   Musculoskeletal:      Right lower leg: Edema present.      Left lower leg: Edema present.      Comments: Right chest port, non tender   Skin:     General: Skin is warm and dry.   Neurological:      General: No focal deficit present.      Mental Status: She is alert and oriented to person, place, and time.   Psychiatric:         Mood and Affect: Mood normal.         Behavior: Behavior normal.       Vital Last Value 24 Hour Range   Temperature 97.9 °F (36.6 °C) (11/24/20 1500) Temp  Min: 97.7 °F (36.5 °C)  Max: 98.2 °F (36.8 °C)   Pulse 76 (11/24/20 1500) Pulse  Min: 60  Max: 84   Respiratory 16 (11/24/20 1500) Resp  Min: 15  Max: 16   Non-Invasive  Blood Pressure 118/70 (11/24/20 1500) BP  Min: 111/76  Max: 142/83   Pulse Oximetry 99 % (11/24/20 1500) SpO2  Min: 99 %  Max: 100 %     Vital Today Admitted   Weight 86 kg (189 lb 9.5 oz) (11/20/20 1512) Weight: 86 kg (189 lb 9.5 oz) (11/20/20 1512)   Height N/A Height: 4' 11\" (149.9 cm) (11/20/20 1512)   BMI N/A BMI (Calculated): 38.29 (11/20/20  injection 5,000 Units 5,000 Units Subcutaneous 2 times per day   acetaminophen (TYLENOL) tab 650 mg 650 mg Oral Q4H PRN   Or      HYDROcodone-acetaminophen (NORCO) 5-325 MG per tab 1 tablet 1 tablet Oral Q4H PRN   Or      HYDROcodone-acetaminophen (NORCO) 1512)       Recent Labs   Lab 11/24/20  0530 11/23/20  0655 11/22/20  0640   WBC 4.0* 4.0* 4.6   HCT 31.0* 32.4* 32.6*   HGB 9.2* 9.8* 9.6*    261 277   SODIUM 138 138 140   POTASSIUM 4.0 3.8 4.0   CHLORIDE 108* 107 109*   CO2 28 27 26   CALCIUM 7.9* 8.0* 7.9*   GLUCOSE 89 88 84   BUN 9 11 16   CREATININE 0.36* 0.48* 0.50*   * 187* 170*   * 173* 170*   ALKPT 539* 552* 500*   BILIRUBIN 1.4* 1.5* 2.3*   ALBUMIN 2.1* 2.1* 2.0*   GFRNA >90 >90 >90       No results found.    TIME SPENT:  > 31 minutes    Charting performed by sabrina Monzon for Dr. Jus Remy.     All medical record entries made by the sabrina were at my direction. I have reviewed the chart and agree that the record accurately reflects my personal performance of the history, physical exam, hospital course, and assessment and plan.     is intact  Nose/Mouth/Throat:mucous membranes are moist   Neck/Thyroid: neck is supple without adenopathy - right IJ catheter   Lymphatic: no abnormal cervical, supraclavicular adenopathy is noted  Respiratory:  lungs are clear to auscultation bilaterally stable     3. HTN:  - on atenolol   - monitor blood pressure at home- stable       4. Abdominal Aortic Aneurysm   - 5.7 - 6 cm -s/p percutaneous EVAR      Thank you for allowing me to participate in the care of your patient.     Rosa Mason MD  6/20/20

## 2021-02-09 NOTE — TELEPHONE ENCOUNTER
Patient given results of labs (no result note coming up in Epic) and verbalized understanding. First B12 injection nurse visit appointment scheduled for 2/7.
done

## 2021-08-31 NOTE — PAYOR COMM NOTE
--------------  CONTINUED STAY REVIEW    Kellie Cole [place] [facility]  Subscriber #:  [id number]  Authorization Number: [id number]    Admit date: 6/11/20  Admit time: 2226    Admitting Physician: Gabby Whitley MD  Attending Physician:  Balbina Tyler MD  [facility] normal mood with appropriate affect, good eye contact, judgement and insight good 6/12/2020 0010 Given 5000 Units Subcutaneous (Right Lower Abdomen) Valente YOU, RN      Pantoprazole Sodium (PROTONIX) EC tab 40 mg     Date Action Dose Route User    6/12/2020 0535 Given 40 mg Oral Jake De Oliveira, RN      sevelamer (RENVELA) tab 8 • COPD (chronic obstructive pulmonary disease) (HCC)     • Coronary atherosclerosis     •      • Dialysis patient [facility])     • Essential hypertension     • High blood pressure     • High cholesterol     • History of blood transfusion 08/2019     [facility]   • His • Diabetes Maternal Aunt         She has never used smokeless tobacco.  She reports that she does not use drugs.     Allergies:     Ace Inhibitors          OTHER (SEE COMMENTS)     Medications:     Current Facility-Administered Medications:   •  CefTRIAXon Radiology: Reviewed     ASSESSMENT:     Antibiotics: Ceftriaxone     [de-identified]year old with a history of ESRD via R chest permacath, CHF, previous AAA repair with endograft 6/2019, COPD, with recent [facility] admission 5/14-5/18 with CHF exacerbation, tested negative

## 2022-04-19 NOTE — ED INITIAL ASSESSMENT (HPI)
1020F at noon    [email or web address] @ [id number]    On chronic O2 for COPD    Denies cough, sob.     HD pt negative

## 2022-06-28 NOTE — TELEPHONE ENCOUNTER
Department of Internal Medicine  History and Physical    PCP: Dr. Angela Stoddard   Admitting Physician: Dr. Elbert Padgett:  Syncope    HISTORY OF PRESENT ILLNESS:    Puneet Brady is a 69-year-old gentleman who presented to [address] [place] [address] emergency department after suffering a series of syncopal episodes over a 45-minute period. There was no inciting event and he had been feeling fine prior to these episodes. He was calling Vyatta at the independent living facility at which she resides. He states he blacked out 3 times during this 45-minute time period. He has an extensive neurosurgical history status post resection of a pituitary macroadenoma and a at [facility] approximately 3 years ago. He is following routinely with ENT and neurology locally in the setting of persistent headache thereafter. He denies any change in his headache symptomatology. He has had no further syncopal episodes since hospitalization. We discussed admission to the hospital with complete syncopal work-up and he voiced understanding and agreement.   No family members were present during my examination    PAST MEDICAL Hx:  Past Medical History:   Diagnosis Date    Cervical (neck) region somatic dysfunction 5/9/2022    Cervicogenic headache 5/9/2022    COPD (chronic obstructive pulmonary disease) ([facility] [address]     DVT of lower extremity (deep venous thrombosis) ([facility] [address] 2007    post foot surgery      GERD (gastroesophageal reflux disease)     HIV (human immunodeficiency virus infection) ([facility] [address]     Hyperlipidemia     Hypertension     Muscle contraction headache 5/9/2022    Prostate enlargement        PAST SURGICAL Hx:   Past Surgical History:   Procedure Laterality Date    ABDOMEN SURGERY  1970    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 Jen Anguiano. TOE LEFT FOOT. V TO Y SKIN PLASTY 5TH METATARSAL PHALANGEAL JOINT LEFT FOOT performed by Roseline Wilkins, SUZANNA at [address]      LYMPHADENECTOMY Left     axilla & Rt side neck    OTHER SURGICAL HISTORY Left 03/22/2019    hammertoe correction 4th and 5th toes left foot, skin plasty 5th MPJ left foot       FAMILY Hx:  Family History   Problem Relation Age of Onset    Breast Cancer Mother     Colon Cancer Mother     Heart Disease Father     Stroke Father     Diabetes Father     Lung Cancer Brother        HOME MEDICATIONS:  Prior to Admission medications    Medication Sig Start Date End Date Taking?  Authorizing Provider   topiramate (TOPAMAX) 25 MG tablet START 1 TABLET BY MOUTH EVERY NIGHT AT BEDTIME FOR 1 WEEK, THEN 1 TWICE DAILY FOR 1 WEEK, THEN 2 TWICE DAILY DAILY IF NEEDED 5/9/22   Anil Vides MD   apixaban (ELIQUIS) 5 MG TABS tablet TAKE ONE TABLET BY MOUTH TWICE A DAY AS DIRECTED BY THE [facility] ([phone] [phone]) 2/4/22   Historical Provider, MD   montelukast (SINGULAIR) 10 MG tablet Take 1 tablet by mouth daily 12/21/21   Curt Mcclendon DO   Loratadine-Pseudoephedrine (CLARITIN-D 12 HOUR PO) Take by mouth    Historical Provider, MD   Aromatic Inhalants (VICKS VAPOR IN) Inhale into the lungs In a machine to help him breath    Historical Provider, MD   Hypertonic Nasal Wash (SINUS RINSE) PACK 1 kit by Nasal route daily 12/14/20   ANDRZEJ Puga CNP   Sodium Chloride-Sodium Bicarb 1.57 g PACK 1 Package by Nasal route daily 12/14/20 3/14/21  ANDRZEJ Puga CNP   azelastine (ASTELIN) 0.1 % nasal spray 2 sprays by Nasal route 2 times daily Use in each nostril as directed 12/4/20   ANDRZEJ Puga CNP   Diphenhydramine-Pseudoephed (BENADRYL ALLERGY/SINUS PO) Take 10 mg by mouth 3 times daily    Historical Provider, MD   hydroCHLOROthiazide (HYDRODIURIL) 25 MG tablet Take 25 mg by mouth daily    Historical Provider, MD   lisinopril (PRINIVIL;ZESTRIL) 20 MG tablet Take 20 mg by mouth daily    Historical Provider, MD   sodium chloride (OCEAN, BABY AYR) 0.65 % nasal spray 2 sprays by Nasal route as needed for Congestion (5 times daily)    Historical Provider, MD   fluticasone (FLONASE) 50 MCG/ACT nasal spray 2 sprays by Each Nostril route daily    Historical Provider, MD   tiotropium (SPIRIVA RESPIMAT) 1.25 MCG/ACT AERS inhaler Inhale 2 puffs into the lungs daily    Historical Provider, MD   omeprazole (PRILOSEC) 20 MG delayed release capsule Take 20 mg by mouth daily    Historical Provider, MD   Abacavir-Dolutegravir-Lamivud (TRIUMEQ) 600- MG TABS Take 1 tablet by mouth nightly    Historical Provider, MD   rosuvastatin (CRESTOR) 40 MG tablet Take 40 mg by mouth every evening    Historical Provider, MD       ALLERGIES:  Cat hair extract, Other, Seasonal, and Ultram [tramadol hcl]    SOCIAL Hx:  Social History     Socioeconomic History    Marital status: Single     Spouse name: Not on file    Number of children: Not on file    Years of education: Not on file    Highest education level: Not on file   Occupational History    Not on file   Tobacco Use    Smoking status: Former Smoker     Years: 40.00     Types: Cigarettes     Quit date: 1/1/2019     Years since quitting: 3.4    Smokeless tobacco: Never Used   Substance and Sexual Activity    Alcohol use: No    Drug use: No    Sexual activity: Never   Other Topics Concern    Not on file   Social History Narrative    Not on file     Social Determinants of Health     Financial Resource Strain:     Difficulty of Paying Living Expenses: Not on file   Food Insecurity:     Worried About Running Out of Food in the Last Year: Not on file    Rebekah of Food in the Last Year: Not on file   Transportation Needs:     Lack of Transportation (Medical): Not on file    Lack of Transportation (Non-Medical):  Not on file   Physical Activity:     Days of Exercise per 06/28/22 0715   BP: 130/78   Pulse: 65   Resp: 18   Temp: 97.9 °F (36.6 °C)   SpO2: 93%         CONSTITUTIONAL:    Awake, alert, cooperative, no apparent distress, and appears stated age    EYES:    PERRL, EOMI, sclera clear, conjunctiva normal    ENT:    Normocephalic, atraumatic, sinuses nontender on palpation. External ears without lesions. Oral pharynx with moist mucus membranes. Tonsils without erythema or exudates. NECK:    Supple, symmetrical, trachea midline, no adenopathy, thyroid symmetric, not enlarged and no tenderness, skin normal, no bruits, no JVD    HEMATOLOGIC/LYMPHATICS:    No cervical lymphadenopathy and no supraclavicular lymphadenopathy    LUNGS:    Symmetric. No increased work of breathing, good air exchange, clear to auscultation bilaterally, no wheezes, rhonchi, or rales,     CARDIOVASCULAR:    Normal apical impulse, regular rate and rhythm, normal S1 and S2, no S3 or S4, and no murmur noted    ABDOMEN:    No scars, normal bowel sounds, soft, non-distended, non-tender, no masses palpated, no hepatosplenomegaly, no rebound or guarding elicited on palpation     MUSCULOSKELETAL:    There is no redness, warmth, or swelling of the joints. Full range of motion noted. Motor strength is 5 out of 5 all extremities bilaterally. Tone is normal.    NEUROLOGIC:    Awake, alert, oriented to name, place and time. Cranial nerves II-XII are grossly intact. Motor is 5 out of 5 bilaterally. SKIN:    No bruising or bleeding. No redness, warmth, or swelling    EXTREMITIES:    Peripheral pulses present. No edema, cyanosis, or swelling. OSTEOPATHIC:    Examined in seated and supine positions. Normal thoracic kyphosis and lumbar lordosis. No acute somatic dysfunction.     LABORATORY DATA:  CBC with Differential:    Lab Results   Component Value Date    WBC 7.9 06/27/2022    RBC 4.27 06/27/2022    HGB 13.8 06/27/2022    HCT 40.4 06/27/2022     06/27/2022    MCV 94.6 06/27/2022    MCH 32.3 06/27/2022    MCHC 34.2 06/27/2022    RDW 14.2 06/27/2022    SEGSPCT 55 02/21/2014    LYMPHOPCT 25.9 06/27/2022    MONOPCT 7.4 06/27/2022    BASOPCT 0.9 06/27/2022    MONOSABS 0.58 06/27/2022    LYMPHSABS 2.04 06/27/2022    EOSABS 0.52 06/27/2022    BASOSABS 0.07 06/27/2022     BMP:    Lab Results   Component Value Date     06/27/2022    K 3.9 06/27/2022     06/27/2022    CO2 24 06/27/2022    BUN 16 06/27/2022    LABALBU 4.0 06/27/2022    CREATININE 1.3 06/27/2022    CALCIUM 8.9 06/27/2022    GFRAA >60 06/27/2022    LABGLOM 55 06/27/2022    GLUCOSE 101 06/27/2022       ASSESSMENT:  1. 3 syncopal episodes over a 45-minute period yesterday with known history of pituitary macroadenoma (acromegaly) status postresection 3 years ago at [facility]  2. Recurrent cervicogenic headache being followed as an outpatient by the ENT and neurology teams  3. Previous history of DVT  4. HIV  5. Essential hypertension  6. Hyperlipidemia    PLAN:  With the patient's previous neurosurgical history, we will move forward with a contrasted MRI of the brain. Carotid ultrasound and 2D echocardiogram with bubble study will also be obtained. Gentle fluid resuscitation will be undertaken. We will monitor orthostatics. Home medications will be resumed. He will work with the therapy teams. He has entirely returned to his baseline this morning and is requesting an overall short hospitalization.     Aisha Aparicio DO, D.O., FACOI  8:21 AM  6/28/2022    Electronically signed by Aisha Aparicio DO on 6/28/22 at 8:21 AM EDT

## 2022-10-20 NOTE — PROGRESS NOTES
[facility]    Progress Note    Eva Cardenas Patient Status:  Inpatient    3/31/1940 MRN [id number]   Location [facility] 5SW/SE Attending Danette Contreras MD   [address] Day # 8 PCP Sameer Keating. Robert Painter MD        Subjective:    [place] is off antibiotics.     CKD on hemodialysis- Nephro consulted      AAA s/p repair       COPD-continue with oxygen use and BiPAP as above.     Adrenal nodule - Follows up as OP    abnormal Bladder US- follows up as OP     DVt px  -in light  GI bleed on scd b yes

## 2022-10-24 NOTE — TELEPHONE ENCOUNTER
Attempted to give Cira Cruz a call in the nu,chris provided to inform her that we haven't received anything from them. ... The number is actually a fax number. Bilateral groin clipped and draped.

## 2022-12-05 NOTE — PROCEDURES
Opened in error. Procedure : endotracheal Intubation     Indication : respiratory failure / required immediate intubation     Pt was unresponsive and no sedation required     Using mac 3 , under direct laryngoscopy vocal cords were visualized   7.5 Et tube was inserted wit

## 2023-02-17 NOTE — CM/SW NOTE
6/20: RODRIGUEZ received MDO for home healthcare screening. SW met with daughter, Oscar Hendrix ([phone]) at bedside. Dtr reports that patient lives at home alone, in a two-story home with 3 steps to enter, 10 steps to the second floor.  Patient is independent with a Taltz Counseling: I discussed with the patient the risks of ixekizumab including but not limited to immunosuppression, serious infections, worsening of inflammatory bowel disease and drug reactions.  The patient understands that monitoring is required including a PPD at baseline and must alert us or the primary physician if symptoms of infection or other concerning signs are noted.

## 2024-09-29 NOTE — CM/SW NOTE
08: 15AM  Received message from DUNIA Valencia yesterday evening - pt's AVAPS settings added to RN to RN care notes. RODRIGUEZ sent AVAPS settings to [facility] via [employer].     11:40AM  Contacted Daja arora/ [facility] - agreeable to 1:30PM d/c.    RODRIGUEZ confirmed w/ pt's RN - ag 2 = A lot of assistance

## 2024-12-19 NOTE — TELEPHONE ENCOUNTER
Verified pt name and  with pt's daughter, Mindy Spear (SHAHBAZ in place). Reviewed Dr. Bala Dalton medication information as noted below. Pt's daughter states she is not going to have the pt take this medication and had no further questions at this time. Detail Level: Detailed ambulatory

## 2025-01-23 NOTE — TELEPHONE ENCOUNTER
Spoke with patient, informed to take antibiotic treatment and to take with probiotic to prevent yeast infection. Instructed to take full course, wipe from front to back and urinate after intercourse. Urinalysis and culture sent to lab.     Also, information Negative

## (undated) DEVICE — SUTURE VICRYL 3-0 SH

## (undated) DEVICE — Device: Brand: CUSTOM PROCEDURE KIT

## (undated) DEVICE — DRAPE SHEET LG

## (undated) DEVICE — STAY.SAFE® CAP: Brand: STAY.SAFE®

## (undated) DEVICE — Device: Brand: DEFENDO AIR/WATER/SUCTION AND BIOPSY VALVE

## (undated) DEVICE — DRAPE SHEET LAPCHOLE 124X100X7

## (undated) DEVICE — LINE MNTR ADLT SET O2 INTMD

## (undated) DEVICE — CONMED SCOPE SAVER BITE BLOCK, 20X27 MM: Brand: SCOPE SAVER

## (undated) DEVICE — CATH GOLD PROBE HEMOGLIDE 7FR

## (undated) DEVICE — DEVICE PORT SITE CLOSURE

## (undated) DEVICE — TRAY SKIN PREP PVP-1

## (undated) DEVICE — Device

## (undated) DEVICE — UNDYED BRAIDED (POLYGLACTIN 910), SYNTHETIC ABSORBABLE SUTURE: Brand: COATED VICRYL

## (undated) DEVICE — FORCEP RADIAL JAW 4

## (undated) DEVICE — TUBING CYSTO TUR DUAL

## (undated) DEVICE — DRAPE SHEET TRANSVERSE LAP

## (undated) DEVICE — [HIGH FLOW INSUFFLATOR,  DO NOT USE IF PACKAGE IS DAMAGED,  KEEP DRY,  KEEP AWAY FROM SUNLIGHT,  PROTECT FROM HEAT AND RADIOACTIVE SOURCES.]: Brand: PNEUMOSURE

## (undated) DEVICE — 6 ML SYRINGE LUER-LOCK TIP: Brand: MONOJECT

## (undated) DEVICE — CAUTERY BLADE 2IN INS E1455

## (undated) DEVICE — MINOR GENERAL: Brand: MEDLINE INDUSTRIES, INC.

## (undated) DEVICE — ENCORE® LATEX ACCLAIM SIZE 8, STERILE LATEX POWDER-FREE SURGICAL GLOVE: Brand: ENCORE

## (undated) DEVICE — TROCAR: Brand: KII® SLEEVE

## (undated) DEVICE — SUCTION CANISTER, 3000CC,SAFELINER: Brand: DEROYAL

## (undated) DEVICE — DERMABOND LIQUID ADHESIVE

## (undated) DEVICE — SNARE OPTMZ PLPCTM TRP

## (undated) DEVICE — SOL  .9 1000ML BTL

## (undated) DEVICE — SNARE CAPTIFLEX MICRO-OVL OLY

## (undated) DEVICE — STAY.SAFE® CATHETER EXTENSION SET WITH LUER-LOCK, 18 INCH: Brand: STAY.SAFE®

## (undated) DEVICE — STERILE LATEX POWDER-FREE SURGICAL GLOVESWITH NITRILE COATING: Brand: PROTEXIS

## (undated) DEVICE — CATH PACK Y-TEC

## (undated) DEVICE — 3M(TM) TEGADERM(TM) TRANSPARENT FILM DRESSING FRAME STYLE 1628: Brand: 3M™ TEGADERM™

## (undated) DEVICE — SUTURE SILK 0

## (undated) DEVICE — CULTURE COLLECT/TRANSPORT SYS

## (undated) DEVICE — PURSE STRING DEVICE: Brand: PURSTRING

## (undated) DEVICE — SUTURE PROLENE 2-0 SH

## (undated) DEVICE — TROCAR: Brand: KII FIOS FIRST ENTRY

## (undated) DEVICE — 3 ML SYRINGE LUER-LOCK TIP: Brand: MONOJECT

## (undated) DEVICE — SOL  .9 3000ML

## (undated) DEVICE — 35 ML SYRINGE REGULAR TIP: Brand: MONOJECT

## (undated) DEVICE — MEDI-VAC NON-CONDUCTIVE SUCTION TUBING 6MM X 1.8M (6FT.) L: Brand: CARDINAL HEALTH

## (undated) DEVICE — CHLORAPREP 26ML APPLICATOR

## (undated) DEVICE — ENCORE® LATEX MICRO SIZE 8, STERILE LATEX POWDER-FREE SURGICAL GLOVE: Brand: ENCORE

## (undated) DEVICE — SUTURE VICRYL 0 UR-6

## (undated) DEVICE — NEEDLE CONTRAST INTERJECT 25G

## (undated) DEVICE — 3M™ IOBAN™ 2 ANTIMICROBIAL INCISE DRAPE 6640EZ: Brand: IOBAN™ 2

## (undated) DEVICE — SUTURE PLAIN GUT 3-0 CT-1

## (undated) NOTE — ED AVS SNAPSHOT
Dieudonne Zoë   MRN: [id number]    Department:  [facility] Emergency Department   Date of Visit:  10/20/2019           Disclosure     Insurance plans vary and the physician(s) referred by the ER may not be covered by your plan.  Please conta within the next three months to obtain basic health screening including reassessment of your blood pressure.     IF THERE IS ANY CHANGE OR WORSENING OF YOUR CONDITION, CALL YOUR PRIMARY CARE PHYSICIAN AT ONCE OR RETURN IMMEDIATELY TO THE EMERGENCY DEPARTMEN

## (undated) NOTE — LETTER
[address], [place]  Authorization for Invasive Procedures  1.  I hereby authorize Dr. Shannan Renee , my physician and whomever may be designated as the doctor's assistant, to perform the following operation and/or procedure:  CARDIAC CAT a directed donor transfusion, I will discuss this with my physician.      5. I consent to the photographing of the operations or procedures to be performed for the purposes of advancing medicine, science, and/or education, provided my identity is not reveal Witness Signature: ____________________________________________ Date: __________ Time: ___________    Statement of Physician  My signature below affirms that prior to the time of the procedure, I have explained to the patient and/or her legal representativ

## (undated) NOTE — ED AVS SNAPSHOT
Stephanie Ruiz   MRN: [id number]    Department:  [facility] Emergency Department   Date of Visit:  6/3/2019           Disclosure     Insurance plans vary and the physician(s) referred by the ER may not be covered by your plan.  Please contact within the next three months to obtain basic health screening including reassessment of your blood pressure.     IF THERE IS ANY CHANGE OR WORSENING OF YOUR CONDITION, CALL YOUR PRIMARY CARE PHYSICIAN AT ONCE OR RETURN IMMEDIATELY TO THE EMERGENCY DEPARTMEN

## (undated) NOTE — MR AVS SNAPSHOT
[facility]  [address] [address]  [phone]               Thank you for choosing us for your health care visit with John Blandon MD.  We are glad to serve you and happy to provide you with this summary of your visit. Imaging:  US KIDNEY/BLADDER (XNI=[id number])    Instructions:   To schedule a test at any [facility], call Central Scheduling at [phone], Monday through Friday between 7:30am to 6pm and on Saturday between 8am and 1pm. Evening aspirin 81 MG Chew   Chew by mouth daily. atenolol 50 MG Tabs   Take 1 tablet (50 mg total) by mouth daily. Commonly known as:  TENORMIN           Oxybutynin Chloride 5 MG Tabs   Take 1 tablet (5 mg total) by mouth 2 (two) times daily.    Commo Moderation of alcohol consumption Men: limit to <= 2 drinks* per day. Women and lighter weight persons: limit to <= 1 drink* per day.               Visit [facility] online at  [email or web address]

## (undated) NOTE — ED AVS SNAPSHOT
[facility] Emergency Department    [address]     [place] [address]    Phone:  [phone]    Fax:  [phone]           Ignacio Lam   MRN: [id number]    Department:  [facility] Emergency Department   Date of Visit:  2/ and Class Registration line at [phone] or find a doctor online by visiting [email or web address].    IF THERE IS ANY CHANGE OR WORSENING OF YOUR CONDITION, CALL YOUR PRIMARY CARE PHYSICIAN AT ONCE OR RETURN IMMEDIATELY TO [address] [place] [address].     If

## (undated) NOTE — LETTER
[facility], [place]  Authorization for Invasive Procedures  1. I hereby authorize  Dr. Chhaya Beltran , my physician and whomever may be designated as the doctor's assistant, to perform the following operation and/or procedure:  Insertion o performed for the purposes of advancing medicine, science, and/or education, provided my identity is not revealed. If the procedure has been videotaped, the physician/surgeon will obtain the original videotape.  The hospital will not be responsible for stor My signature below affirms that prior to the time of the procedure, I have explained to the patient and/or her legal representative, the risks and benefits involved in the proposed treatment and any reasonable alternative to the proposed treatment.  I have

## (undated) NOTE — LETTER
[facility]  Administration of Anesthesia  1. I, Sherine Saxenar, or _________________________________ acting on her behalf, (Patient) (Dependent/Representative) request to receive anesthesia for my pending procedure/operation/treatment. infections, high spinal block, spinal bleeding, seizure, cardiac arrest and death. 7. AWARENESS: I understand that it is possible (but unlikely) to have explicit memory of events from the operating room while under general anesthesia.   8. ELECTROCONVULSIV unconscious pt /Relationship    My signature below affirms that prior to the time of the procedure, I have explained to the patient and/or his/her guardian, the risks and benefits of undergoing anesthesia, as well as any reasonable alternatives.     _______

## (undated) NOTE — LETTER
[address], [place]  Authorization for Invasive Procedures  1.  I hereby authorize Dr. Osmar Knott , my physician and whomever may be designated as the doctor's assistant, to perform the following operation and/or procedure:  Endovascu 5. I consent to the photographing of the operations or procedures to be performed for the purposes of advancing medicine, science, and/or education, provided my identity is not revealed.  If the procedure has been videotaped, the physician/surgeon will obta __________ Time: ___________    Statement of Physician  My signature below affirms that prior to the time of the procedure, I have explained to the patient and/or her legal representative, the risks and benefits involved in the proposed treatment and any r

## (undated) NOTE — MR AVS SNAPSHOT
[address] [place] [address] [phone]               Thank you for choosing us for your health care visit with [facility].   We are glad to serve you and happy to provide you with this summary of Mar 08, 2017 12:45 PM   [facility] Physical Therapy Visit By Therapist with Renée Lara, PTA   [address] [facility] ([address] [place] [address])    [address]   [phone]           Please arrive at you [employer] will allow you to access patient instructions from your recent visit,  view other health information, and more. To sign up or find more information, go to [email or web address] and click on the Sign Up Now link in the [employer] box.      Enter

## (undated) NOTE — ED AVS SNAPSHOT
Tegan Avendaño   MRN: [id number]    Department:  [facility] Emergency Department   Date of Visit:  9/21/2019           Disclosure     Insurance plans vary and the physician(s) referred by the ER may not be covered by your plan.  Please contac within the next three months to obtain basic health screening including reassessment of your blood pressure.     IF THERE IS ANY CHANGE OR WORSENING OF YOUR CONDITION, CALL YOUR PRIMARY CARE PHYSICIAN AT ONCE OR RETURN IMMEDIATELY TO THE EMERGENCY DEPARTMEN

## (undated) NOTE — ED AVS SNAPSHOT
Meera Skwalter   MRN: [id number]    Department:  [facility] Emergency Department   Date of Visit:  1/3/2020           Disclosure     Insurance plans vary and the physician(s) referred by the ER may not be covered by your plan.  Please contact within the next three months to obtain basic health screening including reassessment of your blood pressure.     IF THERE IS ANY CHANGE OR WORSENING OF YOUR CONDITION, CALL YOUR PRIMARY CARE PHYSICIAN AT ONCE OR RETURN IMMEDIATELY TO THE EMERGENCY DEPARTMEN

## (undated) NOTE — LETTER
[facility]  Administration of Anesthesia  1. I, Sherine Saxenar, or _________________________________ acting on her behalf, (Patient) (Dependent/Representative) request to receive anesthesia for my pending procedure/operation/treatment. infections, high spinal block, spinal bleeding, seizure, cardiac arrest and death. 7. AWARENESS: I understand that it is possible (but unlikely) to have explicit memory of events from the operating room while under general anesthesia.   8. ELECTROCONVULSIV unconscious pt /Relationship    My signature below affirms that prior to the time of the procedure, I have explained to the patient and/or his/her guardian, the risks and benefits of undergoing anesthesia, as well as any reasonable alternatives.     _______

## (undated) NOTE — IP AVS SNAPSHOT
Patient Demographics     Address  [address] [place] [address] E [address] Phone  [phone] [facility]) *Preferred*  [phone] [facility]) E-mail Address  [email or web address]      Emergency Contact(s)     Name Relation Home Work Mobile    [place], [place] Inhale 1 puff into the lungs daily. Roxanne Kenyon MD         folic acid 1 MG Tabs  Commonly known as:  Марина Paz  Start taking on:  November 7, 2019  Next dose due: Tomorrow morning      Take 1 tablet (1 mg total) by mouth daily.    Brandan Mooney MD [phone] - MAR ACTION REPORT  (last 24 hrs)    ** SITE UNKNOWN **     Order ID Medication Name Action Time Action Reason Comments    [id number] Albumin Human (ALBUMINAR) 25 % solution 100 mL 11/05/19 1500 New Bag      [id number] Albumin Human (ALBUMINAR) 25 The following orders were created for panel order CBC WITH DIFFERENTIAL WITH PLATELET.   Procedure                               Abnormality         Status                     ---------                               -----------         ------ Ordering provider:  Abigail Martinez MD  11/05/19 2304 Resulting lab:  [place] [facility]    Specimen Information    Type Source Collected On   Blood — 11/06/19 6198          Components    Component Value Reference Range Flag Lab   Magnesium 2.4 1.6 - 2.6 mg/dL Sherine Zacarias Patient Status:  Emergency    3/31/1940 MRN [id number]   Location [address] Attending Dominga Philippe MD   Hosp Day # 0 PCP Sergei Espino.  Judith Madrigal MD     Date:  10/29/2019  Date of Admission:  10/29/2019 • CATARACT EXTRACTION Right 11/15/2017    Dr. Encarnacion Opitz.    • D & C     • DIALYSIS CATHETER REMOVAL N/A 9/30/2019    Performed by Chucho Regalado MD at [address] [place] [address] OR   • ESOPHAGOGASTRODUODENOSCOPY (EGD) N/A 9/27/2019    Performed by Dorlene Gilford, MD at [address] [place] [address] chills[AE.2],[AE.1] diaphoresis[AE.2],[AE.1] fever[AE.2] and[AE.1] unexpected weight change[AE.2]. HENT:[AE.1] Negative[AE.2]. Eyes:[AE.1] Negative[AE.2]. Respiratory: Positive for[AE.1] shortness of breath[AE.2].  Negative for[AE.1] apnea[AE.2],[AE SISG:[UH.3] Normocephalic[AE.2] SRL[HF.7] VSXUDIUIEC[JZ.0]. Eyes:[AE.1] Pupils are equal, round, and reactive to light[AE.2]. [AE.1] EOM[AE.2] are normal.   Cardiovascular:[AE.1] Normal rate[AE.2] and[AE.1] regular rhythm[AE.2].   Exam reveals[AE.1] no gal the patient with pulmonary vascular congestion and bilateral pleural effusions.   These findings have progressed since previous exam.   Dictated by (CST): Emanuel Thomas MD on 10/29/2019 at 13:51     Approved by (CST): Emanuel Thomas MD on 10/29/2019 at 13:52 Patient's past medical history is significant for end-stage renal disease. She is currently on hemodialysis. She has had left renal cyst removed.   She has history of tobacco abuse, hypertension, large AAA status post percutaneous EVAR on 06/19, history o stranded DNA testing was negative in 2017. Patient is intermittently confused. History is obtained from her family member, her daughter, who is at her bedside. She denies any new symptoms.   Patient does have chronic confusion that has stabilized at this recently on admission, her platelet count was over 100, but has precipitately dropped over the last few weeks. Current platelet count is 79,651; however, more recent counts have not been obtained. We will obtain a CBC today to re-review those.   Also obta laboratory work, restart her on V38 and folic acid in case this can help bump the numbers up, and I will reassess her tomorrow. Could consider steroids as discussed.   Of note, she did have CT imaging over the last year or so, and there was no noted hepato discharge from the hospital.    History of Present Illness: Pt today at base line      Cards note seen,  Hematology consult seen    As per cardiology: - severe distal left main disease, symptoms controlled.   After discussion with Dr. Gabriel Dubon platelet goal matt FOBT. On PPI.  Now hemoglobin better than prior.      Bradycardia. Not on beta-blockers.  Cardiology following patient.  Awaiting TSH.     CAD.    Cards. and cardiothoracic surgery following patient.     AAA Alec Stamp repair      Recurrent falls.  With Commonly known as:  Stephenie Moles  Start taking on:  November 7, 2019      Take 1 tablet (1 mg total) by mouth daily.    Quantity:  30 tablet  Refills:  2        CONTINUE taking these medications      Instructions Prescription details   acetaminophen 325 MG Tabs Use with albuterol inhaler   Quantity:  1 each  Refills:  0     Vitamin B-12 ER 2000 MCG Tbcr      Take 2,000 mcg by mouth daily.    Refills:  0        STOP taking these medications    atenolol 25 MG Tabs  Commonly known as:  TENORMIN        Pravastatin So ipratropium-albuterol 0.5-2.5 (3) MG/3ML Inhalation Solution  Take 3 mL by nebulization every 6 (six) hours as needed.     Spacer/Aero Chamber Mouthpiece Does not apply Misc  Use with albuterol inhaler    calcium acetate 667 MG Oral Cap  Take 1 capsule by m Filed:  11/4/2019  5:20 PM Date of Service:  11/4/2019  5:18 PM Status:  Signed    :  Opal Goode, PT (Physical Therapist)       Attempted to see pt this PM, per nursing pt BP is low and recommend hold PT treatment today.  Will follow up to

## (undated) NOTE — MR AVS SNAPSHOT
[address] [facility] [address]  [phone]               Thank you for choosing us for your health care visit with Esme Shukla.  Lisa Rodriguez MD.  We are glad to serve you and happy to provide you with this summary of your v Oxybutynin Chloride 5 MG Oral Tab 180 tablet 1      Sig: Take 1 tablet (5 mg total) by mouth 2 (two) times daily.       lisinopril 5 MG Oral Tab 90 tablet 1      Sig: TK 1 T PO  QD      atenolol 50 MG Oral Tab 90 tablet 0      Sig: Take 1 tablet (50 mg tot These medications were sent to [address], [address] [phone], [phone]  [address] [place] [address]     Phone:  [phone]    - AmLODIPine Besylate 10 MG Tabs  - atenolol 50 MG Tabs  - li It is the patient's responsibility to check with and follow their insurance company's guidelines for prior authorization for this test.  You may be held responsible for payment in full if proper authorization is not acquired.   Please contact the Patient Bu not sign up before the expiration date, you must request a new code. Your unique [employer] Access Code: [email or web address]  Expires: 4/1/2017  2:17 PM    If you have questions, you can call [phone] to talk to our [facility] Staff.  Remember, Yulia i

## (undated) NOTE — IP AVS SNAPSHOT
[facility]            (For Outpatient Use Only) Initial Admit Date: 8/24/2019   Inpt/Obs Admit Date: Inpt: 8/27/19 / Obs: 08/24/19   Discharge Date:    Alesia Reed:  [de-identified]   MRN: [de-identified]   CSN: [id number]   CEID: [id number] Hospital Account Financial Class: Medicare Advantage    September 5, 2019

## (undated) NOTE — LETTER
[address], [place], [place]  [address]  INFORMED CONSENT FOR TRANSFUSION OF BLOOD OR BLOOD PRODUCTS  My physician has informed me of the nature, purpose, benefits and risks of transfusion for blood and blood components that ______________________________________________  (Signature of Patient)                                                            (Responsible party in case of Minor,

## (undated) NOTE — MR AVS SNAPSHOT
[facility]  [address] [facility] [id number] [phone]               Thank you for choosing us for your health care visit with Mable Gibson.  Cris Palma MD.  We are glad to serve you and happy to provide you with this summary of yo your appointment immediately. However, if you are unsure about the requirements for authorization, please wait 5-7 days and then contact your physician's office.  At that time, you will be provided with any authorization numbers or be assured that none are Height Weight BMI    4' 11\" (1.499 m) 140 lb 12.8 oz (63.866 kg) 28.42 kg/m2    Breastfeeding?           No           Current Medications          This list is accurate as of: 3/31/17  2:48 PM.  Always use your most recent med list.                Chauncey Raphael Dietary sodium reduction Reduce dietary sodium intake to <= 100 mmol per day (2.4 g sodium or 6 g sodium chloride)   Aerobic physical activity Regular aerobic physical activity (e.g., brisk walking, light jogging, cycling, swimming, etc.) for a goal of at

## (undated) NOTE — IP AVS SNAPSHOT
[facility]            (For Outpatient Use Only) Initial Admit Date: 10/29/2019   Inpt/Obs Admit Date: Inpt: 10/29/19 / Obs: N/A   Discharge Date:    Jayleen Antelmo:  [de-identified]   MRN: [de-identified]   CSN: [id number]   CEID: [id number]        E Hospital Account Financial Class: Medicare Advantage    November 6, 2019

## (undated) NOTE — LETTER
[address], [place]  Authorization for Invasive Procedures  1.  I hereby authorize  Insertion of Tunneled Dialysis Catheter , my physician and whomever may be designated as the doctor's assistant, to perform the following operatio performed for the purposes of advancing medicine, science, and/or education, provided my identity is not revealed. If the procedure has been videotaped, the physician/surgeon will obtain the original videotape.  The hospital will not be responsible for stor My signature below affirms that prior to the time of the procedure, I have explained to the patient and/or her legal representative, the risks and benefits involved in the proposed treatment and any reasonable alternative to the proposed treatment.  I have

## (undated) NOTE — IP AVS SNAPSHOT
[facility]            (For Outpatient Use Only) Initial Admit Date: 5/14/2020   Inpt/Obs Admit Date: Inpt: 5/14/20 / Obs: N/A   Discharge Date:    Freedom Paez:  [de-identified]   MRN: [de-identified]   CSN: [id number]   CEID: [id number]        HRI Hospital Account Financial Class: Medicare Advantage    May 18, 2020

## (undated) NOTE — MR AVS SNAPSHOT
[address] [place] [address] [phone]               Thank you for choosing us for your health care visit with [facility].   We are glad to serve you and happy to provide you with this summary of [facility] [address]   [phone]            Feb 22, 2017  2:15 PM   [place] Physical Therapy Visit By Therapist with Crystal Harris, PTA   [address] [facility] ([address] [place] [address])    [address] information, go to [email or web address] and click on the Sign Up Now link in the [employer] box. Enter your [facility] Activation Code exactly as it appears below along with your Zip Code and Date of Birth to complete the sign-up process.  If you do

## (undated) NOTE — LETTER
3/1/2018              [place] [address]         Dear Jackie Lino,    Our records indicate that the blood test ordered for you by Jess Cleaning MD  has not been done.   If you have, in fact, already

## (undated) NOTE — IP AVS SNAPSHOT
Patient Demographics     Address  [address] [place] [address] Phone  [phone] [facility])  [phone] (Mobile) *Preferred* E-mail Address  [email or web address]      Emergency Contact(s)     Name Relation Home Work Mobile    [place], [place] platelets are above 20J   Stephanie Mistry MD         escitalopram 5 MG Tabs  Commonly known as:  LEXAPRO  Next dose due: Tomorrow morning      Take 1 tablet (5 mg total) by mouth daily. Martín Maxwell.  Olga Barger MD         Fluticasone-Umeclidin-Vilant [id number] [id number] Clopidogrel Bisulfate (PLAVIX) tab 75 mg 05/18/20 1049 Given      [id number] Fluticasone-Umeclidin-Vilant (TRELEGY ELLIPTA) 100-62.5-25 MCG/INH inhaler 1 puff 05/18/20 1059 Given      [id number] Pantoprazole Sodium (PROTONIX) EC tab 40 mg 05/17/20 Ordering provider:  Maureen Enciso MD  05/17/20 4695 Resulting lab:  [facility])    Specimen Information    Type Source Collected On   Blood — 05/18/20 5403          Components    Component Value Reference Range [place] [address] [place] [address]) [facility] ([facility]) Fer Lay. Hosea Alfred M.D. [facility]. [address] [place] [address] Jeaneth Kaur [address] 03/19/20 1442 - Present            Microbiology Results (All)     Procedure Component Value Units Date/Time    Blood Cult H&P signed by Nima Azar MD at 5/14/2020  2:44 PM   Version 1 of 1    Author:  Nima Azar MD Service:  Hospitalist Author Type:  Physician    Filed:  5/14/2020  2:44 PM Status:  Signed    :  Nima Azar MD (Physician)       [place] pulmonary disease.     PAST SURGICAL HISTORY:  Abdominal aortic aneurysm endovascular graft repair, tonsillectomy, parathyroidectomy, PD catheter insertion and removal, right internal jugular tunneled catheter, left renal mass ablation, , and catar 1.   Acute on chronic hypoxemic respiratory failure. 2.   Fluid overload status. 3.   Urinary tract infection. 4.   End-stage renal disease. 5.   Coronary artery disease. 6.   Mild hyperglycemia.     PLAN:  Patient will be admitted to the hospital.  Gurjit Lantigua • Calculus of kidney     L kidney is not filtering good. Sees Dr. Scout Steven (renal MD).     • Cataract    • COPD (chronic obstructive pulmonary disease) (HCC)    • Coronary atherosclerosis    • Depression    • Dialysis patient [facility])    • Essential hypertensi • Heart Disorder Brother 54        CAD S/P CABG.     • Diabetes Brother    • Diabetes Maternal Grandmother    • Diabetes Maternal Aunt        Social History  Patient willa De Guzman (Son)    Other Topics            Concern    None on file    Soci ipratropium-albuterol (DUONEB) nebulizer solution 3 mL, 3 mL, Nebulization, Q6H PRN  Loperamide HCl (IMODIUM) cap 2 mg, 2 mg, Oral, Q6H PRN  Pantoprazole Sodium (PROTONIX) EC tab 40 mg, 40 mg, Oral, BID AC  SUMAtriptan Succinate (IMITREX) tab 50 mg, 50 mg, Eyes: conjunctivae/corneas clear  Pulmonary:  clear to auscultation bilaterally  Cardiovascular: S1, S2 normal  Abdominal: soft, non-tender  Skin: No rashes or lesions  Neurologic: Grossly normal  Psychiatric: calm    Results:     Laboratory Data:  Recent - wbc wnl     Thank you for allowing me to participate in the care of your patient. Jess Cleaning MD  5/14/2020[RS.1]      Electronically signed by Tila Schmitt MD on 5/14/2020  3:13 PM   Attribution [employer]    RS. 1 - Demi Garcia MD on 5/14/20 Chest x-ray showed mild interstitial edema with proBNP quite elevated. Wean oxygen just qhs. Checked ambulatory pulse ox.        Hypoxia/ H/O smoking. On oxygen. Improved. Will wean as outpt.      Acute cystitis without hematuria  UCx. NTD. UCx. NTD.  Oscar Cook Refills:  1        CONTINUE taking these medications      Instructions Prescription details   acetaminophen 325 MG Tabs  Commonly known as:  TYLENOL      Take 650 mg by mouth every 8 (eight) hours as needed for Pain (Headache).    Refills:  0     Acidophilu Vitamin B-12 ER 2000 MCG Tbcr      Take 2,000 mcg by mouth daily.    Refills:  0            Discharge Plan:  Discharge Condition:[MC.1] Stable[MC.2]    Current Discharge Medication List    Home Meds - Unchanged    carvedilol 12.5 MG Oral Tab  Take 12.5 mg b [phone]    In 2 weeks        Vital signs:  Temp:  [98 °F (36.7 °C)-98.7 °F (37.1 °C)] 98 °F (36.7 °C)  Pulse:  [69-71] 69  Resp:  [18-19] 18  BP: (631-132)/(88-30) 130/73    ----------------------------------------------------------------------------- Congestive heart failure of unknown etiology ([facility] [address]    UTI (urinary tract infection)      PHYSICAL THERAPY ASSESSMENT     Pt seen daily. Pt educated on deep breathing,relaxation and energy conservation technique. SUP for bed mobility and transfer. Balance ac -   Need to walk in hospital room?: A Little   -   Climbing 3-5 steps with a railing?: A Little     AM-PAC Score:  Raw Score: 19   Approx Degree of Impairment: 41.77%   Standardized Score (AM-PAC Scale): 45.44   CMS Modifier (G-Code): CK    FUNCTIONAL ABIL :  Ruth Ramon PT (Physical Therapist)        PHYSICAL THERAPY EVALUATION - INPATIENT     Room Number: 310/310-A  Evaluation Date: 5/16/2020  Type of Evaluation: Initial   Physician Order: PT Eval and Treat    Presenting Problem: febrile, UTI, demonstrates increased risk of falls with LOB during session. Pt is not safe to return home as this time as she lives alone and is without assist for extended periods of time.      Patient will benefit from continued IP PT services to address these deficits • Renal disorder    • Visual impairment        Past Surgical History  Past Surgical History:   Procedure Laterality Date   •      • CATARACT     • CATARACT EXTRACTION Right 11/15/2017    Dr. Floyd President.    • D & C     • DIALYSIS CATHETER REMOVAL N/A  Lower extremity strength is within functional limits    BALANCE  Static Sitting: Good  Dynamic Sitting: Good  Static Standing: Fair  Dynamic Standing: Fair -    ACTIVITY TOLERANCE  Denies dizziness, lightheadedness and SOB during activity today.        AM-P Goal #3   Current Status    Goal #4 Patient will negotiate 1 flight of stairs/one curb w/ assistive device and supervision   Goal #4   Current Status    Goal #5 Patient to demonstrate independence with home activity/exercise instructions provided to hilaria note). Patient had 1 loss of balance in front of toilet and unable to independently recover with moderate assist from therapist given to support. Patient with no awareness as to why or what had happened.   Patient is currently at the mod/min level of assi • Hyperlipidemia     Pt. taking 40 mg Pravastatin; states not been told she has high cholesterol   • PONV (postoperative nausea and vomiting)    • Pulmonary emphysema (HCC)    • Renal disorder    • Visual impairment        Past Surgical History  Past Surgi Memory:  decreased recall of recent events and decreased short term memory  Following Commands:  follows one step commands with increased time  Safety Judgement:  decreased awareness of need for assistance    Communication: appropriate    Behavioral/Emotio aware of session/findings; All patient questions and concerns addressed; Alarm set    OT Goals  Patients self stated goal is: \"I'll do whatever my children would like me to do. \"     Patient will complete functional transfer with supervision  Comment:     P - anti pyretics as needed.    - antibiotics as ordered    - See additional Care Plan goals for specific interventions                Inpatient Dialysis Orders (From admission, onward)     Start       Ordered    05/16/20 0800  Hemodialysis inpatient  Once Reason for Continuing [place]   —   —   —   —    [place] Fistula  —   —   —   —   —    Status  —   —   —   —   —    CHG Impregnated Disc  —   —   —   —   —    If No, Reason Why?  —   —   —   —   —    Dressing Status  Clean;Dry; Intact   Clean;Dry; Intact Dressing  —   —   —   —   —    Dressing Change Due  —   —   10/05/19   —   —    Drainage Description  —   Sydni Cassidy   —    HD Output  —   1800 mL   —   —   —            09/30/19 1000 09/29/19 2200 09/29/19 1000 09/28/19 2027 09/28/19 1643   Site Assessment 09/01/19 2000 09/01/19 1038 08/31/19 2250 08/31/19 2200 08/30/19 2058   Site Assessment  Clean;Dry; Intact   Clean;Dry; Intact   Clean;Dry; Intact   —   Clean;Dry; Intact    Reason for Continuing [place]  Hemodialysis   Hemodialysis   Hemodialysis   — Reason for Continuing [place]  Hemodialysis   Hemodialysis   Hemodialysis   —   Hemodialysis    AV Fistula  —   —   —   —   —    Status  Clamped   Clamped   Clamped   —   Clamped    CHG Impregnated Disc  Yes   Yes   Yes   Yes   Yes    If No, Reason Wh AV Fistula  —   —   —   —   —    Status  Clamped   —   —   —   —    CHG Impregnated Disc  Yes   —   —   —   —    If No, Reason Why?  —   —   —   —   —    Dressing Status  Clean;Dry; Intact   Clean;Dry; Intact   Clean;Dry; Intact   Clean;Dry; Intact   Clean;Dry Dressing  Occlusive   —   Occlusive   Occlusive   Occlusive    Dressing Change Due  —   —   —   —   —    Drainage Description  —   —   —   —   —    HD Output  —   2500 mL   —   —   —            08/16/19 0200 08/15/19 0800 08/14/19 2000 08/13/19 2000 08/13/ HD Output  —   2000 mL   —   —   —            08/12/19 1200 08/12/19 1115 08/12/19 0725 08/11/19 2348 08/11/19 2000   Site Assessment  Clean;Dry; Intact   —   Clean;Dry; Intact   Clean;Dry; Intact   Clean;Dry; Intact    Reason for Continuing [employer] Reason for Continuing Central Line  Hemodialysis   Hemodialysis   —   Hemodialysis   Hemodialysis    AV Fistula  —   —   —   —   —    Status  Clamped   Clamped   —   Clamped   Clamped    CHG Impregnated Disc  —   —   —   —   —    If No, Reason Why?  —   — Dressing Status  Clean;Dry; Intact   Clean;Dry; Intact   —   Clean; Intact;Dry   —    Site Condition  No complications   No complications   —   No complications   —    Dressing  Gauze   Gauze   —   Gauze   —    Dressing Change Due  —   —   —   —   —    [facility] HD Output  —   —   2500 mL   —   2500 mL            11/01/19 1200 10/31/19 2040 10/31/19 0200 10/30/19 2000      Site Assessment  Clean;Dry; Intact   Clean;Dry; Intact   Clean;Dry; Intact   Clean;Dry; Intact     Reason for [facility]  Hemodialysis

## (undated) NOTE — ED AVS SNAPSHOT
Dieudonne Camp   MRN: [id number]    Department:  [facility] Emergency Department   Date of Visit:  7/15/2019           Disclosure     Insurance plans vary and the physician(s) referred by the ER may not be covered by your plan.  Please contac within the next three months to obtain basic health screening including reassessment of your blood pressure.     IF THERE IS ANY CHANGE OR WORSENING OF YOUR CONDITION, CALL YOUR PRIMARY CARE PHYSICIAN AT ONCE OR RETURN IMMEDIATELY TO THE EMERGENCY DEPARTMEN

## (undated) NOTE — IP AVS SNAPSHOT
Patient Demographics     Address  [address] [place] [address] Phone  [phone] [facility])  [phone] (Mobile) *Preferred* E-mail Address  [email or web address]      Emergency Contact(s)     Name Relation Home Work Mobile    [place], [place] Clopidogrel Bisulfate 75 MG Tabs  Commonly known as:  Plavix  Next dose due:  **Tomorrow morning**      Take 1 tablet (75 mg total) by mouth daily.  Start taking only if platelets are above 28J   Neeta Allen MD         escitalopram 5 MG Tabs  Co [id number] Iopamidol (ISOVUE-M) 61 % injection 300 mL 03/25/20 1106 Given      [id number] Pantoprazole Sodium (PROTONIX) EC tab 40 mg 03/25/20 1827 Given      [id number] Pantoprazole Sodium (PROTONIX) EC tab 40 mg 03/26/20 0434 Given      [id number] atorvasta CBC, PLATELET; NO DIFFERENTIAL [[id number]] (Abnormal)  Resulted: 03/26/20 0710, Result status: Final result   Ordering provider:  Letitia Garcia MD  03/25/20 2300 Resulting lab:  [place] [facility]    Specimen Information    Type Source Collected On   Blood — 0 H&P signed by Patricia Urrutia MD at 3/20/2020 10:54 AM  Version 1 of 2    Author:  Patricia Urrutia MD Service:  Cardiology Author Type:  Physician    Filed:  3/20/2020 10:54 AM Date of Service:  3/25/2020  8:30 AM Status:  Signed    :  CARL Mccormack - observe overnight, ambulate as tolerated, monitor vitals  - See additional Care Plan goals for specific interventions    Patient/Family Short Term Goal     Interdisciplinary Progressing    Description:  Patient's Short Term Goal: To recover safely from c Site Assessment  Clean;Dry; Intact   Clean;Dry; Intact   Clean;Dry; Intact   Clean;Dry; Intact   Clean;Dry; Intact    Reason for Continuing [facility]  —   Hemodialysis   Hemodialysis   Hemodialysis   Hemodialysis    AV Fistula  —   —   —   —   —    Status  — AV Fistula  —   —   —   —   —    Status  —   —   Clamped   Clamped   —    CHG Impregnated Disc  Yes   Yes   Yes   Yes   —    If No, Reason Why?  —   —   —   —   —    Dressing Status  Clean;Dry; Intact   Clean;Dry; Intact   Clean;Dry; Intact   Clean;Dry; Intact Dressing Status  Dry;Clean; Intact   Dry;Clean; Intact   Dry;Clean; Intact   Dry;Clean; Intact   Dry;Clean; Intact    Site Condition  No complications   No complications   No complications   No complications   —    Dressing  Occlusive   Occlusive   Occlusive Site Condition  No complications   No complications   —   —   —    Dressing  Occlusive   Occlusive   Occlusive   Occlusive   Occlusive    Dressing Change Due  —   —   —   —   —    Drainage Description  —   —   —   —   —    HD Output  —   —   —   —   — Site Assessment  Clean;Dry; Intact   Clean;Dry; Intact   Clean;Dry; Intact   Dry; Intact; Clean   Clean;Dry; Intact    Reason for Continuing Central Line  —   Hemodialysis   Hemodialysis   Hemodialysis   Discharged with long term antibiotics    AV Fistula  —   — AV Fistula  —   —   —   —   —    Status  Clamped   Clamped   —   Clamped   Clamped    CHG Impregnated Disc  —   —   —   —   —    If No, Reason Why?  —   —   —   —   —    Dressing Status  Clean;Dry; Intact   Clean;Dry; Intact   —   Clean;Dry; Intact   Clean;Dr Dressing Status  Clean;Dry; Intact   Clean;Dry; Intact   Dressing Changed   —   Dressing Changed    Site Condition  No complications   No complications   No complications   —   No complications    Dressing  Occlusive   Occlusive   Occlusive   —   Occlusive Dressing Change Due  —   —   —   —   —    Drainage Description  —   —   —   —   —    HD Output  —   —   —   1000 mL   —            06/13/19 1214               Site Assessment  Clean;Dry; Intact        Reason for Continuing [address]  —        AV Fistula Site Condition  No complications   —   No complications   —   No complications    Dressing  —   —   Other (Comment)   —   —    Dressing Change Due  —   —   11/05/19   —   —    Drainage Description  —   —   —   —   —    HD Output  —   2500 mL   —   2500 mL

## (undated) NOTE — IP AVS SNAPSHOT
Patient Demographics     Address  [address] [place] [address] [address] [address] Phone  [phone] [facility]) *Preferred*  [phone] [facility]) E-mail Address  [email or web address]      Emergency Contact(s)     Name Relation Home Work Mobile    [place], [place] Axel Lebron. Jorge Batista MD         calcium acetate 667 MG Caps  Commonly known as:  PHOSLO  Next dose due: Tonight 9 pm      Take 1 capsule by mouth 3 (three) times daily with meals.           clarithromycin 250 MG Tabs  Commonly known as:  BIAXIN      Take 1 t Information about where to get these medications is not yet available    Ask your nurse or doctor about these medications  Pantoprazole Sodium 40 MG Tbec           [id number] - MAR ACTION REPORT  (last 24 hrs)    ** SITE UNKNOWN **     Order ID Medication N Temp  97.7 °F (36.5 °C) Filed at 09/05/2019 1044   SpO2  100 % Filed at 09/05/2019 1044      Patient's Most Recent Weight       Most Recent Value   Patient Weight  58.8 kg (129 lb 11.2 oz)      CPAP Settings (Inpatient)       Most Recent Value   Mode  Spon Blood Culture FREQ X 2 [[id number]] Collected:  08/27/19 1648    Order Status:  Completed Lab Status:  Final result Updated:  09/01/19 1800    Specimen:  Blood,peripheral      Blood Culture Result No Growth 5 Days    Blood Culture FREQ X 2 [[id number]] Allie name of the hospital, countries, state, [facility] etc.  History     Past Medical History:   Diagnosis Date   • Aortic aneurysm ([address]     Check q6months. 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 Sees Dr. Sathish Weston (renal MD).     • Gillian Allergies/Medications: Allergies:    Ace Inhibitors          OTHER (SEE COMMENTS)    Medications Prior to Admission:  Albuterol Sulfate  (90 Base) MCG/ACT Inhalation Aero Soln Inhale 2 puffs into the lungs every 4 (four) hours as needed for [facility] temperature source Oral, resp. rate 18, weight 133 lb 3.2 oz (60.4 kg), SpO2 96 %, not currently breastfeeding. Constitutional: She appears well-nourished. HENT:   Head: Normocephalic. Eyes: EOM are normal.   Neck: Neck supple. No JVD present.    Ca major discrepancy with preliminary [facility] radiology report.      Dictated by (CST): Chico Roberts MD on 8/25/2019 at 5:29     Approved by (CST): Chico Roberts MD on 8/25/2019 at 5:36          Ct Spine Cervical (cpt=72125)    Result Date: 8/25/2019  CONCLU or pneumonia in the left lung base. No significant change. 2. Stable cardiomegaly. 3. Right-sided dialysis catheter with tip at the RA SVC junction 4. Atherosclerotic calcification aorta.     Dictated by (CST): Sindy Leal MD on 8/25/2019 at 7:00     Ap 1. Consult to Neurology [[id number]] ordered by Roxane Ramirez MD at 19 6640 [facility]    Report of Consultation    [facility] Patient Status:  Inpatient    3/31/1940 MRN [id number]   [facility]. taking 40 mg Pravastatin; states not been told she has high cholesterol   • Renal disorder    • Visual impairment        Past Surgical History  Past Surgical History:   Procedure Laterality Date   •      • CATARACT     • CATARACT EXTRACTION R Units Intravenous Once per day on Mon Wed Fri   Normal Saline Flush 0.9 % injection 10 mL 10 mL Intravenous PRN   Fluticasone-Umeclidin-Vilant (TRELEGY ELLIPTA) 100-62.5-25 MCG/INH inhaler 1 puff 1 puff Inhalation Daily   atenolol (TENORMIN) tab 25 mg 25 m 08/30/19  0628 08/30/19  0800 08/30/19  0935 08/30/19  1349   BP:   123/53    Pulse:   59 59   Resp: 18  18 18   Temp:   97.8 °F (36.6 °C)    TempSrc:   Oral    SpO2: 98% 98% 100%    Weight:       Height:           General: No apparent distress, well nour HGB 7.0 (L) 08/30/2019    HCT 23.1 (L) 08/30/2019    PLT 81.0 (L) 08/30/2019    CREATSERUM 6.59 (H) 08/29/2019    BUN 73 (H) 08/29/2019     08/29/2019    K 4.2 08/29/2019     08/29/2019    CO2 25.0 08/29/2019    GLU 74 08/29/2019    CA 8.5 08/ Filed:  9/4/2019  4:14 PM Date of Service:  9/4/2019  3:03 PM Status:  Signed    :  Jana Jeter, PT (Physical Therapist)       Attempted to see patient but she reports she just got back into bed and is too tired for PT right now, was just up with all needs in reach and RN in room, alarm set. DISCHARGE RECOMMENDATIONS  PT Discharge Recommendations: Sub-acute rehabilitation     PLAN  PT Treatment Plan: Bed mobility; Coordination; Endurance; Patient education;Gait training;Neuromuscular re-educate;Str Gait Assistance: Minimum assistance  Distance (ft): 25'x2  Assistive Device: Rolling walker  Pattern: Shuffle; Ataxic  Stoop/Curb Assistance: Not tested  Comment : Patient ambulates in room with RW. Cues for correct use and safety when ambulating.  She has s Current Status      [MJ.1]         Attribution [employer]    NETO.1 - Yelitza Boland PT on 9/3/2019  1:59 PM               Physical Therapy Note signed by Yelitza Boland PT at 9/3/2019 11:45 AM  Version 1 of 1    Author:  Yelitza Boland PT Service:  — Author Type: Pt tolerated standing at sink 7 min for simple grooming, cues needed for sequence, cues for safety with RW placement, cues needed to identify items on sink.  .  The patient's Approx Degree of Impairment: 56.46% has been calculated based on documentation in Toilet Transfer: min assist with grab bar  Shower Transfer: NT  Chair Transfer: CGA , mod cues for safety    Bedroom Mobility: pt ambulated 25 ft in room with RW for support , with min assist 2/ 1 LOB noted, mod cues for safety    BALANCE ASSESSMENT  Stati Toxic metabolic encephalopathy    Acute upper GI bleed    Acute blood loss anemia    ESRD on hemodialysis ([facility] [address]      OCCUPATIONAL THERAPY ASSESSMENT     RN provided consent to proceed with treatment; care coordinated with physical therapy- pt still pleas -   Putting on and taking off regular lower body clothing?: A Lot  -   Bathing (including washing, rinsing, drying)?: A Lot  -   Toileting, which includes using toilet, bedpan or urinal? : A Lot  -   Putting on and taking off regular upper body clothing?: Future Appointments        Provider Analilia Rangel    11/11/2019 12:00 PM [facility] for [facility] EM [facility]    11/11/2019 12:20 PM [facility] NIKKI Sexton for Health M[facility]

## (undated) NOTE — MR AVS SNAPSHOT
[address] [place] [address] [phone]               Thank you for choosing us for your health care visit with [facility].   We are glad to serve you and happy to provide you with this summary of [employer] will allow you to access patient instructions from your recent visit,  view other health information, and more. To sign up or find more information, go to [email or web address] and click on the Sign Up Now link in the [employer] box.      Enter

## (undated) NOTE — ED AVS SNAPSHOT
[facility] Emergency Department    [place] [address]     [place] [address]    Phone:  [phone]    Fax:  [phone]           Nena Ma   MRN: [id number]    Department:  [facility] Emergency Department   Date of Visit:  2/ [address] [place] ([facility])    [address] [facility]  [address] [place] [address],[address] [facility] [address]   [phone]            Mar 20, 2017 12:45 PM   [facility] Visit By Therapist with Jaycee Meneses, [address] [place] [address] visit does not uncover every injury or illness.  If you have been referred to a primary care or a specialist physician for a follow-up visit, please tell this physician (or your personal doctor if your instructions are to return to your personal doctor) abo [address] [facility] [address] ([address]) [phone]   [place] [employer] ([address]) 300 [facility] [employer] ([address] [place] [address],[address]) [address]  Mert Support Staff. Remember, MyChart is NOT to be used for urgent needs. For medical emergencies, dial 911.

## (undated) NOTE — ED AVS SNAPSHOT
Anjana Root   MRN: [id number]    Department:  [facility] Emergency Department   Date of Visit:  8/29/2018           Disclosure     Insurance plans vary and the physician(s) referred by the ER may not be covered by your plan.  Please contac within the next three months to obtain basic health screening including reassessment of your blood pressure.     IF THERE IS ANY CHANGE OR WORSENING OF YOUR CONDITION, CALL YOUR PRIMARY CARE PHYSICIAN AT ONCE OR RETURN IMMEDIATELY TO THE EMERGENCY DEPARTMEN

## (undated) NOTE — IP AVS SNAPSHOT
[facility]            (For Outpatient Use Only) Initial Admit Date: 3/25/2020   Inpt/Obs Admit Date: Inpt: N/A / Obs: N/A   Discharge Date:    Hospital Acct:  [de-identified]   MRN: [de-identified]   CSN: [id number]   CEID: [id number]        [facility] Hospital Account Financial Class: Medicare Advantage    March 26, 2020

## (undated) NOTE — LETTER
[address]  [facility], [place], [place]  [address]  INFORMED CONSENT FOR TRANSFUSION OF BLOOD OR BLOOD PRODUCTS  My physician has informed me of the nature, purpose, benefits and risks of transfusion for blood and blood components that ______________________________________________  (Signature of Patient)                                                            (Responsible party in case of Minor,

## (undated) NOTE — MR AVS SNAPSHOT
[facility]  [address] [place] [address] [phone]               Thank you for choosing us for your health care visit with Isauro Centeno MD.  We are glad to serve you and happy to provide you with this summary of your visit. Assoc Dx:  CKD (chronic kidney disease), stage IV ([facility] [address] [N18.4], Persistent proteinuria [R80.1]           Bence Deal Protein, Random Urine    Complete by:   May 05, 2017    Assoc Dx:  CKD (chronic kidney disease), stage IV ([facility] [address] [N18.4], Persistent protei If you've recently had a stay at the Hospital you can access your discharge instructions in [facility] by going to Visits < Admission Summaries.  If you've been to the Emergency Department or your doctor's office, you can view your past visit information in My day (2.4 g sodium or 6 g sodium chloride)   Aerobic physical activity Regular aerobic physical activity (e.g., brisk walking, light jogging, cycling, swimming, etc.) for a goal of at least 150 minutes per week.    Moderation of alcohol consumption Men: Cena Aschoff

## (undated) NOTE — LETTER
[facility], [place]  Authorization for Invasive Procedures  1.  I hereby authorize Dr. Dione Collazo , my physician and whomever may be designated as the doctor's assistant, to perform the following operation and/or procedure:Cardiac girish the event that I wish to have autologous transfusions of my own blood, or a directed donor transfusion, I will discuss this with my physician.      5. I consent to the photographing of the operations or procedures to be performed for the purposes of advanci Responsible person in case of minor or unconscious: _____________________________Relationship: ____________     Witness Signature: ____________________________________________ Date: __________ Time: ___________    Statement of Physician  My signature below